# Patient Record
Sex: MALE | Race: WHITE | Employment: OTHER | RURAL
[De-identification: names, ages, dates, MRNs, and addresses within clinical notes are randomized per-mention and may not be internally consistent; named-entity substitution may affect disease eponyms.]

---

## 2017-01-10 ENCOUNTER — HOSPITAL ENCOUNTER (OUTPATIENT)
Dept: OTHER | Age: 57
Discharge: HOME | End: 2017-01-10
Attending: FAMILY MEDICINE | Admitting: FAMILY MEDICINE

## 2017-01-10 DIAGNOSIS — M25.511 CHRONIC RIGHT SHOULDER PAIN: ICD-10-CM

## 2017-01-10 DIAGNOSIS — G89.29 CHRONIC RIGHT SHOULDER PAIN: ICD-10-CM

## 2017-01-13 RX ORDER — PROMETHAZINE HYDROCHLORIDE 25 MG/1
TABLET ORAL
Qty: 20 TABLET | Refills: 0 | Status: SHIPPED | OUTPATIENT
Start: 2017-01-13 | End: 2017-05-17 | Stop reason: SDUPTHER

## 2017-01-20 ENCOUNTER — TELEPHONE (OUTPATIENT)
Dept: FAMILY MEDICINE CLINIC | Age: 57
End: 2017-01-20

## 2017-02-13 RX ORDER — MECLIZINE HYDROCHLORIDE CHEWABLE TABLETS 25 MG/1
TABLET, CHEWABLE ORAL
Qty: 100 TABLET | Refills: 0 | Status: SHIPPED | OUTPATIENT
Start: 2017-02-13 | End: 2017-08-08 | Stop reason: SDUPTHER

## 2017-02-27 ENCOUNTER — TELEPHONE (OUTPATIENT)
Dept: FAMILY MEDICINE CLINIC | Age: 57
End: 2017-02-27

## 2017-03-01 ENCOUNTER — OFFICE VISIT (OUTPATIENT)
Dept: FAMILY MEDICINE CLINIC | Age: 57
End: 2017-03-01
Payer: MEDICARE

## 2017-03-01 ENCOUNTER — HOSPITAL ENCOUNTER (OUTPATIENT)
Dept: OTHER | Age: 57
Discharge: HOME | End: 2017-03-01
Attending: FAMILY MEDICINE | Admitting: FAMILY MEDICINE

## 2017-03-01 VITALS
OXYGEN SATURATION: 90 % | HEIGHT: 76 IN | SYSTOLIC BLOOD PRESSURE: 130 MMHG | DIASTOLIC BLOOD PRESSURE: 64 MMHG | WEIGHT: 315 LBS | HEART RATE: 86 BPM | BODY MASS INDEX: 38.36 KG/M2 | RESPIRATION RATE: 20 BRPM

## 2017-03-01 DIAGNOSIS — E11.42 TYPE 2 DIABETES MELLITUS WITH DIABETIC POLYNEUROPATHY, WITH LONG-TERM CURRENT USE OF INSULIN (HCC): ICD-10-CM

## 2017-03-01 DIAGNOSIS — R06.02 SOB (SHORTNESS OF BREATH): ICD-10-CM

## 2017-03-01 DIAGNOSIS — G47.30 SLEEP APNEA, UNSPECIFIED TYPE: Primary | ICD-10-CM

## 2017-03-01 DIAGNOSIS — R45.1 AGITATION: ICD-10-CM

## 2017-03-01 DIAGNOSIS — Z79.4 TYPE 2 DIABETES MELLITUS WITH DIABETIC POLYNEUROPATHY, WITH LONG-TERM CURRENT USE OF INSULIN (HCC): ICD-10-CM

## 2017-03-01 PROCEDURE — G8417 CALC BMI ABV UP PARAM F/U: HCPCS | Performed by: FAMILY MEDICINE

## 2017-03-01 PROCEDURE — 1036F TOBACCO NON-USER: CPT | Performed by: FAMILY MEDICINE

## 2017-03-01 PROCEDURE — 3045F PR MOST RECENT HEMOGLOBIN A1C LEVEL 7.0-9.0%: CPT | Performed by: FAMILY MEDICINE

## 2017-03-01 PROCEDURE — G8484 FLU IMMUNIZE NO ADMIN: HCPCS | Performed by: FAMILY MEDICINE

## 2017-03-01 PROCEDURE — 99214 OFFICE O/P EST MOD 30 MIN: CPT | Performed by: FAMILY MEDICINE

## 2017-03-01 PROCEDURE — G8427 DOCREV CUR MEDS BY ELIG CLIN: HCPCS | Performed by: FAMILY MEDICINE

## 2017-03-01 PROCEDURE — 3017F COLORECTAL CA SCREEN DOC REV: CPT | Performed by: FAMILY MEDICINE

## 2017-03-01 RX ORDER — LORATADINE 10 MG/1
TABLET ORAL
Qty: 30 TABLET | Refills: 5 | Status: SHIPPED | OUTPATIENT
Start: 2017-03-01 | End: 2017-08-08 | Stop reason: SDUPTHER

## 2017-03-01 RX ORDER — MONTELUKAST SODIUM 10 MG/1
TABLET ORAL
Qty: 90 TABLET | Refills: 5 | Status: SHIPPED | OUTPATIENT
Start: 2017-03-01 | End: 2017-03-22

## 2017-03-01 RX ORDER — OXYCODONE HYDROCHLORIDE 30 MG/1
30 TABLET ORAL EVERY 4 HOURS PRN
Qty: 150 TABLET | Refills: 0 | Status: SHIPPED | OUTPATIENT
Start: 2017-03-01 | End: 2017-03-22 | Stop reason: SDUPTHER

## 2017-03-01 RX ORDER — FUROSEMIDE 40 MG/1
TABLET ORAL
Qty: 30 TABLET | Refills: 5 | Status: SHIPPED | OUTPATIENT
Start: 2017-03-01 | End: 2017-08-08 | Stop reason: SDUPTHER

## 2017-03-01 RX ORDER — POTASSIUM CHLORIDE 750 MG/1
TABLET, FILM COATED, EXTENDED RELEASE ORAL
Qty: 60 TABLET | Refills: 5 | Status: SHIPPED | OUTPATIENT
Start: 2017-03-01 | End: 2017-08-08 | Stop reason: SDUPTHER

## 2017-03-01 RX ORDER — NABUMETONE 750 MG/1
TABLET, FILM COATED ORAL
Qty: 60 TABLET | Refills: 5 | Status: SHIPPED | OUTPATIENT
Start: 2017-03-01 | End: 2017-08-08 | Stop reason: SDUPTHER

## 2017-03-01 RX ORDER — ERGOCALCIFEROL 1.25 MG/1
50000 CAPSULE ORAL WEEKLY
Qty: 4 CAPSULE | Refills: 2 | Status: SHIPPED | OUTPATIENT
Start: 2017-03-01 | End: 2017-04-13

## 2017-03-01 RX ORDER — SIMVASTATIN 40 MG
40 TABLET ORAL NIGHTLY
Qty: 30 TABLET | Refills: 5 | Status: SHIPPED | OUTPATIENT
Start: 2017-03-01 | End: 2017-04-13

## 2017-03-01 RX ORDER — CITALOPRAM 20 MG/1
20 TABLET ORAL DAILY
Qty: 30 TABLET | Refills: 1 | Status: SHIPPED | OUTPATIENT
Start: 2017-03-01 | End: 2017-05-09 | Stop reason: SDUPTHER

## 2017-03-01 RX ORDER — HYDROCODONE BITARTRATE AND ACETAMINOPHEN 10; 325 MG/1; MG/1
1 TABLET ORAL EVERY 4 HOURS PRN
Qty: 150 TABLET | Refills: 0 | Status: SHIPPED | OUTPATIENT
Start: 2017-03-01 | End: 2017-03-22 | Stop reason: SDUPTHER

## 2017-03-01 RX ORDER — OMEPRAZOLE 40 MG/1
1 CAPSULE, DELAYED RELEASE ORAL 2 TIMES DAILY
COMMUNITY
Start: 2017-02-13 | End: 2017-04-13

## 2017-03-01 RX ORDER — CARISOPRODOL 350 MG/1
350 TABLET ORAL 4 TIMES DAILY PRN
Qty: 120 TABLET | Refills: 2 | Status: SHIPPED | OUTPATIENT
Start: 2017-03-01 | End: 2017-05-17 | Stop reason: SDUPTHER

## 2017-03-01 ASSESSMENT — ENCOUNTER SYMPTOMS
SHORTNESS OF BREATH: 1
NAUSEA: 1

## 2017-03-02 LAB
A/G RATIO: 1.3 (ref 0.8–2)
ALBUMIN SERPL-MCNC: 3.9 G/DL (ref 3.4–4.8)
ALP BLD-CCNC: 77 U/L (ref 25–100)
ALT SERPL-CCNC: 33 U/L (ref 4–36)
ANION GAP SERPL CALCULATED.3IONS-SCNC: 12 MMOL/L (ref 3–16)
AST SERPL-CCNC: 38 U/L (ref 8–33)
BILIRUB SERPL-MCNC: <0.2 MG/DL (ref 0.3–1.2)
BUN BLDV-MCNC: 18 MG/DL (ref 6–20)
CALCIUM SERPL-MCNC: 9.2 MG/DL (ref 8.5–10.5)
CHLORIDE BLD-SCNC: 99 MMOL/L (ref 98–107)
CO2: 30 MMOL/L (ref 20–30)
CREAT SERPL-MCNC: 0.9 MG/DL (ref 0.4–1.2)
GFR AFRICAN AMERICAN: >59
GFR NON-AFRICAN AMERICAN: >59
GLOBULIN: 3.1 G/DL
GLUCOSE BLD-MCNC: 294 MG/DL (ref 74–106)
HBA1C MFR BLD: 8.2 %
POTASSIUM SERPL-SCNC: 5.1 MMOL/L (ref 3.4–5.1)
SODIUM BLD-SCNC: 141 MMOL/L (ref 136–145)
TOTAL PROTEIN: 7 G/DL (ref 6.4–8.3)

## 2017-03-03 ENCOUNTER — CARE COORDINATOR VISIT (OUTPATIENT)
Dept: CARE COORDINATION | Age: 57
End: 2017-03-03

## 2017-03-22 ENCOUNTER — OFFICE VISIT (OUTPATIENT)
Dept: FAMILY MEDICINE CLINIC | Age: 57
End: 2017-03-22
Payer: MEDICARE

## 2017-03-22 VITALS
HEART RATE: 83 BPM | DIASTOLIC BLOOD PRESSURE: 68 MMHG | BODY MASS INDEX: 38.36 KG/M2 | WEIGHT: 315 LBS | OXYGEN SATURATION: 93 % | RESPIRATION RATE: 16 BRPM | SYSTOLIC BLOOD PRESSURE: 140 MMHG | HEIGHT: 76 IN

## 2017-03-22 DIAGNOSIS — M25.511 CHRONIC RIGHT SHOULDER PAIN: Primary | ICD-10-CM

## 2017-03-22 DIAGNOSIS — G89.29 CHRONIC RIGHT SHOULDER PAIN: Primary | ICD-10-CM

## 2017-03-22 DIAGNOSIS — E53.8 B12 DEFICIENCY: ICD-10-CM

## 2017-03-22 DIAGNOSIS — M15.9 OSTEOARTHRITIS OF MULTIPLE JOINTS, UNSPECIFIED OSTEOARTHRITIS TYPE: ICD-10-CM

## 2017-03-22 DIAGNOSIS — Z12.11 COLON CANCER SCREENING: ICD-10-CM

## 2017-03-22 DIAGNOSIS — Z79.4 TYPE 2 DIABETES MELLITUS WITH DIABETIC POLYNEUROPATHY, WITH LONG-TERM CURRENT USE OF INSULIN (HCC): ICD-10-CM

## 2017-03-22 DIAGNOSIS — E11.42 TYPE 2 DIABETES MELLITUS WITH DIABETIC POLYNEUROPATHY, WITH LONG-TERM CURRENT USE OF INSULIN (HCC): ICD-10-CM

## 2017-03-22 DIAGNOSIS — G47.30 SLEEP APNEA, UNSPECIFIED TYPE: ICD-10-CM

## 2017-03-22 PROCEDURE — G8484 FLU IMMUNIZE NO ADMIN: HCPCS | Performed by: FAMILY MEDICINE

## 2017-03-22 PROCEDURE — 3045F PR MOST RECENT HEMOGLOBIN A1C LEVEL 7.0-9.0%: CPT | Performed by: FAMILY MEDICINE

## 2017-03-22 PROCEDURE — G8427 DOCREV CUR MEDS BY ELIG CLIN: HCPCS | Performed by: FAMILY MEDICINE

## 2017-03-22 PROCEDURE — 99213 OFFICE O/P EST LOW 20 MIN: CPT | Performed by: FAMILY MEDICINE

## 2017-03-22 PROCEDURE — G8417 CALC BMI ABV UP PARAM F/U: HCPCS | Performed by: FAMILY MEDICINE

## 2017-03-22 PROCEDURE — 1036F TOBACCO NON-USER: CPT | Performed by: FAMILY MEDICINE

## 2017-03-22 PROCEDURE — 20610 DRAIN/INJ JOINT/BURSA W/O US: CPT | Performed by: FAMILY MEDICINE

## 2017-03-22 PROCEDURE — 3017F COLORECTAL CA SCREEN DOC REV: CPT | Performed by: FAMILY MEDICINE

## 2017-03-22 RX ORDER — HYDROCODONE BITARTRATE AND ACETAMINOPHEN 10; 325 MG/1; MG/1
1 TABLET ORAL EVERY 4 HOURS PRN
Qty: 150 TABLET | Refills: 0 | Status: SHIPPED | OUTPATIENT
Start: 2017-03-22 | End: 2017-05-17 | Stop reason: SDUPTHER

## 2017-03-22 RX ORDER — LIDOCAINE HYDROCHLORIDE 10 MG/ML
1 INJECTION, SOLUTION INFILTRATION; PERINEURAL ONCE
Status: COMPLETED | OUTPATIENT
Start: 2017-03-22 | End: 2017-03-22

## 2017-03-22 RX ORDER — OXYCODONE HYDROCHLORIDE 30 MG/1
30 TABLET ORAL EVERY 4 HOURS PRN
Qty: 150 TABLET | Refills: 0 | Status: SHIPPED | OUTPATIENT
Start: 2017-03-22 | End: 2017-05-17 | Stop reason: SDUPTHER

## 2017-03-22 RX ORDER — OMEPRAZOLE 40 MG/1
40 CAPSULE, DELAYED RELEASE ORAL 2 TIMES DAILY
Qty: 30 CAPSULE | Refills: 0 | Status: CANCELLED | OUTPATIENT
Start: 2017-03-22

## 2017-03-22 RX ORDER — OXYCODONE HYDROCHLORIDE 30 MG/1
30 TABLET ORAL EVERY 4 HOURS PRN
Qty: 150 TABLET | Refills: 0 | Status: SHIPPED | OUTPATIENT
Start: 2017-03-22 | End: 2017-03-22 | Stop reason: SDUPTHER

## 2017-03-22 RX ORDER — CYANOCOBALAMIN 1000 UG/ML
1000 INJECTION INTRAMUSCULAR; SUBCUTANEOUS ONCE
Status: COMPLETED | OUTPATIENT
Start: 2017-03-22 | End: 2017-03-22

## 2017-03-22 RX ORDER — METHYLPREDNISOLONE ACETATE 40 MG/ML
40 INJECTION, SUSPENSION INTRA-ARTICULAR; INTRALESIONAL; INTRAMUSCULAR; SOFT TISSUE ONCE
Status: COMPLETED | OUTPATIENT
Start: 2017-03-22 | End: 2017-03-22

## 2017-03-22 RX ORDER — HYDROCODONE BITARTRATE AND ACETAMINOPHEN 10; 325 MG/1; MG/1
1 TABLET ORAL EVERY 4 HOURS PRN
Qty: 150 TABLET | Refills: 0 | Status: SHIPPED | OUTPATIENT
Start: 2017-03-22 | End: 2017-03-22 | Stop reason: SDUPTHER

## 2017-03-22 RX ORDER — MONTELUKAST SODIUM 10 MG/1
TABLET ORAL
Qty: 30 TABLET | Refills: 5 | Status: SHIPPED | OUTPATIENT
Start: 2017-03-22 | End: 2017-08-08 | Stop reason: SDUPTHER

## 2017-03-22 RX ADMIN — METHYLPREDNISOLONE ACETATE 40 MG: 40 INJECTION, SUSPENSION INTRA-ARTICULAR; INTRALESIONAL; INTRAMUSCULAR; SOFT TISSUE at 14:07

## 2017-03-22 RX ADMIN — LIDOCAINE HYDROCHLORIDE 1 ML: 10 INJECTION, SOLUTION INFILTRATION; PERINEURAL at 14:08

## 2017-03-22 RX ADMIN — CYANOCOBALAMIN 1000 MCG: 1000 INJECTION INTRAMUSCULAR; SUBCUTANEOUS at 09:42

## 2017-03-22 ASSESSMENT — ENCOUNTER SYMPTOMS
NAUSEA: 1
SHORTNESS OF BREATH: 1

## 2017-04-13 RX ORDER — OMEPRAZOLE 40 MG/1
CAPSULE, DELAYED RELEASE ORAL
Qty: 60 CAPSULE | Refills: 2 | Status: SHIPPED | OUTPATIENT
Start: 2017-04-13 | End: 2017-05-17

## 2017-04-13 RX ORDER — SIMVASTATIN 40 MG
TABLET ORAL
Qty: 30 TABLET | Refills: 0 | Status: SHIPPED | OUTPATIENT
Start: 2017-04-13 | End: 2017-05-17 | Stop reason: SDUPTHER

## 2017-04-13 RX ORDER — ERGOCALCIFEROL 1.25 MG/1
CAPSULE ORAL
Qty: 4 CAPSULE | Refills: 2 | Status: SHIPPED | OUTPATIENT
Start: 2017-04-13 | End: 2017-05-17

## 2017-05-09 RX ORDER — PEN NEEDLE, DIABETIC 31 GX3/16"
NEEDLE, DISPOSABLE MISCELLANEOUS
Qty: 100 EACH | Refills: 5 | Status: SHIPPED | OUTPATIENT
Start: 2017-05-09 | End: 2017-11-03 | Stop reason: SDUPTHER

## 2017-05-11 RX ORDER — CITALOPRAM 20 MG/1
TABLET ORAL
Qty: 30 TABLET | Refills: 5 | Status: SHIPPED | OUTPATIENT
Start: 2017-05-11 | End: 2017-08-08 | Stop reason: SDUPTHER

## 2017-05-12 RX ORDER — INSULIN GLARGINE 100 [IU]/ML
INJECTION, SOLUTION SUBCUTANEOUS
Qty: 75 ML | Refills: 5 | Status: SHIPPED | OUTPATIENT
Start: 2017-05-12 | End: 2017-08-08 | Stop reason: SDUPTHER

## 2017-05-17 ENCOUNTER — OFFICE VISIT (OUTPATIENT)
Dept: FAMILY MEDICINE CLINIC | Age: 57
End: 2017-05-17
Payer: MEDICARE

## 2017-05-17 ENCOUNTER — HOSPITAL ENCOUNTER (OUTPATIENT)
Dept: OTHER | Age: 57
Discharge: HOME | End: 2017-05-17
Attending: FAMILY MEDICINE | Admitting: FAMILY MEDICINE

## 2017-05-17 VITALS
OXYGEN SATURATION: 91 % | HEIGHT: 76 IN | SYSTOLIC BLOOD PRESSURE: 130 MMHG | DIASTOLIC BLOOD PRESSURE: 62 MMHG | RESPIRATION RATE: 20 BRPM | WEIGHT: 315 LBS | HEART RATE: 87 BPM | BODY MASS INDEX: 38.36 KG/M2

## 2017-05-17 DIAGNOSIS — G47.30 SLEEP APNEA, UNSPECIFIED TYPE: ICD-10-CM

## 2017-05-17 DIAGNOSIS — M54.41 CHRONIC BILATERAL LOW BACK PAIN WITH RIGHT-SIDED SCIATICA: ICD-10-CM

## 2017-05-17 DIAGNOSIS — M15.9 OSTEOARTHRITIS OF MULTIPLE JOINTS, UNSPECIFIED OSTEOARTHRITIS TYPE: ICD-10-CM

## 2017-05-17 DIAGNOSIS — Z79.4 TYPE 2 DIABETES MELLITUS WITH DIABETIC POLYNEUROPATHY, WITH LONG-TERM CURRENT USE OF INSULIN (HCC): Primary | ICD-10-CM

## 2017-05-17 DIAGNOSIS — E11.42 TYPE 2 DIABETES MELLITUS WITH DIABETIC POLYNEUROPATHY, WITH LONG-TERM CURRENT USE OF INSULIN (HCC): ICD-10-CM

## 2017-05-17 DIAGNOSIS — Z79.4 TYPE 2 DIABETES MELLITUS WITH DIABETIC POLYNEUROPATHY, WITH LONG-TERM CURRENT USE OF INSULIN (HCC): ICD-10-CM

## 2017-05-17 DIAGNOSIS — G89.29 CHRONIC BILATERAL LOW BACK PAIN WITH RIGHT-SIDED SCIATICA: ICD-10-CM

## 2017-05-17 DIAGNOSIS — K21.9 GASTROESOPHAGEAL REFLUX DISEASE WITHOUT ESOPHAGITIS: ICD-10-CM

## 2017-05-17 DIAGNOSIS — E11.42 TYPE 2 DIABETES MELLITUS WITH DIABETIC POLYNEUROPATHY, WITH LONG-TERM CURRENT USE OF INSULIN (HCC): Primary | ICD-10-CM

## 2017-05-17 LAB
A/G RATIO: 1.2 (ref 0.8–2)
ALBUMIN SERPL-MCNC: 3.9 G/DL (ref 3.4–4.8)
ALP BLD-CCNC: 76 U/L (ref 25–100)
ALT SERPL-CCNC: 26 U/L (ref 4–36)
ANION GAP SERPL CALCULATED.3IONS-SCNC: 12 MMOL/L (ref 3–16)
AST SERPL-CCNC: 27 U/L (ref 8–33)
BASOPHILS ABSOLUTE: 0 K/UL (ref 0–0.1)
BASOPHILS RELATIVE PERCENT: 0.6 %
BILIRUB SERPL-MCNC: <0.2 MG/DL (ref 0.3–1.2)
BUN BLDV-MCNC: 15 MG/DL (ref 6–20)
CALCIUM SERPL-MCNC: 9.1 MG/DL (ref 8.5–10.5)
CHLORIDE BLD-SCNC: 101 MMOL/L (ref 98–107)
CO2: 28 MMOL/L (ref 20–30)
CREAT SERPL-MCNC: 0.8 MG/DL (ref 0.4–1.2)
EOSINOPHILS ABSOLUTE: 0.2 K/UL (ref 0–0.4)
EOSINOPHILS RELATIVE PERCENT: 3.2 %
GFR AFRICAN AMERICAN: >59
GFR NON-AFRICAN AMERICAN: >59
GLOBULIN: 3.3 G/DL
GLUCOSE BLD-MCNC: 250 MG/DL (ref 74–106)
HBA1C MFR BLD: 7.9 %
HCT VFR BLD CALC: 42.3 % (ref 40–54)
HEMOGLOBIN: 12.9 G/DL (ref 13–18)
LYMPHOCYTES ABSOLUTE: 1.8 K/UL (ref 1.5–4)
LYMPHOCYTES RELATIVE PERCENT: 38.3 % (ref 20–40)
MCH RBC QN AUTO: 29.6 PG (ref 27–32)
MCHC RBC AUTO-ENTMCNC: 30.5 G/DL (ref 31–35)
MCV RBC AUTO: 97 FL (ref 80–100)
MONOCYTES ABSOLUTE: 0.5 K/UL (ref 0.2–0.8)
MONOCYTES RELATIVE PERCENT: 9.6 % (ref 3–10)
NEUTROPHILS ABSOLUTE: 2.3 K/UL (ref 2–7.5)
NEUTROPHILS RELATIVE PERCENT: 48.3 %
PDW BLD-RTO: 13.1 % (ref 11–16)
PLATELET # BLD: 117 K/UL (ref 150–400)
PMV BLD AUTO: 13.3 FL (ref 6–10)
POTASSIUM SERPL-SCNC: 4.9 MMOL/L (ref 3.4–5.1)
RBC # BLD: 4.36 M/UL (ref 4.5–6)
SODIUM BLD-SCNC: 141 MMOL/L (ref 136–145)
TOTAL PROTEIN: 7.2 G/DL (ref 6.4–8.3)
WBC # BLD: 4.7 K/UL (ref 4–11)

## 2017-05-17 PROCEDURE — G8417 CALC BMI ABV UP PARAM F/U: HCPCS | Performed by: FAMILY MEDICINE

## 2017-05-17 PROCEDURE — 99214 OFFICE O/P EST MOD 30 MIN: CPT | Performed by: FAMILY MEDICINE

## 2017-05-17 PROCEDURE — 3045F PR MOST RECENT HEMOGLOBIN A1C LEVEL 7.0-9.0%: CPT | Performed by: FAMILY MEDICINE

## 2017-05-17 PROCEDURE — G8427 DOCREV CUR MEDS BY ELIG CLIN: HCPCS | Performed by: FAMILY MEDICINE

## 2017-05-17 PROCEDURE — 3017F COLORECTAL CA SCREEN DOC REV: CPT | Performed by: FAMILY MEDICINE

## 2017-05-17 PROCEDURE — 1036F TOBACCO NON-USER: CPT | Performed by: FAMILY MEDICINE

## 2017-05-17 RX ORDER — GABAPENTIN 600 MG/1
TABLET ORAL
Qty: 120 TABLET | Refills: 5 | Status: SHIPPED | OUTPATIENT
Start: 2017-05-17 | End: 2017-08-08 | Stop reason: SDUPTHER

## 2017-05-17 RX ORDER — OXYCODONE HYDROCHLORIDE 30 MG/1
30 TABLET ORAL EVERY 4 HOURS PRN
Qty: 150 TABLET | Refills: 0 | Status: SHIPPED | OUTPATIENT
Start: 2017-05-17 | End: 2017-05-17 | Stop reason: SDUPTHER

## 2017-05-17 RX ORDER — HYDROCODONE BITARTRATE AND ACETAMINOPHEN 10; 325 MG/1; MG/1
1 TABLET ORAL EVERY 4 HOURS PRN
Qty: 150 TABLET | Refills: 0 | Status: SHIPPED | OUTPATIENT
Start: 2017-05-17 | End: 2017-05-17 | Stop reason: SDUPTHER

## 2017-05-17 RX ORDER — PANTOPRAZOLE SODIUM 40 MG/1
40 TABLET, DELAYED RELEASE ORAL
Qty: 30 TABLET | Refills: 5 | Status: SHIPPED | OUTPATIENT
Start: 2017-05-17 | End: 2017-08-08 | Stop reason: SDUPTHER

## 2017-05-17 RX ORDER — OXYCODONE HYDROCHLORIDE 30 MG/1
30 TABLET ORAL EVERY 4 HOURS PRN
Qty: 150 TABLET | Refills: 0 | Status: SHIPPED | OUTPATIENT
Start: 2017-05-17 | End: 2017-08-08 | Stop reason: SDUPTHER

## 2017-05-17 RX ORDER — TRIAMCINOLONE ACETONIDE 1 MG/G
CREAM TOPICAL
Qty: 1 TUBE | Refills: 1 | Status: SHIPPED | OUTPATIENT
Start: 2017-05-17 | End: 2017-07-20 | Stop reason: SDUPTHER

## 2017-05-17 RX ORDER — PROMETHAZINE HYDROCHLORIDE 25 MG/1
TABLET ORAL
Qty: 20 TABLET | Refills: 0 | Status: SHIPPED | OUTPATIENT
Start: 2017-05-17 | End: 2017-07-07 | Stop reason: SDUPTHER

## 2017-05-17 RX ORDER — OMEPRAZOLE 40 MG/1
CAPSULE, DELAYED RELEASE ORAL
Qty: 60 CAPSULE | Refills: 2 | Status: CANCELLED | OUTPATIENT
Start: 2017-05-17

## 2017-05-17 RX ORDER — CARISOPRODOL 350 MG/1
350 TABLET ORAL 4 TIMES DAILY PRN
Qty: 120 TABLET | Refills: 2 | Status: SHIPPED | OUTPATIENT
Start: 2017-05-17 | End: 2017-08-08 | Stop reason: SDUPTHER

## 2017-05-17 RX ORDER — ERGOCALCIFEROL 1.25 MG/1
CAPSULE ORAL
Qty: 4 CAPSULE | Refills: 2 | Status: CANCELLED | OUTPATIENT
Start: 2017-05-17

## 2017-05-17 RX ORDER — SIMVASTATIN 40 MG
TABLET ORAL
Qty: 30 TABLET | Refills: 5 | Status: SHIPPED | OUTPATIENT
Start: 2017-05-17 | End: 2017-08-08 | Stop reason: SDUPTHER

## 2017-05-17 RX ORDER — HYDROCODONE BITARTRATE AND ACETAMINOPHEN 10; 325 MG/1; MG/1
1 TABLET ORAL EVERY 4 HOURS PRN
Qty: 150 TABLET | Refills: 0 | Status: SHIPPED | OUTPATIENT
Start: 2017-05-17 | End: 2017-08-08 | Stop reason: SDUPTHER

## 2017-05-17 ASSESSMENT — ENCOUNTER SYMPTOMS
NAUSEA: 1
SHORTNESS OF BREATH: 1

## 2017-05-17 ASSESSMENT — PATIENT HEALTH QUESTIONNAIRE - PHQ9
1. LITTLE INTEREST OR PLEASURE IN DOING THINGS: 0
2. FEELING DOWN, DEPRESSED OR HOPELESS: 0
SUM OF ALL RESPONSES TO PHQ QUESTIONS 1-9: 0
SUM OF ALL RESPONSES TO PHQ9 QUESTIONS 1 & 2: 0

## 2017-07-10 RX ORDER — PROMETHAZINE HYDROCHLORIDE 25 MG/1
TABLET ORAL
Qty: 20 TABLET | Refills: 0 | Status: SHIPPED | OUTPATIENT
Start: 2017-07-10 | End: 2017-07-22 | Stop reason: SDUPTHER

## 2017-07-18 ENCOUNTER — TELEPHONE (OUTPATIENT)
Dept: FAMILY MEDICINE CLINIC | Age: 57
End: 2017-07-18

## 2017-07-19 DIAGNOSIS — Z12.11 COLON CANCER SCREENING: Primary | ICD-10-CM

## 2017-07-19 LAB
CONTROL: ABNORMAL
HEMOCCULT STL QL: POSITIVE

## 2017-07-19 PROCEDURE — 82274 ASSAY TEST FOR BLOOD FECAL: CPT | Performed by: FAMILY MEDICINE

## 2017-07-20 RX ORDER — TRIAMCINOLONE ACETONIDE 1 MG/G
CREAM TOPICAL
Qty: 3 TUBE | Refills: 3 | Status: SHIPPED | OUTPATIENT
Start: 2017-07-20 | End: 2017-08-08 | Stop reason: SDUPTHER

## 2017-07-24 RX ORDER — PROMETHAZINE HYDROCHLORIDE 25 MG/1
TABLET ORAL
Qty: 20 TABLET | Refills: 0 | Status: SHIPPED | OUTPATIENT
Start: 2017-07-24 | End: 2018-01-12 | Stop reason: SDUPTHER

## 2017-08-08 ENCOUNTER — HOSPITAL ENCOUNTER (OUTPATIENT)
Dept: OTHER | Age: 57
Discharge: HOME | End: 2017-08-08
Attending: FAMILY MEDICINE | Admitting: FAMILY MEDICINE

## 2017-08-08 ENCOUNTER — OFFICE VISIT (OUTPATIENT)
Dept: FAMILY MEDICINE CLINIC | Age: 57
End: 2017-08-08
Payer: MEDICARE

## 2017-08-08 VITALS
DIASTOLIC BLOOD PRESSURE: 60 MMHG | SYSTOLIC BLOOD PRESSURE: 130 MMHG | HEART RATE: 74 BPM | RESPIRATION RATE: 22 BRPM | BODY MASS INDEX: 38.36 KG/M2 | HEIGHT: 76 IN | WEIGHT: 315 LBS | OXYGEN SATURATION: 98 %

## 2017-08-08 DIAGNOSIS — G89.29 CHRONIC BILATERAL LOW BACK PAIN WITH RIGHT-SIDED SCIATICA: ICD-10-CM

## 2017-08-08 DIAGNOSIS — Z79.4 TYPE 2 DIABETES MELLITUS WITH DIABETIC POLYNEUROPATHY, WITH LONG-TERM CURRENT USE OF INSULIN (HCC): ICD-10-CM

## 2017-08-08 DIAGNOSIS — M15.9 OSTEOARTHRITIS OF MULTIPLE JOINTS, UNSPECIFIED OSTEOARTHRITIS TYPE: ICD-10-CM

## 2017-08-08 DIAGNOSIS — L03.119 CELLULITIS OF LOWER EXTREMITY, UNSPECIFIED LATERALITY: Primary | ICD-10-CM

## 2017-08-08 DIAGNOSIS — Z12.5 SCREENING FOR PROSTATE CANCER: ICD-10-CM

## 2017-08-08 DIAGNOSIS — M54.41 CHRONIC BILATERAL LOW BACK PAIN WITH RIGHT-SIDED SCIATICA: ICD-10-CM

## 2017-08-08 DIAGNOSIS — E11.9 ENCOUNTER FOR COMPREHENSIVE DIABETIC FOOT EXAMINATION, TYPE 2 DIABETES MELLITUS (HCC): ICD-10-CM

## 2017-08-08 DIAGNOSIS — E11.42 TYPE 2 DIABETES MELLITUS WITH DIABETIC POLYNEUROPATHY, WITH LONG-TERM CURRENT USE OF INSULIN (HCC): ICD-10-CM

## 2017-08-08 PROCEDURE — G8427 DOCREV CUR MEDS BY ELIG CLIN: HCPCS | Performed by: FAMILY MEDICINE

## 2017-08-08 PROCEDURE — 1036F TOBACCO NON-USER: CPT | Performed by: FAMILY MEDICINE

## 2017-08-08 PROCEDURE — 96372 THER/PROPH/DIAG INJ SC/IM: CPT | Performed by: FAMILY MEDICINE

## 2017-08-08 PROCEDURE — 3046F HEMOGLOBIN A1C LEVEL >9.0%: CPT | Performed by: FAMILY MEDICINE

## 2017-08-08 PROCEDURE — G8417 CALC BMI ABV UP PARAM F/U: HCPCS | Performed by: FAMILY MEDICINE

## 2017-08-08 PROCEDURE — 99214 OFFICE O/P EST MOD 30 MIN: CPT | Performed by: FAMILY MEDICINE

## 2017-08-08 PROCEDURE — 3017F COLORECTAL CA SCREEN DOC REV: CPT | Performed by: FAMILY MEDICINE

## 2017-08-08 RX ORDER — LORATADINE 10 MG/1
TABLET ORAL
Qty: 30 TABLET | Refills: 5 | Status: SHIPPED | OUTPATIENT
Start: 2017-08-08 | End: 2018-01-12 | Stop reason: SDUPTHER

## 2017-08-08 RX ORDER — TRIAMCINOLONE ACETONIDE 1 MG/G
CREAM TOPICAL
Qty: 3 TUBE | Refills: 5 | Status: SHIPPED | OUTPATIENT
Start: 2017-08-08 | End: 2017-11-20 | Stop reason: SDUPTHER

## 2017-08-08 RX ORDER — CEFTRIAXONE 1 G/1
1 INJECTION, POWDER, FOR SOLUTION INTRAMUSCULAR; INTRAVENOUS ONCE
Status: COMPLETED | OUTPATIENT
Start: 2017-08-08 | End: 2017-08-08

## 2017-08-08 RX ORDER — NABUMETONE 750 MG/1
TABLET, FILM COATED ORAL
Qty: 60 TABLET | Refills: 5 | Status: SHIPPED | OUTPATIENT
Start: 2017-08-08 | End: 2018-01-19 | Stop reason: ALTCHOICE

## 2017-08-08 RX ORDER — MECLIZINE HYDROCHLORIDE CHEWABLE TABLETS 25 MG/1
TABLET, CHEWABLE ORAL
Qty: 100 TABLET | Refills: 0 | Status: SHIPPED | OUTPATIENT
Start: 2017-08-08 | End: 2017-09-07 | Stop reason: SDUPTHER

## 2017-08-08 RX ORDER — HYDROCODONE BITARTRATE AND ACETAMINOPHEN 10; 325 MG/1; MG/1
1 TABLET ORAL EVERY 4 HOURS PRN
Qty: 150 TABLET | Refills: 0 | Status: SHIPPED | OUTPATIENT
Start: 2017-08-08 | End: 2017-09-06 | Stop reason: SDUPTHER

## 2017-08-08 RX ORDER — GABAPENTIN 600 MG/1
TABLET ORAL
Qty: 120 TABLET | Refills: 5 | Status: SHIPPED | OUTPATIENT
Start: 2017-08-08 | End: 2017-09-11

## 2017-08-08 RX ORDER — CITALOPRAM 20 MG/1
TABLET ORAL
Qty: 30 TABLET | Refills: 5 | Status: SHIPPED | OUTPATIENT
Start: 2017-08-08 | End: 2017-08-08

## 2017-08-08 RX ORDER — LEVOFLOXACIN 500 MG/1
500 TABLET, FILM COATED ORAL DAILY
Qty: 10 TABLET | Refills: 0 | Status: SHIPPED | OUTPATIENT
Start: 2017-08-08 | End: 2017-08-18

## 2017-08-08 RX ORDER — SIMVASTATIN 40 MG
TABLET ORAL
Qty: 30 TABLET | Refills: 5 | Status: SHIPPED | OUTPATIENT
Start: 2017-08-08 | End: 2018-01-12 | Stop reason: SDUPTHER

## 2017-08-08 RX ORDER — OXYCODONE HYDROCHLORIDE 30 MG/1
30 TABLET ORAL EVERY 4 HOURS PRN
Qty: 150 TABLET | Refills: 0 | Status: SHIPPED | OUTPATIENT
Start: 2017-08-08 | End: 2017-09-06 | Stop reason: SDUPTHER

## 2017-08-08 RX ORDER — POTASSIUM CHLORIDE 750 MG/1
TABLET, FILM COATED, EXTENDED RELEASE ORAL
Qty: 60 TABLET | Refills: 5 | Status: SHIPPED | OUTPATIENT
Start: 2017-08-08 | End: 2017-09-11

## 2017-08-08 RX ORDER — FUROSEMIDE 40 MG/1
TABLET ORAL
Qty: 30 TABLET | Refills: 5 | Status: ON HOLD | OUTPATIENT
Start: 2017-08-08 | End: 2018-01-07 | Stop reason: HOSPADM

## 2017-08-08 RX ORDER — ALBUTEROL SULFATE 2.5 MG/3ML
SOLUTION RESPIRATORY (INHALATION)
Qty: 180 VIAL | Refills: 5 | Status: SHIPPED | OUTPATIENT
Start: 2017-08-08 | End: 2018-02-02 | Stop reason: SDUPTHER

## 2017-08-08 RX ORDER — PANTOPRAZOLE SODIUM 40 MG/1
40 TABLET, DELAYED RELEASE ORAL
Qty: 30 TABLET | Refills: 5 | Status: SHIPPED | OUTPATIENT
Start: 2017-08-08 | End: 2018-01-12 | Stop reason: SDUPTHER

## 2017-08-08 RX ORDER — CARISOPRODOL 350 MG/1
350 TABLET ORAL 4 TIMES DAILY PRN
Qty: 120 TABLET | Refills: 2 | Status: SHIPPED | OUTPATIENT
Start: 2017-08-08 | End: 2017-09-13 | Stop reason: SDUPTHER

## 2017-08-08 RX ORDER — MONTELUKAST SODIUM 10 MG/1
TABLET ORAL
Qty: 30 TABLET | Refills: 5 | Status: SHIPPED | OUTPATIENT
Start: 2017-08-08 | End: 2018-01-12 | Stop reason: SDUPTHER

## 2017-08-08 RX ADMIN — CEFTRIAXONE 1 G: 1 INJECTION, POWDER, FOR SOLUTION INTRAMUSCULAR; INTRAVENOUS at 16:41

## 2017-08-08 ASSESSMENT — ENCOUNTER SYMPTOMS
NAUSEA: 1
SHORTNESS OF BREATH: 1

## 2017-08-09 ENCOUNTER — HOSPITAL ENCOUNTER (OUTPATIENT)
Dept: OTHER | Age: 57
Discharge: HOME | End: 2017-08-09
Attending: FAMILY MEDICINE | Admitting: FAMILY MEDICINE

## 2017-08-09 ENCOUNTER — NURSE ONLY (OUTPATIENT)
Dept: FAMILY MEDICINE CLINIC | Age: 57
End: 2017-08-09
Payer: MEDICARE

## 2017-08-09 DIAGNOSIS — Z79.4 TYPE 2 DIABETES MELLITUS WITH DIABETIC POLYNEUROPATHY, WITH LONG-TERM CURRENT USE OF INSULIN (HCC): ICD-10-CM

## 2017-08-09 DIAGNOSIS — L03.119 CELLULITIS OF LOWER EXTREMITY, UNSPECIFIED LATERALITY: Primary | ICD-10-CM

## 2017-08-09 DIAGNOSIS — E11.42 TYPE 2 DIABETES MELLITUS WITH DIABETIC POLYNEUROPATHY, WITH LONG-TERM CURRENT USE OF INSULIN (HCC): ICD-10-CM

## 2017-08-09 DIAGNOSIS — Z12.5 SCREENING FOR PROSTATE CANCER: ICD-10-CM

## 2017-08-09 DIAGNOSIS — L03.119 CELLULITIS OF LOWER EXTREMITY, UNSPECIFIED LATERALITY: ICD-10-CM

## 2017-08-09 LAB
A/G RATIO: 1.1 (ref 0.8–2)
ALBUMIN SERPL-MCNC: 4 G/DL (ref 3.4–4.8)
ALP BLD-CCNC: 72 U/L (ref 25–100)
ALT SERPL-CCNC: 27 U/L (ref 4–36)
ANION GAP SERPL CALCULATED.3IONS-SCNC: 12 MMOL/L (ref 3–16)
AST SERPL-CCNC: 30 U/L (ref 8–33)
BASOPHILS ABSOLUTE: 0 K/UL (ref 0–0.1)
BASOPHILS RELATIVE PERCENT: 0.4 %
BILIRUB SERPL-MCNC: <0.2 MG/DL (ref 0.3–1.2)
BUN BLDV-MCNC: 14 MG/DL (ref 6–20)
CALCIUM SERPL-MCNC: 9.8 MG/DL (ref 8.5–10.5)
CHLORIDE BLD-SCNC: 98 MMOL/L (ref 98–107)
CHOLESTEROL, TOTAL: 189 MG/DL (ref 0–200)
CO2: 30 MMOL/L (ref 20–30)
CREAT SERPL-MCNC: 0.8 MG/DL (ref 0.4–1.2)
CREATININE URINE: 154.5 MG/DL (ref 1.5–300)
EOSINOPHILS ABSOLUTE: 0.1 K/UL (ref 0–0.4)
EOSINOPHILS RELATIVE PERCENT: 2.5 %
GFR AFRICAN AMERICAN: >59
GFR NON-AFRICAN AMERICAN: >59
GLOBULIN: 3.8 G/DL
GLUCOSE BLD-MCNC: 146 MG/DL (ref 74–106)
HBA1C MFR BLD: 8.1 %
HCT VFR BLD CALC: 39.6 % (ref 40–54)
HDLC SERPL-MCNC: 40 MG/DL (ref 40–60)
HEMOGLOBIN: 12.4 G/DL (ref 13–18)
LDL CHOLESTEROL CALCULATED: 99 MG/DL
LYMPHOCYTES ABSOLUTE: 2.1 K/UL (ref 1.5–4)
LYMPHOCYTES RELATIVE PERCENT: 37.5 % (ref 20–40)
MCH RBC QN AUTO: 29.7 PG (ref 27–32)
MCHC RBC AUTO-ENTMCNC: 31.3 G/DL (ref 31–35)
MCV RBC AUTO: 94.7 FL (ref 80–100)
MICROALBUMIN UR-MCNC: 6.6 MG/DL (ref 0–22)
MICROALBUMIN/CREAT UR-RTO: 42.7 MG/G (ref 0–30)
MONOCYTES ABSOLUTE: 0.7 K/UL (ref 0.2–0.8)
MONOCYTES RELATIVE PERCENT: 11.7 % (ref 3–10)
NEUTROPHILS ABSOLUTE: 2.7 K/UL (ref 2–7.5)
NEUTROPHILS RELATIVE PERCENT: 47.9 %
PDW BLD-RTO: 13.4 % (ref 11–16)
PLATELET # BLD: 114 K/UL (ref 150–400)
PMV BLD AUTO: ABNORMAL FL (ref 6–10)
POTASSIUM SERPL-SCNC: 4.6 MMOL/L (ref 3.4–5.1)
PROSTATE SPECIFIC ANTIGEN: 0.11 NG/ML (ref 0–4)
RBC # BLD: 4.18 M/UL (ref 4.5–6)
SODIUM BLD-SCNC: 140 MMOL/L (ref 136–145)
TOTAL PROTEIN: 7.8 G/DL (ref 6.4–8.3)
TRIGL SERPL-MCNC: 250 MG/DL (ref 0–249)
VLDLC SERPL CALC-MCNC: 50 MG/DL
WBC # BLD: 5.7 K/UL (ref 4–11)

## 2017-08-09 PROCEDURE — 96372 THER/PROPH/DIAG INJ SC/IM: CPT | Performed by: FAMILY MEDICINE

## 2017-08-09 RX ORDER — CEFTRIAXONE 1 G/1
1 INJECTION, POWDER, FOR SOLUTION INTRAMUSCULAR; INTRAVENOUS ONCE
Status: COMPLETED | OUTPATIENT
Start: 2017-08-09 | End: 2017-08-09

## 2017-08-09 RX ADMIN — CEFTRIAXONE 1 G: 1 INJECTION, POWDER, FOR SOLUTION INTRAMUSCULAR; INTRAVENOUS at 08:56

## 2017-09-07 RX ORDER — HYDROCODONE BITARTRATE AND ACETAMINOPHEN 10; 325 MG/1; MG/1
1 TABLET ORAL EVERY 4 HOURS PRN
Qty: 150 TABLET | Refills: 0 | Status: SHIPPED | OUTPATIENT
Start: 2017-09-07 | End: 2017-09-11 | Stop reason: SDUPTHER

## 2017-09-07 RX ORDER — MECLIZINE HYDROCHLORIDE CHEWABLE TABLETS 25 MG/1
TABLET, CHEWABLE ORAL
Qty: 30 TABLET | Refills: 0 | Status: SHIPPED | OUTPATIENT
Start: 2017-09-07 | End: 2018-01-12 | Stop reason: SDUPTHER

## 2017-09-07 RX ORDER — OXYCODONE HYDROCHLORIDE 30 MG/1
30 TABLET ORAL EVERY 4 HOURS PRN
Qty: 150 TABLET | Refills: 0 | Status: SHIPPED | OUTPATIENT
Start: 2017-09-07 | End: 2017-09-11 | Stop reason: SDUPTHER

## 2017-09-11 ENCOUNTER — OFFICE VISIT (OUTPATIENT)
Dept: FAMILY MEDICINE CLINIC | Age: 57
End: 2017-09-11
Payer: MEDICARE

## 2017-09-11 VITALS
HEIGHT: 76 IN | SYSTOLIC BLOOD PRESSURE: 130 MMHG | WEIGHT: 315 LBS | DIASTOLIC BLOOD PRESSURE: 60 MMHG | BODY MASS INDEX: 38.36 KG/M2 | HEART RATE: 88 BPM | RESPIRATION RATE: 18 BRPM | OXYGEN SATURATION: 92 %

## 2017-09-11 DIAGNOSIS — E11.42 TYPE 2 DIABETES MELLITUS WITH DIABETIC POLYNEUROPATHY, WITH LONG-TERM CURRENT USE OF INSULIN (HCC): ICD-10-CM

## 2017-09-11 DIAGNOSIS — M15.9 OSTEOARTHRITIS OF MULTIPLE JOINTS, UNSPECIFIED OSTEOARTHRITIS TYPE: ICD-10-CM

## 2017-09-11 DIAGNOSIS — E53.8 B12 DEFICIENCY: ICD-10-CM

## 2017-09-11 DIAGNOSIS — Z79.4 TYPE 2 DIABETES MELLITUS WITH DIABETIC POLYNEUROPATHY, WITH LONG-TERM CURRENT USE OF INSULIN (HCC): ICD-10-CM

## 2017-09-11 DIAGNOSIS — M54.41 CHRONIC BILATERAL LOW BACK PAIN WITH RIGHT-SIDED SCIATICA: ICD-10-CM

## 2017-09-11 DIAGNOSIS — R60.0 PEDAL EDEMA: Primary | ICD-10-CM

## 2017-09-11 DIAGNOSIS — G89.29 CHRONIC BILATERAL LOW BACK PAIN WITH RIGHT-SIDED SCIATICA: ICD-10-CM

## 2017-09-11 PROCEDURE — G8417 CALC BMI ABV UP PARAM F/U: HCPCS | Performed by: FAMILY MEDICINE

## 2017-09-11 PROCEDURE — 96372 THER/PROPH/DIAG INJ SC/IM: CPT | Performed by: FAMILY MEDICINE

## 2017-09-11 PROCEDURE — 1036F TOBACCO NON-USER: CPT | Performed by: FAMILY MEDICINE

## 2017-09-11 PROCEDURE — 3046F HEMOGLOBIN A1C LEVEL >9.0%: CPT | Performed by: FAMILY MEDICINE

## 2017-09-11 PROCEDURE — 90688 IIV4 VACCINE SPLT 0.5 ML IM: CPT | Performed by: FAMILY MEDICINE

## 2017-09-11 PROCEDURE — G0008 ADMIN INFLUENZA VIRUS VAC: HCPCS | Performed by: FAMILY MEDICINE

## 2017-09-11 PROCEDURE — 3017F COLORECTAL CA SCREEN DOC REV: CPT | Performed by: FAMILY MEDICINE

## 2017-09-11 PROCEDURE — 99214 OFFICE O/P EST MOD 30 MIN: CPT | Performed by: FAMILY MEDICINE

## 2017-09-11 PROCEDURE — G8427 DOCREV CUR MEDS BY ELIG CLIN: HCPCS | Performed by: FAMILY MEDICINE

## 2017-09-11 RX ORDER — SPIRONOLACTONE 25 MG/1
25 TABLET ORAL DAILY
Qty: 30 TABLET | Refills: 2 | Status: SHIPPED | OUTPATIENT
Start: 2017-09-11 | End: 2017-11-03 | Stop reason: SDUPTHER

## 2017-09-11 RX ORDER — OXYCODONE HYDROCHLORIDE 30 MG/1
30 TABLET ORAL EVERY 4 HOURS PRN
Qty: 150 TABLET | Refills: 0 | Status: SHIPPED | OUTPATIENT
Start: 2017-09-11 | End: 2017-09-11 | Stop reason: SDUPTHER

## 2017-09-11 RX ORDER — HYDROCODONE BITARTRATE AND ACETAMINOPHEN 10; 325 MG/1; MG/1
1 TABLET ORAL EVERY 4 HOURS PRN
Qty: 150 TABLET | Refills: 0 | Status: SHIPPED | OUTPATIENT
Start: 2017-09-11 | End: 2017-09-11 | Stop reason: SDUPTHER

## 2017-09-11 RX ORDER — GABAPENTIN 800 MG/1
800 TABLET ORAL 4 TIMES DAILY
Qty: 120 TABLET | Refills: 2 | Status: SHIPPED | OUTPATIENT
Start: 2017-09-11 | End: 2017-09-13 | Stop reason: SDUPTHER

## 2017-09-11 RX ORDER — OXYCODONE HYDROCHLORIDE 30 MG/1
30 TABLET ORAL EVERY 4 HOURS PRN
Qty: 150 TABLET | Refills: 0 | Status: SHIPPED | OUTPATIENT
Start: 2017-09-11 | End: 2017-09-13 | Stop reason: SDUPTHER

## 2017-09-11 RX ORDER — HYDROCODONE BITARTRATE AND ACETAMINOPHEN 10; 325 MG/1; MG/1
1 TABLET ORAL EVERY 4 HOURS PRN
Qty: 150 TABLET | Refills: 0 | Status: SHIPPED | OUTPATIENT
Start: 2017-09-11 | End: 2017-09-13 | Stop reason: SDUPTHER

## 2017-09-11 RX ORDER — CYANOCOBALAMIN 1000 UG/ML
1000 INJECTION INTRAMUSCULAR; SUBCUTANEOUS ONCE
Status: COMPLETED | OUTPATIENT
Start: 2017-09-11 | End: 2017-09-11

## 2017-09-11 RX ADMIN — CYANOCOBALAMIN 1000 MCG: 1000 INJECTION INTRAMUSCULAR; SUBCUTANEOUS at 12:24

## 2017-09-11 ASSESSMENT — ENCOUNTER SYMPTOMS
NAUSEA: 1
SHORTNESS OF BREATH: 1

## 2017-09-13 RX ORDER — OXYCODONE HYDROCHLORIDE 30 MG/1
30 TABLET ORAL EVERY 4 HOURS PRN
Qty: 150 TABLET | Refills: 0 | Status: SHIPPED | OUTPATIENT
Start: 2017-09-13 | End: 2017-11-03 | Stop reason: SDUPTHER

## 2017-09-13 RX ORDER — GABAPENTIN 800 MG/1
800 TABLET ORAL 4 TIMES DAILY
Qty: 120 TABLET | Refills: 0 | Status: SHIPPED | OUTPATIENT
Start: 2017-09-13 | End: 2017-11-03 | Stop reason: SDUPTHER

## 2017-09-13 RX ORDER — HYDROCODONE BITARTRATE AND ACETAMINOPHEN 10; 325 MG/1; MG/1
1 TABLET ORAL EVERY 4 HOURS PRN
Qty: 150 TABLET | Refills: 0 | Status: SHIPPED | OUTPATIENT
Start: 2017-09-13 | End: 2017-11-03 | Stop reason: SDUPTHER

## 2017-09-13 RX ORDER — CARISOPRODOL 350 MG/1
350 TABLET ORAL 4 TIMES DAILY PRN
Qty: 120 TABLET | Refills: 0 | Status: SHIPPED | OUTPATIENT
Start: 2017-09-13 | End: 2017-11-03 | Stop reason: SDUPTHER

## 2017-09-14 ENCOUNTER — TELEPHONE (OUTPATIENT)
Dept: FAMILY MEDICINE CLINIC | Age: 57
End: 2017-09-14

## 2017-11-03 ENCOUNTER — OFFICE VISIT (OUTPATIENT)
Dept: FAMILY MEDICINE CLINIC | Age: 57
End: 2017-11-03
Payer: MEDICARE

## 2017-11-03 ENCOUNTER — HOSPITAL ENCOUNTER (OUTPATIENT)
Dept: OTHER | Age: 57
Discharge: HOME | End: 2017-11-03
Attending: FAMILY MEDICINE | Admitting: FAMILY MEDICINE

## 2017-11-03 VITALS
OXYGEN SATURATION: 90 % | WEIGHT: 315 LBS | HEIGHT: 76 IN | RESPIRATION RATE: 16 BRPM | BODY MASS INDEX: 38.36 KG/M2 | DIASTOLIC BLOOD PRESSURE: 58 MMHG | SYSTOLIC BLOOD PRESSURE: 138 MMHG | HEART RATE: 86 BPM

## 2017-11-03 DIAGNOSIS — E11.8 TYPE 2 DIABETES MELLITUS WITH COMPLICATION, WITH LONG-TERM CURRENT USE OF INSULIN (HCC): Primary | ICD-10-CM

## 2017-11-03 DIAGNOSIS — E11.8 TYPE 2 DIABETES MELLITUS WITH COMPLICATION, WITH LONG-TERM CURRENT USE OF INSULIN (HCC): ICD-10-CM

## 2017-11-03 DIAGNOSIS — Z79.4 TYPE 2 DIABETES MELLITUS WITH COMPLICATION, WITH LONG-TERM CURRENT USE OF INSULIN (HCC): ICD-10-CM

## 2017-11-03 DIAGNOSIS — Z79.4 TYPE 2 DIABETES MELLITUS WITH COMPLICATION, WITH LONG-TERM CURRENT USE OF INSULIN (HCC): Primary | ICD-10-CM

## 2017-11-03 LAB
A/G RATIO: 0.9 (ref 0.8–2)
ALBUMIN SERPL-MCNC: 3.4 G/DL (ref 3.4–4.8)
ALP BLD-CCNC: 66 U/L (ref 25–100)
ALT SERPL-CCNC: 9 U/L (ref 4–36)
ANION GAP SERPL CALCULATED.3IONS-SCNC: 12 MMOL/L (ref 3–16)
AST SERPL-CCNC: 16 U/L (ref 8–33)
BILIRUB SERPL-MCNC: <0.2 MG/DL (ref 0.3–1.2)
BUN BLDV-MCNC: 12 MG/DL (ref 6–20)
CALCIUM SERPL-MCNC: 8.7 MG/DL (ref 8.5–10.5)
CHLORIDE BLD-SCNC: 98 MMOL/L (ref 98–107)
CO2: 32 MMOL/L (ref 20–30)
CREAT SERPL-MCNC: 0.8 MG/DL (ref 0.4–1.2)
GFR AFRICAN AMERICAN: >59
GFR NON-AFRICAN AMERICAN: >59
GLOBULIN: 3.6 G/DL
GLUCOSE BLD-MCNC: 166 MG/DL (ref 74–106)
HBA1C MFR BLD: 7.8 %
POTASSIUM SERPL-SCNC: 4.3 MMOL/L (ref 3.4–5.1)
SODIUM BLD-SCNC: 142 MMOL/L (ref 136–145)
TOTAL PROTEIN: 7 G/DL (ref 6.4–8.3)

## 2017-11-03 PROCEDURE — G8417 CALC BMI ABV UP PARAM F/U: HCPCS | Performed by: FAMILY MEDICINE

## 2017-11-03 PROCEDURE — 1036F TOBACCO NON-USER: CPT | Performed by: FAMILY MEDICINE

## 2017-11-03 PROCEDURE — 3017F COLORECTAL CA SCREEN DOC REV: CPT | Performed by: FAMILY MEDICINE

## 2017-11-03 PROCEDURE — G8427 DOCREV CUR MEDS BY ELIG CLIN: HCPCS | Performed by: FAMILY MEDICINE

## 2017-11-03 PROCEDURE — G8484 FLU IMMUNIZE NO ADMIN: HCPCS | Performed by: FAMILY MEDICINE

## 2017-11-03 PROCEDURE — 3045F PR MOST RECENT HEMOGLOBIN A1C LEVEL 7.0-9.0%: CPT | Performed by: FAMILY MEDICINE

## 2017-11-03 PROCEDURE — 99214 OFFICE O/P EST MOD 30 MIN: CPT | Performed by: FAMILY MEDICINE

## 2017-11-03 RX ORDER — CARISOPRODOL 350 MG/1
350 TABLET ORAL 4 TIMES DAILY PRN
Qty: 120 TABLET | Refills: 2 | Status: SHIPPED | OUTPATIENT
Start: 2017-11-03 | End: 2017-11-20 | Stop reason: SDUPTHER

## 2017-11-03 RX ORDER — OXYCODONE HYDROCHLORIDE 30 MG/1
30 TABLET ORAL EVERY 4 HOURS PRN
Qty: 150 TABLET | Refills: 0 | Status: SHIPPED | OUTPATIENT
Start: 2017-11-03 | End: 2017-11-20 | Stop reason: SDUPTHER

## 2017-11-03 RX ORDER — GABAPENTIN 800 MG/1
800 TABLET ORAL 4 TIMES DAILY
Qty: 120 TABLET | Refills: 2 | Status: SHIPPED | OUTPATIENT
Start: 2017-11-03 | End: 2017-11-20 | Stop reason: SDUPTHER

## 2017-11-03 RX ORDER — HYDROCODONE BITARTRATE AND ACETAMINOPHEN 10; 325 MG/1; MG/1
1 TABLET ORAL EVERY 4 HOURS PRN
Qty: 150 TABLET | Refills: 0 | Status: SHIPPED | OUTPATIENT
Start: 2017-11-03 | End: 2017-11-20 | Stop reason: SDUPTHER

## 2017-11-03 RX ORDER — POLYETHYLENE GLYCOL 3350 17 G/17G
17 POWDER, FOR SOLUTION ORAL DAILY
Qty: 510 G | Refills: 1 | Status: SHIPPED | OUTPATIENT
Start: 2017-11-03 | End: 2017-11-20 | Stop reason: SDUPTHER

## 2017-11-03 RX ORDER — SPIRONOLACTONE 25 MG/1
25 TABLET ORAL 2 TIMES DAILY
Qty: 60 TABLET | Refills: 2 | Status: SHIPPED | OUTPATIENT
Start: 2017-11-03 | End: 2017-11-20

## 2017-11-03 RX ORDER — METRONIDAZOLE 500 MG/1
500 TABLET ORAL 3 TIMES DAILY
Qty: 30 TABLET | Refills: 0 | Status: SHIPPED | OUTPATIENT
Start: 2017-11-03 | End: 2017-11-13

## 2017-11-03 RX ORDER — MECLIZINE HYDROCHLORIDE CHEWABLE TABLETS 25 MG/1
TABLET, CHEWABLE ORAL
Qty: 30 TABLET | Refills: 0 | Status: CANCELLED | OUTPATIENT
Start: 2017-11-03

## 2017-11-03 ASSESSMENT — ENCOUNTER SYMPTOMS
SHORTNESS OF BREATH: 1
NAUSEA: 1

## 2017-11-06 NOTE — TELEPHONE ENCOUNTER
Refill request received from pharmacy.  Medication pending for approval.     Patient information below:      Hemoglobin A1C (%)   Date Value   11/03/2017 7.8 (H)   08/08/2017 8.1 (H)   05/17/2017 7.9 (H)     Microscopic Examination (no units)   Date Value   06/08/2015 Not Indicated     MICROALBUMIN, RANDOM URINE (mg/dL)   Date Value   08/09/2017 6.60     LDL Calculated (mg/dL)   Date Value   08/08/2017 99     AST (U/L)   Date Value   11/03/2017 16     ALT (U/L)   Date Value   11/03/2017 9     BUN (mg/dL)   Date Value   11/03/2017 12      (goal A1C is < 7)   (goal LDL is <100) need 30-50% reduction from baseline     BP Readings from Last 3 Encounters:   11/03/17 (!) 138/58   09/11/17 130/60   08/08/17 130/60    (goal /80)      All Future Testing planned in [facility]:  Lab Frequency Next Occurrence   MICROALBUMIN / CREATININE URINE RATIO Once 08/08/2017       Last Office Visit With PCP:  9/7/2017      Next Visit Date:  Future Appointments  Date Time Provider [place]   11/20/2017 9:00 AM Priscila Gabriel MD [address]            Patient Active Problem List:     Right shoulder pain     GERD (gastroesophageal reflux disease)     B12 deficiency     Shoulder pain, left     Cellulitis of right lower extremity     Bilateral low back pain with right-sided sciatica     Type 2 diabetes mellitus with diabetic polyneuropathy (HCC)     Sleep apnea     Osteoarthritis of multiple joints

## 2017-11-07 RX ORDER — PEN NEEDLE, DIABETIC 31 GX3/16"
NEEDLE, DISPOSABLE MISCELLANEOUS
Qty: 100 EACH | Refills: 5 | Status: SHIPPED | OUTPATIENT
Start: 2017-11-07 | End: 2018-03-02 | Stop reason: SDUPTHER

## 2017-11-20 ENCOUNTER — HOSPITAL ENCOUNTER (OUTPATIENT)
Dept: OTHER | Age: 57
Discharge: HOME | End: 2017-11-20
Attending: FAMILY MEDICINE | Admitting: FAMILY MEDICINE

## 2017-11-20 ENCOUNTER — OFFICE VISIT (OUTPATIENT)
Dept: FAMILY MEDICINE CLINIC | Age: 57
End: 2017-11-20
Payer: MEDICARE

## 2017-11-20 VITALS
DIASTOLIC BLOOD PRESSURE: 58 MMHG | HEART RATE: 79 BPM | RESPIRATION RATE: 20 BRPM | HEIGHT: 76 IN | OXYGEN SATURATION: 91 % | SYSTOLIC BLOOD PRESSURE: 120 MMHG

## 2017-11-20 DIAGNOSIS — G89.29 CHRONIC BILATERAL LOW BACK PAIN WITH RIGHT-SIDED SCIATICA: ICD-10-CM

## 2017-11-20 DIAGNOSIS — M15.9 OSTEOARTHRITIS OF MULTIPLE JOINTS, UNSPECIFIED OSTEOARTHRITIS TYPE: ICD-10-CM

## 2017-11-20 DIAGNOSIS — L03.311 CELLULITIS OF ABDOMINAL WALL: Primary | ICD-10-CM

## 2017-11-20 DIAGNOSIS — L03.311 CELLULITIS OF ABDOMINAL WALL: ICD-10-CM

## 2017-11-20 DIAGNOSIS — Z79.4 TYPE 2 DIABETES MELLITUS WITH DIABETIC POLYNEUROPATHY, WITH LONG-TERM CURRENT USE OF INSULIN (HCC): ICD-10-CM

## 2017-11-20 DIAGNOSIS — M54.41 CHRONIC BILATERAL LOW BACK PAIN WITH RIGHT-SIDED SCIATICA: ICD-10-CM

## 2017-11-20 DIAGNOSIS — E11.42 TYPE 2 DIABETES MELLITUS WITH DIABETIC POLYNEUROPATHY, WITH LONG-TERM CURRENT USE OF INSULIN (HCC): ICD-10-CM

## 2017-11-20 LAB
BASOPHILS ABSOLUTE: 0 K/UL (ref 0–0.1)
BASOPHILS RELATIVE PERCENT: 0.4 %
EOSINOPHILS ABSOLUTE: 0.2 K/UL (ref 0–0.4)
EOSINOPHILS RELATIVE PERCENT: 2.7 %
HCT VFR BLD CALC: 34.6 % (ref 40–54)
HEMOGLOBIN: 9.7 G/DL (ref 13–18)
HYPOCHROMIA: PRESENT
LYMPHOCYTES ABSOLUTE: 1.7 K/UL (ref 1.5–4)
LYMPHOCYTES RELATIVE PERCENT: 23.6 % (ref 20–40)
MCH RBC QN AUTO: 26.1 PG (ref 27–32)
MCHC RBC AUTO-ENTMCNC: 28 G/DL (ref 31–35)
MCV RBC AUTO: 93.3 FL (ref 80–100)
MONOCYTES ABSOLUTE: 0.9 K/UL (ref 0.2–0.8)
MONOCYTES RELATIVE PERCENT: 12.8 % (ref 3–10)
NEUTROPHILS ABSOLUTE: 4.5 K/UL (ref 2–7.5)
NEUTROPHILS RELATIVE PERCENT: 60.5 %
PDW BLD-RTO: 15.5 % (ref 11–16)
PLATELET # BLD: 179 K/UL (ref 150–400)
PMV BLD AUTO: 13 FL (ref 6–10)
RBC # BLD: 3.71 M/UL (ref 4.5–6)
WBC # BLD: 7.4 K/UL (ref 4–11)

## 2017-11-20 PROCEDURE — 96372 THER/PROPH/DIAG INJ SC/IM: CPT | Performed by: FAMILY MEDICINE

## 2017-11-20 PROCEDURE — 3017F COLORECTAL CA SCREEN DOC REV: CPT | Performed by: FAMILY MEDICINE

## 2017-11-20 PROCEDURE — 99214 OFFICE O/P EST MOD 30 MIN: CPT | Performed by: FAMILY MEDICINE

## 2017-11-20 PROCEDURE — G8484 FLU IMMUNIZE NO ADMIN: HCPCS | Performed by: FAMILY MEDICINE

## 2017-11-20 PROCEDURE — G8427 DOCREV CUR MEDS BY ELIG CLIN: HCPCS | Performed by: FAMILY MEDICINE

## 2017-11-20 PROCEDURE — 3045F PR MOST RECENT HEMOGLOBIN A1C LEVEL 7.0-9.0%: CPT | Performed by: FAMILY MEDICINE

## 2017-11-20 PROCEDURE — G8417 CALC BMI ABV UP PARAM F/U: HCPCS | Performed by: FAMILY MEDICINE

## 2017-11-20 PROCEDURE — 1036F TOBACCO NON-USER: CPT | Performed by: FAMILY MEDICINE

## 2017-11-20 RX ORDER — CEFTRIAXONE 1 G/1
1 INJECTION, POWDER, FOR SOLUTION INTRAMUSCULAR; INTRAVENOUS ONCE
Status: COMPLETED | OUTPATIENT
Start: 2017-11-20 | End: 2017-11-20

## 2017-11-20 RX ORDER — POLYETHYLENE GLYCOL 3350 17 G/17G
17 POWDER, FOR SOLUTION ORAL DAILY
Qty: 510 G | Refills: 5 | Status: SHIPPED | OUTPATIENT
Start: 2017-11-20 | End: 2017-12-20

## 2017-11-20 RX ORDER — SPIRONOLACTONE 50 MG/1
50 TABLET, FILM COATED ORAL DAILY
Qty: 30 TABLET | Refills: 2 | Status: SHIPPED | OUTPATIENT
Start: 2017-11-20 | End: 2018-01-12 | Stop reason: SDUPTHER

## 2017-11-20 RX ORDER — CEPHALEXIN 500 MG/1
500 CAPSULE ORAL 3 TIMES DAILY
Qty: 30 CAPSULE | Refills: 0 | Status: SHIPPED | OUTPATIENT
Start: 2017-11-20 | End: 2017-12-08 | Stop reason: ALTCHOICE

## 2017-11-20 RX ORDER — SPIRONOLACTONE 25 MG/1
25 TABLET ORAL 2 TIMES DAILY
Qty: 60 TABLET | Refills: 2 | Status: CANCELLED | OUTPATIENT
Start: 2017-11-20

## 2017-11-20 RX ORDER — HYDROCODONE BITARTRATE AND ACETAMINOPHEN 10; 325 MG/1; MG/1
1 TABLET ORAL EVERY 4 HOURS PRN
Qty: 150 TABLET | Refills: 0 | Status: SHIPPED | OUTPATIENT
Start: 2017-11-20 | End: 2018-01-12 | Stop reason: SDUPTHER

## 2017-11-20 RX ORDER — GABAPENTIN 800 MG/1
800 TABLET ORAL 4 TIMES DAILY
Qty: 120 TABLET | Refills: 2 | Status: SHIPPED | OUTPATIENT
Start: 2017-11-20 | End: 2018-01-12 | Stop reason: SDUPTHER

## 2017-11-20 RX ORDER — TRIAMCINOLONE ACETONIDE 1 MG/G
CREAM TOPICAL
Qty: 1 TUBE | Refills: 5 | Status: SHIPPED | OUTPATIENT
Start: 2017-11-20 | End: 2017-11-28 | Stop reason: SDUPTHER

## 2017-11-20 RX ORDER — CITALOPRAM 20 MG/1
20 TABLET ORAL DAILY
Qty: 30 TABLET | Refills: 5 | Status: SHIPPED | OUTPATIENT
Start: 2017-11-20 | End: 2017-11-28

## 2017-11-20 RX ORDER — MECLIZINE HYDROCHLORIDE CHEWABLE TABLETS 25 MG/1
TABLET, CHEWABLE ORAL
Qty: 30 TABLET | Refills: 0 | Status: CANCELLED | OUTPATIENT
Start: 2017-11-20

## 2017-11-20 RX ORDER — NYSTATIN 100000 [USP'U]/G
POWDER TOPICAL 3 TIMES DAILY
Qty: 1 BOTTLE | Refills: 3 | Status: SHIPPED | OUTPATIENT
Start: 2017-11-20 | End: 2018-01-12

## 2017-11-20 RX ORDER — CYANOCOBALAMIN 1000 UG/ML
1000 INJECTION INTRAMUSCULAR; SUBCUTANEOUS ONCE
Status: COMPLETED | OUTPATIENT
Start: 2017-11-20 | End: 2017-11-20

## 2017-11-20 RX ORDER — CARISOPRODOL 350 MG/1
350 TABLET ORAL 4 TIMES DAILY PRN
Qty: 120 TABLET | Refills: 2 | Status: SHIPPED | OUTPATIENT
Start: 2017-11-20 | End: 2018-01-12 | Stop reason: SDUPTHER

## 2017-11-20 RX ORDER — OXYCODONE HYDROCHLORIDE 30 MG/1
30 TABLET ORAL EVERY 4 HOURS PRN
Qty: 150 TABLET | Refills: 0 | Status: SHIPPED | OUTPATIENT
Start: 2017-11-20 | End: 2018-01-12 | Stop reason: SDUPTHER

## 2017-11-20 RX ORDER — CITALOPRAM 20 MG/1
20 TABLET ORAL DAILY
COMMUNITY
Start: 2017-11-03 | End: 2017-11-20 | Stop reason: SDUPTHER

## 2017-11-20 RX ADMIN — CYANOCOBALAMIN 1000 MCG: 1000 INJECTION INTRAMUSCULAR; SUBCUTANEOUS at 10:20

## 2017-11-20 RX ADMIN — CEFTRIAXONE 1 G: 1 INJECTION, POWDER, FOR SOLUTION INTRAMUSCULAR; INTRAVENOUS at 10:15

## 2017-11-20 ASSESSMENT — ENCOUNTER SYMPTOMS
SHORTNESS OF BREATH: 1
NAUSEA: 1

## 2017-11-20 NOTE — PROGRESS NOTES
SUBJECTIVE:    Patient ID: Iraida Ibrahim is a 59 y.o. male. Chief Complaint   Patient presents with    Diabetes       HPI:  He is having some significant abdominal wall pain. He is having redness and swelling and tenderness with skin peeling. He is having more difficulty with moving around. He is not able to drive due to the tenderness. He is forgetting to take the second spironolactone. He is not able to check his sugars. He says he is feeling really bad. He has a lot of fatigue. He is struggling with his pain. He says he is trying to watch his diet. He says he's trying to keep his sugars as good as he can. He says his home situation right now as bad. They've had to move into a hotel room. Review of Systems   Constitutional: Positive for activity change, appetite change and fatigue. Respiratory: Positive for shortness of breath. Gastrointestinal: Positive for nausea. Musculoskeletal: Positive for myalgias. Neurological: Positive for weakness. All other systems reviewed and are negative. OBJECTIVE:  Wt Readings from Last 3 Encounters:   11/28/17 (!) 459 lb (208.2 kg)   11/03/17 (!) 459 lb (208.2 kg)   09/11/17 (!) 443 lb (200.9 kg)     BP Readings from Last 3 Encounters:   12/01/17 (!) 140/70   11/28/17 (!) 142/52   11/20/17 (!) 120/58      Pulse Readings from Last 3 Encounters:   12/01/17 77   11/28/17 70   11/20/17 79     There is no height or weight on file to calculate BMI. Resp Readings from Last 3 Encounters:   12/01/17 22   11/28/17 18   11/20/17 20     Physical Exam   Constitutional: He is oriented to person, place, and time. Vital signs are normal. He appears well-developed and well-nourished. HENT:   Head: Normocephalic and atraumatic.    Right Ear: Hearing and external ear normal.   Left Ear: Hearing and external ear normal.   Nose: Nose normal.   Mouth/Throat: Uvula is midline, oropharynx is clear and moist and mucous membranes are normal.   Eyes: Conjunctivae, 11/28/2017    HGB 9.9 11/28/2017    HCT 35.5 11/28/2017    HCT 41.6 06/05/2012    MCV 92.4 11/28/2017     11/28/2017     06/05/2012       Lab Results   Component Value Date    TSH 1.80 12/20/2016         ASSESSMENT:   1. Cellulitis of abdominal wall  CBC WITH AUTO DIFFERENTIAL   2. Chronic bilateral low back pain with right-sided sciatica     3. Type 2 diabetes mellitus with diabetic polyneuropathy, with long-term current use of insulin ([facility] [address]     4. Osteoarthritis of multiple joints, unspecified osteoarthritis type          PLAN:  Orders Placed This Encounter   Medications    DISCONTD: citalopram (CELEXA) 20 MG tablet     Sig: Take 1 tablet by mouth daily     Dispense:  30 tablet     Refill:  5    oxyCODONE (OXY-IR) 30 MG immediate release tablet     Sig: Take 1 tablet by mouth every 4 hours as needed for Pain  30 day supply. Earliest Fill Date: 11/20/17     Dispense:  150 tablet     Refill:  0    HYDROcodone-acetaminophen (NORCO)  MG per tablet     Sig: Take 1 tablet by mouth every 4 hours as needed for Pain  For break thru pain. 30 day supply. Earliest Fill Date: 11/20/17     Dispense:  150 tablet     Refill:  0    carisoprodol (SOMA) 350 MG tablet     Sig: Take 1 tablet by mouth 4 times daily as needed for Muscle spasms .      Dispense:  120 tablet     Refill:  2    gabapentin (NEURONTIN) 800 MG tablet     Sig: Take 1 tablet by mouth 4 times daily     Dispense:  120 tablet     Refill:  2    polyethylene glycol (MIRALAX) powder     Sig: Take 17 g by mouth daily     Dispense:  510 g     Refill:  5    nystatin (MYCOSTATIN) 517323 UNIT/GM powder     Sig: Apply topically 3 times daily 56.7 bottle please     Dispense:  1 Bottle     Refill:  3    cefTRIAXone (ROCEPHIN) injection 1 g    cephALEXin (KEFLEX) 500 MG capsule     Sig: Take 1 capsule by mouth 3 times daily     Dispense:  30 capsule     Refill:  0    spironolactone (ALDACTONE) 50 MG tablet     Sig: Take 1 tablet by mouth daily

## 2017-11-21 ENCOUNTER — NURSE ONLY (OUTPATIENT)
Dept: FAMILY MEDICINE CLINIC | Age: 57
End: 2017-11-21
Payer: MEDICARE

## 2017-11-21 DIAGNOSIS — L03.115 CELLULITIS OF RIGHT LOWER EXTREMITY: Primary | ICD-10-CM

## 2017-11-21 PROCEDURE — 96372 THER/PROPH/DIAG INJ SC/IM: CPT | Performed by: FAMILY MEDICINE

## 2017-11-21 RX ORDER — CEFTRIAXONE 1 G/1
1 INJECTION, POWDER, FOR SOLUTION INTRAMUSCULAR; INTRAVENOUS ONCE
Status: COMPLETED | OUTPATIENT
Start: 2017-11-21 | End: 2017-11-21

## 2017-11-21 RX ADMIN — CEFTRIAXONE 1 G: 1 INJECTION, POWDER, FOR SOLUTION INTRAMUSCULAR; INTRAVENOUS at 14:26

## 2017-11-21 NOTE — PROGRESS NOTES
Per Jordi Edmond MD orders Reid House was given 1gram  of Rocephin IM, in the right hip. No complications or reactions were noted. Patient tolerated procedure well.

## 2017-11-22 ENCOUNTER — NURSE ONLY (OUTPATIENT)
Dept: FAMILY MEDICINE CLINIC | Age: 57
End: 2017-11-22
Payer: MEDICARE

## 2017-11-22 DIAGNOSIS — L03.115 CELLULITIS OF RIGHT LOWER EXTREMITY: Primary | ICD-10-CM

## 2017-11-22 PROCEDURE — 96372 THER/PROPH/DIAG INJ SC/IM: CPT | Performed by: FAMILY MEDICINE

## 2017-11-22 RX ORDER — CEFTRIAXONE 1 G/1
1 INJECTION, POWDER, FOR SOLUTION INTRAMUSCULAR; INTRAVENOUS ONCE
Status: COMPLETED | OUTPATIENT
Start: 2017-11-22 | End: 2017-11-22

## 2017-11-22 RX ADMIN — CEFTRIAXONE 1 G: 1 INJECTION, POWDER, FOR SOLUTION INTRAMUSCULAR; INTRAVENOUS at 15:08

## 2017-11-28 ENCOUNTER — HOSPITAL ENCOUNTER (OUTPATIENT)
Dept: OTHER | Age: 57
Discharge: HOME | End: 2017-11-28
Attending: FAMILY MEDICINE | Admitting: FAMILY MEDICINE

## 2017-11-28 ENCOUNTER — OFFICE VISIT (OUTPATIENT)
Dept: FAMILY MEDICINE CLINIC | Age: 57
End: 2017-11-28
Payer: MEDICARE

## 2017-11-28 VITALS
HEART RATE: 70 BPM | BODY MASS INDEX: 38.36 KG/M2 | SYSTOLIC BLOOD PRESSURE: 142 MMHG | HEIGHT: 76 IN | OXYGEN SATURATION: 91 % | WEIGHT: 315 LBS | DIASTOLIC BLOOD PRESSURE: 52 MMHG | RESPIRATION RATE: 18 BRPM

## 2017-11-28 DIAGNOSIS — D50.9 IRON DEFICIENCY ANEMIA, UNSPECIFIED IRON DEFICIENCY ANEMIA TYPE: ICD-10-CM

## 2017-11-28 DIAGNOSIS — M15.9 OSTEOARTHRITIS OF MULTIPLE JOINTS, UNSPECIFIED OSTEOARTHRITIS TYPE: ICD-10-CM

## 2017-11-28 DIAGNOSIS — M54.41 CHRONIC BILATERAL LOW BACK PAIN WITH RIGHT-SIDED SCIATICA: ICD-10-CM

## 2017-11-28 DIAGNOSIS — Z79.4 TYPE 2 DIABETES MELLITUS WITH DIABETIC POLYNEUROPATHY, WITH LONG-TERM CURRENT USE OF INSULIN (HCC): ICD-10-CM

## 2017-11-28 DIAGNOSIS — E11.42 TYPE 2 DIABETES MELLITUS WITH DIABETIC POLYNEUROPATHY, WITH LONG-TERM CURRENT USE OF INSULIN (HCC): ICD-10-CM

## 2017-11-28 DIAGNOSIS — G89.29 CHRONIC BILATERAL LOW BACK PAIN WITH RIGHT-SIDED SCIATICA: ICD-10-CM

## 2017-11-28 DIAGNOSIS — L03.311 CELLULITIS OF ABDOMINAL WALL: Primary | ICD-10-CM

## 2017-11-28 LAB
BASOPHILS ABSOLUTE: 0 K/UL (ref 0–0.1)
BASOPHILS RELATIVE PERCENT: 0.2 %
EOSINOPHILS ABSOLUTE: 0.1 K/UL (ref 0–0.4)
EOSINOPHILS RELATIVE PERCENT: 1.1 %
HCT VFR BLD CALC: 35.5 % (ref 40–54)
HEMOGLOBIN: 9.9 G/DL (ref 13–18)
HYPOCHROMIA: PRESENT
LYMPHOCYTES ABSOLUTE: 2.2 K/UL (ref 1.5–4)
LYMPHOCYTES RELATIVE PERCENT: 23.2 % (ref 20–40)
MCH RBC QN AUTO: 25.8 PG (ref 27–32)
MCHC RBC AUTO-ENTMCNC: 27.9 G/DL (ref 31–35)
MCV RBC AUTO: 92.4 FL (ref 80–100)
MONOCYTES ABSOLUTE: 1 K/UL (ref 0.2–0.8)
MONOCYTES RELATIVE PERCENT: 10.2 % (ref 3–10)
NEUTROPHILS ABSOLUTE: 6.2 K/UL (ref 2–7.5)
NEUTROPHILS RELATIVE PERCENT: 65.3 %
PDW BLD-RTO: 16 % (ref 11–16)
PLATELET # BLD: 244 K/UL (ref 150–400)
PMV BLD AUTO: 12.8 FL (ref 6–10)
RBC # BLD: 3.84 M/UL (ref 4.5–6)
WBC # BLD: 9.5 K/UL (ref 4–11)

## 2017-11-28 PROCEDURE — G8484 FLU IMMUNIZE NO ADMIN: HCPCS | Performed by: FAMILY MEDICINE

## 2017-11-28 PROCEDURE — G8427 DOCREV CUR MEDS BY ELIG CLIN: HCPCS | Performed by: FAMILY MEDICINE

## 2017-11-28 PROCEDURE — 3045F PR MOST RECENT HEMOGLOBIN A1C LEVEL 7.0-9.0%: CPT | Performed by: FAMILY MEDICINE

## 2017-11-28 PROCEDURE — 3017F COLORECTAL CA SCREEN DOC REV: CPT | Performed by: FAMILY MEDICINE

## 2017-11-28 PROCEDURE — 1036F TOBACCO NON-USER: CPT | Performed by: FAMILY MEDICINE

## 2017-11-28 PROCEDURE — G8417 CALC BMI ABV UP PARAM F/U: HCPCS | Performed by: FAMILY MEDICINE

## 2017-11-28 PROCEDURE — 99214 OFFICE O/P EST MOD 30 MIN: CPT | Performed by: FAMILY MEDICINE

## 2017-11-28 RX ORDER — LANOLIN ALCOHOL/MO/W.PET/CERES
325 CREAM (GRAM) TOPICAL
Qty: 90 TABLET | Refills: 1 | Status: SHIPPED | OUTPATIENT
Start: 2017-11-28 | End: 2018-01-12 | Stop reason: SDUPTHER

## 2017-11-28 RX ORDER — TRIAMCINOLONE ACETONIDE 1 MG/G
CREAM TOPICAL
Qty: 2 TUBE | Refills: 5 | Status: SHIPPED | OUTPATIENT
Start: 2017-11-28 | End: 2017-12-08 | Stop reason: SDUPTHER

## 2017-11-29 RX ORDER — CARISOPRODOL 350 MG/1
TABLET ORAL
Qty: 120 TABLET | Refills: 0 | OUTPATIENT
Start: 2017-11-29

## 2017-11-29 NOTE — TELEPHONE ENCOUNTER
Refill request received from pharmacy.  Medication pending for approval.     Patient information below:      Hemoglobin A1C (%)   Date Value   11/03/2017 7.8 (H)   08/08/2017 8.1 (H)   05/17/2017 7.9 (H)     Microscopic Examination (no units)   Date Value   06/08/2015 Not Indicated     MICROALBUMIN, RANDOM URINE (mg/dL)   Date Value   08/09/2017 6.60     LDL Calculated (mg/dL)   Date Value   08/08/2017 99     AST (U/L)   Date Value   11/03/2017 16     ALT (U/L)   Date Value   11/03/2017 9     BUN (mg/dL)   Date Value   11/03/2017 12      (goal A1C is < 7)   (goal LDL is <100) need 30-50% reduction from baseline     BP Readings from Last 3 Encounters:   11/28/17 (!) 142/52   11/20/17 (!) 120/58   11/03/17 (!) 138/58    (goal /80)      All Future Testing planned in [facility]:  Lab Frequency Next Occurrence   MICROALBUMIN / CREATININE URINE RATIO Once 08/08/2017       Last Office Visit With PCP:  11/28/2017      Next Visit Date:  Future Appointments  Date Time Provider [place]   12/1/2017 11:30 AM Carter Vieira MD [address]            Patient Active Problem List:     Right shoulder pain     GERD (gastroesophageal reflux disease)     B12 deficiency     Shoulder pain, left     Cellulitis of right lower extremity     Bilateral low back pain with right-sided sciatica     Type 2 diabetes mellitus with diabetic polyneuropathy (HCC)     Sleep apnea     Osteoarthritis of multiple joints

## 2017-11-30 RX ORDER — INSULIN GLARGINE 100 [IU]/ML
INJECTION, SOLUTION SUBCUTANEOUS
Qty: 75 ML | Refills: 5 | Status: SHIPPED | OUTPATIENT
Start: 2017-11-30 | End: 2018-01-12 | Stop reason: SDUPTHER

## 2017-12-01 ENCOUNTER — OFFICE VISIT (OUTPATIENT)
Dept: FAMILY MEDICINE CLINIC | Age: 57
End: 2017-12-01
Payer: MEDICARE

## 2017-12-01 VITALS
OXYGEN SATURATION: 91 % | SYSTOLIC BLOOD PRESSURE: 140 MMHG | HEART RATE: 77 BPM | HEIGHT: 76 IN | RESPIRATION RATE: 22 BRPM | DIASTOLIC BLOOD PRESSURE: 70 MMHG

## 2017-12-01 DIAGNOSIS — R31.9 HEMATURIA, UNSPECIFIED TYPE: Primary | ICD-10-CM

## 2017-12-01 LAB
BILIRUBIN, POC: NEGATIVE
BLOOD URINE, POC: NEGATIVE
CLARITY, POC: CLEAR
COLOR, POC: YELLOW
GLUCOSE URINE, POC: NEGATIVE
KETONES, POC: NEGATIVE
LEUKOCYTE EST, POC: NEGATIVE
NITRITE, POC: NEGATIVE
PH, POC: 5.5
PROTEIN, POC: NEGATIVE
SPECIFIC GRAVITY, POC: 1
UROBILINOGEN, POC: 0.2

## 2017-12-01 PROCEDURE — 1036F TOBACCO NON-USER: CPT | Performed by: FAMILY MEDICINE

## 2017-12-01 PROCEDURE — G8417 CALC BMI ABV UP PARAM F/U: HCPCS | Performed by: FAMILY MEDICINE

## 2017-12-01 PROCEDURE — G8484 FLU IMMUNIZE NO ADMIN: HCPCS | Performed by: FAMILY MEDICINE

## 2017-12-01 PROCEDURE — 99214 OFFICE O/P EST MOD 30 MIN: CPT | Performed by: FAMILY MEDICINE

## 2017-12-01 PROCEDURE — 3017F COLORECTAL CA SCREEN DOC REV: CPT | Performed by: FAMILY MEDICINE

## 2017-12-01 PROCEDURE — G8427 DOCREV CUR MEDS BY ELIG CLIN: HCPCS | Performed by: FAMILY MEDICINE

## 2017-12-01 PROCEDURE — 81002 URINALYSIS NONAUTO W/O SCOPE: CPT | Performed by: FAMILY MEDICINE

## 2017-12-01 RX ORDER — CITALOPRAM 20 MG/1
20 TABLET ORAL DAILY
COMMUNITY
Start: 2017-11-29 | End: 2017-12-28 | Stop reason: ALTCHOICE

## 2017-12-01 ASSESSMENT — ENCOUNTER SYMPTOMS
COLOR CHANGE: 1
SHORTNESS OF BREATH: 1
BACK PAIN: 1
DIARRHEA: 1
NAUSEA: 1

## 2017-12-01 NOTE — PROGRESS NOTES
Have you seen any other physician or provider since your last visit no    Have you had any other diagnostic tests since your last visit? no    Have you changed or stopped any medications since your last visit? no
Pupils are equal, round, and reactive to light. Neck: Trachea normal, normal range of motion and phonation normal. Neck supple. Carotid bruit is not present. No thyroid mass and no thyromegaly present. Cardiovascular: Normal rate, regular rhythm, S1 normal, S2 normal, normal heart sounds and normal pulses. Exam reveals no gallop and no friction rub. No murmur heard. Pulmonary/Chest: Effort normal.   Musculoskeletal:        Right shoulder: He exhibits decreased range of motion, tenderness, pain, spasm and decreased strength. Left shoulder: He exhibits decreased range of motion and tenderness. Left elbow: Tenderness found. Right knee: He exhibits decreased range of motion. Tenderness found. Left knee: He exhibits decreased range of motion. Tenderness found. Lumbar back: He exhibits decreased range of motion, tenderness, pain and spasm. Neurological: He is alert and oriented to person, place, and time. Left arm and 4tht and 5th digit have some decreased sensation. Some decreased  strenght in the left hand   Skin: Skin is warm and dry. Bilateral lower extremity redness and warmth, he has some plus 2 edema below the knee bilaterally   Psychiatric: He has a normal mood and affect. Nursing note and vitals reviewed.       Results in Past 30 Days  Result Component Current Result Ref Range Previous Result Ref Range   Alb 3.4 (11/3/2017) 3.4 - 4.8 g/dL Not in Time Range    Albumin/Globulin Ratio 0.9 (11/3/2017) 0.8 - 2.0 Not in Time Range    Alkaline Phosphatase 66 (11/3/2017) 25 - 100 U/L Not in Time Range    ALT 9 (11/3/2017) 4 - 36 U/L Not in Time Range    AST 16 (11/3/2017) 8 - 33 U/L Not in Time Range    BUN 12 (11/3/2017) 6 - 20 mg/dL Not in Time Range    Calcium 8.7 (11/3/2017) 8.5 - 10.5 mg/dL Not in Time Range    Chloride 98 (11/3/2017) 98 - 107 mmol/L Not in Time Range    CO2 32 (H) (11/3/2017) 20 - 30 mmol/L Not in Time Range    CREATININE 0.8 (11/3/2017) 0.4 -

## 2017-12-03 ASSESSMENT — ENCOUNTER SYMPTOMS
SHORTNESS OF BREATH: 1
NAUSEA: 1

## 2017-12-04 NOTE — PROGRESS NOTES
SUBJECTIVE:    Patient ID: Natalia Reis is a 59 y.o. male. Chief Complaint   Patient presents with    Other     follow up cellulitis of abdominal wall    Discuss Labs       HPI: He's here today in follow-up of the cellulitis. He says it's not quite as swollen. Is not quite is indurated. The medication does seem to be helping some. He says he's trying to do better with eating and drinking. He is continuing to struggle with his appetite. He says is really weak. He's not had any significant improvement any of his other symptoms. He says the fatigue is overwhelming. He says is not sleeping well at all. He said he really has not slept for weeks. He knows as part of why feels bad. Review of Systems   Constitutional: Positive for activity change, appetite change and fatigue. Respiratory: Positive for shortness of breath. Gastrointestinal: Positive for nausea. Musculoskeletal: Positive for myalgias. Neurological: Positive for weakness. All other systems reviewed and are negative. OBJECTIVE:  Wt Readings from Last 3 Encounters:   11/28/17 (!) 459 lb (208.2 kg)   11/03/17 (!) 459 lb (208.2 kg)   09/11/17 (!) 443 lb (200.9 kg)     BP Readings from Last 3 Encounters:   12/01/17 (!) 140/70   11/28/17 (!) 142/52   11/20/17 (!) 120/58      Pulse Readings from Last 3 Encounters:   12/01/17 77   11/28/17 70   11/20/17 79     Body mass index is 55.87 kg/m². Resp Readings from Last 3 Encounters:   12/01/17 22   11/28/17 18   11/20/17 20     Physical Exam   Constitutional: He is oriented to person, place, and time. Vital signs are normal. He appears well-developed and well-nourished. HENT:   Head: Normocephalic and atraumatic.    Right Ear: Hearing and external ear normal.   Left Ear: Hearing and external ear normal.   Nose: Nose normal.   Mouth/Throat: Uvula is midline, oropharynx is clear and moist and mucous membranes are normal.   Eyes: Conjunctivae, EOM and lids are normal. Pupils are equal, round, and reactive to light. Neck: Trachea normal, normal range of motion and phonation normal. Neck supple. Carotid bruit is not present. No thyroid mass and no thyromegaly present. Cardiovascular: Normal rate, regular rhythm, S1 normal, S2 normal, normal heart sounds and normal pulses. Exam reveals no gallop and no friction rub. No murmur heard. Pulmonary/Chest: Effort normal.   Abdominal:   Lower abdomen is somewhat pendulous. There are significant amount of redness. There is a lot of induration. There are some scaling. He does have a little bit of leakage. He does have is obviously some tenderness. Musculoskeletal:        Right shoulder: He exhibits decreased range of motion, tenderness, pain, spasm and decreased strength. Left shoulder: He exhibits decreased range of motion and tenderness. Left elbow: Tenderness found. Right knee: He exhibits decreased range of motion. Tenderness found. Left knee: He exhibits decreased range of motion. Tenderness found. Lumbar back: He exhibits decreased range of motion, tenderness, pain and spasm. Neurological: He is alert and oriented to person, place, and time. Left arm and 4tht and 5th digit have some decreased sensation. Some decreased  strenght in the left hand   Skin: Skin is warm and dry. Bilateral lower extremity redness and warmth, he has some plus 2 edema below the knee bilaterally   Psychiatric: He has a normal mood and affect. Nursing note and vitals reviewed. No results found for requested labs within last 30 days.        Hemoglobin A1C (%)   Date Value   11/03/2017 7.8 (H)     Microscopic Examination (no units)   Date Value   06/08/2015 Not Indicated     MICROALBUMIN, RANDOM URINE (mg/dL)   Date Value   08/09/2017 6.60     LDL Calculated (mg/dL)   Date Value   08/08/2017 99         Lab Results   Component Value Date    WBC 9.5 11/28/2017    NEUTROABS 6.2 11/28/2017    HGB 9.9 11/28/2017    HCT 35.5

## 2017-12-08 ENCOUNTER — OFFICE VISIT (OUTPATIENT)
Dept: FAMILY MEDICINE CLINIC | Age: 57
End: 2017-12-08
Payer: MEDICARE

## 2017-12-08 VITALS
SYSTOLIC BLOOD PRESSURE: 140 MMHG | TEMPERATURE: 99 F | OXYGEN SATURATION: 90 % | HEART RATE: 100 BPM | HEIGHT: 76 IN | RESPIRATION RATE: 18 BRPM | DIASTOLIC BLOOD PRESSURE: 70 MMHG

## 2017-12-08 DIAGNOSIS — I50.9 CONGESTIVE HEART FAILURE, UNSPECIFIED CONGESTIVE HEART FAILURE CHRONICITY, UNSPECIFIED CONGESTIVE HEART FAILURE TYPE: ICD-10-CM

## 2017-12-08 DIAGNOSIS — L03.311 CELLULITIS OF ABDOMINAL WALL: Primary | ICD-10-CM

## 2017-12-08 PROCEDURE — 96372 THER/PROPH/DIAG INJ SC/IM: CPT | Performed by: FAMILY MEDICINE

## 2017-12-08 PROCEDURE — G8427 DOCREV CUR MEDS BY ELIG CLIN: HCPCS | Performed by: FAMILY MEDICINE

## 2017-12-08 PROCEDURE — G8417 CALC BMI ABV UP PARAM F/U: HCPCS | Performed by: FAMILY MEDICINE

## 2017-12-08 PROCEDURE — 1036F TOBACCO NON-USER: CPT | Performed by: FAMILY MEDICINE

## 2017-12-08 PROCEDURE — G8484 FLU IMMUNIZE NO ADMIN: HCPCS | Performed by: FAMILY MEDICINE

## 2017-12-08 PROCEDURE — 99213 OFFICE O/P EST LOW 20 MIN: CPT | Performed by: FAMILY MEDICINE

## 2017-12-08 PROCEDURE — 3017F COLORECTAL CA SCREEN DOC REV: CPT | Performed by: FAMILY MEDICINE

## 2017-12-08 RX ORDER — TRIAMCINOLONE ACETONIDE 1 MG/G
CREAM TOPICAL
Qty: 3 TUBE | Refills: 5 | Status: SHIPPED | OUTPATIENT
Start: 2017-12-08 | End: 2018-01-12 | Stop reason: SDUPTHER

## 2017-12-08 RX ORDER — CYANOCOBALAMIN 1000 UG/ML
1000 INJECTION INTRAMUSCULAR; SUBCUTANEOUS ONCE
Status: COMPLETED | OUTPATIENT
Start: 2017-12-08 | End: 2017-12-08

## 2017-12-08 RX ORDER — CEFTRIAXONE 1 G/1
1 INJECTION, POWDER, FOR SOLUTION INTRAMUSCULAR; INTRAVENOUS ONCE
Status: COMPLETED | OUTPATIENT
Start: 2017-12-08 | End: 2017-12-08

## 2017-12-08 RX ORDER — LEVOFLOXACIN 750 MG/1
750 TABLET ORAL DAILY
Qty: 10 TABLET | Refills: 0 | Status: SHIPPED | OUTPATIENT
Start: 2017-12-08 | End: 2017-12-18

## 2017-12-08 RX ADMIN — CYANOCOBALAMIN 1000 MCG: 1000 INJECTION INTRAMUSCULAR; SUBCUTANEOUS at 10:51

## 2017-12-08 RX ADMIN — CEFTRIAXONE 1 G: 1 INJECTION, POWDER, FOR SOLUTION INTRAMUSCULAR; INTRAVENOUS at 10:50

## 2017-12-11 ENCOUNTER — NURSE ONLY (OUTPATIENT)
Dept: FAMILY MEDICINE CLINIC | Age: 57
End: 2017-12-11
Payer: MEDICARE

## 2017-12-11 DIAGNOSIS — L03.818 CELLULITIS OF OTHER SPECIFIED SITE: Primary | ICD-10-CM

## 2017-12-11 PROCEDURE — 96372 THER/PROPH/DIAG INJ SC/IM: CPT | Performed by: FAMILY MEDICINE

## 2017-12-11 RX ORDER — CEFTRIAXONE 1 G/1
1 INJECTION, POWDER, FOR SOLUTION INTRAMUSCULAR; INTRAVENOUS ONCE
Status: COMPLETED | OUTPATIENT
Start: 2017-12-11 | End: 2017-12-11

## 2017-12-11 RX ADMIN — CEFTRIAXONE 1 G: 1 INJECTION, POWDER, FOR SOLUTION INTRAMUSCULAR; INTRAVENOUS at 15:52

## 2017-12-11 NOTE — PROGRESS NOTES
Per Nadir Nguyen MD orders Sarah Boland was given 1gm  of Rocephin IM, in the Left Gluteal. No complications or reactions were noted. Patient tolerated procedure well.

## 2017-12-12 ENCOUNTER — NURSE ONLY (OUTPATIENT)
Dept: FAMILY MEDICINE CLINIC | Age: 57
End: 2017-12-12
Payer: MEDICARE

## 2017-12-12 DIAGNOSIS — L03.115 CELLULITIS OF RIGHT LOWER EXTREMITY: Primary | ICD-10-CM

## 2017-12-12 PROCEDURE — 96372 THER/PROPH/DIAG INJ SC/IM: CPT | Performed by: FAMILY MEDICINE

## 2017-12-12 RX ORDER — CEFTRIAXONE 1 G/1
1 INJECTION, POWDER, FOR SOLUTION INTRAMUSCULAR; INTRAVENOUS ONCE
Status: COMPLETED | OUTPATIENT
Start: 2017-12-12 | End: 2017-12-12

## 2017-12-12 RX ADMIN — CEFTRIAXONE 1 G: 1 INJECTION, POWDER, FOR SOLUTION INTRAMUSCULAR; INTRAVENOUS at 15:32

## 2017-12-12 NOTE — PROGRESS NOTES
Per Sandip Gracia MD orders Shakira Neil was given 1gram  of Rocephin IM, in the right hip. No complications or reactions were noted. Patient tolerated procedure well.

## 2017-12-13 ENCOUNTER — NURSE ONLY (OUTPATIENT)
Dept: FAMILY MEDICINE CLINIC | Age: 57
End: 2017-12-13
Payer: MEDICARE

## 2017-12-13 DIAGNOSIS — L03.115 CELLULITIS OF RIGHT LOWER EXTREMITY: Primary | ICD-10-CM

## 2017-12-13 PROCEDURE — 96372 THER/PROPH/DIAG INJ SC/IM: CPT | Performed by: FAMILY MEDICINE

## 2017-12-13 RX ORDER — CEFTRIAXONE 1 G/1
1 INJECTION, POWDER, FOR SOLUTION INTRAMUSCULAR; INTRAVENOUS ONCE
Status: COMPLETED | OUTPATIENT
Start: 2017-12-13 | End: 2017-12-13

## 2017-12-13 RX ADMIN — CEFTRIAXONE 1 G: 1 INJECTION, POWDER, FOR SOLUTION INTRAMUSCULAR; INTRAVENOUS at 14:33

## 2017-12-13 NOTE — PROGRESS NOTES
Per Dyan Putnam MD orders Edith Lopes was given 1gram  of Rocephin shot IM, in the left hip. No complications or reactions were noted.  Patient tolerated procedure well. '

## 2017-12-19 ENCOUNTER — TELEPHONE (OUTPATIENT)
Dept: FAMILY MEDICINE CLINIC | Age: 57
End: 2017-12-19

## 2017-12-19 RX ORDER — LEVOFLOXACIN 500 MG/1
500 TABLET, FILM COATED ORAL DAILY
Qty: 10 TABLET | Refills: 0 | Status: SHIPPED | OUTPATIENT
Start: 2017-12-19 | End: 2017-12-27 | Stop reason: ALTCHOICE

## 2017-12-27 ENCOUNTER — OFFICE VISIT (OUTPATIENT)
Dept: FAMILY MEDICINE CLINIC | Age: 57
End: 2017-12-27
Payer: MEDICARE

## 2017-12-27 VITALS
OXYGEN SATURATION: 89 % | RESPIRATION RATE: 18 BRPM | HEIGHT: 76 IN | SYSTOLIC BLOOD PRESSURE: 108 MMHG | DIASTOLIC BLOOD PRESSURE: 60 MMHG | HEART RATE: 86 BPM

## 2017-12-27 DIAGNOSIS — L03.311 CELLULITIS OF ABDOMINAL WALL: Primary | ICD-10-CM

## 2017-12-27 DIAGNOSIS — I50.9 CONGESTIVE HEART FAILURE, UNSPECIFIED CONGESTIVE HEART FAILURE CHRONICITY, UNSPECIFIED CONGESTIVE HEART FAILURE TYPE: ICD-10-CM

## 2017-12-27 PROCEDURE — G8417 CALC BMI ABV UP PARAM F/U: HCPCS | Performed by: FAMILY MEDICINE

## 2017-12-27 PROCEDURE — G8484 FLU IMMUNIZE NO ADMIN: HCPCS | Performed by: FAMILY MEDICINE

## 2017-12-27 PROCEDURE — 1036F TOBACCO NON-USER: CPT | Performed by: FAMILY MEDICINE

## 2017-12-27 PROCEDURE — 3017F COLORECTAL CA SCREEN DOC REV: CPT | Performed by: FAMILY MEDICINE

## 2017-12-27 PROCEDURE — 99213 OFFICE O/P EST LOW 20 MIN: CPT | Performed by: FAMILY MEDICINE

## 2017-12-27 PROCEDURE — G8427 DOCREV CUR MEDS BY ELIG CLIN: HCPCS | Performed by: FAMILY MEDICINE

## 2017-12-27 PROCEDURE — 96372 THER/PROPH/DIAG INJ SC/IM: CPT | Performed by: FAMILY MEDICINE

## 2017-12-27 RX ORDER — CEFTRIAXONE 1 G/1
1 INJECTION, POWDER, FOR SOLUTION INTRAMUSCULAR; INTRAVENOUS ONCE
Status: COMPLETED | OUTPATIENT
Start: 2017-12-27 | End: 2017-12-27

## 2017-12-27 RX ORDER — METHYLPREDNISOLONE SODIUM SUCCINATE 125 MG/2ML
125 INJECTION, POWDER, LYOPHILIZED, FOR SOLUTION INTRAMUSCULAR; INTRAVENOUS ONCE
Status: COMPLETED | OUTPATIENT
Start: 2017-12-27 | End: 2017-12-27

## 2017-12-27 RX ADMIN — CEFTRIAXONE 1 G: 1 INJECTION, POWDER, FOR SOLUTION INTRAMUSCULAR; INTRAVENOUS at 12:32

## 2017-12-27 RX ADMIN — METHYLPREDNISOLONE SODIUM SUCCINATE 125 MG: 125 INJECTION, POWDER, LYOPHILIZED, FOR SOLUTION INTRAMUSCULAR; INTRAVENOUS at 12:33

## 2017-12-27 NOTE — PROGRESS NOTES
Have you seen any other physician or provider since your last visit no    Have you had any other diagnostic tests since your last visit? no    Have you changed or stopped any medications since your last visit? no     Per Martin Connell MD orders Manuel Muñoz was given 1 gram  of Rocephin IM, in the right hip. No complications or reactions were noted. Patient tolerated procedure well. Per Martin Connell MD orders Manuel Muñoz was given 125 mg  of solumedrol IM, in the left hip. No complications or reactions were noted. Patient tolerated procedure well.

## 2017-12-28 ENCOUNTER — NURSE ONLY (OUTPATIENT)
Dept: FAMILY MEDICINE CLINIC | Age: 57
End: 2017-12-28

## 2017-12-28 DIAGNOSIS — L03.115 CELLULITIS OF RIGHT LOWER EXTREMITY: Primary | ICD-10-CM

## 2017-12-28 PROBLEM — I50.43 CHF (CONGESTIVE HEART FAILURE), NYHA CLASS I, ACUTE ON CHRONIC, COMBINED (HCC): Status: ACTIVE | Noted: 2017-12-28

## 2017-12-28 RX ORDER — CEFTRIAXONE 1 G/1
1 INJECTION, POWDER, FOR SOLUTION INTRAMUSCULAR; INTRAVENOUS ONCE
Qty: 1 G | Refills: 0
Start: 2017-12-28 | End: 2017-12-28 | Stop reason: ALTCHOICE

## 2017-12-28 ASSESSMENT — ENCOUNTER SYMPTOMS
NAUSEA: 1
SHORTNESS OF BREATH: 1
COLOR CHANGE: 1
DIARRHEA: 1
BACK PAIN: 1

## 2017-12-28 NOTE — PROGRESS NOTES
Per Keiko Bell MD orders Ludy Granda was given 1 gram  of rocpehin IM, in the right hip. No complications or reactions were noted. Patient tolerated procedure well.

## 2017-12-29 NOTE — PROGRESS NOTES
SUBJECTIVE:    Patient ID: Jeison Torres is a 59 y.o. male. Chief Complaint   Patient presents with    Shortness of Breath    Tachycardia       HPI: He's here complaining of some increase in shortness of breath. He is having some more swelling. He's had more racing heart. He says he's having some more swelling in his abdomen. He is having a lot of redness and tenderness is still having some drainage down in the abdominal area. He says he's somewhat better he thinks that he was before on the abdomen. He continues to struggle with his blood sugars. He's getting by relatively well with his pain medicine. He continues to have a lot of social issues. He is very worried about his mother-in-law who seems to be terminal at this point. Review of Systems   Constitutional: Positive for activity change, appetite change and fatigue. Respiratory: Positive for shortness of breath. Gastrointestinal: Positive for diarrhea and nausea. Musculoskeletal: Positive for arthralgias, back pain, gait problem and myalgias. Skin: Positive for color change and rash. Neurological: Positive for weakness. All other systems reviewed and are negative. OBJECTIVE:  Wt Readings from Last 3 Encounters:   12/28/17 (!) 460 lb (208.7 kg)   11/28/17 (!) 459 lb (208.2 kg)   11/03/17 (!) 459 lb (208.2 kg)     BP Readings from Last 3 Encounters:   12/28/17 (!) 170/70   12/27/17 108/60   12/08/17 (!) 140/70      Pulse Readings from Last 3 Encounters:   12/28/17 82   12/27/17 86   12/08/17 100     There is no height or weight on file to calculate BMI. Resp Readings from Last 3 Encounters:   12/28/17 (!) 2   12/27/17 18   12/08/17 18     Physical Exam   Constitutional: He is oriented to person, place, and time. Vital signs are normal. He appears well-developed and well-nourished. HENT:   Head: Normocephalic and atraumatic.    Right Ear: Hearing and external ear normal.   Left Ear: Hearing and external ear normal.   Nose: Nose normal.   Mouth/Throat: Uvula is midline, oropharynx is clear and moist and mucous membranes are normal.   Eyes: Conjunctivae, EOM and lids are normal. Pupils are equal, round, and reactive to light. Neck: Trachea normal, normal range of motion and phonation normal. Neck supple. Carotid bruit is not present. No thyroid mass and no thyromegaly present. Cardiovascular: Normal rate, regular rhythm, S1 normal, S2 normal, normal heart sounds and normal pulses. Exam reveals no gallop and no friction rub. No murmur heard. Pulmonary/Chest: Effort normal.   Musculoskeletal:        Right shoulder: He exhibits decreased range of motion, tenderness, pain, spasm and decreased strength. Left shoulder: He exhibits decreased range of motion and tenderness. Left elbow: Tenderness found. Right knee: He exhibits decreased range of motion. Tenderness found. Left knee: He exhibits decreased range of motion. Tenderness found. Lumbar back: He exhibits decreased range of motion, tenderness, pain and spasm. Neurological: He is alert and oriented to person, place, and time. Left arm and 4tht and 5th digit have some decreased sensation. Some decreased  strenght in the left hand   Skin: Skin is warm and dry. Bilateral lower extremity redness and warmth, he has some plus 2 edema below the knee bilaterally   Psychiatric: He has a normal mood and affect. Nursing note and vitals reviewed.       Results in Past 30 Days  Result Component Current Result Ref Range Previous Result Ref Range   Alb 3.3 (L) (12/28/2017) 3.4 - 4.8 g/dL Not in Time Range    Albumin/Globulin Ratio 0.8 (12/28/2017) 0.8 - 2.0 Not in Time Range    Alkaline Phosphatase 71 (12/28/2017) 25 - 100 U/L Not in Time Range    ALT 8 (12/28/2017) 4 - 36 U/L Not in Time Range    AST 20 (12/28/2017) 8 - 33 U/L Not in Time Range    BUN 18 (12/28/2017) 6 - 20 mg/dL Not in Time Range    Calcium 8.5 (12/28/2017) 8.5 - 10.5 mg/dL Not in % cream Reorder       Controlled Substances Monitoring:

## 2017-12-30 PROBLEM — E66.01 MORBID OBESITY (HCC): Status: ACTIVE | Noted: 2017-12-30

## 2017-12-30 PROBLEM — E11.9 TYPE 2 DIABETES MELLITUS (HCC): Status: ACTIVE | Noted: 2017-12-30

## 2017-12-30 PROBLEM — D64.9 ANEMIA: Status: ACTIVE | Noted: 2017-12-30

## 2017-12-30 PROBLEM — I50.9 CONGESTIVE HEART FAILURE (HCC): Status: ACTIVE | Noted: 2017-12-30

## 2017-12-30 ASSESSMENT — ENCOUNTER SYMPTOMS
BACK PAIN: 1
DIARRHEA: 1
NAUSEA: 1
COLOR CHANGE: 1
SHORTNESS OF BREATH: 1

## 2017-12-30 NOTE — PROGRESS NOTES
Constitutional: He is oriented to person, place, and time. Vital signs are normal. He appears well-developed and well-nourished. HENT:   Head: Normocephalic and atraumatic. Right Ear: Hearing and external ear normal.   Left Ear: Hearing and external ear normal.   Nose: Nose normal.   Mouth/Throat: Uvula is midline, oropharynx is clear and moist and mucous membranes are normal.   Eyes: Conjunctivae, EOM and lids are normal. Pupils are equal, round, and reactive to light. Neck: Trachea normal, normal range of motion and phonation normal. Neck supple. Carotid bruit is not present. No thyroid mass and no thyromegaly present. Cardiovascular: Normal rate, regular rhythm, S1 normal, S2 normal, normal heart sounds and normal pulses. Exam reveals no gallop and no friction rub. No murmur heard. Pulmonary/Chest: Effort normal.   Musculoskeletal:        Right shoulder: He exhibits decreased range of motion, tenderness, pain, spasm and decreased strength. Left shoulder: He exhibits decreased range of motion and tenderness. Left elbow: Tenderness found. Right knee: He exhibits decreased range of motion. Tenderness found. Left knee: He exhibits decreased range of motion. Tenderness found. Lumbar back: He exhibits decreased range of motion, tenderness, pain and spasm. Neurological: He is alert and oriented to person, place, and time. Left arm and 4tht and 5th digit have some decreased sensation. Some decreased  strenght in the left hand   Skin: Skin is warm and dry. Bilateral lower extremity redness and warmth, he has some plus 2 edema below the knee bilaterally   Psychiatric: He has a normal mood and affect. Nursing note and vitals reviewed.       Results in Past 30 Days  Result Component Current Result Ref Range Previous Result Ref Range   Alb 3.3 (L) (12/28/2017) 3.4 - 4.8 g/dL Not in Time Range    Albumin/Globulin Ratio 0.8 (12/28/2017) 0.8 - 2.0 Not in Time Range mg        Medications Discontinued During This Encounter   Medication Reason    levofloxacin (LEVAQUIN) 500 MG tablet Therapy completed       Controlled Substances Monitoring:

## 2018-01-02 PROBLEM — I10 ESSENTIAL HYPERTENSION: Status: ACTIVE | Noted: 2018-01-02

## 2018-01-02 PROBLEM — R53.81 DECLINING FUNCTIONAL STATUS: Status: ACTIVE | Noted: 2018-01-02

## 2018-01-08 ENCOUNTER — TELEPHONE (OUTPATIENT)
Dept: MEDSURG UNIT | Age: 58
End: 2018-01-08

## 2018-01-09 ENCOUNTER — CARE COORDINATION (OUTPATIENT)
Dept: CARE COORDINATION | Age: 58
End: 2018-01-09

## 2018-01-09 ENCOUNTER — TELEPHONE (OUTPATIENT)
Dept: FAMILY MEDICINE CLINIC | Age: 58
End: 2018-01-09

## 2018-01-09 RX ORDER — POTASSIUM CHLORIDE 20 MEQ/1
20 TABLET, EXTENDED RELEASE ORAL DAILY
Qty: 14 TABLET | Refills: 0 | Status: SHIPPED | OUTPATIENT
Start: 2018-01-09 | End: 2018-01-12

## 2018-01-11 ENCOUNTER — HOSPITAL ENCOUNTER (OUTPATIENT)
Dept: OTHER | Age: 58
Discharge: HOME | End: 2018-01-11
Attending: NURSE PRACTITIONER | Admitting: NURSE PRACTITIONER

## 2018-01-11 ENCOUNTER — HOSPITAL ENCOUNTER (OUTPATIENT)
Dept: PHYSICAL THERAPY | Age: 58
Discharge: HOME | End: 2018-01-31
Attending: INTERNAL MEDICINE | Admitting: INTERNAL MEDICINE

## 2018-01-11 DIAGNOSIS — R60.1 ANASARCA: ICD-10-CM

## 2018-01-11 LAB
ANION GAP SERPL CALCULATED.3IONS-SCNC: 12 MMOL/L (ref 3–16)
BUN BLDV-MCNC: 35 MG/DL (ref 6–20)
CALCIUM SERPL-MCNC: 9.7 MG/DL (ref 8.5–10.5)
CHLORIDE BLD-SCNC: 95 MMOL/L (ref 98–107)
CO2: 35 MMOL/L (ref 20–30)
CREAT SERPL-MCNC: 1.3 MG/DL (ref 0.4–1.2)
GFR AFRICAN AMERICAN: >59
GFR NON-AFRICAN AMERICAN: 56
GLUCOSE BLD-MCNC: 109 MG/DL (ref 74–106)
POTASSIUM SERPL-SCNC: 5.2 MMOL/L (ref 3.4–5.1)
SODIUM BLD-SCNC: 142 MMOL/L (ref 136–145)

## 2018-01-11 ASSESSMENT — PAIN DESCRIPTION - FREQUENCY: FREQUENCY: CONTINUOUS

## 2018-01-11 ASSESSMENT — PAIN DESCRIPTION - LOCATION: LOCATION: BACK

## 2018-01-11 ASSESSMENT — PAIN DESCRIPTION - PAIN TYPE: TYPE: CHRONIC PAIN

## 2018-01-11 ASSESSMENT — PAIN DESCRIPTION - DESCRIPTORS: DESCRIPTORS: ACHING;DULL

## 2018-01-11 ASSESSMENT — PAIN DESCRIPTION - PROGRESSION: CLINICAL_PROGRESSION: NOT CHANGED

## 2018-01-11 ASSESSMENT — PAIN SCALES - GENERAL: PAINLEVEL_OUTOF10: 4

## 2018-01-11 NOTE — PROGRESS NOTES
Physical Therapy  Initial Assessment  Date: 2018  Patient Name: Jocelyn Mcgregor  MRN: [id number]  : 1960     Treatment Diagnosis:  functional decline, decreased strength, endurance; difficulty walking    Restrictions  Restrictions/Precautions  Restrictions/Precautions: General Precautions  Required Braces or Orthoses?: No    Subjective   General  Chart Reviewed: Yes  Patient assessed for rehabilitation services?: Yes  Family / Caregiver Present: No  Referring Practitioner: Inocencia Hightower MD  Diagnosis: Muscle weakness, Difficulty walking  Follows Commands: Within Functional Limits  General Comment  Comments: denies having hip, knee, shoulder surgeries; notes knee arthritis with intermittent pain  PT Visit Information  PT Insurance Information: Medicare  Subjective  Subjective: Recent hospitalization for abdominal wound, congestive heart failure, fluid retention - spent about 9 days in hospital; current weight is about 430#; state illness came on fairly quickly, over about a week; developed as SOB, general weakness, low back pain; was d/c home: currently, feels he is gradually gaining his strength and endurance since returning home; has difficulty with general mobility, low endurance with physical activity, especially ambulation;   Pain Screening  Patient Currently in Pain: Yes  Pain Assessment  Pain Assessment: 0-10  Pain Level: 4  Pain Type: Chronic pain  Pain Location: Back  Pain Descriptors: Aching;Dull  Pain Frequency: Continuous  Clinical Progression: Not changed  Vital Signs  Patient Currently in Pain: Yes    Vision/Hearing  Vision  Vision: Within Functional Limits  Hearing  Hearing: Within functional limits    Orientation  Orientation  Overall Orientation Status: Within Normal Limits    Social/Functional History  Social/Functional History  Lives With: Spouse  ADL Assistance: Independent  Ambulation Assistance: Independent  Transfer Assistance: Independent  Active : Yes  Mode of Transportation: Car  Occupation: Unemployed  Objective     Observation/Palpation  Posture: Fair  Observation: Tall, morbidly obese male; NAD; ambulates with a cane/staff, wide MERRY; steady but guarded; mouth breather, mild SOB breathing    AROM RLE (degrees)  RLE AROM: WFL  AROM LLE (degrees)  LLE AROM : WFL  AROM RUE (degrees)  RUE AROM : WFL  AROM LUE (degrees)  LUE AROM : WFL    Strength RLE  R Hip Flexion: 4+/5  R Hip ABduction: 4+/5  R Hip ADduction: 4+/5  R Knee Flexion: 4+/5  R Knee Extension: 4+/5  R Ankle Dorsiflexion: 5/5  Strength LLE  L Hip Flexion: 4+/5  L Hip ABduction: 4+/5  L Hip ADduction: 4+/5  L Knee Flexion: 4+/5  L Knee Extension: 4+/5  L Ankle Dorsiflexion: 5/5  Strength RUE  Strength RUE: WFL  Strength LUE  Strength LUE: WFL  Tone RLE  RLE Tone: Normotonic  Tone LLE  LLE Tone: Normotonic  Motor Control  Gross Motor?: WFL  Additional Measures  Special Tests: LEFS =  Other: HR/O2: rest: 77/93%, after walking 150 feet - 82/95%, 300 feet: 80/83% with jump to 92% within 20 seconds  Sensation  Overall Sensation Status: WFL     Transfers  Sit to Stand: Modified independent  Stand to sit: Modified independent  Ambulation  Ambulation?: Yes  Ambulation 1  Surface: level tile  Device: Single point cane  Assistance: Modified Independent  Quality of Gait: as noted     Exercises  Exercise 1: Begin: Seated hip Abduction, adduction: use band and ball  Exercise 2: Standing marches: 1' x 3 with needed rest breaks  Exercise 3: standing hip ABD AROM - bilateral - 3 x 10 each  Exercise 4: standing heel-toe raises: 2 x 15  Exercise 5: standing mid rows, low rows  Exercise 7: ambulation in souza: Document: distance = , time = , HR =  O2 =                       Assessment   Conditions Requiring Skilled Therapeutic Intervention  Body structures, Functions, Activity limitations: Decreased functional mobility ; Decreased strength;Decreased high-level IADLs  Assessment: s/p recent hospitalization due to CHF; presents with functional decline, decreased strength, endurance; difficulty walking  Treatment Diagnosis:  functional decline, decreased strength, endurance; difficulty walking  Prognosis: Good  Decision Making: Medium Complexity  Patient Education: sefl-care, activity modification-energy conservation  Barriers to Learning: no  REQUIRES PT FOLLOW UP: Yes  Treatment Initiated : patient ed. Activity Tolerance  Activity Tolerance: Patient Tolerated treatment well         Plan   Plan  Times per week: 2 x week  Plan weeks: 8 weeks  Current Treatment Recommendations: Strengthening, Balance Training, Neuromuscular Re-education, Gait Training, Endurance Training, Home Exercise Program, Transfer Training    G-Code  PT G-Codes  Functional Assessment Tool Used: LEFS  Score: 24  Functional Limitation: Mobility: Walking and moving around  Mobility: Walking and Moving Around Current Status (): At least 40 percent but less than 60 percent impaired, limited or restricted  Mobility: Walking and Moving Around Goal Status (): At least 20 percent but less than 40 percent impaired, limited or restricted    OutComes Score                                           Goals  Short term goals  Time Frame for Short term goals: 4 weeks  Short term goal 1: Patient to ambulate 500+ feet independently without SOB, O2 sats > 90%. Short term goal 2: Achieve a LEFS score of 30 or higher. Short term goal 3: [place] with HEP. Long term goals  Time Frame for Long term goals : 8 weeks  Long term goal 1: Patient to ambulate 1,000 -1,500+ feet independently without SOB, O2 sats > 90%. Long term goal 2: Achieve a LEFS score of 35 or higher. Long term goal 3: Patient to be able to perform ADL's, I-ADL's independently without SOB.   Patient Goals   Patient goals : improve strength, functional mobility, activity tolerance       Therapy Time   Individual Concurrent Group Co-treatment   Time In 1104         Time Out 1148         Minutes 44

## 2018-01-12 ENCOUNTER — OFFICE VISIT (OUTPATIENT)
Dept: FAMILY MEDICINE CLINIC | Age: 58
End: 2018-01-12
Payer: MEDICARE

## 2018-01-12 VITALS
OXYGEN SATURATION: 93 % | RESPIRATION RATE: 22 BRPM | HEIGHT: 76 IN | HEART RATE: 82 BPM | SYSTOLIC BLOOD PRESSURE: 118 MMHG | BODY MASS INDEX: 38.36 KG/M2 | WEIGHT: 315 LBS | DIASTOLIC BLOOD PRESSURE: 52 MMHG

## 2018-01-12 DIAGNOSIS — I10 ESSENTIAL HYPERTENSION: ICD-10-CM

## 2018-01-12 DIAGNOSIS — Z79.4 TYPE 2 DIABETES MELLITUS WITH COMPLICATION, WITH LONG-TERM CURRENT USE OF INSULIN (HCC): ICD-10-CM

## 2018-01-12 DIAGNOSIS — E66.01 MORBID OBESITY (HCC): ICD-10-CM

## 2018-01-12 DIAGNOSIS — E11.8 TYPE 2 DIABETES MELLITUS WITH COMPLICATION, WITH LONG-TERM CURRENT USE OF INSULIN (HCC): ICD-10-CM

## 2018-01-12 DIAGNOSIS — I50.9 CONGESTIVE HEART FAILURE, UNSPECIFIED CONGESTIVE HEART FAILURE CHRONICITY, UNSPECIFIED CONGESTIVE HEART FAILURE TYPE: Primary | ICD-10-CM

## 2018-01-12 PROCEDURE — 99214 OFFICE O/P EST MOD 30 MIN: CPT | Performed by: FAMILY MEDICINE

## 2018-01-12 RX ORDER — ALBUTEROL SULFATE 90 UG/1
2 AEROSOL, METERED RESPIRATORY (INHALATION) EVERY 6 HOURS PRN
Qty: 1 INHALER | Refills: 3 | Status: SHIPPED | OUTPATIENT
Start: 2018-01-12 | End: 2018-04-16

## 2018-01-12 RX ORDER — GABAPENTIN 800 MG/1
800 TABLET ORAL 4 TIMES DAILY
Qty: 120 TABLET | Refills: 2 | Status: SHIPPED | OUTPATIENT
Start: 2018-01-12 | End: 2018-02-02 | Stop reason: SDUPTHER

## 2018-01-12 RX ORDER — FLUTICASONE PROPIONATE 50 MCG
1 SPRAY, SUSPENSION (ML) NASAL DAILY
COMMUNITY
End: 2018-02-02 | Stop reason: SDUPTHER

## 2018-01-12 RX ORDER — ALBUTEROL SULFATE 2.5 MG/3ML
SOLUTION RESPIRATORY (INHALATION)
Qty: 180 VIAL | Refills: 5 | Status: CANCELLED | OUTPATIENT
Start: 2018-01-12

## 2018-01-12 RX ORDER — FUROSEMIDE 80 MG
80 TABLET ORAL DAILY
Qty: 45 TABLET | Refills: 2 | Status: SHIPPED | OUTPATIENT
Start: 2018-01-12 | End: 2018-02-02 | Stop reason: SDUPTHER

## 2018-01-12 RX ORDER — CARISOPRODOL 350 MG/1
350 TABLET ORAL 4 TIMES DAILY PRN
Qty: 120 TABLET | Refills: 2 | Status: SHIPPED | OUTPATIENT
Start: 2018-01-12 | End: 2018-02-02 | Stop reason: SDUPTHER

## 2018-01-12 RX ORDER — NABUMETONE 750 MG/1
TABLET, FILM COATED ORAL
Qty: 60 TABLET | Refills: 5 | Status: CANCELLED | OUTPATIENT
Start: 2018-01-12

## 2018-01-12 RX ORDER — PANTOPRAZOLE SODIUM 40 MG/1
40 TABLET, DELAYED RELEASE ORAL
Qty: 30 TABLET | Refills: 5 | Status: SHIPPED | OUTPATIENT
Start: 2018-01-12 | End: 2018-05-18 | Stop reason: SDUPTHER

## 2018-01-12 RX ORDER — LORATADINE 10 MG/1
TABLET ORAL
Qty: 30 TABLET | Refills: 5 | Status: SHIPPED | OUTPATIENT
Start: 2018-01-12 | End: 2018-05-18 | Stop reason: SDUPTHER

## 2018-01-12 RX ORDER — CARVEDILOL 6.25 MG/1
6.25 TABLET ORAL 2 TIMES DAILY WITH MEALS
Qty: 60 TABLET | Refills: 2 | Status: SHIPPED | OUTPATIENT
Start: 2018-01-12 | End: 2018-04-16 | Stop reason: SDUPTHER

## 2018-01-12 RX ORDER — PROMETHAZINE HYDROCHLORIDE 25 MG/1
TABLET ORAL
Qty: 20 TABLET | Refills: 0 | Status: SHIPPED | OUTPATIENT
Start: 2018-01-12 | End: 2018-01-26 | Stop reason: SDUPTHER

## 2018-01-12 RX ORDER — MECLIZINE HYDROCHLORIDE CHEWABLE TABLETS 25 MG/1
TABLET, CHEWABLE ORAL
Qty: 30 TABLET | Refills: 0 | Status: SHIPPED | OUTPATIENT
Start: 2018-01-12 | End: 2018-02-02 | Stop reason: SDUPTHER

## 2018-01-12 RX ORDER — TRIAMCINOLONE ACETONIDE 1 MG/G
CREAM TOPICAL
Qty: 3 TUBE | Refills: 5 | Status: SHIPPED | OUTPATIENT
Start: 2018-01-12 | End: 2020-03-17 | Stop reason: SDUPTHER

## 2018-01-12 RX ORDER — LISINOPRIL 20 MG/1
20 TABLET ORAL DAILY
Qty: 30 TABLET | Refills: 2 | Status: SHIPPED | OUTPATIENT
Start: 2018-01-12 | End: 2018-02-02

## 2018-01-12 RX ORDER — POTASSIUM CHLORIDE 20 MEQ/1
20 TABLET, EXTENDED RELEASE ORAL DAILY
Qty: 14 TABLET | Refills: 0 | Status: CANCELLED | OUTPATIENT
Start: 2018-01-12

## 2018-01-12 RX ORDER — LANOLIN ALCOHOL/MO/W.PET/CERES
325 CREAM (GRAM) TOPICAL
Qty: 90 TABLET | Refills: 2 | Status: SHIPPED | OUTPATIENT
Start: 2018-01-12 | End: 2018-02-02 | Stop reason: SDUPTHER

## 2018-01-12 RX ORDER — FUROSEMIDE 40 MG/1
40 TABLET ORAL DAILY
COMMUNITY
End: 2018-02-02

## 2018-01-12 RX ORDER — OXYCODONE HYDROCHLORIDE 30 MG/1
30 TABLET ORAL EVERY 4 HOURS PRN
Qty: 150 TABLET | Refills: 0 | Status: SHIPPED | OUTPATIENT
Start: 2018-01-12 | End: 2018-02-02 | Stop reason: SDUPTHER

## 2018-01-12 RX ORDER — SIMVASTATIN 40 MG
TABLET ORAL
Qty: 30 TABLET | Refills: 5 | Status: SHIPPED | OUTPATIENT
Start: 2018-01-12 | End: 2018-05-18 | Stop reason: SDUPTHER

## 2018-01-12 RX ORDER — SPIRONOLACTONE 50 MG/1
50 TABLET, FILM COATED ORAL 2 TIMES DAILY
Qty: 60 TABLET | Refills: 2 | Status: SHIPPED | OUTPATIENT
Start: 2018-01-12 | End: 2018-04-16

## 2018-01-12 RX ORDER — MONTELUKAST SODIUM 10 MG/1
TABLET ORAL
Qty: 30 TABLET | Refills: 5 | Status: SHIPPED | OUTPATIENT
Start: 2018-01-12 | End: 2018-05-18 | Stop reason: SDUPTHER

## 2018-01-12 RX ORDER — HYDROCODONE BITARTRATE AND ACETAMINOPHEN 10; 325 MG/1; MG/1
1 TABLET ORAL EVERY 4 HOURS PRN
Qty: 150 TABLET | Refills: 0 | Status: SHIPPED | OUTPATIENT
Start: 2018-01-12 | End: 2018-02-02 | Stop reason: SDUPTHER

## 2018-01-12 RX ORDER — METOLAZONE 5 MG/1
5 TABLET ORAL DAILY
Qty: 30 TABLET | Refills: 0 | Status: CANCELLED | OUTPATIENT
Start: 2018-01-12

## 2018-01-12 ASSESSMENT — ENCOUNTER SYMPTOMS
NAUSEA: 1
SHORTNESS OF BREATH: 1
BACK PAIN: 1
COLOR CHANGE: 1
DIARRHEA: 1

## 2018-01-12 NOTE — PROGRESS NOTES
SUBJECTIVE:    Patient ID: Siva Wyman is a 59 y.o. male. Chief Complaint   Patient presents with    Follow-Up from [address] [place] [address] Rd cover    Other     he is taking a lasix 40 and 80 not sure if he should       HPI: he is here in follow up of hospitialization. He is doing better. His abdomen is not as red or swollen. He is feeling quite a bit better. He's not having as much shortness of breath. His weight down quite a bit. He says he does feel like he's much improved. He still struggling somewhat with keeping the fluid off. He says he still has a lot of weakness. He's not had any fever. He has completed the medicine they told him to take. Daily he's not able to get on his insurance is the Zaroxolyn. He wasn't sure what to do about that. He is trying to watch the diet as they've told him. He is not have any other significant new symptoms. His blood sugars are doing well. His blood pressures have been good. Review of Systems   Constitutional: Positive for activity change, appetite change and fatigue. Respiratory: Positive for shortness of breath. Gastrointestinal: Positive for diarrhea and nausea. Musculoskeletal: Positive for arthralgias, back pain, gait problem and myalgias. Skin: Positive for color change and rash. Neurological: Positive for weakness. All other systems reviewed and are negative. OBJECTIVE:  Wt Readings from Last 3 Encounters:   01/12/18 (!) 430 lb (195 kg)   01/07/18 (!) 449 lb 8 oz (203.9 kg)   01/01/18 (!) 492 lb 2 oz (223.2 kg)     BP Readings from Last 3 Encounters:   01/12/18 (!) 118/52   01/07/18 (!) 130/52   01/01/18 (!) 175/76      Pulse Readings from Last 3 Encounters:   01/12/18 82   01/07/18 78   01/01/18 78     Body mass index is 52.34 kg/m². Resp Readings from Last 3 Encounters:   01/12/18 22   01/07/18 18   01/01/18 20     Physical Exam   Constitutional: He is oriented to person, place, and time.  Vital signs are normal. He (12/28/2017) 4 - 36 U/L Not in Time Range    AST 20 (12/28/2017) 8 - 33 U/L Not in Time Range    BUN 35 (H) (1/11/2018) 6 - 20 mg/dL 23 (H) (1/5/2018) 6 - 20 mg/dL   Calcium 9.7 (1/11/2018) 8.5 - 10.5 mg/dL 9.3 (1/5/2018) 8.5 - 10.5 mg/dL   Chloride 95 (L) (1/11/2018) 98 - 107 mmol/L 90 (L) (1/5/2018) 98 - 107 mmol/L   CO2 35 (H) (1/11/2018) 20 - 30 mmol/L 34 (H) (1/5/2018) 20 - 30 mmol/L   CREATININE 1.3 (H) (1/11/2018) 0.4 - 1.2 mg/dL 1.0 (1/5/2018) 0.4 - 1.2 mg/dL   GFR  >59 (1/11/2018) >59 >59 (1/5/2018) >59   GFR Non- 56 (L) (1/11/2018) >59 >59 (1/5/2018) >59   Globulin 3.9 (12/28/2017) g/dL Not in Time Range    Glucose 109 (H) (1/11/2018) 74 - 106 mg/dL 241 (H) (1/5/2018) 74 - 106 mg/dL   Potassium 5.2 (H) (1/11/2018) 3.4 - 5.1 mmol/L 4.2 (1/5/2018) 3.4 - 5.1 mmol/L   Sodium 142 (1/11/2018) 136 - 145 mmol/L 135 (L) (1/5/2018) 136 - 145 mmol/L   Total Bilirubin 0.3 (12/28/2017) 0.3 - 1.2 mg/dL Not in Time Range    Total Protein 7.2 (12/28/2017) 6.4 - 8.3 g/dL Not in Time Range        Hemoglobin A1C (%)   Date Value   11/03/2017 7.8 (H)     Microscopic Examination (no units)   Date Value   12/28/2017 Not Indicated     LDL Calculated (mg/dL)   Date Value   08/08/2017 99         Lab Results   Component Value Date    WBC 7.8 01/05/2018    NEUTROABS 4.8 01/05/2018    HGB 10.7 01/05/2018    HCT 38.6 01/05/2018    HCT 41.6 06/05/2012    MCV 92.3 01/05/2018     01/05/2018     06/05/2012       Lab Results   Component Value Date    TSH 2.61 12/28/2017         ASSESSMENT:   1. Congestive heart failure, unspecified congestive heart failure chronicity, unspecified congestive heart failure type ([facility] [address]     2. Essential hypertension     3. Morbid obesity ([facility] [address]     4.  Type 2 diabetes mellitus with complication, with long-term current use of insulin (HCC)          PLAN:  Orders Placed This Encounter   Medications    spironolactone (ALDACTONE) 50 MG tablet     Sig: Take 1 tablet by day supply. Earliest Fill Date: 1/12/18     Dispense:  150 tablet     Refill:  0    ferrous sulfate (FE TABS) 325 (65 Fe) MG EC tablet     Sig: Take 1 tablet by mouth 3 times daily (with meals)     Dispense:  90 tablet     Refill:  2    insulin glargine (LANTUS SOLOSTAR) 100 UNIT/ML injection pen     Sig: INJECT 100 UNITS TWICE A DAY     Dispense:  75 mL     Refill:  5    triamcinolone (KENALOG) 0.1 % cream     Sig: Apply topically 2 times daily.   80 gram tube     Dispense:  3 Tube     Refill:  5    furosemide (LASIX) 80 MG tablet     Sig: Take 1 tablet by mouth daily Take one tablet in the am and half in pm     Dispense:  45 tablet     Refill:  2    lisinopril (PRINIVIL;ZESTRIL) 20 MG tablet     Sig: Take 1 tablet by mouth daily     Dispense:  30 tablet     Refill:  2    carvedilol (COREG) 6.25 MG tablet     Sig: Take 1 tablet by mouth 2 times daily (with meals)     Dispense:  60 tablet     Refill:  2    albuterol sulfate HFA (VENTOLIN HFA) 108 (90 Base) MCG/ACT inhaler     Sig: Inhale 2 puffs into the lungs every 6 hours as needed for Wheezing     Dispense:  1 Inhaler     Refill:  3        Medications Discontinued During This Encounter   Medication Reason    nystatin (MYCOSTATIN) 217686 UNIT/GM powder Cost of medication    potassium chloride (KLOR-CON M) 20 MEQ extended release tablet     metolazone (ZAROXOLYN) 5 MG tablet     spironolactone (ALDACTONE) 50 MG tablet Reorder    meclizine (ANTIVERT) 25 MG CHEW Reorder    loratadine (CLARITIN) 10 MG tablet Reorder    saxagliptin (ONGLYZA) 5 MG TABS tablet Reorder    metFORMIN (GLUCOPHAGE) 1000 MG tablet Reorder    montelukast (SINGULAIR) 10 MG tablet Reorder    pantoprazole (PROTONIX) 40 MG tablet Reorder    simvastatin (ZOCOR) 40 MG tablet Reorder    promethazine (PHENERGAN) 25 MG tablet Reorder    gabapentin (NEURONTIN) 800 MG tablet Reorder    carisoprodol (SOMA) 350 MG tablet Reorder    HYDROcodone-acetaminophen (NORCO)  MG per tablet Reorder    oxyCODONE (OXY-IR) 30 MG immediate release tablet Reorder    ferrous sulfate (FE TABS) 325 (65 Fe) MG EC tablet Reorder    LANTUS SOLOSTAR 100 UNIT/ML injection pen Reorder    triamcinolone (KENALOG) 0.1 % cream Reorder    furosemide (LASIX) 80 MG tablet Reorder    lisinopril (PRINIVIL;ZESTRIL) 20 MG tablet Reorder    carvedilol (COREG) 6.25 MG tablet Reorder       Controlled Substances Monitoring:

## 2018-01-18 ENCOUNTER — HOSPITAL ENCOUNTER (OUTPATIENT)
Dept: PHYSICAL THERAPY | Age: 58
Discharge: HOME | End: 2018-01-18
Admitting: INTERNAL MEDICINE

## 2018-01-19 ENCOUNTER — OFFICE VISIT (OUTPATIENT)
Dept: FAMILY MEDICINE CLINIC | Age: 58
End: 2018-01-19
Payer: MEDICARE

## 2018-01-19 ENCOUNTER — HOSPITAL ENCOUNTER (OUTPATIENT)
Dept: OTHER | Age: 58
Discharge: HOME | End: 2018-01-19
Attending: FAMILY MEDICINE | Admitting: FAMILY MEDICINE

## 2018-01-19 VITALS
RESPIRATION RATE: 18 BRPM | OXYGEN SATURATION: 97 % | SYSTOLIC BLOOD PRESSURE: 122 MMHG | DIASTOLIC BLOOD PRESSURE: 60 MMHG | WEIGHT: 315 LBS | HEART RATE: 80 BPM | BODY MASS INDEX: 38.36 KG/M2 | HEIGHT: 76 IN

## 2018-01-19 DIAGNOSIS — I50.9 CONGESTIVE HEART FAILURE, UNSPECIFIED CONGESTIVE HEART FAILURE CHRONICITY, UNSPECIFIED CONGESTIVE HEART FAILURE TYPE: ICD-10-CM

## 2018-01-19 DIAGNOSIS — I10 ESSENTIAL HYPERTENSION: Primary | ICD-10-CM

## 2018-01-19 DIAGNOSIS — Z79.4 TYPE 2 DIABETES MELLITUS WITH COMPLICATION, WITH LONG-TERM CURRENT USE OF INSULIN (HCC): ICD-10-CM

## 2018-01-19 DIAGNOSIS — E11.8 TYPE 2 DIABETES MELLITUS WITH COMPLICATION, WITH LONG-TERM CURRENT USE OF INSULIN (HCC): ICD-10-CM

## 2018-01-19 DIAGNOSIS — I10 ESSENTIAL HYPERTENSION: ICD-10-CM

## 2018-01-19 LAB
A/G RATIO: 1.1 (ref 0.8–2)
ALBUMIN SERPL-MCNC: 3.9 G/DL (ref 3.4–4.8)
ALP BLD-CCNC: 64 U/L (ref 25–100)
ALT SERPL-CCNC: 17 U/L (ref 4–36)
ANION GAP SERPL CALCULATED.3IONS-SCNC: 12 MMOL/L (ref 3–16)
AST SERPL-CCNC: 20 U/L (ref 8–33)
BASOPHILS ABSOLUTE: 0 K/UL (ref 0–0.1)
BASOPHILS RELATIVE PERCENT: 0.4 %
BILIRUB SERPL-MCNC: <0.2 MG/DL (ref 0.3–1.2)
BUN BLDV-MCNC: 20 MG/DL (ref 6–20)
CALCIUM SERPL-MCNC: 9.5 MG/DL (ref 8.5–10.5)
CHLORIDE BLD-SCNC: 100 MMOL/L (ref 98–107)
CO2: 31 MMOL/L (ref 20–30)
CREAT SERPL-MCNC: 1 MG/DL (ref 0.4–1.2)
EOSINOPHILS ABSOLUTE: 0.3 K/UL (ref 0–0.4)
EOSINOPHILS RELATIVE PERCENT: 3.3 %
GFR AFRICAN AMERICAN: >59
GFR NON-AFRICAN AMERICAN: >59
GLOBULIN: 3.4 G/DL
GLUCOSE BLD-MCNC: 180 MG/DL (ref 74–106)
HCT VFR BLD CALC: 43.4 % (ref 40–54)
HEMOGLOBIN: 12.1 G/DL (ref 13–18)
IMMATURE GRANULOCYTES #: 0.1 K/UL
IMMATURE GRANULOCYTES %: 0.6 % (ref 0–5)
LYMPHOCYTES ABSOLUTE: 2.1 K/UL (ref 1.5–4)
LYMPHOCYTES RELATIVE PERCENT: 26.2 %
MCH RBC QN AUTO: 26.3 PG (ref 27–32)
MCHC RBC AUTO-ENTMCNC: 27.9 G/DL (ref 31–35)
MCV RBC AUTO: 94.3 FL (ref 80–100)
MONOCYTES ABSOLUTE: 0.7 K/UL (ref 0.2–0.8)
MONOCYTES RELATIVE PERCENT: 9.4 %
NEUTROPHILS ABSOLUTE: 4.7 K/UL (ref 2–7.5)
NEUTROPHILS RELATIVE PERCENT: 60.1 %
PDW BLD-RTO: 17.5 % (ref 11–16)
PLATELET # BLD: 232 K/UL (ref 150–400)
PMV BLD AUTO: 13.4 FL (ref 6–10)
POTASSIUM SERPL-SCNC: 4.7 MMOL/L (ref 3.4–5.1)
RBC # BLD: 4.6 M/UL (ref 4.5–6)
SODIUM BLD-SCNC: 143 MMOL/L (ref 136–145)
TOTAL PROTEIN: 7.3 G/DL (ref 6.4–8.3)
WBC # BLD: 7.9 K/UL (ref 4–11)

## 2018-01-19 PROCEDURE — 3046F HEMOGLOBIN A1C LEVEL >9.0%: CPT | Performed by: FAMILY MEDICINE

## 2018-01-19 PROCEDURE — 1111F DSCHRG MED/CURRENT MED MERGE: CPT | Performed by: FAMILY MEDICINE

## 2018-01-19 PROCEDURE — 3017F COLORECTAL CA SCREEN DOC REV: CPT | Performed by: FAMILY MEDICINE

## 2018-01-19 PROCEDURE — 99214 OFFICE O/P EST MOD 30 MIN: CPT | Performed by: FAMILY MEDICINE

## 2018-01-19 PROCEDURE — G8484 FLU IMMUNIZE NO ADMIN: HCPCS | Performed by: FAMILY MEDICINE

## 2018-01-19 PROCEDURE — 1036F TOBACCO NON-USER: CPT | Performed by: FAMILY MEDICINE

## 2018-01-19 PROCEDURE — G8427 DOCREV CUR MEDS BY ELIG CLIN: HCPCS | Performed by: FAMILY MEDICINE

## 2018-01-19 PROCEDURE — G8417 CALC BMI ABV UP PARAM F/U: HCPCS | Performed by: FAMILY MEDICINE

## 2018-01-19 ASSESSMENT — ENCOUNTER SYMPTOMS
COLOR CHANGE: 1
NAUSEA: 1
BACK PAIN: 1
DIARRHEA: 1
SHORTNESS OF BREATH: 1

## 2018-01-19 NOTE — PROGRESS NOTES
Have you seen any other physician or provider since your last visit no    Have you had any other diagnostic tests since your last visit? no    Have you changed or stopped any medications since your last visit? no
(H) (1/5/2018) 20 - 30 mmol/L   CREATININE 1.3 (H) (1/11/2018) 0.4 - 1.2 mg/dL 1.0 (1/5/2018) 0.4 - 1.2 mg/dL   GFR  >59 (1/11/2018) >59 >59 (1/5/2018) >59   GFR Non- 56 (L) (1/11/2018) >59 >59 (1/5/2018) >59   Globulin 3.9 (12/28/2017) g/dL Not in Time Range    Glucose 109 (H) (1/11/2018) 74 - 106 mg/dL 241 (H) (1/5/2018) 74 - 106 mg/dL   Potassium 5.2 (H) (1/11/2018) 3.4 - 5.1 mmol/L 4.2 (1/5/2018) 3.4 - 5.1 mmol/L   Sodium 142 (1/11/2018) 136 - 145 mmol/L 135 (L) (1/5/2018) 136 - 145 mmol/L   Total Bilirubin 0.3 (12/28/2017) 0.3 - 1.2 mg/dL Not in Time Range    Total Protein 7.2 (12/28/2017) 6.4 - 8.3 g/dL Not in Time Range        Hemoglobin A1C (%)   Date Value   11/03/2017 7.8 (H)     Microscopic Examination (no units)   Date Value   12/28/2017 Not Indicated     LDL Calculated (mg/dL)   Date Value   08/08/2017 99         Lab Results   Component Value Date    WBC 7.8 01/05/2018    NEUTROABS 4.8 01/05/2018    HGB 10.7 01/05/2018    HCT 38.6 01/05/2018    HCT 41.6 06/05/2012    MCV 92.3 01/05/2018     01/05/2018     06/05/2012       Lab Results   Component Value Date    TSH 2.61 12/28/2017         ASSESSMENT:   1. Essential hypertension  COMPREHENSIVE METABOLIC PANEL    CBC WITH AUTO DIFFERENTIAL   2. Type 2 diabetes mellitus with complication, with long-term current use of insulin ([facility] [address]     3. Congestive heart failure, unspecified congestive heart failure chronicity, unspecified congestive heart failure type ([facility] [address]          PLAN:  No orders of the defined types were placed in this encounter.        Medications Discontinued During This Encounter   Medication Reason    nabumetone (RELAFEN) 750 MG tablet Alternate therapy       Controlled Substances Monitoring:

## 2018-01-23 ENCOUNTER — HOSPITAL ENCOUNTER (OUTPATIENT)
Dept: PHYSICAL THERAPY | Age: 58
Discharge: HOME | End: 2018-01-23
Admitting: INTERNAL MEDICINE

## 2018-01-23 NOTE — FLOWSHEET NOTE
Physical Therapy Daily Treatment Note   Date:  2018    TIme In:     1400                 Time Out:     1430    Patient Name:  Tom Bach    :  1960  MRN: [id number]    Restrictions/Precautions:    Pertinent Medical History:  Medical/Treatment Diagnosis Information:  ·   functional decline, decreased strength, endurance; difficulty walking  ·    Insurance/Certification information:    Medicare  Physician Information:     Dr. Jus Keane of care signed (Y/N):    Visit# / total visits:     2/    G-Code (if applicable):      Date / Visit # G-Code Applied:         Progress Note: []  Yes  [x]  No  Next due by: Visit #10      Pain level:   7/10  Subjective: Pt reported having a lot of pain in low back.      Objective:  Observation:   Test measurements:    Palpation:    Exercises:  Exercise Resistance/Repetitions Other comments   Seated hip ABD,ADD  5\"x20 23   Standing marches with rest breaks 1'x3 23   Standing hip ABD AROM 3x10ea B 23   Standing heel-toe-raises 2x15 23   standings mid/low rows 2x15 Red 23                                 ambulation in souza: Document: distance = , time = , HR =  O2 =        Other Therapeutic Activities:      Manual Treatments:      Modalities:        Timed Code Treatment Minutes:  10 and rest grouped      Total Treatment Minutes:  30    Treatment/Activity Tolerance:  [] Patient tolerated treatment well [x] Patient limited by fatigue  [x] Patient limited by pain  [] Patient limited by other medical complications  [] Other:     Pain after treatment:      7/10    Prognosis: [] Good [x] Fair  [] Poor    Patient Requires Follow-up: [x] Yes  [] No    Plan:   [x] Continue per plan of care [] Alter current plan (see comments)  [] Plan of care initiated [] Hold pending MD visit [] Discharge    Plan for Next Session:        Electronically signed by:  Ella Guardado PTA

## 2018-01-26 RX ORDER — PROMETHAZINE HYDROCHLORIDE 25 MG/1
TABLET ORAL
Qty: 20 TABLET | Refills: 0 | Status: SHIPPED | OUTPATIENT
Start: 2018-01-26 | End: 2018-02-13 | Stop reason: SDUPTHER

## 2018-01-29 RX ORDER — POTASSIUM CHLORIDE 750 MG/1
TABLET, FILM COATED, EXTENDED RELEASE ORAL
Qty: 60 TABLET | Refills: 0 | OUTPATIENT
Start: 2018-01-29

## 2018-01-30 ENCOUNTER — HOSPITAL ENCOUNTER (OUTPATIENT)
Dept: PHYSICAL THERAPY | Age: 58
Discharge: HOME | End: 2018-02-02
Admitting: INTERNAL MEDICINE

## 2018-01-30 NOTE — FLOWSHEET NOTE
Physical Therapy Daily Treatment Note   Date:  2018    TIme In:      1430                Time Out:     1500    Patient Name:  Shravan Byers    :  1960  MRN: [id number]    Restrictions/Precautions:    Pertinent Medical History:  Medical/Treatment Diagnosis Information:    functional decline, decreased strength, endurance; difficulty walking     Insurance/Certification information:    Medicare  Physician Information:    Callum Suggs MD  Plan of care signed (Y/N):    Visit# / total visits:     3/    G-Code (if applicable):      Date / Visit # G-Code Applied:         Progress Note: []  Yes  [x]  No  Next due by: Visit #10      Pain level:   7/10  Subjective: Pt with no new complaints, back is not hurting as bad as last visit. Objective:  Observation:   Test measurements:    Palpation:    Exercises:  Exercise Resistance/Repetitions Other comments   Seated hip ABD,ADD  5\"x20 30   Standing marches with rest breaks 1'x3 30   Standing hip ABD AROM 3x10ea B 30   Standing heel-toe-raises 2x15 30   standings mid/low rows 2x15 Green 30                                 ambulation in souza: Document: distance = , time = , HR =  O2 =        Other Therapeutic Activities:      Manual Treatments:      Modalities:        Timed Code Treatment Minutes: Total Treatment Minutes:      Treatment/Activity Tolerance:  [] Patient tolerated treatment well [x] Patient limited by fatigue  [] Patient limited by pain  [] Patient limited by other medical complications  [] Other: Pt able to complete exercises today with less c/o pain.     Pain after treatment:      7/10    Prognosis: [] Good [x] Fair  [] Poor    Patient Requires Follow-up: [x] Yes  [] No    Plan:   [x] Continue per plan of care [] Alter current plan (see comments)  [] Plan of care initiated [] Hold pending MD visit [] Discharge    Plan for Next Session:        Electronically signed by:  Felecia Santamaria PTA

## 2018-02-01 ENCOUNTER — HOSPITAL ENCOUNTER (OUTPATIENT)
Dept: PHYSICAL THERAPY | Age: 58
Discharge: HOME | End: 2018-02-04
Admitting: INTERNAL MEDICINE

## 2018-02-01 ENCOUNTER — HOSPITAL ENCOUNTER (OUTPATIENT)
Dept: OTHER | Age: 58
Discharge: HOME | End: 2018-02-28
Attending: INTERNAL MEDICINE | Admitting: INTERNAL MEDICINE

## 2018-02-02 ENCOUNTER — HOSPITAL ENCOUNTER (OUTPATIENT)
Dept: OTHER | Age: 58
Discharge: HOME | End: 2018-02-02
Attending: FAMILY MEDICINE | Admitting: FAMILY MEDICINE

## 2018-02-02 ENCOUNTER — OFFICE VISIT (OUTPATIENT)
Dept: FAMILY MEDICINE CLINIC | Age: 58
End: 2018-02-02
Payer: MEDICARE

## 2018-02-02 VITALS
BODY MASS INDEX: 38.36 KG/M2 | HEART RATE: 81 BPM | SYSTOLIC BLOOD PRESSURE: 112 MMHG | DIASTOLIC BLOOD PRESSURE: 62 MMHG | WEIGHT: 315 LBS | HEIGHT: 76 IN | RESPIRATION RATE: 18 BRPM | OXYGEN SATURATION: 93 %

## 2018-02-02 DIAGNOSIS — R42 DIZZINESS: Primary | ICD-10-CM

## 2018-02-02 DIAGNOSIS — R42 DIZZINESS: ICD-10-CM

## 2018-02-02 LAB
A/G RATIO: 1.3 (ref 0.8–2)
ALBUMIN SERPL-MCNC: 4.1 G/DL (ref 3.4–4.8)
ALP BLD-CCNC: 58 U/L (ref 25–100)
ALT SERPL-CCNC: 13 U/L (ref 4–36)
ANION GAP SERPL CALCULATED.3IONS-SCNC: 10 MMOL/L (ref 3–16)
AST SERPL-CCNC: 17 U/L (ref 8–33)
BASOPHILS ABSOLUTE: 0 K/UL (ref 0–0.1)
BASOPHILS RELATIVE PERCENT: 0.5 %
BILIRUB SERPL-MCNC: <0.2 MG/DL (ref 0.3–1.2)
BUN BLDV-MCNC: 23 MG/DL (ref 6–20)
CALCIUM SERPL-MCNC: 9 MG/DL (ref 8.5–10.5)
CHLORIDE BLD-SCNC: 100 MMOL/L (ref 98–107)
CO2: 32 MMOL/L (ref 20–30)
CREAT SERPL-MCNC: 0.9 MG/DL (ref 0.4–1.2)
EOSINOPHILS ABSOLUTE: 0.3 K/UL (ref 0–0.4)
EOSINOPHILS RELATIVE PERCENT: 4.1 %
GFR AFRICAN AMERICAN: >59
GFR NON-AFRICAN AMERICAN: >59
GLOBULIN: 3.2 G/DL
GLUCOSE BLD-MCNC: 111 MG/DL (ref 74–106)
HCT VFR BLD CALC: 43.9 % (ref 40–54)
HEMOGLOBIN: 12.5 G/DL (ref 13–18)
IMMATURE GRANULOCYTES #: 0 K/UL
IMMATURE GRANULOCYTES %: 0.3 % (ref 0–5)
LYMPHOCYTES ABSOLUTE: 2 K/UL (ref 1.5–4)
LYMPHOCYTES RELATIVE PERCENT: 29.8 %
MCH RBC QN AUTO: 26.6 PG (ref 27–32)
MCHC RBC AUTO-ENTMCNC: 28.5 G/DL (ref 31–35)
MCV RBC AUTO: 93.4 FL (ref 80–100)
MONOCYTES ABSOLUTE: 0.7 K/UL (ref 0.2–0.8)
MONOCYTES RELATIVE PERCENT: 10.5 %
NEUTROPHILS ABSOLUTE: 3.6 K/UL (ref 2–7.5)
NEUTROPHILS RELATIVE PERCENT: 54.8 %
PDW BLD-RTO: 17.1 % (ref 11–16)
PLATELET # BLD: 122 K/UL (ref 150–400)
PMV BLD AUTO: 13.1 FL (ref 6–10)
POTASSIUM SERPL-SCNC: 4.4 MMOL/L (ref 3.4–5.1)
RBC # BLD: 4.7 M/UL (ref 4.5–6)
SODIUM BLD-SCNC: 142 MMOL/L (ref 136–145)
TOTAL PROTEIN: 7.3 G/DL (ref 6.4–8.3)
WBC # BLD: 6.6 K/UL (ref 4–11)

## 2018-02-02 PROCEDURE — 1111F DSCHRG MED/CURRENT MED MERGE: CPT | Performed by: FAMILY MEDICINE

## 2018-02-02 PROCEDURE — 3017F COLORECTAL CA SCREEN DOC REV: CPT | Performed by: FAMILY MEDICINE

## 2018-02-02 PROCEDURE — 99214 OFFICE O/P EST MOD 30 MIN: CPT | Performed by: FAMILY MEDICINE

## 2018-02-02 PROCEDURE — G8484 FLU IMMUNIZE NO ADMIN: HCPCS | Performed by: FAMILY MEDICINE

## 2018-02-02 PROCEDURE — 1036F TOBACCO NON-USER: CPT | Performed by: FAMILY MEDICINE

## 2018-02-02 PROCEDURE — G8417 CALC BMI ABV UP PARAM F/U: HCPCS | Performed by: FAMILY MEDICINE

## 2018-02-02 PROCEDURE — G8427 DOCREV CUR MEDS BY ELIG CLIN: HCPCS | Performed by: FAMILY MEDICINE

## 2018-02-02 RX ORDER — NABUMETONE 750 MG/1
TABLET, FILM COATED ORAL
COMMUNITY
Start: 2018-01-26 | End: 2018-02-02 | Stop reason: CLARIF

## 2018-02-02 RX ORDER — FUROSEMIDE 40 MG/1
40 TABLET ORAL DAILY
Qty: 60 TABLET | Status: CANCELLED | OUTPATIENT
Start: 2018-02-02

## 2018-02-02 RX ORDER — FUROSEMIDE 80 MG
80 TABLET ORAL DAILY
Qty: 45 TABLET | Refills: 2 | Status: SHIPPED | OUTPATIENT
Start: 2018-02-02 | End: 2018-04-16 | Stop reason: SDUPTHER

## 2018-02-02 RX ORDER — CARISOPRODOL 350 MG/1
350 TABLET ORAL 4 TIMES DAILY PRN
Qty: 120 TABLET | Refills: 2 | Status: SHIPPED | OUTPATIENT
Start: 2018-02-02 | End: 2018-03-04

## 2018-02-02 RX ORDER — BENAZEPRIL HYDROCHLORIDE 10 MG/1
10 TABLET ORAL DAILY
Qty: 30 TABLET | Refills: 2 | Status: SHIPPED | OUTPATIENT
Start: 2018-02-02 | End: 2018-02-21

## 2018-02-02 RX ORDER — OXYCODONE HYDROCHLORIDE 30 MG/1
30 TABLET ORAL EVERY 4 HOURS PRN
Qty: 150 TABLET | Refills: 0 | Status: SHIPPED | OUTPATIENT
Start: 2018-02-02 | End: 2018-03-22 | Stop reason: SDUPTHER

## 2018-02-02 RX ORDER — GABAPENTIN 800 MG/1
800 TABLET ORAL 4 TIMES DAILY
Qty: 120 TABLET | Refills: 2 | Status: SHIPPED | OUTPATIENT
Start: 2018-02-02 | End: 2018-02-13

## 2018-02-02 RX ORDER — ALBUTEROL SULFATE 2.5 MG/3ML
SOLUTION RESPIRATORY (INHALATION)
Qty: 180 VIAL | Refills: 5 | Status: SHIPPED | OUTPATIENT
Start: 2018-02-02 | End: 2020-03-17 | Stop reason: SDUPTHER

## 2018-02-02 RX ORDER — MECLIZINE HYDROCHLORIDE CHEWABLE TABLETS 25 MG/1
TABLET, CHEWABLE ORAL
Qty: 30 TABLET | Refills: 0 | Status: SHIPPED | OUTPATIENT
Start: 2018-02-02 | End: 2018-05-24 | Stop reason: SDUPTHER

## 2018-02-02 RX ORDER — FLUTICASONE PROPIONATE 50 MCG
1 SPRAY, SUSPENSION (ML) NASAL DAILY
Qty: 1 BOTTLE | Refills: 5 | Status: SHIPPED | OUTPATIENT
Start: 2018-02-02 | End: 2018-09-04 | Stop reason: SDUPTHER

## 2018-02-02 RX ORDER — LANOLIN ALCOHOL/MO/W.PET/CERES
325 CREAM (GRAM) TOPICAL
Qty: 90 TABLET | Refills: 2 | Status: SHIPPED | OUTPATIENT
Start: 2018-02-02 | End: 2019-08-13

## 2018-02-02 RX ORDER — HYDROCODONE BITARTRATE AND ACETAMINOPHEN 10; 325 MG/1; MG/1
1 TABLET ORAL EVERY 4 HOURS PRN
Qty: 150 TABLET | Refills: 0 | Status: SHIPPED | OUTPATIENT
Start: 2018-02-02 | End: 2018-02-21 | Stop reason: SDUPTHER

## 2018-02-02 ASSESSMENT — ENCOUNTER SYMPTOMS
BACK PAIN: 1
DIARRHEA: 1
COLOR CHANGE: 1
NAUSEA: 1
SHORTNESS OF BREATH: 1

## 2018-02-02 NOTE — PROGRESS NOTES
Have you seen any other physician or provider since your last visit no    Have you had any other diagnostic tests since your last visit? no    Have you changed or stopped any medications since your last visit? no     Goals      A1C <9      Blood Pressure < 140/90                     LDL CALC < 100      weight loss

## 2018-02-02 NOTE — PROGRESS NOTES
normal.   Left Ear: Hearing and external ear normal.   Nose: Nose normal.   Mouth/Throat: Uvula is midline, oropharynx is clear and moist and mucous membranes are normal.   Eyes: Conjunctivae, EOM and lids are normal. Pupils are equal, round, and reactive to light. Neck: Trachea normal, normal range of motion and phonation normal. Neck supple. Carotid bruit is not present. No thyroid mass and no thyromegaly present. Cardiovascular: Normal rate, regular rhythm, S1 normal, S2 normal, normal heart sounds and normal pulses. Exam reveals no gallop and no friction rub. No murmur heard. Pulmonary/Chest: Effort normal.   Musculoskeletal:        Right shoulder: He exhibits decreased range of motion, tenderness, pain, spasm and decreased strength. Left shoulder: He exhibits decreased range of motion and tenderness. Left elbow: Tenderness found. Right knee: He exhibits decreased range of motion. Tenderness found. Left knee: He exhibits decreased range of motion. Tenderness found. Lumbar back: He exhibits decreased range of motion, tenderness, pain and spasm. Neurological: He is alert and oriented to person, place, and time. Left arm and 4tht and 5th digit have some decreased sensation. Some decreased  strenght in the left hand   Skin: Skin is warm and dry. Bilateral lower extremity redness and warmth, he has some plus 2 edema below the knee bilaterally   Psychiatric: He has a normal mood and affect. Nursing note and vitals reviewed.       Results in Past 30 Days  Result Component Current Result Ref Range Previous Result Ref Range   Alb 4.1 (2/2/2018) 3.4 - 4.8 g/dL 3.9 (1/19/2018) 3.4 - 4.8 g/dL   Albumin/Globulin Ratio 1.3 (2/2/2018) 0.8 - 2.0 1.1 (1/19/2018) 0.8 - 2.0   Alkaline Phosphatase 58 (2/2/2018) 25 - 100 U/L 64 (1/19/2018) 25 - 100 U/L   ALT 13 (2/2/2018) 4 - 36 U/L 17 (1/19/2018) 4 - 36 U/L   AST 17 (2/2/2018) 8 - 33 U/L 20 (1/19/2018) 8 - 33 U/L   BUN 23 (H) (65 Fe) MG EC tablet Reorder    albuterol (PROVENTIL) (2.5 MG/3ML) 0.083% nebulizer solution Reorder    furosemide (LASIX) 80 MG tablet Reorder       Controlled Substances Monitoring:

## 2018-02-06 ENCOUNTER — HOSPITAL ENCOUNTER (OUTPATIENT)
Dept: PHYSICAL THERAPY | Age: 58
Discharge: HOME | End: 2018-02-09
Admitting: INTERNAL MEDICINE

## 2018-02-06 NOTE — FLOWSHEET NOTE
Physical Therapy Daily Treatment Note   Date:  2018    TIme In:   1356                   Time Out:     2663    Patient Name:  Maikol Deleon    :  1960  MRN: [id number]    Restrictions/Precautions:    Pertinent Medical History:  Medical/Treatment Diagnosis Information:    functional decline, decreased strength, endurance; difficulty walking     Insurance/Certification information:    Medicare  Physician Information:    Michela Ganser, MD   Plan of care signed (Y/N):    Visit# / total visits:     5/    G-Code (if applicable):      Date / Visit # G-Code Applied:         Progress Note: []  Yes  [x]  No  Next due by: Visit #10      Pain level:   -8/10  Subjective: Pt reports he is having pain in his low back and left knee today. He isn't feeling good today. Objective:  Observation:   Test measurements:  LEFS:. Palpation:    Exercises:  Exercise Resistance/Repetitions Other comments   Seated hip ABD,ADD  2x20 RTB 6   Standing marches with rest breaks 1'x3 6   Standing hip ABD AROM 3x10ea B 6   Standing heel-toe-raises 2x15 6   standings mid/low rows 2x15 blue 6   HS curls 2x15 red 6   LAQ 2x10 2# 6                       ambulation in souza: Document: distance = , time = , HR =  O2 =  250'/4min, Vitals pre/post: 70/80bpm, 93/94 O2. 6     Other Therapeutic Activities:  Re-assess performed by Tierra Santos, PT. Manual Treatments:      Modalities:        Timed Code Treatment Minutes:  35      Total Treatment Minutes:  42    Treatment/Activity Tolerance:  [x] Patient tolerated treatment well [x] Patient limited by fatigue  [] Patient limited by pain  [] Patient limited by other medical complications  [x] Other: Pt completed tx with increased pain levels and difficulty with standing activity due to knee and back pain. Goals  Short term goals  Time Frame for Short term goals: 4 weeks  Short term goal 1: Patient to ambulate 500+ feet independently without SOB, O2 sats > 90%.  - met  Short term goal 2: Achieve a LEFS score of 30 or higher. - not met  Short term goal 3: [place] with HEP. - not met  Long term goals  Time Frame for Long term goals : 8 weeks  Long term goal 1: Patient to ambulate 1,000 -1,500+ feet independently without SOB, O2 sats > 90%. Long term goal 2: Achieve a LEFS score of 35 or higher. Long term goal 3: Patient to be able to perform ADL's, I-ADL's independently without SOB. Slow progress; responding favorably to PT intervention.     Pain after treatment:     8 /10    Prognosis: [] Good [x] Fair  [] Poor    Patient Requires Follow-up: [x] Yes  [] No    Plan:   [x] Continue per plan of care [] Alter current plan (see comments)  [] Plan of care initiated [] Hold pending MD visit [] Discharge    Plan for Next Session:        Electronically signed by:  Haydee Zhang PTA    Electronically signed by Paul Jane PT on 2/9/2018 at 1:33 PM

## 2018-02-08 ENCOUNTER — HOSPITAL ENCOUNTER (OUTPATIENT)
Dept: PHYSICAL THERAPY | Age: 58
Discharge: HOME | End: 2018-02-11
Admitting: INTERNAL MEDICINE

## 2018-02-08 NOTE — FLOWSHEET NOTE
Physical Therapy Daily Treatment Note   Date:  2018    TIme In:                      Time Out:    1433     Patient Name:  Lanie Snellen    :  1960  MRN: [id number]    Restrictions/Precautions:    Pertinent Medical History:  Medical/Treatment Diagnosis Information:    functional decline, decreased strength, endurance; difficulty walking     Insurance/Certification information:    Medicare  Physician Information:    Pili Trejo MD   Plan of care signed (Y/N):    Visit# / total visits:     6/    G-Code (if applicable):      Date / Visit # G-Code Applied:         Progress Note: []  Yes  [x]  No  Next due by: Visit #10      Pain level:   7/10  Subjective: Pt reports he is feeling better today than he did last visit but his knee is getting worse. Objective:  Observation:   Test measurements:  LEFS:. Palpation:    Exercises:  Exercise Resistance/Repetitions Other comments   Seated hip ABD,ADD  2x20 RTB 8   Standing marches with rest breaks 1'x3 8   Standing hip ABD AROM 3x10ea B 8   Standing heel-toe-raises 2x15 8   standings mid/low rows 2x15 blue 8   HS curls 2x15 red 8   LAQ 2x10 2# 8                       ambulation in souza: Document: distance = , time = , HR =  O2 =  300'/6.5min, Vitals pre/post: 76/74bpm, 93/94 O2. 8     Other Therapeutic Activities:      Manual Treatments:      Modalities:        Timed Code Treatment Minutes:  30      Total Treatment Minutes:  38    Treatment/Activity Tolerance:  [x] Patient tolerated treatment well [x] Patient limited by fatigue  [] Patient limited by pain  [] Patient limited by other medical complications  [x] Other: Pt completed tx with decreased rest breaks and was able to maintain good O2 levels with ambulation. Goals  Short term goals  Time Frame for Short term goals: 4 weeks  Short term goal 1: Patient to ambulate 500+ feet independently without SOB, O2 sats > 90%. Short term goal 2: Achieve a LEFS score of 30 or higher.   Short term goal 3:

## 2018-02-13 ENCOUNTER — OFFICE VISIT (OUTPATIENT)
Dept: FAMILY MEDICINE CLINIC | Age: 58
End: 2018-02-13
Payer: MEDICARE

## 2018-02-13 ENCOUNTER — HOSPITAL ENCOUNTER (OUTPATIENT)
Dept: PHYSICAL THERAPY | Age: 58
Discharge: HOME | End: 2018-02-16
Admitting: INTERNAL MEDICINE

## 2018-02-13 VITALS
BODY MASS INDEX: 38.36 KG/M2 | RESPIRATION RATE: 18 BRPM | WEIGHT: 315 LBS | HEART RATE: 78 BPM | DIASTOLIC BLOOD PRESSURE: 52 MMHG | SYSTOLIC BLOOD PRESSURE: 130 MMHG | HEIGHT: 76 IN | OXYGEN SATURATION: 95 %

## 2018-02-13 DIAGNOSIS — E11.42 TYPE 2 DIABETES MELLITUS WITH DIABETIC POLYNEUROPATHY, WITH LONG-TERM CURRENT USE OF INSULIN (HCC): ICD-10-CM

## 2018-02-13 DIAGNOSIS — R42 DIZZINESS: Primary | ICD-10-CM

## 2018-02-13 DIAGNOSIS — I50.9 CONGESTIVE HEART FAILURE, UNSPECIFIED CONGESTIVE HEART FAILURE CHRONICITY, UNSPECIFIED CONGESTIVE HEART FAILURE TYPE: ICD-10-CM

## 2018-02-13 DIAGNOSIS — Z79.4 TYPE 2 DIABETES MELLITUS WITH DIABETIC POLYNEUROPATHY, WITH LONG-TERM CURRENT USE OF INSULIN (HCC): ICD-10-CM

## 2018-02-13 LAB — GLUCOSE BLD-MCNC: 149 MG/DL

## 2018-02-13 PROCEDURE — G8417 CALC BMI ABV UP PARAM F/U: HCPCS | Performed by: FAMILY MEDICINE

## 2018-02-13 PROCEDURE — G8427 DOCREV CUR MEDS BY ELIG CLIN: HCPCS | Performed by: FAMILY MEDICINE

## 2018-02-13 PROCEDURE — 3017F COLORECTAL CA SCREEN DOC REV: CPT | Performed by: FAMILY MEDICINE

## 2018-02-13 PROCEDURE — 3046F HEMOGLOBIN A1C LEVEL >9.0%: CPT | Performed by: FAMILY MEDICINE

## 2018-02-13 PROCEDURE — 1036F TOBACCO NON-USER: CPT | Performed by: FAMILY MEDICINE

## 2018-02-13 PROCEDURE — 82962 GLUCOSE BLOOD TEST: CPT | Performed by: FAMILY MEDICINE

## 2018-02-13 PROCEDURE — G8484 FLU IMMUNIZE NO ADMIN: HCPCS | Performed by: FAMILY MEDICINE

## 2018-02-13 PROCEDURE — 99213 OFFICE O/P EST LOW 20 MIN: CPT | Performed by: FAMILY MEDICINE

## 2018-02-13 RX ORDER — PROMETHAZINE HYDROCHLORIDE 25 MG/1
TABLET ORAL
Qty: 20 TABLET | Refills: 0 | Status: SHIPPED | OUTPATIENT
Start: 2018-02-13 | End: 2018-05-24 | Stop reason: SDUPTHER

## 2018-02-13 RX ORDER — GABAPENTIN 600 MG/1
600 TABLET ORAL 4 TIMES DAILY
Qty: 120 TABLET | Refills: 0 | Status: SHIPPED | OUTPATIENT
Start: 2018-02-13 | End: 2018-03-22 | Stop reason: SDUPTHER

## 2018-02-13 ASSESSMENT — ENCOUNTER SYMPTOMS
COLOR CHANGE: 1
SHORTNESS OF BREATH: 1
NAUSEA: 1
DIARRHEA: 1
BACK PAIN: 1

## 2018-02-13 NOTE — PROGRESS NOTES
Have you seen any other physician or provider since your last visit no    Have you had any other diagnostic tests since your last visit? no    Have you changed or stopped any medications since your last visit? no     Goals      A1C <9      Blood Pressure < 140/90                     LDL CALC < 100      weight loss
Normocephalic and atraumatic. Right Ear: Hearing and external ear normal.   Left Ear: Hearing and external ear normal.   Nose: Nose normal.   Mouth/Throat: Uvula is midline, oropharynx is clear and moist and mucous membranes are normal.   Eyes: Conjunctivae, EOM and lids are normal. Pupils are equal, round, and reactive to light. Neck: Trachea normal, normal range of motion and phonation normal. Neck supple. Carotid bruit is not present. No thyroid mass and no thyromegaly present. Cardiovascular: Normal rate, regular rhythm, S1 normal, S2 normal, normal heart sounds and normal pulses. Exam reveals no gallop and no friction rub. No murmur heard. Pulmonary/Chest: Effort normal.   Musculoskeletal:        Right shoulder: He exhibits decreased range of motion, tenderness, pain, spasm and decreased strength. Left shoulder: He exhibits decreased range of motion and tenderness. Left elbow: Tenderness found. Right knee: He exhibits decreased range of motion. Tenderness found. Left knee: He exhibits decreased range of motion. Tenderness found. Lumbar back: He exhibits decreased range of motion, tenderness, pain and spasm. Neurological: He is alert and oriented to person, place, and time. Left arm and 4tht and 5th digit have some decreased sensation. Some decreased  strenght in the left hand   Skin: Skin is warm and dry. Bilateral lower extremity redness and warmth, he has some plus 2 edema below the knee bilaterally   Psychiatric: He has a normal mood and affect. Nursing note and vitals reviewed.       Results in Past 30 Days  Result Component Current Result Ref Range Previous Result Ref Range   Alb 4.1 (2/2/2018) 3.4 - 4.8 g/dL 3.9 (1/19/2018) 3.4 - 4.8 g/dL   Albumin/Globulin Ratio 1.3 (2/2/2018) 0.8 - 2.0 1.1 (1/19/2018) 0.8 - 2.0   Alkaline Phosphatase 58 (2/2/2018) 25 - 100 U/L 64 (1/19/2018) 25 - 100 U/L   ALT 13 (2/2/2018) 4 - 36 U/L 17 (1/19/2018) 4 - 36 U/L

## 2018-02-15 ENCOUNTER — HOSPITAL ENCOUNTER (OUTPATIENT)
Dept: PHYSICAL THERAPY | Age: 58
Discharge: HOME | End: 2018-02-18
Admitting: INTERNAL MEDICINE

## 2018-02-15 NOTE — FLOWSHEET NOTE
Physical Therapy Daily Treatment Note   Date:  2/15/2018    TIme In:   1410                   Time Out:    1450     Patient Name:  Loida Murphy    :  1960  MRN: [id number]    Restrictions/Precautions:    Pertinent Medical History:  Medical/Treatment Diagnosis Information:    functional decline, decreased strength, endurance; difficulty walking     Insurance/Certification information:    Medicare  Physician Information:    Leticia Husain MD   Plan of care signed (Y/N):    Visit# / total visits:     8/    G-Code (if applicable):      Date / Visit # G-Code Applied:         Progress Note: []  Yes  [x]  No  Next due by: Visit #10      Pain level:   7/10  Subjective: Pt reports he is feeling better today than he did last visit but his knee is getting worse. Objective:  Observation:   Test measurements:  LEFS:. Palpation:    Exercises:  Exercise Resistance/Repetitions Other comments   Seated hip ABD,ADD  2x20 RTB 15   Standing marches with rest breaks 1'x3 15   Standing hip ABD AROM 3x10ea B 15   Standing heel-toe-raises 2x15 15   standings mid/low rows 2x15 blue 15   HS curls 2x15 red 15   LAQ 2x10 2# 15                       ambulation in souza: Document: distance = , time = , HR =  O2 =  490'/4.5min, Vitals pre/post: 72/85bpm, 97/92 O2. 15     Other Therapeutic Activities:      Manual Treatments:      Modalities:        Timed Code Treatment Minutes:  40      Total Treatment Minutes:  40    Treatment/Activity Tolerance:  [x] Patient tolerated treatment well [x] Patient limited by fatigue  [] Patient limited by pain  [] Patient limited by other medical complications  [x] Other: Pt completed tx with decreased rest breaks and was able to maintain good O2 levels with ambulation. Pt able to significantly increase ambulation distance today while keeping good stats.     Goals  Short term goals  Time Frame for Short term goals: 4 weeks  Short term goal 1: Patient to ambulate 500+ feet independently without SOB, O2 sats > 90%. Short term goal 2: Achieve a LEFS score of 30 or higher. Short term goal 3: [place] with HEP. Long term goals  Time Frame for Long term goals : 8 weeks  Long term goal 1: Patient to ambulate 1,000 -1,500+ feet independently without SOB, O2 sats > 90%. Long term goal 2: Achieve a LEFS score of 35 or higher. Long term goal 3: Patient to be able to perform ADL's, I-ADL's independently without SOB.       Pain after treatment:     7/10    Prognosis: [] Good [x] Fair  [] Poor    Patient Requires Follow-up: [x] Yes  [] No    Plan:   [x] Continue per plan of care [] Alter current plan (see comments)  [] Plan of care initiated [] Hold pending MD visit [] Discharge    Plan for Next Session:        Electronically signed by:  Neyda He PTA

## 2018-02-20 ENCOUNTER — HOSPITAL ENCOUNTER (OUTPATIENT)
Dept: PHYSICAL THERAPY | Age: 58
Discharge: HOME | End: 2018-02-23
Admitting: INTERNAL MEDICINE

## 2018-02-21 ENCOUNTER — HOSPITAL ENCOUNTER (OUTPATIENT)
Dept: OTHER | Age: 58
Discharge: HOME | End: 2018-02-21
Attending: FAMILY MEDICINE | Admitting: FAMILY MEDICINE

## 2018-02-21 ENCOUNTER — OFFICE VISIT (OUTPATIENT)
Dept: FAMILY MEDICINE CLINIC | Age: 58
End: 2018-02-21
Payer: MEDICARE

## 2018-02-21 VITALS
DIASTOLIC BLOOD PRESSURE: 56 MMHG | HEART RATE: 73 BPM | SYSTOLIC BLOOD PRESSURE: 112 MMHG | WEIGHT: 315 LBS | BODY MASS INDEX: 38.36 KG/M2 | OXYGEN SATURATION: 96 % | HEIGHT: 76 IN | RESPIRATION RATE: 18 BRPM

## 2018-02-21 DIAGNOSIS — Z79.4 TYPE 2 DIABETES MELLITUS WITH DIABETIC POLYNEUROPATHY, WITH LONG-TERM CURRENT USE OF INSULIN (HCC): ICD-10-CM

## 2018-02-21 DIAGNOSIS — I10 ESSENTIAL HYPERTENSION: ICD-10-CM

## 2018-02-21 DIAGNOSIS — I50.9 CONGESTIVE HEART FAILURE, UNSPECIFIED CONGESTIVE HEART FAILURE CHRONICITY, UNSPECIFIED CONGESTIVE HEART FAILURE TYPE: Primary | ICD-10-CM

## 2018-02-21 DIAGNOSIS — E11.42 TYPE 2 DIABETES MELLITUS WITH DIABETIC POLYNEUROPATHY, WITH LONG-TERM CURRENT USE OF INSULIN (HCC): ICD-10-CM

## 2018-02-21 DIAGNOSIS — M15.9 OSTEOARTHRITIS OF MULTIPLE JOINTS, UNSPECIFIED OSTEOARTHRITIS TYPE: ICD-10-CM

## 2018-02-21 LAB — GLUCOSE BLD-MCNC: 136 MG/DL

## 2018-02-21 PROCEDURE — 3017F COLORECTAL CA SCREEN DOC REV: CPT | Performed by: FAMILY MEDICINE

## 2018-02-21 PROCEDURE — G8417 CALC BMI ABV UP PARAM F/U: HCPCS | Performed by: FAMILY MEDICINE

## 2018-02-21 PROCEDURE — 99214 OFFICE O/P EST MOD 30 MIN: CPT | Performed by: FAMILY MEDICINE

## 2018-02-21 PROCEDURE — 82962 GLUCOSE BLOOD TEST: CPT | Performed by: FAMILY MEDICINE

## 2018-02-21 PROCEDURE — 1036F TOBACCO NON-USER: CPT | Performed by: FAMILY MEDICINE

## 2018-02-21 PROCEDURE — 3044F HG A1C LEVEL LT 7.0%: CPT | Performed by: FAMILY MEDICINE

## 2018-02-21 PROCEDURE — G8482 FLU IMMUNIZE ORDER/ADMIN: HCPCS | Performed by: FAMILY MEDICINE

## 2018-02-21 PROCEDURE — G8427 DOCREV CUR MEDS BY ELIG CLIN: HCPCS | Performed by: FAMILY MEDICINE

## 2018-02-21 RX ORDER — LISINOPRIL 2.5 MG/1
2.5 TABLET ORAL DAILY
Qty: 30 TABLET | Refills: 3 | Status: SHIPPED | OUTPATIENT
Start: 2018-02-21 | End: 2018-06-25 | Stop reason: SDUPTHER

## 2018-02-21 RX ORDER — HYDROCODONE BITARTRATE AND ACETAMINOPHEN 10; 325 MG/1; MG/1
1 TABLET ORAL EVERY 4 HOURS PRN
Qty: 150 TABLET | Refills: 0 | Status: SHIPPED | OUTPATIENT
Start: 2018-02-21 | End: 2018-03-22 | Stop reason: SDUPTHER

## 2018-02-21 ASSESSMENT — ENCOUNTER SYMPTOMS
SHORTNESS OF BREATH: 1
DIARRHEA: 1
BACK PAIN: 1
COLOR CHANGE: 1
NAUSEA: 1

## 2018-02-21 NOTE — PROGRESS NOTES
and external ear normal.   Nose: Nose normal.   Mouth/Throat: Uvula is midline, oropharynx is clear and moist and mucous membranes are normal.   Eyes: Conjunctivae, EOM and lids are normal. Pupils are equal, round, and reactive to light. Neck: Trachea normal, normal range of motion and phonation normal. Neck supple. Carotid bruit is not present. No thyroid mass and no thyromegaly present. Cardiovascular: Normal rate, regular rhythm, S1 normal, S2 normal, normal heart sounds and normal pulses. Exam reveals no gallop and no friction rub. No murmur heard. Pulmonary/Chest: Effort normal.   Musculoskeletal:        Right shoulder: He exhibits decreased range of motion, tenderness, pain, spasm and decreased strength. Left shoulder: He exhibits decreased range of motion and tenderness. Left elbow: Tenderness found. Right knee: He exhibits decreased range of motion. Tenderness found. Left knee: He exhibits decreased range of motion. Tenderness found. Lumbar back: He exhibits decreased range of motion, tenderness, pain and spasm. Neurological: He is alert and oriented to person, place, and time. Left arm and 4tht and 5th digit have some decreased sensation. Some decreased  strenght in the left hand   Skin: Skin is warm and dry. Bilateral lower extremity redness and warmth, he has some plus 2 edema below the knee bilaterally   Psychiatric: He has a normal mood and affect. Nursing note and vitals reviewed.       Results in Past 30 Days  Result Component Current Result Ref Range Previous Result Ref Range   Alb 4.0 (2/21/2018) 3.4 - 4.8 g/dL Not in Time Range    Albumin/Globulin Ratio 1.3 (2/21/2018) 0.8 - 2.0 Not in Time Range    Alkaline Phosphatase 67 (2/21/2018) 25 - 100 U/L Not in Time Range    ALT 21 (2/21/2018) 4 - 36 U/L Not in Time Range    AST 21 (2/21/2018) 8 - 33 U/L Not in Time Range    BUN 21 (H) (2/21/2018) 6 - 20 mg/dL Not in Time Range    Calcium 9.1 (2/21/2018) 8.5 - 10.5 mg/dL Not in Time Range    Chloride 99 (2/21/2018) 98 - 107 mmol/L Not in Time Range    CO2 30 (2/21/2018) 20 - 30 mmol/L Not in Time Range    CREATININE 0.9 (2/21/2018) 0.4 - 1.2 mg/dL Not in Time Range    GFR  >59 (2/21/2018) >59 Not in Time Range    GFR Non- >59 (2/21/2018) >59 Not in Time Range    Globulin 3.1 (2/21/2018) g/dL Not in Time Range    Glucose 168 (H) (2/21/2018) 74 - 106 mg/dL 136 (2/21/2018) mg/dL   Potassium 4.0 (2/21/2018) 3.4 - 5.1 mmol/L Not in Time Range    Sodium 143 (2/21/2018) 136 - 145 mmol/L Not in Time Range    Total Bilirubin <0.2 (L) (2/21/2018) 0.3 - 1.2 mg/dL Not in Time Range    Total Protein 7.1 (2/21/2018) 6.4 - 8.3 g/dL Not in Time Range        Hemoglobin A1C (%)   Date Value   02/21/2018 6.8 (H)     Microscopic Examination (no units)   Date Value   12/28/2017 Not Indicated     LDL Calculated (mg/dL)   Date Value   08/08/2017 99         Lab Results   Component Value Date    WBC 8.4 02/21/2018    NEUTROABS 4.7 02/21/2018    HGB 13.0 02/21/2018    HCT 45.0 02/21/2018    HCT 41.6 06/05/2012    MCV 95.5 02/21/2018     02/21/2018     06/05/2012       Lab Results   Component Value Date    TSH 2.61 12/28/2017         ASSESSMENT:   1. Congestive heart failure, unspecified congestive heart failure chronicity, unspecified congestive heart failure type ([address]     2. Type 2 diabetes mellitus with diabetic polyneuropathy, with long-term current use of insulin ([facility])  POCT Glucose    COMPREHENSIVE METABOLIC PANEL    CBC WITH AUTO DIFFERENTIAL    HEMOGLOBIN A1C   3. Osteoarthritis of multiple joints, unspecified osteoarthritis type     4. Essential hypertension          PLAN:  Orders Placed This Encounter   Medications    HYDROcodone-acetaminophen (NORCO)  MG per tablet     Sig: Take 1 tablet by mouth every 4 hours as needed for Pain for up to 30 days For break thru pain. 30 day supply.      Dispense:  150 tablet

## 2018-02-22 ENCOUNTER — HOSPITAL ENCOUNTER (OUTPATIENT)
Dept: PHYSICAL THERAPY | Age: 58
Discharge: HOME | End: 2018-02-25
Admitting: INTERNAL MEDICINE

## 2018-02-22 LAB
A/G RATIO: 1.3 (ref 0.8–2)
ALBUMIN SERPL-MCNC: 4 G/DL (ref 3.4–4.8)
ALP BLD-CCNC: 67 U/L (ref 25–100)
ALT SERPL-CCNC: 21 U/L (ref 4–36)
ANION GAP SERPL CALCULATED.3IONS-SCNC: 14 MMOL/L (ref 3–16)
AST SERPL-CCNC: 21 U/L (ref 8–33)
BASOPHILS ABSOLUTE: 0 K/UL (ref 0–0.1)
BASOPHILS RELATIVE PERCENT: 0.5 %
BILIRUB SERPL-MCNC: <0.2 MG/DL (ref 0.3–1.2)
BUN BLDV-MCNC: 21 MG/DL (ref 6–20)
CALCIUM SERPL-MCNC: 9.1 MG/DL (ref 8.5–10.5)
CHLORIDE BLD-SCNC: 99 MMOL/L (ref 98–107)
CO2: 30 MMOL/L (ref 20–30)
CREAT SERPL-MCNC: 0.9 MG/DL (ref 0.4–1.2)
EOSINOPHILS ABSOLUTE: 0.3 K/UL (ref 0–0.4)
EOSINOPHILS RELATIVE PERCENT: 3.2 %
GFR AFRICAN AMERICAN: >59
GFR NON-AFRICAN AMERICAN: >59
GLOBULIN: 3.1 G/DL
GLUCOSE BLD-MCNC: 168 MG/DL (ref 74–106)
HBA1C MFR BLD: 6.8 %
HCT VFR BLD CALC: 45 % (ref 40–54)
HEMOGLOBIN: 13 G/DL (ref 13–18)
IMMATURE GRANULOCYTES #: 0 K/UL
IMMATURE GRANULOCYTES %: 0.4 % (ref 0–5)
LYMPHOCYTES ABSOLUTE: 2.6 K/UL (ref 1.5–4)
LYMPHOCYTES RELATIVE PERCENT: 31.3 %
MCH RBC QN AUTO: 27.6 PG (ref 27–32)
MCHC RBC AUTO-ENTMCNC: 28.9 G/DL (ref 31–35)
MCV RBC AUTO: 95.5 FL (ref 80–100)
MONOCYTES ABSOLUTE: 0.7 K/UL (ref 0.2–0.8)
MONOCYTES RELATIVE PERCENT: 8 %
NEUTROPHILS ABSOLUTE: 4.7 K/UL (ref 2–7.5)
NEUTROPHILS RELATIVE PERCENT: 56.6 %
PDW BLD-RTO: 16 % (ref 11–16)
PLATELET # BLD: 140 K/UL (ref 150–400)
POTASSIUM SERPL-SCNC: 4 MMOL/L (ref 3.4–5.1)
RBC # BLD: 4.71 M/UL (ref 4.5–6)
SODIUM BLD-SCNC: 143 MMOL/L (ref 136–145)
TOTAL PROTEIN: 7.1 G/DL (ref 6.4–8.3)
WBC # BLD: 8.4 K/UL (ref 4–11)

## 2018-02-26 RX ORDER — LORATADINE 10 MG/1
TABLET ORAL
Qty: 30 TABLET | Refills: 0 | Status: SHIPPED | OUTPATIENT
Start: 2018-02-26 | End: 2018-05-18

## 2018-02-26 RX ORDER — NABUMETONE 750 MG/1
TABLET, FILM COATED ORAL
Qty: 60 TABLET | Refills: 0 | OUTPATIENT
Start: 2018-02-26

## 2018-02-27 ENCOUNTER — HOSPITAL ENCOUNTER (OUTPATIENT)
Dept: PHYSICAL THERAPY | Age: 58
Discharge: HOME | End: 2018-03-02
Admitting: INTERNAL MEDICINE

## 2018-03-01 ENCOUNTER — HOSPITAL ENCOUNTER (OUTPATIENT)
Dept: OTHER | Age: 58
Discharge: HOME | End: 2018-03-31
Attending: INTERNAL MEDICINE | Admitting: INTERNAL MEDICINE

## 2018-03-06 ENCOUNTER — HOSPITAL ENCOUNTER (OUTPATIENT)
Dept: PHYSICAL THERAPY | Age: 58
Discharge: HOME | End: 2018-03-09
Admitting: INTERNAL MEDICINE

## 2018-03-06 NOTE — FLOWSHEET NOTE
Physical Therapy Reassessment Note     Date:  3/6/18    TIme In:      1430                Time Out:    1455    Patient Name:  Maikol Deleon    :  1960  MRN: [id number]    Restrictions/Precautions:    Pertinent Medical History:  Medical/Treatment Diagnosis Information:    functional decline, decreased strength, endurance; difficulty walking     Insurance/Certification information:    Medicare  Physician Information:    Michela Ganser, MD   Plan of care signed (Y/N):    Visit# / total visits:     11/    G-Code (if applicable):      Date / Visit # G-Code Applied:    PT G-Codes  Functional Assessment Tool Used: LEFS  Score: 18  Functional Limitation: Mobility: Walking and moving around  Mobility: Walking and Moving Around Current Status (): At least 40 percent but less than 60 percent impaired, limited or restricted  Mobility: Walking and Moving Around Goal Status (): At least 20 percent but less than 40 percent impaired, limited or restricted    Progress Note: []  Yes  [x]  No  Next due by: Visit #10      Pain level:   7/10    Subjective: Pt reports his back is really hurting him today. Objective:  Observation:   Test measurements:  LEFS:.   Palpation:    Exercises:  Exercise Resistance/Repetitions Other comments   Seated hip ABD,ADD  2x20 RTB 6   Standing marches with rest breaks 1'x3 Unable to do today, try again next visit   Standing hip ABD AROM 3x10ea B 6   Standing heel-toe-raises 2x15 6   standings mid/low rows 2x15 blue 6   HS curls 2x15 red 6   LAQ 2x10 2# 6                       ambulation in souza: Document: distance = , time = , HR =  O2 =  500'/ 4.5 min, Vitals pre/post:75/86 bpm, SpO2: 95/93% 6     Other Therapeutic Activities:      Manual Treatments:      Modalities:        Timed Code Treatment Minutes:  25      Total Treatment Minutes:  25    Treatment/Activity Tolerance:  [x] Patient tolerated treatment well [x] Patient limited by fatigue  [] Patient limited by pain  []

## 2018-03-08 ENCOUNTER — HOSPITAL ENCOUNTER (OUTPATIENT)
Dept: PHYSICAL THERAPY | Age: 58
Discharge: HOME | End: 2018-03-11
Admitting: INTERNAL MEDICINE

## 2018-03-13 ENCOUNTER — HOSPITAL ENCOUNTER (OUTPATIENT)
Dept: PHYSICAL THERAPY | Age: 58
Discharge: HOME | End: 2018-03-16
Admitting: INTERNAL MEDICINE

## 2018-03-13 NOTE — FLOWSHEET NOTE
> 90%. - met  Short term goal 2: Achieve a LEFS score of 30 or higher. - not met  Short term goal 3: [place] with HEP. - met  Long term goals  Time Frame for Long term goals : 8 weeks  Long term goal 1: Patient to ambulate 1,000 -1,500+ feet independently without SOB, O2 sats > 90%. Long term goal 2: Achieve a LEFS score of 35 or higher. Long term goal 3: Patient to be able to perform ADL's, I-ADL's independently without SOB.       Pain after treatment:     8/10    Prognosis: [] Good [x] Fair  [] Poor    Patient Requires Follow-up: [x] Yes  [] No    Plan:   [x] Continue per plan of care [] Alter current plan (see comments)  [] Plan of care initiated [] Hold pending MD visit [] Discharge    Plan for Next Session:        Electronically signed by:  Electronically signed by Phillip Moyer PTA on 3/13/2018 at 1:35 PM

## 2018-03-15 ENCOUNTER — HOSPITAL ENCOUNTER (OUTPATIENT)
Dept: PHYSICAL THERAPY | Age: 58
Discharge: HOME | End: 2018-03-18
Admitting: INTERNAL MEDICINE

## 2018-03-20 ENCOUNTER — HOSPITAL ENCOUNTER (OUTPATIENT)
Dept: PHYSICAL THERAPY | Age: 58
Discharge: HOME | End: 2018-03-23
Admitting: INTERNAL MEDICINE

## 2018-03-20 NOTE — FLOWSHEET NOTE
Physical Therapy Daily Treatment Note     Date:  3/20/2018    TIme In:  1618                    Time Out:   [facility]    Patient Name:  Giselle Liu    :  1960  MRN: [id number]    Restrictions/Precautions:    Pertinent Medical History:  Medical/Treatment Diagnosis Information:    functional decline, decreased strength, endurance; difficulty walking     Insurance/Certification information:    Medicare  Physician Information:    Quynh Mcnamara MD   Plan of care signed (Y/N):    Visit# / total visits:     13/    G-Code (if applicable):      Date / Visit # G-Code Applied:         Progress Note: []  Yes  [x]  No  Next due by: Visit #10      Pain level:  7.5 /10    Subjective: Pt reports his knee pain is worse than his back pain but he is willing to try anything that may help with his back. Objective:  Observation:   Test measurements:  LEFS:. Palpation:    Exercises:  Exercise Resistance/Repetitions Other comments        Standing marches with rest breaks 1'x3 Pt unable to tolerate. Standing hip ABD AROM 3x10ea B 20   Standing heel-toe-raises 2x15 20   standings mid/low rows 2x15 blue 20   HS curls 2x15 green 20   LAQ 2x10 3# 20   [place] Next visit maybe    Ball lift to wall x15 20   Sitting marches 2x10 20   Big ball roll 3-way x15 ea 20                  ambulation in souza: Document: distance = , time = , HR =  O2 =  500'/ 4.5 min, Vitals pre/post:75/94 bpm, SpO2: 95/93% x2 20     Other Therapeutic Activities:      Manual Treatments:      Modalities:        Timed Code Treatment Minutes:  30      Total Treatment Minutes:  36    Treatment/Activity Tolerance:  [x] Patient tolerated treatment well [x] Patient limited by fatigue  [] Patient limited by pain  [] Patient limited by other medical complications  [x] Other: Pt performed new activities well with frequent short rest breaks throughout.     Goals  Short term goals  Time Frame for Short term goals: 4 weeks  Short term goal 1: Patient to ambulate 500+

## 2018-03-22 RX ORDER — OXYCODONE HYDROCHLORIDE 30 MG/1
30 TABLET ORAL EVERY 4 HOURS PRN
Qty: 150 TABLET | Refills: 0 | Status: SHIPPED | OUTPATIENT
Start: 2018-03-22 | End: 2018-04-16 | Stop reason: SDUPTHER

## 2018-03-22 RX ORDER — GABAPENTIN 600 MG/1
600 TABLET ORAL 4 TIMES DAILY
Qty: 120 TABLET | Refills: 0 | Status: SHIPPED | OUTPATIENT
Start: 2018-03-22 | End: 2018-04-16 | Stop reason: SDUPTHER

## 2018-03-22 RX ORDER — HYDROCODONE BITARTRATE AND ACETAMINOPHEN 10; 325 MG/1; MG/1
1 TABLET ORAL EVERY 4 HOURS PRN
Qty: 150 TABLET | Refills: 0 | Status: SHIPPED | OUTPATIENT
Start: 2018-03-22 | End: 2018-04-16 | Stop reason: SDUPTHER

## 2018-03-22 NOTE — TELEPHONE ENCOUNTER
Refill request received from pharmacy.  Medication pending for approval.     Patient information below:      Hemoglobin A1C (%)   Date Value   02/21/2018 6.8 (H)   11/03/2017 7.8 (H)   08/08/2017 8.1 (H)     Microscopic Examination (no units)   Date Value   12/28/2017 Not Indicated     LDL Calculated (mg/dL)   Date Value   08/08/2017 99     AST (U/L)   Date Value   02/21/2018 21     ALT (U/L)   Date Value   02/21/2018 21     BUN (mg/dL)   Date Value   02/21/2018 21 (H)      (goal A1C is < 7)   (goal LDL is <100) need 30-50% reduction from baseline     BP Readings from Last 3 Encounters:   02/21/18 (!) 112/56   02/13/18 (!) 130/52   02/02/18 112/62    (goal /80)      All Future Testing planned in [facility]:  Lab Frequency Next Occurrence   MICROALBUMIN / CREATININE URINE RATIO Once 08/08/2017       Last Office Visit With PCP:  3/6/2018      Next Visit Date:  Future Appointments  Date Time Provider [place]   3/22/2018 2:00 PM James Moyer, PTA [facility] None   3/27/2018 2:00 PM Britt Moyer, PTA [facility] None   3/29/2018 2:00 PM Britt Moyer, PTA [facility] None   4/16/2018 2:45 PM Tod Baugh MD [address] [place] [address]            Patient Active Problem List:     Right shoulder pain     GERD (gastroesophageal reflux disease)     B12 deficiency     Shoulder pain, left     Cellulitis of right lower extremity     Bilateral low back pain with right-sided sciatica     Type 2 diabetes mellitus with diabetic polyneuropathy (HCC)     Sleep apnea     Osteoarthritis of multiple joints     CHF (congestive heart failure), NYHA class I, acute on chronic, combined ([address]     Anasarca     Panniculitis     Congestive heart failure (HCC)     Type 2 diabetes mellitus ([address]     Morbid obesity ([address]     Anemia     Cellulitis of abdominal wall     Essential hypertension     Declining functional status

## 2018-03-27 ENCOUNTER — HOSPITAL ENCOUNTER (OUTPATIENT)
Dept: PHYSICAL THERAPY | Age: 58
Discharge: HOME | End: 2018-03-30
Admitting: INTERNAL MEDICINE

## 2018-03-27 RX ORDER — LORATADINE 10 MG/1
TABLET ORAL
Qty: 30 TABLET | Refills: 5 | OUTPATIENT
Start: 2018-03-27

## 2018-03-27 RX ORDER — NYSTATIN 100000 [USP'U]/G
POWDER TOPICAL
Qty: 60 BOTTLE | Refills: 0 | Status: SHIPPED | OUTPATIENT
Start: 2018-03-27 | End: 2020-03-17 | Stop reason: SDUPTHER

## 2018-03-29 ENCOUNTER — HOSPITAL ENCOUNTER (OUTPATIENT)
Dept: PHYSICAL THERAPY | Age: 58
Discharge: HOME | End: 2018-04-01
Admitting: INTERNAL MEDICINE

## 2018-04-01 ENCOUNTER — HOSPITAL ENCOUNTER (OUTPATIENT)
Dept: OTHER | Age: 58
Discharge: HOME | End: 2018-04-30
Attending: INTERNAL MEDICINE | Admitting: INTERNAL MEDICINE

## 2018-04-03 ENCOUNTER — HOSPITAL ENCOUNTER (OUTPATIENT)
Dept: PHYSICAL THERAPY | Age: 58
Discharge: HOME | End: 2018-04-06
Admitting: INTERNAL MEDICINE

## 2018-04-10 ENCOUNTER — HOSPITAL ENCOUNTER (OUTPATIENT)
Dept: PHYSICAL THERAPY | Age: 58
Discharge: HOME | End: 2018-04-13
Admitting: INTERNAL MEDICINE

## 2018-04-16 ENCOUNTER — HOSPITAL ENCOUNTER (OUTPATIENT)
Dept: OTHER | Age: 58
Discharge: HOME | End: 2018-04-16
Attending: FAMILY MEDICINE | Admitting: FAMILY MEDICINE

## 2018-04-16 ENCOUNTER — OFFICE VISIT (OUTPATIENT)
Dept: FAMILY MEDICINE CLINIC | Age: 58
End: 2018-04-16
Payer: MEDICARE

## 2018-04-16 VITALS
BODY MASS INDEX: 38.36 KG/M2 | RESPIRATION RATE: 20 BRPM | WEIGHT: 315 LBS | OXYGEN SATURATION: 94 % | HEART RATE: 78 BPM | HEIGHT: 76 IN | DIASTOLIC BLOOD PRESSURE: 72 MMHG | SYSTOLIC BLOOD PRESSURE: 130 MMHG

## 2018-04-16 DIAGNOSIS — R42 DIZZINESS: ICD-10-CM

## 2018-04-16 DIAGNOSIS — R42 DIZZINESS: Primary | ICD-10-CM

## 2018-04-16 LAB
BILIRUBIN, POC: NORMAL
BLOOD URINE, POC: NORMAL
CLARITY, POC: CLEAR
COLOR, POC: YELLOW
GLUCOSE URINE, POC: NORMAL
KETONES, POC: NORMAL
LEUKOCYTE EST, POC: NORMAL
NITRITE, POC: NORMAL
PH, POC: 5.5
PROTEIN, POC: 30
SPECIFIC GRAVITY, POC: 1.02
UROBILINOGEN, POC: 0.2

## 2018-04-16 PROCEDURE — G8427 DOCREV CUR MEDS BY ELIG CLIN: HCPCS | Performed by: FAMILY MEDICINE

## 2018-04-16 PROCEDURE — 1036F TOBACCO NON-USER: CPT | Performed by: FAMILY MEDICINE

## 2018-04-16 PROCEDURE — G8417 CALC BMI ABV UP PARAM F/U: HCPCS | Performed by: FAMILY MEDICINE

## 2018-04-16 PROCEDURE — 81002 URINALYSIS NONAUTO W/O SCOPE: CPT | Performed by: FAMILY MEDICINE

## 2018-04-16 PROCEDURE — 99214 OFFICE O/P EST MOD 30 MIN: CPT | Performed by: FAMILY MEDICINE

## 2018-04-16 PROCEDURE — 3017F COLORECTAL CA SCREEN DOC REV: CPT | Performed by: FAMILY MEDICINE

## 2018-04-16 RX ORDER — BENAZEPRIL HYDROCHLORIDE 10 MG/1
10 TABLET ORAL
COMMUNITY
Start: 2018-04-02 | End: 2018-04-16

## 2018-04-16 RX ORDER — SPIRONOLACTONE 50 MG/1
50 TABLET, FILM COATED ORAL 2 TIMES DAILY
Qty: 60 TABLET | Refills: 2 | Status: CANCELLED | OUTPATIENT
Start: 2018-04-16

## 2018-04-16 RX ORDER — CARISOPRODOL 350 MG/1
350 TABLET ORAL 4 TIMES DAILY PRN
Qty: 120 TABLET | Refills: 2 | Status: SHIPPED | OUTPATIENT
Start: 2018-04-16 | End: 2018-05-16

## 2018-04-16 RX ORDER — NYSTATIN 100000 [USP'U]/G
POWDER TOPICAL
Qty: 60 BOTTLE | Refills: 0 | Status: CANCELLED | OUTPATIENT
Start: 2018-04-16

## 2018-04-16 RX ORDER — GABAPENTIN 800 MG/1
800 TABLET ORAL
COMMUNITY
Start: 2018-03-27 | End: 2018-04-16 | Stop reason: DRUGHIGH

## 2018-04-16 RX ORDER — CARVEDILOL 6.25 MG/1
6.25 TABLET ORAL 2 TIMES DAILY WITH MEALS
Qty: 60 TABLET | Refills: 5 | Status: SHIPPED | OUTPATIENT
Start: 2018-04-16 | End: 2018-10-17 | Stop reason: SDUPTHER

## 2018-04-16 RX ORDER — OXYCODONE HYDROCHLORIDE 30 MG/1
30 TABLET ORAL EVERY 4 HOURS PRN
Qty: 150 TABLET | Refills: 0 | Status: SHIPPED | OUTPATIENT
Start: 2018-04-16 | End: 2018-05-18 | Stop reason: SDUPTHER

## 2018-04-16 RX ORDER — CARISOPRODOL 350 MG/1
350 TABLET ORAL
COMMUNITY
Start: 2018-03-27 | End: 2018-04-16 | Stop reason: SDUPTHER

## 2018-04-16 RX ORDER — GABAPENTIN 600 MG/1
600 TABLET ORAL 4 TIMES DAILY
Qty: 120 TABLET | Refills: 2 | Status: SHIPPED | OUTPATIENT
Start: 2018-04-16 | End: 2018-05-18 | Stop reason: SDUPTHER

## 2018-04-16 RX ORDER — HYDROCODONE BITARTRATE AND ACETAMINOPHEN 10; 325 MG/1; MG/1
1 TABLET ORAL EVERY 4 HOURS PRN
Qty: 150 TABLET | Refills: 0 | Status: SHIPPED | OUTPATIENT
Start: 2018-04-16 | End: 2018-05-18 | Stop reason: SDUPTHER

## 2018-04-16 RX ORDER — FUROSEMIDE 80 MG
80 TABLET ORAL DAILY
Qty: 45 TABLET | Refills: 2 | Status: SHIPPED | OUTPATIENT
Start: 2018-04-16 | End: 2018-11-13 | Stop reason: SDUPTHER

## 2018-04-16 RX ORDER — LORATADINE 10 MG/1
TABLET ORAL
Qty: 30 TABLET | Refills: 5 | Status: CANCELLED | OUTPATIENT
Start: 2018-04-16

## 2018-04-16 RX ORDER — SPIRONOLACTONE 25 MG/1
25 TABLET ORAL DAILY
Qty: 30 TABLET | Refills: 5 | Status: SHIPPED | OUTPATIENT
Start: 2018-04-16 | End: 2018-10-17 | Stop reason: SDUPTHER

## 2018-04-16 ASSESSMENT — ENCOUNTER SYMPTOMS
DIARRHEA: 1
NAUSEA: 1
COLOR CHANGE: 1
SHORTNESS OF BREATH: 1
BACK PAIN: 1

## 2018-04-17 LAB
A/G RATIO: 1.3 (ref 0.8–2)
ALBUMIN SERPL-MCNC: 4.2 G/DL (ref 3.4–4.8)
ALP BLD-CCNC: 59 U/L (ref 25–100)
ALT SERPL-CCNC: 20 U/L (ref 4–36)
ANION GAP SERPL CALCULATED.3IONS-SCNC: 15 MMOL/L (ref 3–16)
AST SERPL-CCNC: 21 U/L (ref 8–33)
BILIRUB SERPL-MCNC: 0.3 MG/DL (ref 0.3–1.2)
BUN BLDV-MCNC: 19 MG/DL (ref 6–20)
CALCIUM SERPL-MCNC: 9.3 MG/DL (ref 8.5–10.5)
CHLORIDE BLD-SCNC: 95 MMOL/L (ref 98–107)
CO2: 28 MMOL/L (ref 20–30)
CREAT SERPL-MCNC: 1 MG/DL (ref 0.4–1.2)
GFR AFRICAN AMERICAN: >59
GFR NON-AFRICAN AMERICAN: >59
GLOBULIN: 3.3 G/DL
GLUCOSE BLD-MCNC: 387 MG/DL (ref 74–106)
HCT VFR BLD CALC: 47.4 % (ref 40–54)
HEMOGLOBIN: 14.2 G/DL (ref 13–18)
MCH RBC QN AUTO: 29.6 PG (ref 27–32)
MCHC RBC AUTO-ENTMCNC: 30 G/DL (ref 31–35)
MCV RBC AUTO: 98.8 FL (ref 80–100)
PDW BLD-RTO: 13.5 % (ref 11–16)
PLATELET # BLD: 114 K/UL (ref 150–400)
POTASSIUM SERPL-SCNC: 4.5 MMOL/L (ref 3.4–5.1)
RBC # BLD: 4.8 M/UL (ref 4.5–6)
SODIUM BLD-SCNC: 138 MMOL/L (ref 136–145)
TOTAL PROTEIN: 7.5 G/DL (ref 6.4–8.3)
WBC # BLD: 6.5 K/UL (ref 4–11)

## 2018-04-26 RX ORDER — FERROUS SULFATE 325(65) MG
TABLET ORAL
Qty: 90 TABLET | Refills: 2 | Status: SHIPPED | OUTPATIENT
Start: 2018-04-26 | End: 2018-08-15 | Stop reason: SDUPTHER

## 2018-04-26 RX ORDER — SIMVASTATIN 40 MG
TABLET ORAL
Qty: 30 TABLET | Refills: 5 | Status: SHIPPED | OUTPATIENT
Start: 2018-04-26 | End: 2018-05-18 | Stop reason: SDUPTHER

## 2018-04-26 RX ORDER — PANTOPRAZOLE SODIUM 40 MG/1
TABLET, DELAYED RELEASE ORAL
Qty: 30 TABLET | Refills: 5 | Status: SHIPPED | OUTPATIENT
Start: 2018-04-26 | End: 2018-05-18 | Stop reason: SDUPTHER

## 2018-05-01 ENCOUNTER — HOSPITAL ENCOUNTER (OUTPATIENT)
Dept: OTHER | Age: 58
Discharge: HOME | End: 2018-05-31
Attending: INTERNAL MEDICINE | Admitting: INTERNAL MEDICINE

## 2018-05-07 RX ORDER — AZITHROMYCIN 250 MG/1
TABLET, FILM COATED ORAL
Qty: 6 TABLET | Refills: 0 | Status: SHIPPED | OUTPATIENT
Start: 2018-05-07 | End: 2018-05-18 | Stop reason: ALTCHOICE

## 2018-05-07 RX ORDER — BENZONATATE 200 MG/1
200 CAPSULE ORAL 3 TIMES DAILY PRN
Qty: 21 CAPSULE | Refills: 0 | Status: SHIPPED | OUTPATIENT
Start: 2018-05-07 | End: 2019-11-25 | Stop reason: SDUPTHER

## 2018-05-18 ENCOUNTER — OFFICE VISIT (OUTPATIENT)
Dept: FAMILY MEDICINE CLINIC | Age: 58
End: 2018-05-18
Payer: MEDICARE

## 2018-05-18 ENCOUNTER — HOSPITAL ENCOUNTER (OUTPATIENT)
Dept: OTHER | Age: 58
Discharge: HOME | End: 2018-05-18
Attending: FAMILY MEDICINE | Admitting: FAMILY MEDICINE

## 2018-05-18 VITALS
DIASTOLIC BLOOD PRESSURE: 68 MMHG | HEART RATE: 70 BPM | OXYGEN SATURATION: 95 % | HEIGHT: 76 IN | SYSTOLIC BLOOD PRESSURE: 132 MMHG | BODY MASS INDEX: 38.36 KG/M2 | WEIGHT: 315 LBS | RESPIRATION RATE: 20 BRPM

## 2018-05-18 DIAGNOSIS — Z79.4 TYPE 2 DIABETES MELLITUS WITH COMPLICATION, WITH LONG-TERM CURRENT USE OF INSULIN (HCC): ICD-10-CM

## 2018-05-18 DIAGNOSIS — R68.89 HEAT INTOLERANCE: ICD-10-CM

## 2018-05-18 DIAGNOSIS — R53.83 FATIGUE, UNSPECIFIED TYPE: ICD-10-CM

## 2018-05-18 DIAGNOSIS — E11.8 TYPE 2 DIABETES MELLITUS WITH COMPLICATION, WITH LONG-TERM CURRENT USE OF INSULIN (HCC): ICD-10-CM

## 2018-05-18 DIAGNOSIS — R53.83 FATIGUE, UNSPECIFIED TYPE: Primary | ICD-10-CM

## 2018-05-18 LAB
A/G RATIO: 1.3 (ref 0.8–2)
ALBUMIN SERPL-MCNC: 4 G/DL (ref 3.4–4.8)
ALP BLD-CCNC: 58 U/L (ref 25–100)
ALT SERPL-CCNC: 25 U/L (ref 4–36)
ANION GAP SERPL CALCULATED.3IONS-SCNC: 11 MMOL/L (ref 3–16)
AST SERPL-CCNC: 27 U/L (ref 8–33)
BILIRUB SERPL-MCNC: 0.3 MG/DL (ref 0.3–1.2)
BUN BLDV-MCNC: 16 MG/DL (ref 6–20)
CALCIUM SERPL-MCNC: 9.2 MG/DL (ref 8.5–10.5)
CHLORIDE BLD-SCNC: 96 MMOL/L (ref 98–107)
CO2: 31 MMOL/L (ref 20–30)
CREAT SERPL-MCNC: 0.9 MG/DL (ref 0.4–1.2)
GFR AFRICAN AMERICAN: >59
GFR NON-AFRICAN AMERICAN: >59
GLOBULIN: 3.2 G/DL
GLUCOSE BLD-MCNC: 244 MG/DL (ref 74–106)
HBA1C MFR BLD: 9.6 %
HCT VFR BLD CALC: 44.9 % (ref 40–54)
HEMOGLOBIN: 13.8 G/DL (ref 13–18)
MCH RBC QN AUTO: 30.4 PG (ref 27–32)
MCHC RBC AUTO-ENTMCNC: 30.7 G/DL (ref 31–35)
MCV RBC AUTO: 98.9 FL (ref 80–100)
PDW BLD-RTO: 12.4 % (ref 11–16)
PLATELET # BLD: 140 K/UL (ref 150–400)
PMV BLD AUTO: 13.7 FL (ref 6–10)
POTASSIUM SERPL-SCNC: 4 MMOL/L (ref 3.4–5.1)
RBC # BLD: 4.54 M/UL (ref 4.5–6)
SODIUM BLD-SCNC: 138 MMOL/L (ref 136–145)
T4 FREE: 1.05 NG/DL (ref 0.89–1.76)
TOTAL PROTEIN: 7.2 G/DL (ref 6.4–8.3)
TSH SERPL DL<=0.05 MIU/L-ACNC: 2.12 UIU/ML (ref 0.35–5.5)
WBC # BLD: 6.3 K/UL (ref 4–11)

## 2018-05-18 PROCEDURE — G8427 DOCREV CUR MEDS BY ELIG CLIN: HCPCS | Performed by: FAMILY MEDICINE

## 2018-05-18 PROCEDURE — G8417 CALC BMI ABV UP PARAM F/U: HCPCS | Performed by: FAMILY MEDICINE

## 2018-05-18 PROCEDURE — 1036F TOBACCO NON-USER: CPT | Performed by: FAMILY MEDICINE

## 2018-05-18 PROCEDURE — 3046F HEMOGLOBIN A1C LEVEL >9.0%: CPT | Performed by: FAMILY MEDICINE

## 2018-05-18 PROCEDURE — 3017F COLORECTAL CA SCREEN DOC REV: CPT | Performed by: FAMILY MEDICINE

## 2018-05-18 PROCEDURE — 99214 OFFICE O/P EST MOD 30 MIN: CPT | Performed by: FAMILY MEDICINE

## 2018-05-18 PROCEDURE — 2022F DILAT RTA XM EVC RTNOPTHY: CPT | Performed by: FAMILY MEDICINE

## 2018-05-18 RX ORDER — GABAPENTIN 600 MG/1
600 TABLET ORAL 4 TIMES DAILY
Qty: 120 TABLET | Refills: 2 | Status: SHIPPED | OUTPATIENT
Start: 2018-05-18 | End: 2018-07-17 | Stop reason: SDUPTHER

## 2018-05-18 RX ORDER — BENAZEPRIL HYDROCHLORIDE 10 MG/1
10 TABLET ORAL
COMMUNITY
Start: 2018-05-02 | End: 2019-01-10

## 2018-05-18 RX ORDER — OXYCODONE HYDROCHLORIDE 30 MG/1
30 TABLET ORAL EVERY 4 HOURS PRN
Qty: 150 TABLET | Refills: 0 | Status: SHIPPED | OUTPATIENT
Start: 2018-05-18 | End: 2018-07-16 | Stop reason: SDUPTHER

## 2018-05-18 RX ORDER — HYDROCODONE BITARTRATE AND ACETAMINOPHEN 10; 325 MG/1; MG/1
1 TABLET ORAL EVERY 4 HOURS PRN
Qty: 150 TABLET | Refills: 0 | Status: SHIPPED | OUTPATIENT
Start: 2018-05-18 | End: 2018-05-18 | Stop reason: SDUPTHER

## 2018-05-18 RX ORDER — PANTOPRAZOLE SODIUM 40 MG/1
40 TABLET, DELAYED RELEASE ORAL
Qty: 30 TABLET | Refills: 5 | Status: SHIPPED | OUTPATIENT
Start: 2018-05-18 | End: 2018-11-15 | Stop reason: SDUPTHER

## 2018-05-18 RX ORDER — LORATADINE 10 MG/1
TABLET ORAL
Qty: 30 TABLET | Refills: 5 | Status: SHIPPED | OUTPATIENT
Start: 2018-05-18 | End: 2019-04-01 | Stop reason: SDUPTHER

## 2018-05-18 RX ORDER — SIMVASTATIN 40 MG
TABLET ORAL
Qty: 30 TABLET | Refills: 5 | Status: SHIPPED | OUTPATIENT
Start: 2018-05-18 | End: 2018-11-15 | Stop reason: SDUPTHER

## 2018-05-18 RX ORDER — MONTELUKAST SODIUM 10 MG/1
TABLET ORAL
Qty: 30 TABLET | Refills: 5 | Status: SHIPPED | OUTPATIENT
Start: 2018-05-18 | End: 2018-11-15 | Stop reason: SDUPTHER

## 2018-05-18 RX ORDER — CARISOPRODOL 350 MG/1
350 TABLET ORAL 4 TIMES DAILY
COMMUNITY
Start: 2018-04-26 | End: 2018-07-17 | Stop reason: SDUPTHER

## 2018-05-18 RX ORDER — HYDROCODONE BITARTRATE AND ACETAMINOPHEN 10; 325 MG/1; MG/1
1 TABLET ORAL EVERY 4 HOURS PRN
Qty: 150 TABLET | Refills: 0 | Status: SHIPPED | OUTPATIENT
Start: 2018-05-18 | End: 2018-07-16 | Stop reason: SDUPTHER

## 2018-05-18 RX ORDER — OXYCODONE HYDROCHLORIDE 30 MG/1
30 TABLET ORAL EVERY 4 HOURS PRN
Qty: 150 TABLET | Refills: 0 | Status: SHIPPED | OUTPATIENT
Start: 2018-05-18 | End: 2018-05-18 | Stop reason: SDUPTHER

## 2018-05-18 ASSESSMENT — ENCOUNTER SYMPTOMS
SHORTNESS OF BREATH: 1
DIARRHEA: 1
BACK PAIN: 1
COLOR CHANGE: 1
NAUSEA: 1

## 2018-05-24 RX ORDER — PROMETHAZINE HYDROCHLORIDE 25 MG/1
TABLET ORAL
Qty: 20 TABLET | Refills: 0 | Status: SHIPPED | OUTPATIENT
Start: 2018-05-24 | End: 2018-06-25 | Stop reason: SDUPTHER

## 2018-05-24 RX ORDER — MECLIZINE HYDROCHLORIDE CHEWABLE TABLETS 25 MG/1
TABLET, CHEWABLE ORAL
Qty: 30 TABLET | Refills: 0 | Status: SHIPPED | OUTPATIENT
Start: 2018-05-24 | End: 2019-12-30

## 2018-06-25 RX ORDER — PROMETHAZINE HYDROCHLORIDE 25 MG/1
TABLET ORAL
Qty: 20 TABLET | Refills: 0 | Status: SHIPPED | OUTPATIENT
Start: 2018-06-25 | End: 2018-07-24 | Stop reason: SDUPTHER

## 2018-06-25 RX ORDER — LISINOPRIL 2.5 MG/1
TABLET ORAL
Qty: 30 TABLET | Refills: 5 | Status: SHIPPED | OUTPATIENT
Start: 2018-06-25 | End: 2018-11-15 | Stop reason: SDUPTHER

## 2018-07-16 DIAGNOSIS — G89.29 CHRONIC MIDLINE LOW BACK PAIN WITH BILATERAL SCIATICA: Primary | ICD-10-CM

## 2018-07-16 DIAGNOSIS — M54.42 CHRONIC MIDLINE LOW BACK PAIN WITH BILATERAL SCIATICA: Primary | ICD-10-CM

## 2018-07-16 DIAGNOSIS — M54.41 CHRONIC MIDLINE LOW BACK PAIN WITH BILATERAL SCIATICA: Primary | ICD-10-CM

## 2018-07-16 RX ORDER — OXYCODONE HYDROCHLORIDE 30 MG/1
30 TABLET ORAL EVERY 4 HOURS PRN
Qty: 150 TABLET | Refills: 0 | Status: SHIPPED | OUTPATIENT
Start: 2018-07-16 | End: 2018-08-15 | Stop reason: SDUPTHER

## 2018-07-16 RX ORDER — HYDROCODONE BITARTRATE AND ACETAMINOPHEN 10; 325 MG/1; MG/1
1 TABLET ORAL EVERY 4 HOURS PRN
Qty: 150 TABLET | Refills: 0 | Status: SHIPPED | OUTPATIENT
Start: 2018-07-16 | End: 2018-08-15 | Stop reason: SDUPTHER

## 2018-07-16 NOTE — TELEPHONE ENCOUNTER
Refill request received from pharmacy.  Medication pending for approval.     **KITTY up to date**    Patient information below:      Hemoglobin A1C (%)   Date Value   05/18/2018 9.6 (H)   02/21/2018 6.8 (H)   11/03/2017 7.8 (H)     Microscopic Examination (no units)   Date Value   12/28/2017 Not Indicated     LDL Calculated (mg/dL)   Date Value   08/08/2017 99     AST (U/L)   Date Value   05/18/2018 27     ALT (U/L)   Date Value   05/18/2018 25     BUN (mg/dL)   Date Value   05/18/2018 16      (goal A1C is < 7)   (goal LDL is <100) need 30-50% reduction from baseline     BP Readings from Last 3 Encounters:   05/18/18 132/68   04/16/18 130/72   02/21/18 (!) 112/56    (goal /80)      All Future Testing planned in [facility]:  Lab Frequency Next Occurrence   MICROALBUMIN / CREATININE URINE RATIO Once 08/08/2017       Last Office Visit With PCP:  6/25/2018      Next Visit Date:  Future Appointments  Date Time Provider [place] Sierra   8/15/2018 9:15 AM Cyn Silva MD [address] [place] [address]            Patient Active Problem List:     Right shoulder pain     GERD (gastroesophageal reflux disease)     B12 deficiency     Shoulder pain, left     Cellulitis of right lower extremity     Bilateral low back pain with right-sided sciatica     Type 2 diabetes mellitus with diabetic polyneuropathy (HCC)     Sleep apnea     Osteoarthritis of multiple joints     CHF (congestive heart failure), NYHA class I, acute on chronic, combined ([address]     Anasarca     Panniculitis     Congestive heart failure (HCC)     Type 2 diabetes mellitus ([address]     Morbid obesity ([address]     Anemia     Cellulitis of abdominal wall     Essential hypertension     Declining functional status

## 2018-07-17 DIAGNOSIS — G89.29 CHRONIC BILATERAL LOW BACK PAIN WITH RIGHT-SIDED SCIATICA: ICD-10-CM

## 2018-07-17 DIAGNOSIS — M15.9 OSTEOARTHRITIS OF MULTIPLE JOINTS, UNSPECIFIED OSTEOARTHRITIS TYPE: ICD-10-CM

## 2018-07-17 DIAGNOSIS — M54.41 CHRONIC BILATERAL LOW BACK PAIN WITH RIGHT-SIDED SCIATICA: ICD-10-CM

## 2018-07-17 DIAGNOSIS — G89.29 CHRONIC RIGHT SHOULDER PAIN: Primary | ICD-10-CM

## 2018-07-17 DIAGNOSIS — M25.511 CHRONIC RIGHT SHOULDER PAIN: Primary | ICD-10-CM

## 2018-07-17 RX ORDER — GABAPENTIN 600 MG/1
600 TABLET ORAL 4 TIMES DAILY
Qty: 120 TABLET | Refills: 2 | Status: SHIPPED | OUTPATIENT
Start: 2018-07-17 | End: 2018-09-24 | Stop reason: SDUPTHER

## 2018-07-17 RX ORDER — NITROGLYCERIN 0.4 MG/1
0.4 TABLET SUBLINGUAL EVERY 5 MIN PRN
Qty: 25 TABLET | Refills: 0 | Status: SHIPPED | OUTPATIENT
Start: 2018-07-17 | End: 2020-03-17 | Stop reason: SDUPTHER

## 2018-07-17 RX ORDER — CARISOPRODOL 350 MG/1
350 TABLET ORAL 4 TIMES DAILY
Qty: 120 TABLET | Refills: 2 | Status: SHIPPED | OUTPATIENT
Start: 2018-07-17 | End: 2018-09-24 | Stop reason: SDUPTHER

## 2018-07-24 RX ORDER — PROMETHAZINE HYDROCHLORIDE 25 MG/1
TABLET ORAL
Qty: 20 TABLET | Refills: 0 | Status: SHIPPED | OUTPATIENT
Start: 2018-07-24 | End: 2018-08-30 | Stop reason: SDUPTHER

## 2018-08-15 ENCOUNTER — HOSPITAL ENCOUNTER (OUTPATIENT)
Facility: HOSPITAL | Age: 58
Discharge: HOME | End: 2018-08-15
Payer: MEDICARE

## 2018-08-15 ENCOUNTER — OFFICE VISIT (OUTPATIENT)
Dept: FAMILY MEDICINE CLINIC | Age: 58
End: 2018-08-15
Payer: MEDICARE

## 2018-08-15 VITALS
BODY MASS INDEX: 38.36 KG/M2 | RESPIRATION RATE: 20 BRPM | DIASTOLIC BLOOD PRESSURE: 68 MMHG | HEART RATE: 82 BPM | OXYGEN SATURATION: 94 % | WEIGHT: 315 LBS | HEIGHT: 76 IN | SYSTOLIC BLOOD PRESSURE: 138 MMHG

## 2018-08-15 DIAGNOSIS — Z79.4 TYPE 2 DIABETES MELLITUS WITH DIABETIC POLYNEUROPATHY, WITH LONG-TERM CURRENT USE OF INSULIN (HCC): ICD-10-CM

## 2018-08-15 DIAGNOSIS — M25.511 CHRONIC RIGHT SHOULDER PAIN: ICD-10-CM

## 2018-08-15 DIAGNOSIS — M15.9 OSTEOARTHRITIS OF MULTIPLE JOINTS, UNSPECIFIED OSTEOARTHRITIS TYPE: ICD-10-CM

## 2018-08-15 DIAGNOSIS — G89.29 CHRONIC MIDLINE LOW BACK PAIN WITH BILATERAL SCIATICA: ICD-10-CM

## 2018-08-15 DIAGNOSIS — I50.43 CHF (CONGESTIVE HEART FAILURE), NYHA CLASS I, ACUTE ON CHRONIC, COMBINED (HCC): ICD-10-CM

## 2018-08-15 DIAGNOSIS — E55.9 VITAMIN D DEFICIENCY: ICD-10-CM

## 2018-08-15 DIAGNOSIS — M54.41 CHRONIC BILATERAL LOW BACK PAIN WITH RIGHT-SIDED SCIATICA: ICD-10-CM

## 2018-08-15 DIAGNOSIS — E11.9 ENCOUNTER FOR DIABETIC FOOT EXAM (HCC): ICD-10-CM

## 2018-08-15 DIAGNOSIS — M54.42 CHRONIC MIDLINE LOW BACK PAIN WITH BILATERAL SCIATICA: ICD-10-CM

## 2018-08-15 DIAGNOSIS — E66.01 MORBID OBESITY (HCC): ICD-10-CM

## 2018-08-15 DIAGNOSIS — E11.42 TYPE 2 DIABETES MELLITUS WITH DIABETIC POLYNEUROPATHY, WITH LONG-TERM CURRENT USE OF INSULIN (HCC): ICD-10-CM

## 2018-08-15 DIAGNOSIS — E53.8 B12 DEFICIENCY: ICD-10-CM

## 2018-08-15 DIAGNOSIS — G89.29 CHRONIC RIGHT SHOULDER PAIN: ICD-10-CM

## 2018-08-15 DIAGNOSIS — Z13.220 SCREENING, LIPID: ICD-10-CM

## 2018-08-15 DIAGNOSIS — G89.29 CHRONIC BILATERAL LOW BACK PAIN WITH RIGHT-SIDED SCIATICA: ICD-10-CM

## 2018-08-15 DIAGNOSIS — R53.83 FATIGUE, UNSPECIFIED TYPE: Primary | ICD-10-CM

## 2018-08-15 DIAGNOSIS — M54.41 CHRONIC MIDLINE LOW BACK PAIN WITH BILATERAL SCIATICA: ICD-10-CM

## 2018-08-15 DIAGNOSIS — I10 ESSENTIAL HYPERTENSION: ICD-10-CM

## 2018-08-15 LAB
A/G RATIO: 1.3 (ref 0.8–2)
ALBUMIN SERPL-MCNC: 4.2 G/DL (ref 3.4–4.8)
ALP BLD-CCNC: 66 U/L (ref 25–100)
ALT SERPL-CCNC: 26 U/L (ref 4–36)
ANION GAP SERPL CALCULATED.3IONS-SCNC: 14 MMOL/L (ref 3–16)
AST SERPL-CCNC: 23 U/L (ref 8–33)
BILIRUB SERPL-MCNC: 0.3 MG/DL (ref 0.3–1.2)
BUN BLDV-MCNC: 18 MG/DL (ref 6–20)
CALCIUM SERPL-MCNC: 9.7 MG/DL (ref 8.5–10.5)
CHLORIDE BLD-SCNC: 96 MMOL/L (ref 98–107)
CHOLESTEROL, TOTAL: 191 MG/DL (ref 0–200)
CO2: 27 MMOL/L (ref 20–30)
CREAT SERPL-MCNC: 1 MG/DL (ref 0.4–1.2)
CREATININE URINE: 21.5 MG/DL (ref 1.5–300)
FOLATE: 16.09 NG/ML
GFR AFRICAN AMERICAN: >59
GFR NON-AFRICAN AMERICAN: >59
GLOBULIN: 3.2 G/DL
GLUCOSE BLD-MCNC: 463 MG/DL (ref 74–106)
HBA1C MFR BLD: 10.9 %
HDLC SERPL-MCNC: 39 MG/DL (ref 40–60)
LDL CHOLESTEROL CALCULATED: 86 MG/DL
MICROALBUMIN UR-MCNC: <1.2 MG/DL (ref 0–22)
MICROALBUMIN/CREAT UR-RTO: NORMAL MG/G (ref 0–30)
POTASSIUM SERPL-SCNC: 4.7 MMOL/L (ref 3.4–5.1)
SODIUM BLD-SCNC: 137 MMOL/L (ref 136–145)
TOTAL PROTEIN: 7.4 G/DL (ref 6.4–8.3)
TRIGL SERPL-MCNC: 332 MG/DL (ref 0–249)
VITAMIN B-12: 315 PG/ML (ref 211–911)
VITAMIN D 25-HYDROXY: 22.3 (ref 32–100)
VLDLC SERPL CALC-MCNC: 66 MG/DL

## 2018-08-15 PROCEDURE — 83036 HEMOGLOBIN GLYCOSYLATED A1C: CPT

## 2018-08-15 PROCEDURE — 82306 VITAMIN D 25 HYDROXY: CPT

## 2018-08-15 PROCEDURE — 1036F TOBACCO NON-USER: CPT | Performed by: FAMILY MEDICINE

## 2018-08-15 PROCEDURE — 80053 COMPREHEN METABOLIC PANEL: CPT

## 2018-08-15 PROCEDURE — 99214 OFFICE O/P EST MOD 30 MIN: CPT | Performed by: FAMILY MEDICINE

## 2018-08-15 PROCEDURE — 3046F HEMOGLOBIN A1C LEVEL >9.0%: CPT | Performed by: FAMILY MEDICINE

## 2018-08-15 PROCEDURE — 82570 ASSAY OF URINE CREATININE: CPT

## 2018-08-15 PROCEDURE — 82607 VITAMIN B-12: CPT

## 2018-08-15 PROCEDURE — 3017F COLORECTAL CA SCREEN DOC REV: CPT | Performed by: FAMILY MEDICINE

## 2018-08-15 PROCEDURE — 80061 LIPID PANEL: CPT

## 2018-08-15 PROCEDURE — G8427 DOCREV CUR MEDS BY ELIG CLIN: HCPCS | Performed by: FAMILY MEDICINE

## 2018-08-15 PROCEDURE — 82043 UR ALBUMIN QUANTITATIVE: CPT

## 2018-08-15 PROCEDURE — 36415 COLL VENOUS BLD VENIPUNCTURE: CPT

## 2018-08-15 PROCEDURE — G8417 CALC BMI ABV UP PARAM F/U: HCPCS | Performed by: FAMILY MEDICINE

## 2018-08-15 PROCEDURE — 2022F DILAT RTA XM EVC RTNOPTHY: CPT | Performed by: FAMILY MEDICINE

## 2018-08-15 PROCEDURE — 82746 ASSAY OF FOLIC ACID SERUM: CPT

## 2018-08-15 RX ORDER — OXYCODONE HYDROCHLORIDE 30 MG/1
30 TABLET ORAL EVERY 4 HOURS PRN
Qty: 150 TABLET | Refills: 0 | Status: SHIPPED | OUTPATIENT
Start: 2018-08-15 | End: 2018-09-24 | Stop reason: SDUPTHER

## 2018-08-15 RX ORDER — OXYCODONE HYDROCHLORIDE 30 MG/1
30 TABLET ORAL EVERY 4 HOURS PRN
Qty: 150 TABLET | Refills: 0 | Status: SHIPPED | OUTPATIENT
Start: 2018-08-15 | End: 2018-08-15 | Stop reason: SDUPTHER

## 2018-08-15 RX ORDER — HYDROCODONE BITARTRATE AND ACETAMINOPHEN 10; 325 MG/1; MG/1
1 TABLET ORAL EVERY 4 HOURS PRN
Qty: 150 TABLET | Refills: 0 | Status: SHIPPED | OUTPATIENT
Start: 2018-08-15 | End: 2018-08-15 | Stop reason: SDUPTHER

## 2018-08-15 RX ORDER — HYDROCODONE BITARTRATE AND ACETAMINOPHEN 10; 325 MG/1; MG/1
1 TABLET ORAL EVERY 4 HOURS PRN
Qty: 150 TABLET | Refills: 0 | Status: SHIPPED | OUTPATIENT
Start: 2018-08-15 | End: 2018-09-24 | Stop reason: SDUPTHER

## 2018-08-15 ASSESSMENT — ENCOUNTER SYMPTOMS
NAUSEA: 1
BACK PAIN: 1
DIARRHEA: 1
COLOR CHANGE: 1
SHORTNESS OF BREATH: 1

## 2018-08-15 ASSESSMENT — PATIENT HEALTH QUESTIONNAIRE - PHQ9
1. LITTLE INTEREST OR PLEASURE IN DOING THINGS: 0
SUM OF ALL RESPONSES TO PHQ9 QUESTIONS 1 & 2: 0
SUM OF ALL RESPONSES TO PHQ QUESTIONS 1-9: 0
2. FEELING DOWN, DEPRESSED OR HOPELESS: 0
SUM OF ALL RESPONSES TO PHQ QUESTIONS 1-9: 0

## 2018-08-15 NOTE — LETTER
necessary by my provider as a result of suspicious/unpredictable behavior or inappropriate drug screen results. I understand if contacted for a random count it is my responsibility to bring all medications listed on this MEDICATION AGREEMENT to the providers office at time of count. 6. I understand that this provider may stop prescribing the medications listed if:    ? I do not show any improvement in pain or my activity has not improved    ? I develop rapid tolerance or loss of improvement as described in my treatment plan    ? I develop significant side effects from the medication    ? My behavior is inconsistent with the responsibilities outlined above, which may also result in being prevented from receiving further care from this office. AGREEMENT: I have read the above and have had all my questions answered. For chronic pain management, I know that chronic pain can be managed with many types of treatments. A chronic opioid trial may be part of my treatment but I must be an active participant in my care. Opioid medication is only one part of my pain management. There is limited scientific data to suggest that using opioids over 4 months will lower my pain and or improve my daily function. There is some scientific information that suggests using chronic opioids can increase my pain, make me feel less well, and increase my risk of unintentional death directly related to the opioid medication. I know that if my provider feels my risk from controlled medications and or opioid therapy is greater than my benefit, I will have my controlled substance medications and or opioid medication compassionately lowered or removed altogether. I agree to a controlled substance medication or chronic opioid trial.     I further agree to allow this office to contact family or friends if there are concerns about my safety and use of the controlled medications. Demario Telles have agreed to use the medications listed on this MEDICATION AGREEMENT as instructed by my physician and as stated in this Medication Agreement.        Patient Signature___________________________________________Date_________    Patient Printed Name_______________________________________      Provider Signature_________________________________________Date_________

## 2018-08-29 RX ORDER — SIMVASTATIN 40 MG
TABLET ORAL
Qty: 90 TABLET | Refills: 0 | OUTPATIENT
Start: 2018-08-29

## 2018-08-30 RX ORDER — PROMETHAZINE HYDROCHLORIDE 25 MG/1
TABLET ORAL
Qty: 20 TABLET | Refills: 0 | Status: SHIPPED | OUTPATIENT
Start: 2018-08-30 | End: 2018-10-02 | Stop reason: SDUPTHER

## 2018-09-04 RX ORDER — FLUTICASONE PROPIONATE 50 MCG
SPRAY, SUSPENSION (ML) NASAL
Qty: 16 BOTTLE | Refills: 0 | Status: SHIPPED | OUTPATIENT
Start: 2018-09-04 | End: 2018-12-11 | Stop reason: SDUPTHER

## 2018-09-17 RX ORDER — INSULIN DETEMIR 100 [IU]/ML
INJECTION, SOLUTION SUBCUTANEOUS
Qty: 5 PEN | Refills: 4 | Status: SHIPPED | OUTPATIENT
Start: 2018-09-17 | End: 2018-11-15 | Stop reason: SDUPTHER

## 2018-09-24 ENCOUNTER — NURSE ONLY (OUTPATIENT)
Dept: FAMILY MEDICINE CLINIC | Age: 58
End: 2018-09-24
Payer: MEDICARE

## 2018-09-24 DIAGNOSIS — G89.29 CHRONIC BILATERAL LOW BACK PAIN WITH RIGHT-SIDED SCIATICA: ICD-10-CM

## 2018-09-24 DIAGNOSIS — M54.41 CHRONIC MIDLINE LOW BACK PAIN WITH BILATERAL SCIATICA: ICD-10-CM

## 2018-09-24 DIAGNOSIS — G89.29 CHRONIC MIDLINE LOW BACK PAIN WITH BILATERAL SCIATICA: ICD-10-CM

## 2018-09-24 DIAGNOSIS — Z00.129 ENCOUNTER FOR CHILDHOOD IMMUNIZATIONS APPROPRIATE FOR AGE: Primary | ICD-10-CM

## 2018-09-24 DIAGNOSIS — Z23 ENCOUNTER FOR CHILDHOOD IMMUNIZATIONS APPROPRIATE FOR AGE: Primary | ICD-10-CM

## 2018-09-24 DIAGNOSIS — E53.8 VITAMIN B12 DEFICIENCY: ICD-10-CM

## 2018-09-24 DIAGNOSIS — M54.41 CHRONIC BILATERAL LOW BACK PAIN WITH RIGHT-SIDED SCIATICA: ICD-10-CM

## 2018-09-24 DIAGNOSIS — M54.42 CHRONIC MIDLINE LOW BACK PAIN WITH BILATERAL SCIATICA: ICD-10-CM

## 2018-09-24 PROCEDURE — 90688 IIV4 VACCINE SPLT 0.5 ML IM: CPT | Performed by: FAMILY MEDICINE

## 2018-09-24 PROCEDURE — G0008 ADMIN INFLUENZA VIRUS VAC: HCPCS | Performed by: FAMILY MEDICINE

## 2018-09-24 PROCEDURE — 96372 THER/PROPH/DIAG INJ SC/IM: CPT | Performed by: FAMILY MEDICINE

## 2018-09-24 RX ORDER — CYANOCOBALAMIN 1000 UG/ML
1000 INJECTION INTRAMUSCULAR; SUBCUTANEOUS ONCE
Status: COMPLETED | OUTPATIENT
Start: 2018-09-24 | End: 2018-09-24

## 2018-09-24 RX ORDER — HYDROCODONE BITARTRATE AND ACETAMINOPHEN 10; 325 MG/1; MG/1
1 TABLET ORAL EVERY 4 HOURS PRN
Qty: 150 TABLET | Refills: 0 | Status: SHIPPED | OUTPATIENT
Start: 2018-09-24 | End: 2018-11-15 | Stop reason: SDUPTHER

## 2018-09-24 RX ORDER — CARISOPRODOL 350 MG/1
350 TABLET ORAL 4 TIMES DAILY
Qty: 120 TABLET | Refills: 2 | Status: SHIPPED | OUTPATIENT
Start: 2018-09-24 | End: 2018-11-15 | Stop reason: SDUPTHER

## 2018-09-24 RX ORDER — OXYCODONE HYDROCHLORIDE 30 MG/1
30 TABLET ORAL EVERY 4 HOURS PRN
Qty: 150 TABLET | Refills: 0 | Status: SHIPPED | OUTPATIENT
Start: 2018-09-24 | End: 2018-11-15 | Stop reason: SDUPTHER

## 2018-09-24 RX ORDER — GABAPENTIN 600 MG/1
600 TABLET ORAL 4 TIMES DAILY
Qty: 120 TABLET | Refills: 2 | Status: SHIPPED | OUTPATIENT
Start: 2018-09-24 | End: 2018-11-15 | Stop reason: SDUPTHER

## 2018-09-24 RX ADMIN — CYANOCOBALAMIN 1000 MCG: 1000 INJECTION INTRAMUSCULAR; SUBCUTANEOUS at 16:49

## 2018-09-24 NOTE — PROGRESS NOTES
Immunizations     Name Date Dose Route    Influenza, Anant Glaze, 3 Years and older, IM (Fluzone 3 yrs and older or Afluria 5 yrs and older) 9/24/2018 0.5 mL Intramuscular    Site: Deltoid- Left    Lot: [id number]    NDC: 98844-727-24          Administrations This Visit     cyanocobalamin injection 1,000 mcg     Admin Date  09/24/2018  16:49 Action  Given Dose  1000 mcg Route  Intramuscular Site  Deltoid Right Administered By  Marbin Hamilton    Ordering Provider:  Brittani Alexis MD    [address]:  [id number]    Lot#:  [id number]    :  [address] [facility]    Patient Supplied?:  No

## 2018-10-02 RX ORDER — PROMETHAZINE HYDROCHLORIDE 25 MG/1
TABLET ORAL
Qty: 20 TABLET | Refills: 0 | Status: SHIPPED | OUTPATIENT
Start: 2018-10-02 | End: 2018-11-13 | Stop reason: SDUPTHER

## 2018-10-17 RX ORDER — CARVEDILOL 6.25 MG/1
TABLET ORAL
Qty: 60 TABLET | Refills: 0 | Status: SHIPPED | OUTPATIENT
Start: 2018-10-17 | End: 2018-11-13 | Stop reason: SDUPTHER

## 2018-10-17 RX ORDER — SPIRONOLACTONE 25 MG/1
TABLET ORAL
Qty: 30 TABLET | Refills: 0 | Status: SHIPPED | OUTPATIENT
Start: 2018-10-17 | End: 2018-11-13 | Stop reason: SDUPTHER

## 2018-10-30 ENCOUNTER — OFFICE VISIT (OUTPATIENT)
Dept: FAMILY MEDICINE CLINIC | Age: 58
End: 2018-10-30
Payer: MEDICARE

## 2018-10-30 VITALS
RESPIRATION RATE: 20 BRPM | HEART RATE: 58 BPM | HEIGHT: 76 IN | SYSTOLIC BLOOD PRESSURE: 100 MMHG | BODY MASS INDEX: 38.36 KG/M2 | DIASTOLIC BLOOD PRESSURE: 62 MMHG | OXYGEN SATURATION: 96 % | WEIGHT: 315 LBS

## 2018-10-30 DIAGNOSIS — S81.802A WOUND OF LEFT LOWER EXTREMITY, INITIAL ENCOUNTER: ICD-10-CM

## 2018-10-30 DIAGNOSIS — S81.801A WOUND OF RIGHT LOWER EXTREMITY, INITIAL ENCOUNTER: Primary | ICD-10-CM

## 2018-10-30 PROCEDURE — G8482 FLU IMMUNIZE ORDER/ADMIN: HCPCS | Performed by: NURSE PRACTITIONER

## 2018-10-30 PROCEDURE — 29580 STRAPPING UNNA BOOT: CPT | Performed by: NURSE PRACTITIONER

## 2018-10-30 PROCEDURE — G8427 DOCREV CUR MEDS BY ELIG CLIN: HCPCS | Performed by: NURSE PRACTITIONER

## 2018-10-30 PROCEDURE — 99213 OFFICE O/P EST LOW 20 MIN: CPT | Performed by: NURSE PRACTITIONER

## 2018-10-30 PROCEDURE — 3017F COLORECTAL CA SCREEN DOC REV: CPT | Performed by: NURSE PRACTITIONER

## 2018-10-30 PROCEDURE — G8417 CALC BMI ABV UP PARAM F/U: HCPCS | Performed by: NURSE PRACTITIONER

## 2018-10-30 PROCEDURE — 1036F TOBACCO NON-USER: CPT | Performed by: NURSE PRACTITIONER

## 2018-10-30 ASSESSMENT — ENCOUNTER SYMPTOMS
CHEST TIGHTNESS: 0
GASTROINTESTINAL NEGATIVE: 1
SHORTNESS OF BREATH: 0
EYES NEGATIVE: 1
ALLERGIC/IMMUNOLOGIC NEGATIVE: 1
COUGH: 0

## 2018-10-30 NOTE — PROGRESS NOTES
Have you seen any other physician or provider since your last visit no    Have you had any other diagnostic tests since your last visit? no    Have you changed or stopped any medications since your last visit? no     Per BLACK [place] applied to Bilateral Lower Extremities. Secured with ace bandage. Patient tolerated procedure well.
Circulation check with good  capillary refill present. Patient instructed to monitor for signs if impaired  circulation from compression, such as pain, coolness to touch, or cyanotic  (blue) in appearence. Loosen ace if any of these occur. There are no discontinued medications. Return in about 1 week (around 11/6/2018) for wound check.     Concepción Knott, APRN

## 2018-11-06 ENCOUNTER — OFFICE VISIT (OUTPATIENT)
Dept: FAMILY MEDICINE CLINIC | Age: 58
End: 2018-11-06
Payer: MEDICARE

## 2018-11-06 VITALS
BODY MASS INDEX: 38.36 KG/M2 | HEART RATE: 91 BPM | WEIGHT: 315 LBS | RESPIRATION RATE: 18 BRPM | OXYGEN SATURATION: 94 % | HEIGHT: 76 IN | DIASTOLIC BLOOD PRESSURE: 60 MMHG | SYSTOLIC BLOOD PRESSURE: 132 MMHG

## 2018-11-06 DIAGNOSIS — Z79.4 TYPE 2 DIABETES MELLITUS WITH DIABETIC POLYNEUROPATHY, WITH LONG-TERM CURRENT USE OF INSULIN (HCC): ICD-10-CM

## 2018-11-06 DIAGNOSIS — M79.605 PAIN IN BOTH LOWER EXTREMITIES: ICD-10-CM

## 2018-11-06 DIAGNOSIS — E11.42 TYPE 2 DIABETES MELLITUS WITH DIABETIC POLYNEUROPATHY, WITH LONG-TERM CURRENT USE OF INSULIN (HCC): ICD-10-CM

## 2018-11-06 DIAGNOSIS — M79.604 PAIN IN BOTH LOWER EXTREMITIES: ICD-10-CM

## 2018-11-06 DIAGNOSIS — L97.901 ULCER OF LOWER EXTREMITY, LIMITED TO BREAKDOWN OF SKIN, UNSPECIFIED LATERALITY (HCC): Primary | ICD-10-CM

## 2018-11-06 DIAGNOSIS — E53.8 B12 DEFICIENCY: ICD-10-CM

## 2018-11-06 PROCEDURE — 96372 THER/PROPH/DIAG INJ SC/IM: CPT | Performed by: NURSE PRACTITIONER

## 2018-11-06 PROCEDURE — G8417 CALC BMI ABV UP PARAM F/U: HCPCS | Performed by: NURSE PRACTITIONER

## 2018-11-06 PROCEDURE — 3017F COLORECTAL CA SCREEN DOC REV: CPT | Performed by: NURSE PRACTITIONER

## 2018-11-06 PROCEDURE — G8427 DOCREV CUR MEDS BY ELIG CLIN: HCPCS | Performed by: NURSE PRACTITIONER

## 2018-11-06 PROCEDURE — 29580 STRAPPING UNNA BOOT: CPT | Performed by: NURSE PRACTITIONER

## 2018-11-06 PROCEDURE — 3046F HEMOGLOBIN A1C LEVEL >9.0%: CPT | Performed by: NURSE PRACTITIONER

## 2018-11-06 PROCEDURE — G8482 FLU IMMUNIZE ORDER/ADMIN: HCPCS | Performed by: NURSE PRACTITIONER

## 2018-11-06 PROCEDURE — 2022F DILAT RTA XM EVC RTNOPTHY: CPT | Performed by: NURSE PRACTITIONER

## 2018-11-06 PROCEDURE — 1036F TOBACCO NON-USER: CPT | Performed by: NURSE PRACTITIONER

## 2018-11-06 PROCEDURE — 99214 OFFICE O/P EST MOD 30 MIN: CPT | Performed by: NURSE PRACTITIONER

## 2018-11-06 RX ORDER — CYANOCOBALAMIN 1000 UG/ML
1000 INJECTION INTRAMUSCULAR; SUBCUTANEOUS ONCE
Status: COMPLETED | OUTPATIENT
Start: 2018-11-06 | End: 2018-11-06

## 2018-11-06 RX ADMIN — CYANOCOBALAMIN 1000 MCG: 1000 INJECTION INTRAMUSCULAR; SUBCUTANEOUS at 13:33

## 2018-11-06 NOTE — PROGRESS NOTES
mass index is 49.05 kg/m². @LEVQPMI0(6)@  Resp Readings from Last 2 Encounters:   11/27/18 20   11/15/18 20       Physical Exam   Constitutional: He is oriented to person, place, and time. Vital signs are normal. He appears well-developed and well-nourished. HENT:   Head: Normocephalic and atraumatic. Right Ear: Hearing and external ear normal.   Left Ear: Hearing and external ear normal.   Nose: Nose normal.   Mouth/Throat: Uvula is midline, oropharynx is clear and moist and mucous membranes are normal.   Eyes: Pupils are equal, round, and reactive to light. Conjunctivae, EOM and lids are normal.   Neck: Trachea normal, normal range of motion and phonation normal. Neck supple. Carotid bruit is not present. No thyroid mass and no thyromegaly present. Cardiovascular: Normal rate, regular rhythm, S1 normal, S2 normal, normal heart sounds and normal pulses. Exam reveals no gallop and no friction rub. No murmur heard. Pulmonary/Chest: Effort normal. He has decreased breath sounds. Musculoskeletal:        Right shoulder: He exhibits decreased range of motion, tenderness, pain, spasm and decreased strength. Left shoulder: He exhibits decreased range of motion and tenderness. Left elbow: Tenderness found. Right knee: He exhibits decreased range of motion. Tenderness found. Left knee: He exhibits decreased range of motion. Tenderness found. Lumbar back: He exhibits decreased range of motion, tenderness, pain and spasm. Right lower leg: He exhibits tenderness. Left lower leg: He exhibits tenderness. Neurological: He is alert and oriented to person, place, and time. GCS eye subscore is 4. GCS verbal subscore is 5. GCS motor subscore is 6. Skin: Skin is warm and dry. Abrasion noted. There is erythema. Bilateral lower extremity redness with small skin ulcerations on front tibial areas that are open with clear drainage.  He has some tenderness with edema below the knees. Psychiatric: He has a normal mood and affect. Nursing note and vitals reviewed. Results in Past 30 Days  Result Component Current Result Ref Range Previous Result Ref Range   Alb 4.5 (11/15/2018) 3.4 - 4.8 g/dL Not in Time Range    Albumin/Globulin Ratio 1.4 (11/15/2018) 0.8 - 2.0 Not in Time Range    Alkaline Phosphatase 65 (11/15/2018) 25 - 100 U/L Not in Time Range    ALT 28 (11/15/2018) 4 - 36 U/L Not in Time Range    AST 30 (11/15/2018) 8 - 33 U/L Not in Time Range    BUN 19 (11/15/2018) 6 - 20 mg/dL Not in Time Range    Calcium 9.7 (11/15/2018) 8.5 - 10.5 mg/dL Not in Time Range    Chloride 95 (L) (11/15/2018) 98 - 107 mmol/L Not in Time Range    CO2 26 (11/15/2018) 20 - 30 mmol/L Not in Time Range    CREATININE 1.0 (11/15/2018) 0.4 - 1.2 mg/dL Not in Time Range    GFR  >59 (11/15/2018) >59 Not in Time Range    GFR Non- >59 (11/15/2018) >59 Not in Time Range    Globulin 3.3 (11/15/2018) g/dL Not in Time Range    Glucose 345 (H) (11/15/2018) 74 - 106 mg/dL Not in Time Range    Potassium 4.8 (11/15/2018) 3.4 - 5.1 mmol/L Not in Time Range    Sodium 137 (11/15/2018) 136 - 145 mmol/L Not in Time Range    Total Bilirubin 0.4 (11/15/2018) 0.3 - 1.2 mg/dL Not in Time Range    Total Protein 7.8 (11/15/2018) 6.4 - 8.3 g/dL Not in Time Range      Hemoglobin A1C (%)   Date Value   11/15/2018 10.1 (H)     Microscopic Examination (no units)   Date Value   12/28/2017 Not Indicated     Microalbumin, Random Urine (mg/dL)   Date Value   08/15/2018 <1.20     LDL Calculated (mg/dL)   Date Value   08/15/2018 86       Lab Results   Component Value Date    WBC 6.3 05/18/2018    NEUTROABS 4.7 02/21/2018    HGB 13.8 05/18/2018    HCT 44.9 05/18/2018    HCT 41.6 06/05/2012    MCV 98.9 05/18/2018     05/18/2018     06/05/2012     Lab Results   Component Value Date    TSH 2.12 05/18/2018         ASSESSMENT/PLAN:  Wilma Hernandez was seen today for wound check.     Diagnoses and

## 2018-11-13 ENCOUNTER — OFFICE VISIT (OUTPATIENT)
Dept: FAMILY MEDICINE CLINIC | Age: 58
End: 2018-11-13
Payer: MEDICARE

## 2018-11-13 VITALS
HEART RATE: 68 BPM | WEIGHT: 315 LBS | RESPIRATION RATE: 18 BRPM | SYSTOLIC BLOOD PRESSURE: 112 MMHG | DIASTOLIC BLOOD PRESSURE: 60 MMHG | BODY MASS INDEX: 38.36 KG/M2 | OXYGEN SATURATION: 96 % | HEIGHT: 76 IN

## 2018-11-13 DIAGNOSIS — Z79.4 TYPE 2 DIABETES MELLITUS WITH DIABETIC POLYNEUROPATHY, WITH LONG-TERM CURRENT USE OF INSULIN (HCC): ICD-10-CM

## 2018-11-13 DIAGNOSIS — R11.0 NAUSEA: ICD-10-CM

## 2018-11-13 DIAGNOSIS — L98.491 SKIN ULCER, LIMITED TO BREAKDOWN OF SKIN (HCC): Primary | ICD-10-CM

## 2018-11-13 DIAGNOSIS — E11.42 TYPE 2 DIABETES MELLITUS WITH DIABETIC POLYNEUROPATHY, WITH LONG-TERM CURRENT USE OF INSULIN (HCC): ICD-10-CM

## 2018-11-13 PROCEDURE — 3017F COLORECTAL CA SCREEN DOC REV: CPT | Performed by: NURSE PRACTITIONER

## 2018-11-13 PROCEDURE — 99213 OFFICE O/P EST LOW 20 MIN: CPT | Performed by: NURSE PRACTITIONER

## 2018-11-13 PROCEDURE — 2022F DILAT RTA XM EVC RTNOPTHY: CPT | Performed by: NURSE PRACTITIONER

## 2018-11-13 PROCEDURE — G8417 CALC BMI ABV UP PARAM F/U: HCPCS | Performed by: NURSE PRACTITIONER

## 2018-11-13 PROCEDURE — G8482 FLU IMMUNIZE ORDER/ADMIN: HCPCS | Performed by: NURSE PRACTITIONER

## 2018-11-13 PROCEDURE — 3046F HEMOGLOBIN A1C LEVEL >9.0%: CPT | Performed by: NURSE PRACTITIONER

## 2018-11-13 PROCEDURE — 1036F TOBACCO NON-USER: CPT | Performed by: NURSE PRACTITIONER

## 2018-11-13 PROCEDURE — G8427 DOCREV CUR MEDS BY ELIG CLIN: HCPCS | Performed by: NURSE PRACTITIONER

## 2018-11-13 RX ORDER — CARVEDILOL 6.25 MG/1
TABLET ORAL
Qty: 60 TABLET | Refills: 2 | Status: SHIPPED | OUTPATIENT
Start: 2018-11-13 | End: 2019-02-08 | Stop reason: SDUPTHER

## 2018-11-13 RX ORDER — PROMETHAZINE HYDROCHLORIDE 25 MG/1
25 TABLET ORAL EVERY 6 HOURS PRN
Qty: 30 TABLET | Refills: 0 | Status: SHIPPED | OUTPATIENT
Start: 2018-11-13 | End: 2018-11-15 | Stop reason: SDUPTHER

## 2018-11-13 RX ORDER — SPIRONOLACTONE 25 MG/1
TABLET ORAL
Qty: 30 TABLET | Refills: 0 | Status: SHIPPED | OUTPATIENT
Start: 2018-11-13 | End: 2018-12-11 | Stop reason: SDUPTHER

## 2018-11-13 RX ORDER — FUROSEMIDE 80 MG
TABLET ORAL
Qty: 135 TABLET | Refills: 0 | Status: SHIPPED | OUTPATIENT
Start: 2018-11-13 | End: 2019-01-10 | Stop reason: SDUPTHER

## 2018-11-15 ENCOUNTER — HOSPITAL ENCOUNTER (OUTPATIENT)
Facility: HOSPITAL | Age: 58
Discharge: HOME | End: 2018-11-15
Payer: MEDICARE

## 2018-11-15 ENCOUNTER — OFFICE VISIT (OUTPATIENT)
Dept: FAMILY MEDICINE CLINIC | Age: 58
End: 2018-11-15
Payer: MEDICARE

## 2018-11-15 VITALS
HEIGHT: 76 IN | BODY MASS INDEX: 38.36 KG/M2 | RESPIRATION RATE: 20 BRPM | HEART RATE: 74 BPM | SYSTOLIC BLOOD PRESSURE: 122 MMHG | WEIGHT: 315 LBS | DIASTOLIC BLOOD PRESSURE: 64 MMHG | OXYGEN SATURATION: 96 %

## 2018-11-15 DIAGNOSIS — Z79.4 TYPE 2 DIABETES MELLITUS WITH DIABETIC POLYNEUROPATHY, WITH LONG-TERM CURRENT USE OF INSULIN (HCC): Primary | ICD-10-CM

## 2018-11-15 DIAGNOSIS — M15.9 OSTEOARTHRITIS OF MULTIPLE JOINTS, UNSPECIFIED OSTEOARTHRITIS TYPE: ICD-10-CM

## 2018-11-15 DIAGNOSIS — Z79.4 TYPE 2 DIABETES MELLITUS WITH DIABETIC POLYNEUROPATHY, WITH LONG-TERM CURRENT USE OF INSULIN (HCC): ICD-10-CM

## 2018-11-15 DIAGNOSIS — G89.29 CHRONIC RIGHT SHOULDER PAIN: ICD-10-CM

## 2018-11-15 DIAGNOSIS — M54.41 CHRONIC BILATERAL LOW BACK PAIN WITH RIGHT-SIDED SCIATICA: ICD-10-CM

## 2018-11-15 DIAGNOSIS — E66.01 MORBID OBESITY (HCC): ICD-10-CM

## 2018-11-15 DIAGNOSIS — E11.42 TYPE 2 DIABETES MELLITUS WITH DIABETIC POLYNEUROPATHY, WITH LONG-TERM CURRENT USE OF INSULIN (HCC): ICD-10-CM

## 2018-11-15 DIAGNOSIS — M25.511 CHRONIC RIGHT SHOULDER PAIN: ICD-10-CM

## 2018-11-15 DIAGNOSIS — E11.42 TYPE 2 DIABETES MELLITUS WITH DIABETIC POLYNEUROPATHY, WITH LONG-TERM CURRENT USE OF INSULIN (HCC): Primary | ICD-10-CM

## 2018-11-15 DIAGNOSIS — I10 ESSENTIAL HYPERTENSION: ICD-10-CM

## 2018-11-15 DIAGNOSIS — Z20.09 EXPOSURE TO INTESTINAL INFECTIOUS DISEASE: ICD-10-CM

## 2018-11-15 DIAGNOSIS — G89.29 CHRONIC BILATERAL LOW BACK PAIN WITH RIGHT-SIDED SCIATICA: ICD-10-CM

## 2018-11-15 PROCEDURE — 3017F COLORECTAL CA SCREEN DOC REV: CPT | Performed by: FAMILY MEDICINE

## 2018-11-15 PROCEDURE — 2022F DILAT RTA XM EVC RTNOPTHY: CPT | Performed by: FAMILY MEDICINE

## 2018-11-15 PROCEDURE — 99213 OFFICE O/P EST LOW 20 MIN: CPT | Performed by: FAMILY MEDICINE

## 2018-11-15 PROCEDURE — 1036F TOBACCO NON-USER: CPT | Performed by: FAMILY MEDICINE

## 2018-11-15 PROCEDURE — 20610 DRAIN/INJ JOINT/BURSA W/O US: CPT | Performed by: FAMILY MEDICINE

## 2018-11-15 PROCEDURE — 3046F HEMOGLOBIN A1C LEVEL >9.0%: CPT | Performed by: FAMILY MEDICINE

## 2018-11-15 PROCEDURE — G8427 DOCREV CUR MEDS BY ELIG CLIN: HCPCS | Performed by: FAMILY MEDICINE

## 2018-11-15 PROCEDURE — 36415 COLL VENOUS BLD VENIPUNCTURE: CPT

## 2018-11-15 PROCEDURE — 86708 HEPATITIS A ANTIBODY: CPT

## 2018-11-15 PROCEDURE — G8417 CALC BMI ABV UP PARAM F/U: HCPCS | Performed by: FAMILY MEDICINE

## 2018-11-15 PROCEDURE — 80053 COMPREHEN METABOLIC PANEL: CPT

## 2018-11-15 PROCEDURE — G8482 FLU IMMUNIZE ORDER/ADMIN: HCPCS | Performed by: FAMILY MEDICINE

## 2018-11-15 PROCEDURE — 83036 HEMOGLOBIN GLYCOSYLATED A1C: CPT

## 2018-11-15 RX ORDER — GABAPENTIN 600 MG/1
600 TABLET ORAL 4 TIMES DAILY
Qty: 120 TABLET | Refills: 2 | Status: SHIPPED | OUTPATIENT
Start: 2018-11-15 | End: 2019-02-14 | Stop reason: SDUPTHER

## 2018-11-15 RX ORDER — MONTELUKAST SODIUM 10 MG/1
TABLET ORAL
Qty: 30 TABLET | Refills: 5 | Status: SHIPPED | OUTPATIENT
Start: 2018-11-15 | End: 2019-05-14 | Stop reason: SDUPTHER

## 2018-11-15 RX ORDER — OXYCODONE HYDROCHLORIDE 30 MG/1
30 TABLET ORAL EVERY 4 HOURS PRN
Qty: 150 TABLET | Refills: 0 | Status: SHIPPED | OUTPATIENT
Start: 2018-11-15 | End: 2018-11-15 | Stop reason: SDUPTHER

## 2018-11-15 RX ORDER — LISINOPRIL 2.5 MG/1
TABLET ORAL
Qty: 30 TABLET | Refills: 5 | Status: SHIPPED | OUTPATIENT
Start: 2018-11-15 | End: 2019-05-14

## 2018-11-15 RX ORDER — PROMETHAZINE HYDROCHLORIDE 25 MG/1
25 TABLET ORAL EVERY 6 HOURS PRN
Qty: 30 TABLET | Refills: 0 | Status: SHIPPED | OUTPATIENT
Start: 2018-11-15 | End: 2019-01-09 | Stop reason: SDUPTHER

## 2018-11-15 RX ORDER — KETOROLAC TROMETHAMINE 30 MG/ML
60 INJECTION, SOLUTION INTRAMUSCULAR; INTRAVENOUS ONCE
Status: SHIPPED | OUTPATIENT
Start: 2018-11-15 | End: 2018-11-20

## 2018-11-15 RX ORDER — PANTOPRAZOLE SODIUM 40 MG/1
40 TABLET, DELAYED RELEASE ORAL
Qty: 30 TABLET | Refills: 5 | Status: SHIPPED | OUTPATIENT
Start: 2018-11-15 | End: 2019-05-14 | Stop reason: SDUPTHER

## 2018-11-15 RX ORDER — HYDROCODONE BITARTRATE AND ACETAMINOPHEN 10; 325 MG/1; MG/1
1 TABLET ORAL EVERY 4 HOURS PRN
Qty: 150 TABLET | Refills: 0 | Status: SHIPPED | OUTPATIENT
Start: 2018-11-15 | End: 2018-11-15 | Stop reason: SDUPTHER

## 2018-11-15 RX ORDER — CARISOPRODOL 350 MG/1
350 TABLET ORAL 4 TIMES DAILY
Qty: 120 TABLET | Refills: 2 | Status: SHIPPED | OUTPATIENT
Start: 2018-11-15 | End: 2019-02-14 | Stop reason: SDUPTHER

## 2018-11-15 RX ORDER — HYDROCODONE BITARTRATE AND ACETAMINOPHEN 10; 325 MG/1; MG/1
1 TABLET ORAL EVERY 4 HOURS PRN
Qty: 150 TABLET | Refills: 0 | Status: SHIPPED | OUTPATIENT
Start: 2018-11-15 | End: 2018-12-04 | Stop reason: SDUPTHER

## 2018-11-15 RX ORDER — SIMVASTATIN 40 MG
TABLET ORAL
Qty: 30 TABLET | Refills: 5 | Status: SHIPPED | OUTPATIENT
Start: 2018-11-15 | End: 2019-05-14 | Stop reason: SDUPTHER

## 2018-11-15 RX ORDER — OXYCODONE HYDROCHLORIDE 30 MG/1
30 TABLET ORAL EVERY 4 HOURS PRN
Qty: 150 TABLET | Refills: 0 | Status: SHIPPED | OUTPATIENT
Start: 2018-11-15 | End: 2018-12-04 | Stop reason: SDUPTHER

## 2018-11-15 RX ORDER — METHYLPREDNISOLONE ACETATE 40 MG/ML
40 INJECTION, SUSPENSION INTRA-ARTICULAR; INTRALESIONAL; INTRAMUSCULAR; SOFT TISSUE ONCE
Status: COMPLETED | OUTPATIENT
Start: 2018-11-15 | End: 2018-11-15

## 2018-11-15 RX ORDER — LIDOCAINE HYDROCHLORIDE 10 MG/ML
1 INJECTION, SOLUTION INFILTRATION; PERINEURAL ONCE
Status: COMPLETED | OUTPATIENT
Start: 2018-11-15 | End: 2018-11-15

## 2018-11-15 RX ADMIN — METHYLPREDNISOLONE ACETATE 40 MG: 40 INJECTION, SUSPENSION INTRA-ARTICULAR; INTRALESIONAL; INTRAMUSCULAR; SOFT TISSUE at 15:24

## 2018-11-15 RX ADMIN — LIDOCAINE HYDROCHLORIDE 1 ML: 10 INJECTION, SOLUTION INFILTRATION; PERINEURAL at 15:23

## 2018-11-15 ASSESSMENT — ENCOUNTER SYMPTOMS
BACK PAIN: 1
SHORTNESS OF BREATH: 1
NAUSEA: 1
COLOR CHANGE: 1
DIARRHEA: 1

## 2018-11-15 NOTE — PROGRESS NOTES
SUBJECTIVE:    Patient ID: Dakotah Wong is a 59 y.o. male. Chief Complaint   Patient presents with    Shoulder Pain     r side     Diabetes     f/u       HPI: he is having much more low back pain. He says he has had to work on his hot water heater. He is miserable with pain, even with his pain medication. He is haivng pretty good blood pressures athome. He is having some fluctuating blood sugars. He really having a lot of right shoulder pain. He is says the cold and damp weather has made it much worse. He is not having any chest pain or increase in sob. He is not having any medication problems. He's had some abrasions on his legs that have completely healed now. He says he feels like there completed back to normal. He is struggling with his arthritic pains. He says he's functionally not as good as he is mentally. He says that's a big struggle for him. He's not have significant other new issues. Review of Systems   Constitutional: Positive for activity change, appetite change and fatigue. Respiratory: Positive for shortness of breath. Gastrointestinal: Positive for diarrhea and nausea. Musculoskeletal: Positive for arthralgias, back pain, gait problem and myalgias. Skin: Positive for color change and rash. Neurological: Positive for weakness. All other systems reviewed and are negative. OBJECTIVE:  Wt Readings from Last 3 Encounters:   11/15/18 (!) 411 lb (186.4 kg)   11/13/18 (!) 410 lb (186 kg)   11/06/18 (!) 403 lb (182.8 kg)     BP Readings from Last 3 Encounters:   11/15/18 122/64   11/13/18 112/60   11/06/18 132/60      Pulse Readings from Last 3 Encounters:   11/15/18 74   11/13/18 68   11/06/18 91     Body mass index is 50.03 kg/m². Resp Readings from Last 3 Encounters:   11/15/18 20   11/13/18 18   11/06/18 18     Physical Exam   Constitutional: He is oriented to person, place, and time. Vital signs are normal. He appears well-developed and well-nourished.    HENT: complications or reactions noted. Patient tolerated procedure well. Medications Discontinued During This Encounter   Medication Reason    promethazine (PHENERGAN) 25 MG tablet REORDER    HYDROcodone-acetaminophen (NORCO)  MG per tablet REORDER    oxyCODONE (OXY-IR) 30 MG immediate release tablet REORDER    carisoprodol (SOMA) 350 MG tablet REORDER    gabapentin (NEURONTIN) 600 MG tablet REORDER    LEVEMIR FLEXTOUCH 100 UNIT/ML injection pen REORDER    lisinopril (PRINIVIL;ZESTRIL) 2.5 MG tablet REORDER    metFORMIN (GLUCOPHAGE) 1000 MG tablet REORDER    pantoprazole (PROTONIX) 40 MG tablet REORDER    montelukast (SINGULAIR) 10 MG tablet REORDER    simvastatin (ZOCOR) 40 MG tablet REORDER    glucose blood VI test strips (LEIDY CONTOUR NEXT TEST) strip REORDER    Insulin Pen Needle (ULTRA-THIN II MINI PEN NEEDLE) 31G X 5 MM MISC REORDER    HYDROcodone-acetaminophen (NORCO)  MG per tablet REORDER    oxyCODONE (OXY-IR) 30 MG immediate release tablet REORDER       Controlled Substances Monitoring: Attestation: The Prescription Monitoring Report for this patient was reviewed today. Aman Falk MD)  Documentation: Random urine drug screen sent today., No signs of potential drug abuse or diversion identified.  Aman Falk MD)

## 2018-11-15 NOTE — LETTER
necessary by my provider as a result of suspicious/unpredictable behavior or inappropriate drug screen results. I understand if contacted for a random count it is my responsibility to bring all medications listed on this MEDICATION AGREEMENT to the providers office at time of count. 6. I understand that this provider may stop prescribing the medications listed if:    ? I do not show any improvement in pain or my activity has not improved    ? I develop rapid tolerance or loss of improvement as described in my treatment plan    ? I develop significant side effects from the medication    ? My behavior is inconsistent with the responsibilities outlined above, which may also result in being prevented from receiving further care from this office. AGREEMENT: I have read the above and have had all my questions answered. For chronic pain management, I know that chronic pain can be managed with many types of treatments. A chronic opioid trial may be part of my treatment but I must be an active participant in my care. Opioid medication is only one part of my pain management. There is limited scientific data to suggest that using opioids over 4 months will lower my pain and or improve my daily function. There is some scientific information that suggests using chronic opioids can increase my pain, make me feel less well, and increase my risk of unintentional death directly related to the opioid medication. I know that if my provider feels my risk from controlled medications and or opioid therapy is greater than my benefit, I will have my controlled substance medications and or opioid medication compassionately lowered or removed altogether. I agree to a controlled substance medication or chronic opioid trial.     I further agree to allow this office to contact family or friends if there are concerns about my safety and use of the controlled medications.

## 2018-11-16 LAB
A/G RATIO: 1.4 (ref 0.8–2)
ALBUMIN SERPL-MCNC: 4.5 G/DL (ref 3.4–4.8)
ALP BLD-CCNC: 65 U/L (ref 25–100)
ALT SERPL-CCNC: 28 U/L (ref 4–36)
ANION GAP SERPL CALCULATED.3IONS-SCNC: 16 MMOL/L (ref 3–16)
AST SERPL-CCNC: 30 U/L (ref 8–33)
BILIRUB SERPL-MCNC: 0.4 MG/DL (ref 0.3–1.2)
BUN BLDV-MCNC: 19 MG/DL (ref 6–20)
CALCIUM SERPL-MCNC: 9.7 MG/DL (ref 8.5–10.5)
CHLORIDE BLD-SCNC: 95 MMOL/L (ref 98–107)
CO2: 26 MMOL/L (ref 20–30)
CREAT SERPL-MCNC: 1 MG/DL (ref 0.4–1.2)
GFR AFRICAN AMERICAN: >59
GFR NON-AFRICAN AMERICAN: >59
GLOBULIN: 3.3 G/DL
GLUCOSE BLD-MCNC: 345 MG/DL (ref 74–106)
HBA1C MFR BLD: 10.1 %
POTASSIUM SERPL-SCNC: 4.8 MMOL/L (ref 3.4–5.1)
SODIUM BLD-SCNC: 137 MMOL/L (ref 136–145)
TOTAL PROTEIN: 7.8 G/DL (ref 6.4–8.3)

## 2018-11-19 LAB — HAV AB SERPL IA-ACNC: NEGATIVE

## 2018-11-27 ENCOUNTER — OFFICE VISIT (OUTPATIENT)
Dept: FAMILY MEDICINE CLINIC | Age: 58
End: 2018-11-27
Payer: MEDICARE

## 2018-11-27 VITALS
WEIGHT: 315 LBS | RESPIRATION RATE: 20 BRPM | HEART RATE: 78 BPM | DIASTOLIC BLOOD PRESSURE: 72 MMHG | SYSTOLIC BLOOD PRESSURE: 130 MMHG | HEIGHT: 76 IN | OXYGEN SATURATION: 98 % | BODY MASS INDEX: 38.36 KG/M2

## 2018-11-27 DIAGNOSIS — M54.50 ACUTE EXACERBATION OF CHRONIC LOW BACK PAIN: Primary | ICD-10-CM

## 2018-11-27 DIAGNOSIS — G89.29 ACUTE EXACERBATION OF CHRONIC LOW BACK PAIN: Primary | ICD-10-CM

## 2018-11-27 PROCEDURE — G8417 CALC BMI ABV UP PARAM F/U: HCPCS | Performed by: FAMILY MEDICINE

## 2018-11-27 PROCEDURE — G8427 DOCREV CUR MEDS BY ELIG CLIN: HCPCS | Performed by: FAMILY MEDICINE

## 2018-11-27 PROCEDURE — 3017F COLORECTAL CA SCREEN DOC REV: CPT | Performed by: FAMILY MEDICINE

## 2018-11-27 PROCEDURE — 96372 THER/PROPH/DIAG INJ SC/IM: CPT | Performed by: FAMILY MEDICINE

## 2018-11-27 PROCEDURE — G8482 FLU IMMUNIZE ORDER/ADMIN: HCPCS | Performed by: FAMILY MEDICINE

## 2018-11-27 PROCEDURE — 1036F TOBACCO NON-USER: CPT | Performed by: FAMILY MEDICINE

## 2018-11-27 PROCEDURE — 99213 OFFICE O/P EST LOW 20 MIN: CPT | Performed by: FAMILY MEDICINE

## 2018-11-27 RX ORDER — METHYLPREDNISOLONE SODIUM SUCCINATE 125 MG/2ML
125 INJECTION, POWDER, LYOPHILIZED, FOR SOLUTION INTRAMUSCULAR; INTRAVENOUS ONCE
Status: COMPLETED | OUTPATIENT
Start: 2018-11-27 | End: 2018-11-27

## 2018-11-27 RX ORDER — KETOROLAC TROMETHAMINE 30 MG/ML
60 INJECTION, SOLUTION INTRAMUSCULAR; INTRAVENOUS ONCE
Status: COMPLETED | OUTPATIENT
Start: 2018-11-27 | End: 2018-11-27

## 2018-11-27 RX ADMIN — KETOROLAC TROMETHAMINE 60 MG: 30 INJECTION, SOLUTION INTRAMUSCULAR; INTRAVENOUS at 16:36

## 2018-11-27 RX ADMIN — METHYLPREDNISOLONE SODIUM SUCCINATE 125 MG: 125 INJECTION, POWDER, LYOPHILIZED, FOR SOLUTION INTRAMUSCULAR; INTRAVENOUS at 16:37

## 2018-11-27 ASSESSMENT — ENCOUNTER SYMPTOMS
DIARRHEA: 1
BACK PAIN: 1
NAUSEA: 1
SHORTNESS OF BREATH: 1
COLOR CHANGE: 1

## 2018-11-28 ENCOUNTER — TELEPHONE (OUTPATIENT)
Dept: FAMILY MEDICINE CLINIC | Age: 58
End: 2018-11-28

## 2018-11-28 RX ORDER — PREDNISONE 10 MG/1
TABLET ORAL
Qty: 48 TABLET | Refills: 0 | Status: SHIPPED | OUTPATIENT
Start: 2018-11-28 | End: 2018-11-29 | Stop reason: SDUPTHER

## 2018-11-29 DIAGNOSIS — G89.29 CHRONIC BILATERAL LOW BACK PAIN WITH RIGHT-SIDED SCIATICA: Primary | ICD-10-CM

## 2018-11-29 DIAGNOSIS — M54.41 CHRONIC BILATERAL LOW BACK PAIN WITH RIGHT-SIDED SCIATICA: Primary | ICD-10-CM

## 2018-11-29 RX ORDER — PREDNISONE 10 MG/1
TABLET ORAL
Qty: 48 TABLET | Refills: 0 | Status: SHIPPED | OUTPATIENT
Start: 2018-11-29 | End: 2018-12-09

## 2018-12-02 PROBLEM — L97.901 ULCER OF LOWER EXTREMITY, LIMITED TO BREAKDOWN OF SKIN (HCC): Status: ACTIVE | Noted: 2018-12-02

## 2018-12-02 ASSESSMENT — ENCOUNTER SYMPTOMS
SHORTNESS OF BREATH: 1
GASTROINTESTINAL NEGATIVE: 1
EYES NEGATIVE: 1
NAUSEA: 1
COLOR CHANGE: 1
EYES NEGATIVE: 1
SHORTNESS OF BREATH: 1
BACK PAIN: 1
BACK PAIN: 1
ALLERGIC/IMMUNOLOGIC NEGATIVE: 1
ALLERGIC/IMMUNOLOGIC NEGATIVE: 1
COLOR CHANGE: 1

## 2018-12-04 ENCOUNTER — OFFICE VISIT (OUTPATIENT)
Dept: FAMILY MEDICINE CLINIC | Age: 58
End: 2018-12-04
Payer: MEDICARE

## 2018-12-04 VITALS
RESPIRATION RATE: 20 BRPM | DIASTOLIC BLOOD PRESSURE: 72 MMHG | HEIGHT: 76 IN | OXYGEN SATURATION: 96 % | SYSTOLIC BLOOD PRESSURE: 130 MMHG | WEIGHT: 315 LBS | BODY MASS INDEX: 38.36 KG/M2 | HEART RATE: 78 BPM

## 2018-12-04 DIAGNOSIS — M54.41 CHRONIC BILATERAL LOW BACK PAIN WITH RIGHT-SIDED SCIATICA: ICD-10-CM

## 2018-12-04 DIAGNOSIS — M25.511 CHRONIC RIGHT SHOULDER PAIN: Primary | ICD-10-CM

## 2018-12-04 DIAGNOSIS — G89.29 CHRONIC BILATERAL LOW BACK PAIN WITH RIGHT-SIDED SCIATICA: ICD-10-CM

## 2018-12-04 DIAGNOSIS — M15.9 OSTEOARTHRITIS OF MULTIPLE JOINTS, UNSPECIFIED OSTEOARTHRITIS TYPE: ICD-10-CM

## 2018-12-04 DIAGNOSIS — R53.81 DECLINING FUNCTIONAL STATUS: ICD-10-CM

## 2018-12-04 DIAGNOSIS — E53.8 B12 DEFICIENCY: ICD-10-CM

## 2018-12-04 DIAGNOSIS — I10 ESSENTIAL HYPERTENSION: ICD-10-CM

## 2018-12-04 DIAGNOSIS — G89.29 CHRONIC RIGHT SHOULDER PAIN: Primary | ICD-10-CM

## 2018-12-04 PROCEDURE — G8417 CALC BMI ABV UP PARAM F/U: HCPCS | Performed by: FAMILY MEDICINE

## 2018-12-04 PROCEDURE — G8482 FLU IMMUNIZE ORDER/ADMIN: HCPCS | Performed by: FAMILY MEDICINE

## 2018-12-04 PROCEDURE — 20610 DRAIN/INJ JOINT/BURSA W/O US: CPT | Performed by: FAMILY MEDICINE

## 2018-12-04 PROCEDURE — 3017F COLORECTAL CA SCREEN DOC REV: CPT | Performed by: FAMILY MEDICINE

## 2018-12-04 PROCEDURE — G8427 DOCREV CUR MEDS BY ELIG CLIN: HCPCS | Performed by: FAMILY MEDICINE

## 2018-12-04 PROCEDURE — 1036F TOBACCO NON-USER: CPT | Performed by: FAMILY MEDICINE

## 2018-12-04 PROCEDURE — 99214 OFFICE O/P EST MOD 30 MIN: CPT | Performed by: FAMILY MEDICINE

## 2018-12-04 PROCEDURE — 90471 IMMUNIZATION ADMIN: CPT | Performed by: FAMILY MEDICINE

## 2018-12-04 PROCEDURE — 90632 HEPA VACCINE ADULT IM: CPT | Performed by: FAMILY MEDICINE

## 2018-12-04 RX ORDER — OXYCODONE HYDROCHLORIDE 30 MG/1
30 TABLET ORAL EVERY 4 HOURS PRN
Qty: 150 TABLET | Refills: 0 | Status: SHIPPED | OUTPATIENT
Start: 2018-12-04 | End: 2019-02-14 | Stop reason: SDUPTHER

## 2018-12-04 RX ORDER — HYDROCODONE BITARTRATE AND ACETAMINOPHEN 10; 325 MG/1; MG/1
1 TABLET ORAL EVERY 4 HOURS PRN
Qty: 150 TABLET | Refills: 0 | Status: SHIPPED | OUTPATIENT
Start: 2018-12-04 | End: 2019-02-14 | Stop reason: SDUPTHER

## 2018-12-04 RX ORDER — KETOROLAC TROMETHAMINE 30 MG/ML
60 INJECTION, SOLUTION INTRAMUSCULAR; INTRAVENOUS ONCE
Status: COMPLETED | OUTPATIENT
Start: 2018-12-04 | End: 2018-12-04

## 2018-12-04 RX ORDER — METHYLPREDNISOLONE ACETATE 40 MG/ML
40 INJECTION, SUSPENSION INTRA-ARTICULAR; INTRALESIONAL; INTRAMUSCULAR; SOFT TISSUE ONCE
Status: COMPLETED | OUTPATIENT
Start: 2018-12-04 | End: 2018-12-04

## 2018-12-04 RX ORDER — METHYLPREDNISOLONE SODIUM SUCCINATE 125 MG/2ML
125 INJECTION, POWDER, LYOPHILIZED, FOR SOLUTION INTRAMUSCULAR; INTRAVENOUS ONCE
Status: COMPLETED | OUTPATIENT
Start: 2018-12-04 | End: 2018-12-04

## 2018-12-04 RX ORDER — LIDOCAINE HYDROCHLORIDE 10 MG/ML
1 INJECTION, SOLUTION INFILTRATION; PERINEURAL ONCE
Status: COMPLETED | OUTPATIENT
Start: 2018-12-04 | End: 2018-12-04

## 2018-12-04 RX ORDER — CYANOCOBALAMIN 1000 UG/ML
1000 INJECTION INTRAMUSCULAR; SUBCUTANEOUS ONCE
Status: COMPLETED | OUTPATIENT
Start: 2018-12-04 | End: 2018-12-04

## 2018-12-04 RX ADMIN — LIDOCAINE HYDROCHLORIDE 1 ML: 10 INJECTION, SOLUTION INFILTRATION; PERINEURAL at 15:07

## 2018-12-04 RX ADMIN — METHYLPREDNISOLONE ACETATE 40 MG: 40 INJECTION, SUSPENSION INTRA-ARTICULAR; INTRALESIONAL; INTRAMUSCULAR; SOFT TISSUE at 15:08

## 2018-12-04 RX ADMIN — CYANOCOBALAMIN 1000 MCG: 1000 INJECTION INTRAMUSCULAR; SUBCUTANEOUS at 15:10

## 2018-12-04 RX ADMIN — METHYLPREDNISOLONE SODIUM SUCCINATE 125 MG: 125 INJECTION, POWDER, LYOPHILIZED, FOR SOLUTION INTRAMUSCULAR; INTRAVENOUS at 15:09

## 2018-12-04 RX ADMIN — KETOROLAC TROMETHAMINE 60 MG: 30 INJECTION, SOLUTION INTRAMUSCULAR; INTRAVENOUS at 15:06

## 2018-12-04 ASSESSMENT — ENCOUNTER SYMPTOMS
SHORTNESS OF BREATH: 1
COLOR CHANGE: 1
BACK PAIN: 1
DIARRHEA: 1
NAUSEA: 1

## 2018-12-04 NOTE — PROGRESS NOTES
Administrations This Visit     cyanocobalamin injection 1,000 mcg     Admin Date  12/04/2018  15:10 Action  Given Dose  1000 mcg Route  Intramuscular Site  Deltoid Left Administered By  [address] [place] [address]    Ordering Provider:  Chavo Ahumada MD    . [facility] 47:  [id number]    Lot#:  [id number]    :  [facility]    Patient Supplied?:  No          ketorolac (TORADOL) injection 60 mg     Admin Date  12/04/2018  15:06 Action  Given Dose  60 mg Route  Intramuscular Site  Dorsogluteal Right Administered By  [address] [place] [address]    Ordering Provider:  Chavo Ahumada MD    . [facility] 47:  [id number]    Lot#:  [id number]    :  [facility]    Patient Supplied?:  No          lidocaine 1 % injection 1 mL     Admin Date  12/04/2018  15:07 Action  Given Dose  1 mL Route  Intra-articular Site  Shoulder Right Administered By  [address] [place] [address]    Ordering Provider:  Chavo Ahumada MD    . [facility] 47:  [id number]    Lot#:  [id number]    :  [facility]    Patient Supplied?:  No    Comments:  Dr Ingrid Coleman did inj          methylPREDNISolone acetate (DEPO-MEDROL) injection 40 mg     Admin Date  12/04/2018  15:08 Action  Given Dose  40 mg Route  Intra-articular Site  Shoulder Right Administered By  [address] [place] [address]    Ordering Provider:  Chavo Ahumada MD    . [facility] 47:  [id number]    Lot#:  [id number]    :  [address] Patient Supplied?:  No          methylPREDNISolone sodium (SOLU-MEDROL) injection 125 mg     Admin Date  12/04/2018  15:09 Action  Given Dose  125 mg Route  Intramuscular Site  Dorsogluteal Right Administered By  [address] [place] [address]    Ordering Provider:  Chavo Ahumada MD    . [facility] 47:  [id number]    Lot#:  [id number]    :  [address]     Patient Supplied?:  No
Chronic right shoulder pain   DRAIN/INJECT LARGE JOINT/BURSA   2. Chronic bilateral low back pain with right-sided sciatica  HYDROcodone-acetaminophen (NORCO)  MG per tablet    oxyCODONE (OXY-IR) 30 MG immediate release tablet   3. Osteoarthritis of multiple joints, unspecified osteoarthritis type   DRAIN/INJECT LARGE JOINT/BURSA   4. Essential hypertension     5. Declining functional status     6. B12 deficiency          PLAN:  Orders Placed This Encounter   Medications    Blood Glucose Monitoring Suppl (D-CARE GLUCOMETER) w/Device KIT     Si Device by Does not apply route daily     Dispense:  1 kit     Refill:  0    methylPREDNISolone acetate (DEPO-MEDROL) injection 40 mg    lidocaine 1 % injection 1 mL    methylPREDNISolone sodium (SOLU-MEDROL) injection 125 mg    ketorolac (TORADOL) injection 60 mg    HYDROcodone-acetaminophen (NORCO)  MG per tablet     Sig: Take 1 tablet by mouth every 4 hours as needed for Pain for up to 30 days. For break thru pain. 30 day supply. Dispense:  150 tablet     Refill:  0    oxyCODONE (OXY-IR) 30 MG immediate release tablet     Sig: Take 1 tablet by mouth every 4 hours as needed for Pain for up to 30 days. 30 day supply.      Dispense:  150 tablet     Refill:  0    cyanocobalamin injection 1,000 mcg        Medications Discontinued During This Encounter   Medication Reason    HYDROcodone-acetaminophen (NORCO)  MG per tablet REORDER    oxyCODONE (OXY-IR) 30 MG immediate release tablet REORDER       Controlled Substances Monitoring:

## 2018-12-11 RX ORDER — SPIRONOLACTONE 25 MG/1
TABLET ORAL
Qty: 30 TABLET | Refills: 0 | Status: SHIPPED | OUTPATIENT
Start: 2018-12-11 | End: 2019-01-10 | Stop reason: SDUPTHER

## 2018-12-11 RX ORDER — PROMETHAZINE HYDROCHLORIDE 25 MG/1
25 TABLET ORAL EVERY 6 HOURS PRN
Qty: 30 TABLET | Refills: 1 | Status: SHIPPED | OUTPATIENT
Start: 2018-12-11 | End: 2018-12-18

## 2018-12-11 RX ORDER — FLUTICASONE PROPIONATE 50 MCG
SPRAY, SUSPENSION (ML) NASAL
Qty: 16 BOTTLE | Refills: 0 | Status: SHIPPED | OUTPATIENT
Start: 2018-12-11 | End: 2019-01-09 | Stop reason: SDUPTHER

## 2019-01-09 RX ORDER — SPIRONOLACTONE 25 MG/1
TABLET ORAL
Qty: 30 TABLET | Refills: 0 | Status: CANCELLED | OUTPATIENT
Start: 2019-01-09

## 2019-01-10 ENCOUNTER — OFFICE VISIT (OUTPATIENT)
Dept: FAMILY MEDICINE CLINIC | Age: 59
End: 2019-01-10
Payer: MEDICARE

## 2019-01-10 VITALS
WEIGHT: 315 LBS | DIASTOLIC BLOOD PRESSURE: 60 MMHG | OXYGEN SATURATION: 94 % | SYSTOLIC BLOOD PRESSURE: 110 MMHG | HEIGHT: 76 IN | HEART RATE: 78 BPM | RESPIRATION RATE: 20 BRPM | BODY MASS INDEX: 38.36 KG/M2

## 2019-01-10 DIAGNOSIS — R60.0 PEDAL EDEMA: Primary | ICD-10-CM

## 2019-01-10 PROCEDURE — 3017F COLORECTAL CA SCREEN DOC REV: CPT | Performed by: FAMILY MEDICINE

## 2019-01-10 PROCEDURE — G8482 FLU IMMUNIZE ORDER/ADMIN: HCPCS | Performed by: FAMILY MEDICINE

## 2019-01-10 PROCEDURE — G8417 CALC BMI ABV UP PARAM F/U: HCPCS | Performed by: FAMILY MEDICINE

## 2019-01-10 PROCEDURE — 99213 OFFICE O/P EST LOW 20 MIN: CPT | Performed by: FAMILY MEDICINE

## 2019-01-10 PROCEDURE — 1036F TOBACCO NON-USER: CPT | Performed by: FAMILY MEDICINE

## 2019-01-10 PROCEDURE — G8427 DOCREV CUR MEDS BY ELIG CLIN: HCPCS | Performed by: FAMILY MEDICINE

## 2019-01-10 PROCEDURE — 96372 THER/PROPH/DIAG INJ SC/IM: CPT | Performed by: FAMILY MEDICINE

## 2019-01-10 RX ORDER — SPIRONOLACTONE 25 MG/1
TABLET ORAL
Qty: 15 TABLET | Refills: 0 | Status: SHIPPED | OUTPATIENT
Start: 2019-01-10 | End: 2019-02-08 | Stop reason: SDUPTHER

## 2019-01-10 RX ORDER — PROMETHAZINE HYDROCHLORIDE 25 MG/1
25 TABLET ORAL EVERY 6 HOURS PRN
Qty: 30 TABLET | Refills: 1 | Status: SHIPPED | OUTPATIENT
Start: 2019-01-10 | End: 2019-01-10

## 2019-01-10 RX ORDER — CYANOCOBALAMIN 1000 UG/ML
1000 INJECTION INTRAMUSCULAR; SUBCUTANEOUS ONCE
Status: COMPLETED | OUTPATIENT
Start: 2019-01-10 | End: 2019-01-10

## 2019-01-10 RX ORDER — FLUTICASONE PROPIONATE 50 MCG
SPRAY, SUSPENSION (ML) NASAL
Qty: 16 BOTTLE | Refills: 0 | Status: SHIPPED | OUTPATIENT
Start: 2019-01-10 | End: 2019-02-08 | Stop reason: SDUPTHER

## 2019-01-10 RX ORDER — PROMETHAZINE HYDROCHLORIDE 25 MG/1
TABLET ORAL
Qty: 30 TABLET | Refills: 0 | Status: SHIPPED | OUTPATIENT
Start: 2019-01-10 | End: 2019-05-02 | Stop reason: SDUPTHER

## 2019-01-10 RX ORDER — FUROSEMIDE 80 MG
TABLET ORAL
Qty: 135 TABLET | Refills: 0 | Status: SHIPPED | OUTPATIENT
Start: 2019-01-10 | End: 2020-03-17 | Stop reason: SDUPTHER

## 2019-01-10 RX ADMIN — CYANOCOBALAMIN 1000 MCG: 1000 INJECTION INTRAMUSCULAR; SUBCUTANEOUS at 12:50

## 2019-01-10 ASSESSMENT — ENCOUNTER SYMPTOMS
BACK PAIN: 1
DIARRHEA: 1
COLOR CHANGE: 1
SHORTNESS OF BREATH: 1
NAUSEA: 1

## 2019-02-08 RX ORDER — PNV NO.95/FERROUS FUM/FOLIC AC 28MG-0.8MG
TABLET ORAL
Qty: 90 TABLET | Refills: 0 | Status: SHIPPED | OUTPATIENT
Start: 2019-02-08 | End: 2019-03-05 | Stop reason: SDUPTHER

## 2019-02-08 RX ORDER — FLUTICASONE PROPIONATE 50 MCG
SPRAY, SUSPENSION (ML) NASAL
Qty: 16 BOTTLE | Refills: 0 | Status: SHIPPED | OUTPATIENT
Start: 2019-02-08 | End: 2019-03-05 | Stop reason: SDUPTHER

## 2019-02-08 RX ORDER — FUROSEMIDE 80 MG
TABLET ORAL
Qty: 135 TABLET | Refills: 0 | Status: SHIPPED | OUTPATIENT
Start: 2019-02-08 | End: 2019-02-14 | Stop reason: SDUPTHER

## 2019-02-08 RX ORDER — CARVEDILOL 6.25 MG/1
TABLET ORAL
Qty: 60 TABLET | Refills: 0 | Status: SHIPPED | OUTPATIENT
Start: 2019-02-08 | End: 2019-03-05 | Stop reason: SDUPTHER

## 2019-02-08 RX ORDER — SPIRONOLACTONE 25 MG/1
TABLET ORAL
Qty: 15 TABLET | Refills: 0 | Status: SHIPPED | OUTPATIENT
Start: 2019-02-08 | End: 2019-03-05 | Stop reason: SDUPTHER

## 2019-02-14 ENCOUNTER — OFFICE VISIT (OUTPATIENT)
Dept: FAMILY MEDICINE CLINIC | Age: 59
End: 2019-02-14
Payer: MEDICARE

## 2019-02-14 ENCOUNTER — HOSPITAL ENCOUNTER (OUTPATIENT)
Facility: HOSPITAL | Age: 59
Discharge: HOME | End: 2019-02-14
Payer: MEDICARE

## 2019-02-14 VITALS
HEIGHT: 76 IN | SYSTOLIC BLOOD PRESSURE: 110 MMHG | RESPIRATION RATE: 20 BRPM | BODY MASS INDEX: 38.36 KG/M2 | OXYGEN SATURATION: 91 % | DIASTOLIC BLOOD PRESSURE: 70 MMHG | HEART RATE: 90 BPM | WEIGHT: 315 LBS

## 2019-02-14 DIAGNOSIS — Z79.4 TYPE 2 DIABETES MELLITUS WITH DIABETIC POLYNEUROPATHY, WITH LONG-TERM CURRENT USE OF INSULIN (HCC): ICD-10-CM

## 2019-02-14 DIAGNOSIS — E11.42 TYPE 2 DIABETES MELLITUS WITH DIABETIC POLYNEUROPATHY, WITH LONG-TERM CURRENT USE OF INSULIN (HCC): ICD-10-CM

## 2019-02-14 DIAGNOSIS — M15.9 OSTEOARTHRITIS OF MULTIPLE JOINTS, UNSPECIFIED OSTEOARTHRITIS TYPE: ICD-10-CM

## 2019-02-14 DIAGNOSIS — G89.29 CHRONIC BILATERAL LOW BACK PAIN WITH RIGHT-SIDED SCIATICA: ICD-10-CM

## 2019-02-14 DIAGNOSIS — K21.9 GASTROESOPHAGEAL REFLUX DISEASE WITHOUT ESOPHAGITIS: ICD-10-CM

## 2019-02-14 DIAGNOSIS — E53.8 B12 DEFICIENCY: ICD-10-CM

## 2019-02-14 DIAGNOSIS — M54.41 CHRONIC BILATERAL LOW BACK PAIN WITH RIGHT-SIDED SCIATICA: ICD-10-CM

## 2019-02-14 DIAGNOSIS — J20.9 ACUTE BRONCHITIS, UNSPECIFIED ORGANISM: Primary | ICD-10-CM

## 2019-02-14 DIAGNOSIS — I10 ESSENTIAL HYPERTENSION: ICD-10-CM

## 2019-02-14 DIAGNOSIS — I50.43 CHF (CONGESTIVE HEART FAILURE), NYHA CLASS I, ACUTE ON CHRONIC, COMBINED (HCC): ICD-10-CM

## 2019-02-14 PROCEDURE — 3017F COLORECTAL CA SCREEN DOC REV: CPT | Performed by: FAMILY MEDICINE

## 2019-02-14 PROCEDURE — G8417 CALC BMI ABV UP PARAM F/U: HCPCS | Performed by: FAMILY MEDICINE

## 2019-02-14 PROCEDURE — G8427 DOCREV CUR MEDS BY ELIG CLIN: HCPCS | Performed by: FAMILY MEDICINE

## 2019-02-14 PROCEDURE — 3046F HEMOGLOBIN A1C LEVEL >9.0%: CPT | Performed by: FAMILY MEDICINE

## 2019-02-14 PROCEDURE — 36415 COLL VENOUS BLD VENIPUNCTURE: CPT

## 2019-02-14 PROCEDURE — 96372 THER/PROPH/DIAG INJ SC/IM: CPT | Performed by: FAMILY MEDICINE

## 2019-02-14 PROCEDURE — 2022F DILAT RTA XM EVC RTNOPTHY: CPT | Performed by: FAMILY MEDICINE

## 2019-02-14 PROCEDURE — 99214 OFFICE O/P EST MOD 30 MIN: CPT | Performed by: FAMILY MEDICINE

## 2019-02-14 PROCEDURE — G8482 FLU IMMUNIZE ORDER/ADMIN: HCPCS | Performed by: FAMILY MEDICINE

## 2019-02-14 PROCEDURE — 1036F TOBACCO NON-USER: CPT | Performed by: FAMILY MEDICINE

## 2019-02-14 RX ORDER — CEFTRIAXONE 1 G/1
1 INJECTION, POWDER, FOR SOLUTION INTRAMUSCULAR; INTRAVENOUS ONCE
Status: DISCONTINUED | OUTPATIENT
Start: 2019-02-14 | End: 2021-06-15 | Stop reason: HOSPADM

## 2019-02-14 RX ORDER — CARISOPRODOL 350 MG/1
350 TABLET ORAL 4 TIMES DAILY
Qty: 120 TABLET | Refills: 2 | Status: SHIPPED | OUTPATIENT
Start: 2019-02-14 | End: 2019-05-14

## 2019-02-14 RX ORDER — GABAPENTIN 600 MG/1
600 TABLET ORAL 4 TIMES DAILY
Qty: 120 TABLET | Refills: 2 | Status: SHIPPED | OUTPATIENT
Start: 2019-02-14 | End: 2019-05-14

## 2019-02-14 RX ORDER — OXYCODONE HYDROCHLORIDE 30 MG/1
30 TABLET ORAL EVERY 4 HOURS PRN
Qty: 150 TABLET | Refills: 0 | Status: SHIPPED | OUTPATIENT
Start: 2019-02-14 | End: 2019-02-14 | Stop reason: SDUPTHER

## 2019-02-14 RX ORDER — LEVOFLOXACIN 500 MG/1
500 TABLET, FILM COATED ORAL DAILY
Qty: 10 TABLET | Refills: 0 | Status: SHIPPED | OUTPATIENT
Start: 2019-02-14 | End: 2019-02-24

## 2019-02-14 RX ORDER — CYANOCOBALAMIN 1000 UG/ML
1000 INJECTION INTRAMUSCULAR; SUBCUTANEOUS ONCE
Status: COMPLETED | OUTPATIENT
Start: 2019-02-14 | End: 2019-02-14

## 2019-02-14 RX ORDER — GUAIFENESIN 600 MG/1
1200 TABLET, EXTENDED RELEASE ORAL 2 TIMES DAILY PRN
Qty: 60 TABLET | Refills: 0 | Status: SHIPPED | OUTPATIENT
Start: 2019-02-14 | End: 2019-05-14

## 2019-02-14 RX ORDER — HYDROCODONE BITARTRATE AND ACETAMINOPHEN 10; 325 MG/1; MG/1
1 TABLET ORAL EVERY 4 HOURS PRN
Qty: 150 TABLET | Refills: 0 | Status: SHIPPED | OUTPATIENT
Start: 2019-02-14 | End: 2019-04-08 | Stop reason: SDUPTHER

## 2019-02-14 RX ORDER — OXYCODONE HYDROCHLORIDE 30 MG/1
30 TABLET ORAL EVERY 4 HOURS PRN
Qty: 150 TABLET | Refills: 0 | Status: SHIPPED | OUTPATIENT
Start: 2019-02-14 | End: 2019-04-08 | Stop reason: SDUPTHER

## 2019-02-14 RX ORDER — HYDROCODONE BITARTRATE AND ACETAMINOPHEN 10; 325 MG/1; MG/1
1 TABLET ORAL EVERY 4 HOURS PRN
Qty: 150 TABLET | Refills: 0 | Status: SHIPPED | OUTPATIENT
Start: 2019-02-14 | End: 2019-02-14 | Stop reason: SDUPTHER

## 2019-02-14 RX ADMIN — CYANOCOBALAMIN 1000 MCG: 1000 INJECTION INTRAMUSCULAR; SUBCUTANEOUS at 15:43

## 2019-02-14 ASSESSMENT — PATIENT HEALTH QUESTIONNAIRE - PHQ9
SUM OF ALL RESPONSES TO PHQ QUESTIONS 1-9: 0
2. FEELING DOWN, DEPRESSED OR HOPELESS: 0
SUM OF ALL RESPONSES TO PHQ9 QUESTIONS 1 & 2: 0
1. LITTLE INTEREST OR PLEASURE IN DOING THINGS: 0
SUM OF ALL RESPONSES TO PHQ QUESTIONS 1-9: 0

## 2019-02-14 ASSESSMENT — ENCOUNTER SYMPTOMS
COLOR CHANGE: 1
BACK PAIN: 1
DIARRHEA: 1
SHORTNESS OF BREATH: 1
NAUSEA: 1

## 2019-02-15 PROCEDURE — 36415 COLL VENOUS BLD VENIPUNCTURE: CPT

## 2019-03-05 RX ORDER — SPIRONOLACTONE 25 MG/1
TABLET ORAL
Qty: 15 TABLET | Refills: 0 | Status: SHIPPED | OUTPATIENT
Start: 2019-03-05 | End: 2019-04-01 | Stop reason: SDUPTHER

## 2019-03-05 RX ORDER — FLUTICASONE PROPIONATE 50 MCG
SPRAY, SUSPENSION (ML) NASAL
Qty: 16 G | Refills: 0 | Status: SHIPPED | OUTPATIENT
Start: 2019-03-05 | End: 2019-04-01 | Stop reason: SDUPTHER

## 2019-03-05 RX ORDER — CARVEDILOL 6.25 MG/1
TABLET ORAL
Qty: 60 TABLET | Refills: 2 | Status: SHIPPED | OUTPATIENT
Start: 2019-03-05 | End: 2019-05-14

## 2019-03-05 RX ORDER — FERROUS SULFATE 325(65) MG
TABLET ORAL
Qty: 90 TABLET | Refills: 2 | Status: SHIPPED | OUTPATIENT
Start: 2019-03-05 | End: 2019-05-14

## 2019-04-01 RX ORDER — SPIRONOLACTONE 25 MG/1
TABLET ORAL
Qty: 15 TABLET | Refills: 0 | Status: SHIPPED | OUTPATIENT
Start: 2019-04-01 | End: 2019-05-02 | Stop reason: SDUPTHER

## 2019-04-01 RX ORDER — FLUTICASONE PROPIONATE 50 MCG
SPRAY, SUSPENSION (ML) NASAL
Qty: 16 G | Refills: 0 | Status: SHIPPED | OUTPATIENT
Start: 2019-04-01 | End: 2019-05-02 | Stop reason: SDUPTHER

## 2019-04-01 RX ORDER — LORATADINE 10 MG/1
TABLET ORAL
Qty: 30 TABLET | Refills: 5 | Status: SHIPPED | OUTPATIENT
Start: 2019-04-01 | End: 2019-09-26 | Stop reason: SDUPTHER

## 2019-04-08 DIAGNOSIS — G89.29 CHRONIC BILATERAL LOW BACK PAIN WITH RIGHT-SIDED SCIATICA: ICD-10-CM

## 2019-04-08 DIAGNOSIS — M54.41 CHRONIC BILATERAL LOW BACK PAIN WITH RIGHT-SIDED SCIATICA: ICD-10-CM

## 2019-04-08 RX ORDER — HYDROCODONE BITARTRATE AND ACETAMINOPHEN 10; 325 MG/1; MG/1
1 TABLET ORAL EVERY 4 HOURS PRN
Qty: 150 TABLET | Refills: 0 | Status: SHIPPED | OUTPATIENT
Start: 2019-04-08 | End: 2019-05-14 | Stop reason: SDUPTHER

## 2019-04-08 RX ORDER — OXYCODONE HYDROCHLORIDE 30 MG/1
30 TABLET ORAL EVERY 4 HOURS PRN
Qty: 150 TABLET | Refills: 0 | Status: SHIPPED | OUTPATIENT
Start: 2019-04-08 | End: 2019-05-14 | Stop reason: SDUPTHER

## 2019-05-02 RX ORDER — FLUTICASONE PROPIONATE 50 MCG
SPRAY, SUSPENSION (ML) NASAL
Qty: 16 G | Refills: 0 | Status: SHIPPED | OUTPATIENT
Start: 2019-05-02 | End: 2019-06-03 | Stop reason: SDUPTHER

## 2019-05-02 RX ORDER — PROMETHAZINE HYDROCHLORIDE 25 MG/1
TABLET ORAL
Qty: 30 TABLET | Refills: 0 | Status: SHIPPED | OUTPATIENT
Start: 2019-05-02 | End: 2019-06-03 | Stop reason: SDUPTHER

## 2019-05-02 RX ORDER — SPIRONOLACTONE 25 MG/1
TABLET ORAL
Qty: 15 TABLET | Refills: 0 | Status: SHIPPED | OUTPATIENT
Start: 2019-05-02 | End: 2019-05-14 | Stop reason: SDUPTHER

## 2019-05-02 NOTE — TELEPHONE ENCOUNTER
Refill request received from pharmacy.  Medication pending for approval.     Patient information below:      Hemoglobin A1C (%)   Date Value   02/14/2019 9.3 (H)   11/15/2018 10.1 (H)   08/15/2018 10.9 (H)     Microscopic Examination (no units)   Date Value   12/28/2017 Not Indicated     Microalbumin, Random Urine (mg/dL)   Date Value   08/15/2018 <1.20     LDL Calculated (mg/dL)   Date Value   08/15/2018 86     AST (IU/L)   Date Value   02/14/2019 17     ALT (IU/L)   Date Value   02/14/2019 21     BUN (mg/dL)   Date Value   02/14/2019 13      (goal A1C is < 7)   (goal LDL is <100) need 30-50% reduction from baseline     BP Readings from Last 3 Encounters:   02/14/19 110/70   01/10/19 110/60   12/04/18 130/72    (goal /80)      All Future Testing planned in [facility]:  Lab Frequency Next Occurrence       Last Office Visit With PCP:  4/24/2019      Next Visit Date:  Future Appointments   Date Time Provider [place]   5/14/2019  1:30 PM Ravi Garcia MD [address]            Patient Active Problem List:     Right shoulder pain     GERD (gastroesophageal reflux disease)     B12 deficiency     Shoulder pain, left     Cellulitis of right lower extremity     Bilateral low back pain with right-sided sciatica     Type 2 diabetes mellitus with diabetic polyneuropathy (HCC)     Sleep apnea     Osteoarthritis of multiple joints     CHF (congestive heart failure), NYHA class I, acute on chronic, combined ([address]     Anasarca     Panniculitis     Congestive heart failure (HCC)     Type 2 diabetes mellitus ([address]     Morbid obesity ([address]     Anemia     Cellulitis of abdominal wall     Essential hypertension     Declining functional status     Ulcer of lower extremity, limited to breakdown of skin ([address]

## 2019-05-14 ENCOUNTER — OFFICE VISIT (OUTPATIENT)
Dept: FAMILY MEDICINE CLINIC | Age: 59
End: 2019-05-14
Payer: MEDICARE

## 2019-05-14 ENCOUNTER — HOSPITAL ENCOUNTER (OUTPATIENT)
Facility: HOSPITAL | Age: 59
Discharge: HOME | End: 2019-05-14
Payer: MEDICARE

## 2019-05-14 VITALS
DIASTOLIC BLOOD PRESSURE: 72 MMHG | RESPIRATION RATE: 20 BRPM | OXYGEN SATURATION: 98 % | HEART RATE: 85 BPM | HEIGHT: 76 IN | WEIGHT: 315 LBS | BODY MASS INDEX: 38.36 KG/M2 | SYSTOLIC BLOOD PRESSURE: 130 MMHG

## 2019-05-14 DIAGNOSIS — G89.29 CHRONIC PAIN OF LEFT KNEE: ICD-10-CM

## 2019-05-14 DIAGNOSIS — E11.42 TYPE 2 DIABETES MELLITUS WITH DIABETIC POLYNEUROPATHY, WITH LONG-TERM CURRENT USE OF INSULIN (HCC): ICD-10-CM

## 2019-05-14 DIAGNOSIS — Z12.11 COLON CANCER SCREENING: ICD-10-CM

## 2019-05-14 DIAGNOSIS — E53.8 B12 DEFICIENCY: ICD-10-CM

## 2019-05-14 DIAGNOSIS — G89.29 CHRONIC BILATERAL LOW BACK PAIN WITH RIGHT-SIDED SCIATICA: ICD-10-CM

## 2019-05-14 DIAGNOSIS — I50.43 CHF (CONGESTIVE HEART FAILURE), NYHA CLASS I, ACUTE ON CHRONIC, COMBINED (HCC): ICD-10-CM

## 2019-05-14 DIAGNOSIS — G89.29 CHRONIC RIGHT SHOULDER PAIN: Primary | ICD-10-CM

## 2019-05-14 DIAGNOSIS — M25.511 CHRONIC RIGHT SHOULDER PAIN: Primary | ICD-10-CM

## 2019-05-14 DIAGNOSIS — I10 ESSENTIAL HYPERTENSION: ICD-10-CM

## 2019-05-14 DIAGNOSIS — Z79.4 TYPE 2 DIABETES MELLITUS WITH DIABETIC POLYNEUROPATHY, WITH LONG-TERM CURRENT USE OF INSULIN (HCC): ICD-10-CM

## 2019-05-14 DIAGNOSIS — M15.9 OSTEOARTHRITIS OF MULTIPLE JOINTS, UNSPECIFIED OSTEOARTHRITIS TYPE: ICD-10-CM

## 2019-05-14 DIAGNOSIS — M25.562 CHRONIC PAIN OF LEFT KNEE: ICD-10-CM

## 2019-05-14 DIAGNOSIS — M54.41 CHRONIC BILATERAL LOW BACK PAIN WITH RIGHT-SIDED SCIATICA: ICD-10-CM

## 2019-05-14 PROCEDURE — 1036F TOBACCO NON-USER: CPT | Performed by: FAMILY MEDICINE

## 2019-05-14 PROCEDURE — 20610 DRAIN/INJ JOINT/BURSA W/O US: CPT | Performed by: FAMILY MEDICINE

## 2019-05-14 PROCEDURE — 3046F HEMOGLOBIN A1C LEVEL >9.0%: CPT | Performed by: FAMILY MEDICINE

## 2019-05-14 PROCEDURE — 3017F COLORECTAL CA SCREEN DOC REV: CPT | Performed by: FAMILY MEDICINE

## 2019-05-14 PROCEDURE — G8427 DOCREV CUR MEDS BY ELIG CLIN: HCPCS | Performed by: FAMILY MEDICINE

## 2019-05-14 PROCEDURE — 36415 COLL VENOUS BLD VENIPUNCTURE: CPT

## 2019-05-14 PROCEDURE — 2022F DILAT RTA XM EVC RTNOPTHY: CPT | Performed by: FAMILY MEDICINE

## 2019-05-14 PROCEDURE — G8417 CALC BMI ABV UP PARAM F/U: HCPCS | Performed by: FAMILY MEDICINE

## 2019-05-14 PROCEDURE — 96372 THER/PROPH/DIAG INJ SC/IM: CPT | Performed by: FAMILY MEDICINE

## 2019-05-14 PROCEDURE — 99213 OFFICE O/P EST LOW 20 MIN: CPT | Performed by: FAMILY MEDICINE

## 2019-05-14 RX ORDER — SPIRONOLACTONE 25 MG/1
TABLET ORAL
Qty: 30 TABLET | Refills: 2 | Status: SHIPPED | OUTPATIENT
Start: 2019-05-14 | End: 2019-09-16 | Stop reason: SDUPTHER

## 2019-05-14 RX ORDER — LIDOCAINE HYDROCHLORIDE 10 MG/ML
1 INJECTION, SOLUTION INFILTRATION; PERINEURAL ONCE
Status: COMPLETED | OUTPATIENT
Start: 2019-05-14 | End: 2019-05-14

## 2019-05-14 RX ORDER — GABAPENTIN 800 MG/1
800 TABLET ORAL 4 TIMES DAILY
Qty: 120 TABLET | Refills: 2 | Status: SHIPPED | OUTPATIENT
Start: 2019-05-14 | End: 2019-08-06 | Stop reason: SDUPTHER

## 2019-05-14 RX ORDER — METHYLPREDNISOLONE ACETATE 40 MG/ML
40 INJECTION, SUSPENSION INTRA-ARTICULAR; INTRALESIONAL; INTRAMUSCULAR; SOFT TISSUE ONCE
Status: COMPLETED | OUTPATIENT
Start: 2019-05-14 | End: 2019-05-14

## 2019-05-14 RX ORDER — CARISOPRODOL 350 MG/1
350 TABLET ORAL 4 TIMES DAILY
Qty: 120 TABLET | Refills: 2 | Status: SHIPPED | OUTPATIENT
Start: 2019-05-14 | End: 2019-06-13

## 2019-05-14 RX ORDER — OXYCODONE HYDROCHLORIDE 30 MG/1
30 TABLET ORAL EVERY 4 HOURS PRN
Qty: 150 TABLET | Refills: 0 | Status: SHIPPED | OUTPATIENT
Start: 2019-05-14 | End: 2019-06-11 | Stop reason: SDUPTHER

## 2019-05-14 RX ORDER — SIMVASTATIN 40 MG
TABLET ORAL
Qty: 30 TABLET | Refills: 5 | Status: SHIPPED | OUTPATIENT
Start: 2019-05-14 | End: 2019-09-16 | Stop reason: SDUPTHER

## 2019-05-14 RX ORDER — HYDROCODONE BITARTRATE AND ACETAMINOPHEN 10; 325 MG/1; MG/1
1 TABLET ORAL EVERY 4 HOURS PRN
Qty: 150 TABLET | Refills: 0 | Status: SHIPPED | OUTPATIENT
Start: 2019-05-14 | End: 2019-06-11 | Stop reason: SDUPTHER

## 2019-05-14 RX ORDER — CYANOCOBALAMIN 1000 UG/ML
1000 INJECTION INTRAMUSCULAR; SUBCUTANEOUS ONCE
Status: COMPLETED | OUTPATIENT
Start: 2019-05-14 | End: 2019-05-14

## 2019-05-14 RX ORDER — MONTELUKAST SODIUM 10 MG/1
TABLET ORAL
Qty: 30 TABLET | Refills: 5 | Status: SHIPPED | OUTPATIENT
Start: 2019-05-14 | End: 2019-09-16 | Stop reason: SDUPTHER

## 2019-05-14 RX ORDER — AMOXICILLIN AND CLAVULANATE POTASSIUM 875; 125 MG/1; MG/1
1 TABLET, FILM COATED ORAL 2 TIMES DAILY WITH MEALS
Qty: 20 TABLET | Refills: 0 | Status: SHIPPED | OUTPATIENT
Start: 2019-05-14 | End: 2019-05-24

## 2019-05-14 RX ORDER — PANTOPRAZOLE SODIUM 40 MG/1
40 TABLET, DELAYED RELEASE ORAL
Qty: 30 TABLET | Refills: 5 | Status: SHIPPED | OUTPATIENT
Start: 2019-05-14 | End: 2019-09-16 | Stop reason: SDUPTHER

## 2019-05-14 RX ADMIN — LIDOCAINE HYDROCHLORIDE 1 ML: 10 INJECTION, SOLUTION INFILTRATION; PERINEURAL at 15:26

## 2019-05-14 RX ADMIN — LIDOCAINE HYDROCHLORIDE 1 ML: 10 INJECTION, SOLUTION INFILTRATION; PERINEURAL at 15:25

## 2019-05-14 RX ADMIN — METHYLPREDNISOLONE ACETATE 40 MG: 40 INJECTION, SUSPENSION INTRA-ARTICULAR; INTRALESIONAL; INTRAMUSCULAR; SOFT TISSUE at 15:28

## 2019-05-14 RX ADMIN — CYANOCOBALAMIN 1000 MCG: 1000 INJECTION INTRAMUSCULAR; SUBCUTANEOUS at 15:28

## 2019-05-14 RX ADMIN — METHYLPREDNISOLONE ACETATE 40 MG: 40 INJECTION, SUSPENSION INTRA-ARTICULAR; INTRALESIONAL; INTRAMUSCULAR; SOFT TISSUE at 15:27

## 2019-05-14 ASSESSMENT — ENCOUNTER SYMPTOMS
NAUSEA: 1
COLOR CHANGE: 1
DIARRHEA: 1
BACK PAIN: 1
SHORTNESS OF BREATH: 1

## 2019-05-14 NOTE — PATIENT INSTRUCTIONS
they seem to have the same symptoms. What may be good for you may be harmful to others. · If you are no longer taking a prescribed medication and you have pills left please take your pills out of their original containers. Mix crushed pills with an undesirable substance, such as cat litter or used coffee grounds. Put the mixture into a disposable container with a lid, such as an empty margarine tub, or into a sealable bag. Cover up or remove any of your personal information on the empty containers by covering it with black permanent marker or duct tape. Place the sealed container with the mixture, and the empty drug containers, in the trash. · If you use a medication that is in the form of a patch, dispose of used patches by folding them in half so that the sticky sides meet, and then flushing them down a toilet. They should not be placed in the household trash where children or pets can find them. · If you have any questions, ask your provider or pharmacist for more information. · Be sure to keep all appointments for provider visits or tests.   ·

## 2019-05-14 NOTE — PROGRESS NOTES
SUBJECTIVE:    Patient ID: Jessika Kimball is a 59 y.o. male. Chief Complaint   Patient presents with    Hand Pain     going numb also     Shoulder Pain     R side        HPI: He's here today complaining of some significant hand numbness. He's having some pain in his right shoulder particularly. He says is getting worse as he goes along. He is not having quite as much difficulties with his blood pressures. He is not having as much swelling. He is having some elevated blood sugars particularly off and on. He has not had any chest pain. He denies any increase in shortness of breath. His functional ability is getting worse. He says having more arthritic symptoms. He's had more difficulty with his day-to-day function. He is having to use a walking cane almost all the time. He said 9 any recent falls or injuries. He does get some significant relief with his pain medicine. He says he's really wanting to go back to work. He says he doesn't see that happening. He is very depressed about that. Review of Systems   Constitutional: Positive for activity change, appetite change and fatigue. Respiratory: Positive for shortness of breath. Gastrointestinal: Positive for diarrhea and nausea. Musculoskeletal: Positive for arthralgias, back pain, gait problem and myalgias. Skin: Positive for color change and rash. Neurological: Positive for weakness. All other systems reviewed and are negative. OBJECTIVE:  Wt Readings from Last 3 Encounters:   05/14/19 (!) 400 lb (181.4 kg)   02/14/19 (!) 404 lb (183.3 kg)   01/10/19 (!) 420 lb (190.5 kg)     BP Readings from Last 3 Encounters:   05/14/19 130/72   02/14/19 110/70   01/10/19 110/60      Pulse Readings from Last 3 Encounters:   05/14/19 85   02/14/19 90   01/10/19 78     Body mass index is 48.69 kg/m².      Resp Readings from Last 3 Encounters:   05/14/19 20   02/14/19 20   01/10/19 20     Physical Exam   Constitutional: He is oriented to person, place, and time. Vital signs are normal. He appears well-developed and well-nourished. HENT:   Head: Normocephalic and atraumatic. Right Ear: Hearing and external ear normal.   Left Ear: Hearing and external ear normal.   Nose: Nose normal.   Mouth/Throat: Uvula is midline, oropharynx is clear and moist and mucous membranes are normal.   Eyes: Pupils are equal, round, and reactive to light. Conjunctivae, EOM and lids are normal.   Neck: Trachea normal, normal range of motion and phonation normal. Neck supple. Carotid bruit is not present. No thyroid mass and no thyromegaly present. Cardiovascular: Normal rate, regular rhythm, S1 normal, S2 normal, normal heart sounds and normal pulses. Exam reveals no gallop and no friction rub. No murmur heard. Pulmonary/Chest: Effort normal.   Musculoskeletal:        Right shoulder: He exhibits decreased range of motion, tenderness, pain, spasm and decreased strength. Left shoulder: He exhibits decreased range of motion and tenderness. Left elbow: Tenderness found. Right knee: He exhibits decreased range of motion. Tenderness found. Left knee: He exhibits decreased range of motion. Tenderness found. Lumbar back: He exhibits decreased range of motion, tenderness, pain and spasm. Right upper leg: He exhibits tenderness. Neurological: He is alert and oriented to person, place, and time. Left arm and 4tht and 5th digit have some decreased sensation. Some decreased  strenght in the left hand   Skin: Skin is warm and dry. Bilateral lower extremity redness and warmth, he has some plus 2 edema below the knee bilaterally   Psychiatric: He has a normal mood and affect. Nursing note and vitals reviewed.       Results in Past 30 Days  Result Component Current Result Ref Range Previous Result Ref Range   Alb 4.2 (5/15/2019) 3.6 - 4.8 g/dL Not in Time Range    Albumin/Globulin Ratio 1.3 (5/15/2019) 1.2 - 2.2 Not in Time Range    Alkaline Phosphatase 94 (5/15/2019) 39 - 117 IU/L Not in Time Range    ALT 31 (5/15/2019) 0 - 44 IU/L Not in Time Range    AST 31 (5/15/2019) 0 - 40 IU/L Not in Time Range    BUN 14 (5/15/2019) 8 - 27 mg/dL Not in Time Range    Calcium 9.4 (5/15/2019) 8.6 - 10.2 mg/dL Not in Time Range    Chloride 94 (L) (5/15/2019) 96 - 106 mmol/L Not in Time Range    CO2 21 (5/15/2019) 20 - 29 mmol/L Not in Time Range    CREATININE 0.95 (5/15/2019) 0.76 - 1.27 mg/dL Not in Time Range    GFR  100 (5/15/2019) >59 mL/min/1.73 Not in Time Range    GFR Non- 87 (5/15/2019) >59 mL/min/1.73 Not in Time Range    Globulin 3.2 (5/15/2019) 1.5 - 4.5 g/dL Not in Time Range    Glucose 274 (H) (5/15/2019) 65 - 99 mg/dL Not in Time Range    Potassium 4.9 (5/15/2019) 3.5 - 5.2 mmol/L Not in Time Range    Sodium 134 (5/15/2019) 134 - 144 mmol/L Not in Time Range    Total Bilirubin 0.3 (5/15/2019) 0.0 - 1.2 mg/dL Not in Time Range    Total Protein 7.4 (5/15/2019) 6.0 - 8.5 g/dL Not in Time Range        Hemoglobin A1C (%)   Date Value   05/15/2019 12.5 (H)     Microscopic Examination (no units)   Date Value   12/28/2017 Not Indicated     Microalbumin, Random Urine (mg/dL)   Date Value   08/15/2018 <1.20     LDL Calculated (mg/dL)   Date Value   05/15/2019 86         Lab Results   Component Value Date    WBC 7.8 05/15/2019    NEUTROABS 4.7 02/21/2018    HGB 15.1 05/15/2019    HCT 45.8 05/15/2019    HCT 41.6 06/05/2012    MCV 92 05/15/2019     05/15/2019     06/05/2012       Lab Results   Component Value Date    TSH 2.12 05/18/2018         ASSESSMENT:    Diagnosis Orders   1. Chronic right shoulder pain  20610 - ID DRAIN/INJECT LARGE JOINT/BURSA   2. Chronic bilateral low back pain with right-sided sciatica  oxyCODONE (OXY-IR) 30 MG immediate release tablet    HYDROcodone-acetaminophen (NORCO)  MG per tablet    gabapentin (NEURONTIN) 800 MG tablet    carisoprodol (SOMA) 350 MG tablet   3.  Colon cancer screening  COLOGUARD   4. Type 2 diabetes mellitus with diabetic polyneuropathy, with long-term current use of insulin ([facility]     5. B12 deficiency     6. CHF (congestive heart failure), NYHA class I, acute on chronic, combined ([facility]     7. Essential hypertension     8. Osteoarthritis of multiple joints, unspecified osteoarthritis type  67103 - [place] DRAIN/INJECT LARGE JOINT/BURSA   9. Chronic pain of left knee  XR KNEE LEFT (MIN 4 VIEWS)    28068 - [place] DRAIN/INJECT LARGE JOINT/BURSA        PLAN:  Orders Placed This Encounter   Medications    pantoprazole (PROTONIX) 40 MG tablet     Sig: Take 1 tablet by mouth daily (with breakfast) In place of omeprazole     Dispense:  30 tablet     Refill:  5    montelukast (SINGULAIR) 10 MG tablet     Sig: TAKE ONE TABLET BY MOUTH NIGHTLY AS NEEDED FOR ALLERGIES     Dispense:  30 tablet     Refill:  5    simvastatin (ZOCOR) 40 MG tablet     Sig: TAKE ONE TABLET BY MOUTH AT BEDTIME FOR CHOLESTEROL     Dispense:  30 tablet     Refill:  5    blood glucose test strips (LEIDY CONTOUR NEXT TEST) strip     Sig: TEST TWICE A DAY     Dispense:  100 strip     Refill:  5    metFORMIN (GLUCOPHAGE) 1000 MG tablet     Sig: TAKE ONE TABLET BY MOUTH TWICE A DAY FOR SUGAR CONTROL     Dispense:  60 tablet     Refill:  5    spironolactone (ALDACTONE) 25 MG tablet     Sig: TAKE ONE TABLET BY MOUTH DAILY FOR BLOOD PRESSURE (FLUID PILL)     Dispense:  30 tablet     Refill:  2    oxyCODONE (OXY-IR) 30 MG immediate release tablet     Sig: Take 1 tablet by mouth every 4 hours as needed for Pain for up to 30 days. Dispense:  150 tablet     Refill:  0     Reduce doses taken as pain becomes manageable    HYDROcodone-acetaminophen (NORCO)  MG per tablet     Sig: Take 1 tablet by mouth every 4 hours as needed for Pain for up to 30 days. For break thru pain.   30 day supply     Dispense:  150 tablet     Refill:  0     Reduce doses taken as pain becomes manageable    gabapentin (NEURONTIN) 800 MG tablet     Sig: Take 1 tablet by mouth 4 times daily for 30 days. Dispense:  120 tablet     Refill:  2    carisoprodol (SOMA) 350 MG tablet     Sig: Take 1 tablet by mouth 4 times daily for 30 days. Dispense:  120 tablet     Refill:  2    cyanocobalamin injection 1,000 mcg    methylPREDNISolone acetate (DEPO-MEDROL) injection 40 mg    lidocaine 1 % injection 1 mL    methylPREDNISolone acetate (DEPO-MEDROL) injection 40 mg    lidocaine 1 % injection 1 mL    amoxicillin-clavulanate (AUGMENTIN) 875-125 MG per tablet     Sig: Take 1 tablet by mouth 2 times daily (with meals) for 10 days     Dispense:  20 tablet     Refill:  0           Medications Discontinued During This Encounter   Medication Reason    FEROSUL 325 (65 Fe) MG tablet     carvedilol (COREG) 6.25 MG tablet LIST CLEANUP    lisinopril (PRINIVIL;ZESTRIL) 2.5 MG tablet LIST CLEANUP    guaiFENesin (MUCINEX) 600 MG extended release tablet LIST CLEANUP    Methyl Salicylate-Lido-Menthol (DERMACINRX DUOPATCH PHARMAPAK EX) LIST CLEANUP    gabapentin (NEURONTIN) 600 MG tablet     carisoprodol (SOMA) 350 MG tablet LIST CLEANUP    pantoprazole (PROTONIX) 40 MG tablet REORDER    montelukast (SINGULAIR) 10 MG tablet REORDER    simvastatin (ZOCOR) 40 MG tablet REORDER    blood glucose test strips (LEIDY CONTOUR NEXT TEST) strip REORDER    metFORMIN (GLUCOPHAGE) 1000 MG tablet REORDER    spironolactone (ALDACTONE) 25 MG tablet REORDER    oxyCODONE (OXY-IR) 30 MG immediate release tablet REORDER    HYDROcodone-acetaminophen (NORCO)  MG per tablet REORDER   Steroid injection was performed by Pamela Velez MD using Plain Lidocaine 1% 1ml and Depo-Medrol 40mg 1ml Intra-Articular in the right shoulder and left knee. No complications or reactions noted. Patient tolerated procedure well.       Controlled Substances Monitoring:

## 2019-06-03 RX ORDER — LISINOPRIL 2.5 MG/1
TABLET ORAL
Qty: 30 TABLET | Refills: 5 | Status: SHIPPED | OUTPATIENT
Start: 2019-06-03 | End: 2019-09-16

## 2019-06-03 RX ORDER — PROMETHAZINE HYDROCHLORIDE 25 MG/1
TABLET ORAL
Qty: 30 TABLET | Refills: 0 | Status: SHIPPED | OUTPATIENT
Start: 2019-06-03 | End: 2019-08-06 | Stop reason: SDUPTHER

## 2019-06-03 RX ORDER — FLUTICASONE PROPIONATE 50 MCG
SPRAY, SUSPENSION (ML) NASAL
Qty: 16 G | Refills: 0 | Status: SHIPPED | OUTPATIENT
Start: 2019-06-03 | End: 2019-07-01 | Stop reason: SDUPTHER

## 2019-06-03 RX ORDER — CARVEDILOL 6.25 MG/1
TABLET ORAL
Qty: 60 TABLET | Refills: 2 | Status: SHIPPED | OUTPATIENT
Start: 2019-06-03 | End: 2019-08-13 | Stop reason: SDUPTHER

## 2019-06-03 RX ORDER — FERROUS SULFATE 325(65) MG
TABLET ORAL
Qty: 90 TABLET | Refills: 2 | Status: SHIPPED | OUTPATIENT
Start: 2019-06-03 | End: 2019-08-13 | Stop reason: SDUPTHER

## 2019-06-03 NOTE — TELEPHONE ENCOUNTER
Refill request received from pharmacy.  Medication pending for approval.     Patient information below:      Hemoglobin A1C (%)   Date Value   05/15/2019 12.5 (H)   02/14/2019 9.3 (H)   11/15/2018 10.1 (H)     Microscopic Examination (no units)   Date Value   12/28/2017 Not Indicated     Microalbumin, Random Urine (mg/dL)   Date Value   08/15/2018 <1.20     LDL Calculated (mg/dL)   Date Value   05/15/2019 86     AST (IU/L)   Date Value   05/15/2019 31     ALT (IU/L)   Date Value   05/15/2019 31     BUN (mg/dL)   Date Value   05/15/2019 14      (goal A1C is < 7)   (goal LDL is <100) need 30-50% reduction from baseline     BP Readings from Last 3 Encounters:   05/14/19 130/72   02/14/19 110/70   01/10/19 110/60    (goal /80)      All Future Testing planned in [facility]:  Lab Frequency Next Occurrence   COLOGUARD Once 05/14/2019   XR KNEE LEFT (MIN 4 VIEWS) Once 05/14/2019       Last Office Visit With PCP:  5/14/2019      Next Visit Date:  Future Appointments   Date Time Provider Marin Sierra   8/13/2019  1:15 PM Vince Card MD [address]            Patient Active Problem List:     Right shoulder pain     GERD (gastroesophageal reflux disease)     B12 deficiency     Shoulder pain, left     Cellulitis of right lower extremity     Bilateral low back pain with right-sided sciatica     Type 2 diabetes mellitus with diabetic polyneuropathy (HCC)     Sleep apnea     Osteoarthritis of multiple joints     CHF (congestive heart failure), NYHA class I, acute on chronic, combined ([address]     Anasarca     Panniculitis     Congestive heart failure (HCC)     Type 2 diabetes mellitus ([address]     Morbid obesity ([address]     Anemia     Cellulitis of abdominal wall     Essential hypertension     Declining functional status     Ulcer of lower extremity, limited to breakdown of skin ([address]

## 2019-06-11 DIAGNOSIS — M54.41 CHRONIC BILATERAL LOW BACK PAIN WITH RIGHT-SIDED SCIATICA: ICD-10-CM

## 2019-06-11 DIAGNOSIS — G89.29 CHRONIC BILATERAL LOW BACK PAIN WITH RIGHT-SIDED SCIATICA: ICD-10-CM

## 2019-06-11 RX ORDER — HYDROCODONE BITARTRATE AND ACETAMINOPHEN 10; 325 MG/1; MG/1
1 TABLET ORAL EVERY 4 HOURS PRN
Qty: 150 TABLET | Refills: 0 | Status: SHIPPED | OUTPATIENT
Start: 2019-06-11 | End: 2019-07-08 | Stop reason: SDUPTHER

## 2019-06-11 RX ORDER — FLUCONAZOLE 150 MG/1
150 TABLET ORAL ONCE
Qty: 2 TABLET | Refills: 0 | Status: SHIPPED | OUTPATIENT
Start: 2019-06-11 | End: 2019-06-11

## 2019-06-11 RX ORDER — OXYCODONE HYDROCHLORIDE 30 MG/1
30 TABLET ORAL EVERY 4 HOURS PRN
Qty: 150 TABLET | Refills: 0 | Status: SHIPPED | OUTPATIENT
Start: 2019-06-11 | End: 2019-07-08 | Stop reason: SDUPTHER

## 2019-07-01 RX ORDER — FLUTICASONE PROPIONATE 50 MCG
SPRAY, SUSPENSION (ML) NASAL
Qty: 16 G | Refills: 2 | Status: SHIPPED | OUTPATIENT
Start: 2019-07-01 | End: 2019-08-13 | Stop reason: SDUPTHER

## 2019-07-08 DIAGNOSIS — G89.29 CHRONIC BILATERAL LOW BACK PAIN WITH RIGHT-SIDED SCIATICA: ICD-10-CM

## 2019-07-08 DIAGNOSIS — M54.41 CHRONIC BILATERAL LOW BACK PAIN WITH RIGHT-SIDED SCIATICA: ICD-10-CM

## 2019-07-08 RX ORDER — HYDROCODONE BITARTRATE AND ACETAMINOPHEN 10; 325 MG/1; MG/1
1 TABLET ORAL EVERY 4 HOURS PRN
Qty: 150 TABLET | Refills: 0 | Status: SHIPPED | OUTPATIENT
Start: 2019-07-08 | End: 2019-07-25 | Stop reason: SDUPTHER

## 2019-07-08 RX ORDER — OXYCODONE HYDROCHLORIDE 30 MG/1
30 TABLET ORAL EVERY 4 HOURS PRN
Qty: 150 TABLET | Refills: 0 | Status: SHIPPED | OUTPATIENT
Start: 2019-07-08 | End: 2019-07-25 | Stop reason: SDUPTHER

## 2019-07-23 NOTE — TELEPHONE ENCOUNTER
Refill request received from pharmacy.  Medication pending for approval.     Patient information below:      Hemoglobin A1C (%)   Date Value   05/15/2019 12.5 (H)   02/14/2019 9.3 (H)   11/15/2018 10.1 (H)     Microscopic Examination (no units)   Date Value   12/28/2017 Not Indicated     Microalbumin, Random Urine (mg/dL)   Date Value   08/15/2018 <1.20     LDL Calculated (mg/dL)   Date Value   05/15/2019 86     AST (IU/L)   Date Value   05/15/2019 31     ALT (IU/L)   Date Value   05/15/2019 31     BUN (mg/dL)   Date Value   05/15/2019 14      (goal A1C is < 7)   (goal LDL is <100) need 30-50% reduction from baseline     BP Readings from Last 3 Encounters:   05/14/19 130/72   02/14/19 110/70   01/10/19 110/60    (goal /80)      All Future Testing planned in [facility]:  Lab Frequency Next Occurrence   COLOGUARD Once 07/31/2019   XR KNEE LEFT (MIN 4 VIEWS) Once 05/14/2019       Last Office Visit With PCP:  7/1/2019      Next Visit Date:  Future Appointments   Date Time Provider Marin Sierra   8/13/2019  1:15 PM Ann Le MD [address]            Patient Active Problem List:     Right shoulder pain     GERD (gastroesophageal reflux disease)     B12 deficiency     Shoulder pain, left     Cellulitis of right lower extremity     Bilateral low back pain with right-sided sciatica     Type 2 diabetes mellitus with diabetic polyneuropathy (HCC)     Sleep apnea     Osteoarthritis of multiple joints     CHF (congestive heart failure), NYHA class I, acute on chronic, combined ([address]     Anasarca     Panniculitis     Congestive heart failure (HCC)     Type 2 diabetes mellitus ([address]     Morbid obesity ([address]     Anemia     Cellulitis of abdominal wall     Essential hypertension     Declining functional status     Ulcer of lower extremity, limited to breakdown of skin ([address]

## 2019-07-24 RX ORDER — INSULIN DETEMIR 100 [IU]/ML
INJECTION, SOLUTION SUBCUTANEOUS
Qty: 1 PEN | Refills: 3 | Status: SHIPPED | OUTPATIENT
Start: 2019-07-24 | End: 2019-08-13 | Stop reason: SDUPTHER

## 2019-07-25 ENCOUNTER — TELEPHONE (OUTPATIENT)
Dept: FAMILY MEDICINE CLINIC | Age: 59
End: 2019-07-25

## 2019-07-25 DIAGNOSIS — M54.41 CHRONIC BILATERAL LOW BACK PAIN WITH RIGHT-SIDED SCIATICA: ICD-10-CM

## 2019-07-25 DIAGNOSIS — G89.29 CHRONIC BILATERAL LOW BACK PAIN WITH RIGHT-SIDED SCIATICA: ICD-10-CM

## 2019-07-25 RX ORDER — OXYCODONE HYDROCHLORIDE 30 MG/1
30 TABLET ORAL EVERY 4 HOURS PRN
Qty: 150 TABLET | Refills: 0 | Status: SHIPPED | OUTPATIENT
Start: 2019-07-25 | End: 2019-08-13 | Stop reason: SDUPTHER

## 2019-07-25 RX ORDER — HYDROCODONE BITARTRATE AND ACETAMINOPHEN 10; 325 MG/1; MG/1
1 TABLET ORAL EVERY 4 HOURS PRN
Qty: 150 TABLET | Refills: 0 | Status: SHIPPED | OUTPATIENT
Start: 2019-07-25 | End: 2019-08-13 | Stop reason: SDUPTHER

## 2019-07-25 RX ORDER — FLUCONAZOLE 100 MG/1
100 TABLET ORAL DAILY
Qty: 14 TABLET | Refills: 0 | Status: SHIPPED | OUTPATIENT
Start: 2019-07-25 | End: 2019-08-08

## 2019-08-06 DIAGNOSIS — M54.41 CHRONIC BILATERAL LOW BACK PAIN WITH RIGHT-SIDED SCIATICA: ICD-10-CM

## 2019-08-06 DIAGNOSIS — G89.29 CHRONIC BILATERAL LOW BACK PAIN WITH RIGHT-SIDED SCIATICA: ICD-10-CM

## 2019-08-07 ENCOUNTER — TELEPHONE (OUTPATIENT)
Dept: FAMILY MEDICINE CLINIC | Age: 59
End: 2019-08-07

## 2019-08-07 RX ORDER — PROMETHAZINE HYDROCHLORIDE 25 MG/1
TABLET ORAL
Qty: 30 TABLET | Refills: 0 | Status: SHIPPED | OUTPATIENT
Start: 2019-08-07 | End: 2019-08-13 | Stop reason: SDUPTHER

## 2019-08-07 RX ORDER — GABAPENTIN 800 MG/1
TABLET ORAL
Qty: 120 TABLET | Refills: 2 | Status: SHIPPED | OUTPATIENT
Start: 2019-08-07 | End: 2019-09-16 | Stop reason: SDUPTHER

## 2019-08-13 ENCOUNTER — HOSPITAL ENCOUNTER (OUTPATIENT)
Facility: HOSPITAL | Age: 59
Discharge: HOME | End: 2019-08-13
Payer: MEDICARE

## 2019-08-13 ENCOUNTER — OFFICE VISIT (OUTPATIENT)
Dept: FAMILY MEDICINE CLINIC | Age: 59
End: 2019-08-13
Payer: MEDICARE

## 2019-08-13 VITALS
SYSTOLIC BLOOD PRESSURE: 122 MMHG | BODY MASS INDEX: 38.36 KG/M2 | HEART RATE: 78 BPM | OXYGEN SATURATION: 98 % | RESPIRATION RATE: 20 BRPM | WEIGHT: 315 LBS | DIASTOLIC BLOOD PRESSURE: 70 MMHG | HEIGHT: 76 IN

## 2019-08-13 DIAGNOSIS — E11.9 ENCOUNTER FOR DIABETIC FOOT EXAM (HCC): ICD-10-CM

## 2019-08-13 DIAGNOSIS — E55.9 VITAMIN D DEFICIENCY: ICD-10-CM

## 2019-08-13 DIAGNOSIS — M25.511 CHRONIC RIGHT SHOULDER PAIN: ICD-10-CM

## 2019-08-13 DIAGNOSIS — D50.9 IRON DEFICIENCY ANEMIA, UNSPECIFIED IRON DEFICIENCY ANEMIA TYPE: ICD-10-CM

## 2019-08-13 DIAGNOSIS — E11.42 TYPE 2 DIABETES MELLITUS WITH DIABETIC POLYNEUROPATHY, WITH LONG-TERM CURRENT USE OF INSULIN (HCC): ICD-10-CM

## 2019-08-13 DIAGNOSIS — E66.01 MORBID OBESITY WITH BMI OF 45.0-49.9, ADULT (HCC): ICD-10-CM

## 2019-08-13 DIAGNOSIS — R53.83 FATIGUE, UNSPECIFIED TYPE: ICD-10-CM

## 2019-08-13 DIAGNOSIS — L97.901 ULCER OF LOWER EXTREMITY, LIMITED TO BREAKDOWN OF SKIN, UNSPECIFIED LATERALITY (HCC): ICD-10-CM

## 2019-08-13 DIAGNOSIS — Z79.4 TYPE 2 DIABETES MELLITUS WITH DIABETIC POLYNEUROPATHY, WITH LONG-TERM CURRENT USE OF INSULIN (HCC): Primary | ICD-10-CM

## 2019-08-13 DIAGNOSIS — G89.29 CHRONIC BILATERAL LOW BACK PAIN WITH RIGHT-SIDED SCIATICA: ICD-10-CM

## 2019-08-13 DIAGNOSIS — G89.29 CHRONIC RIGHT SHOULDER PAIN: ICD-10-CM

## 2019-08-13 DIAGNOSIS — E11.42 TYPE 2 DIABETES MELLITUS WITH DIABETIC POLYNEUROPATHY, WITH LONG-TERM CURRENT USE OF INSULIN (HCC): Primary | ICD-10-CM

## 2019-08-13 DIAGNOSIS — M54.41 CHRONIC BILATERAL LOW BACK PAIN WITH RIGHT-SIDED SCIATICA: ICD-10-CM

## 2019-08-13 DIAGNOSIS — Z79.4 TYPE 2 DIABETES MELLITUS WITH DIABETIC POLYNEUROPATHY, WITH LONG-TERM CURRENT USE OF INSULIN (HCC): ICD-10-CM

## 2019-08-13 DIAGNOSIS — M15.9 OSTEOARTHRITIS OF MULTIPLE JOINTS, UNSPECIFIED OSTEOARTHRITIS TYPE: ICD-10-CM

## 2019-08-13 PROCEDURE — G8417 CALC BMI ABV UP PARAM F/U: HCPCS | Performed by: FAMILY MEDICINE

## 2019-08-13 PROCEDURE — 1036F TOBACCO NON-USER: CPT | Performed by: FAMILY MEDICINE

## 2019-08-13 PROCEDURE — 99213 OFFICE O/P EST LOW 20 MIN: CPT | Performed by: FAMILY MEDICINE

## 2019-08-13 PROCEDURE — 20610 DRAIN/INJ JOINT/BURSA W/O US: CPT | Performed by: FAMILY MEDICINE

## 2019-08-13 PROCEDURE — 3046F HEMOGLOBIN A1C LEVEL >9.0%: CPT | Performed by: FAMILY MEDICINE

## 2019-08-13 PROCEDURE — 2022F DILAT RTA XM EVC RTNOPTHY: CPT | Performed by: FAMILY MEDICINE

## 2019-08-13 PROCEDURE — G8427 DOCREV CUR MEDS BY ELIG CLIN: HCPCS | Performed by: FAMILY MEDICINE

## 2019-08-13 PROCEDURE — 36415 COLL VENOUS BLD VENIPUNCTURE: CPT

## 2019-08-13 PROCEDURE — 3017F COLORECTAL CA SCREEN DOC REV: CPT | Performed by: FAMILY MEDICINE

## 2019-08-13 RX ORDER — HYDROCODONE BITARTRATE AND ACETAMINOPHEN 10; 325 MG/1; MG/1
1 TABLET ORAL EVERY 4 HOURS PRN
Qty: 150 TABLET | Refills: 0 | Status: SHIPPED | OUTPATIENT
Start: 2019-08-13 | End: 2019-09-16 | Stop reason: SDUPTHER

## 2019-08-13 RX ORDER — CARVEDILOL 6.25 MG/1
TABLET ORAL
Qty: 60 TABLET | Refills: 5 | Status: SHIPPED | OUTPATIENT
Start: 2019-08-13 | End: 2019-09-16

## 2019-08-13 RX ORDER — PROMETHAZINE HYDROCHLORIDE 25 MG/1
TABLET ORAL
Qty: 30 TABLET | Refills: 0 | Status: SHIPPED | OUTPATIENT
Start: 2019-08-13 | End: 2019-09-16 | Stop reason: SDUPTHER

## 2019-08-13 RX ORDER — OXYCODONE HYDROCHLORIDE 30 MG/1
30 TABLET ORAL EVERY 4 HOURS PRN
Qty: 150 TABLET | Refills: 0 | Status: SHIPPED | OUTPATIENT
Start: 2019-08-13 | End: 2019-09-16 | Stop reason: SDUPTHER

## 2019-08-13 RX ORDER — FERROUS SULFATE 325(65) MG
TABLET ORAL
Qty: 90 TABLET | Refills: 5 | Status: SHIPPED | OUTPATIENT
Start: 2019-08-13 | End: 2019-12-17 | Stop reason: SDUPTHER

## 2019-08-13 RX ORDER — CYANOCOBALAMIN 1000 UG/ML
1000 INJECTION INTRAMUSCULAR; SUBCUTANEOUS ONCE
Status: COMPLETED | OUTPATIENT
Start: 2019-08-13 | End: 2019-08-13

## 2019-08-13 RX ORDER — OXYCODONE HYDROCHLORIDE 30 MG/1
30 TABLET ORAL EVERY 4 HOURS PRN
Qty: 150 TABLET | Refills: 0 | Status: SHIPPED | OUTPATIENT
Start: 2019-08-13 | End: 2019-08-13 | Stop reason: SDUPTHER

## 2019-08-13 RX ORDER — LIDOCAINE HYDROCHLORIDE 10 MG/ML
1 INJECTION, SOLUTION INFILTRATION; PERINEURAL ONCE
Status: COMPLETED | OUTPATIENT
Start: 2019-08-13 | End: 2019-08-13

## 2019-08-13 RX ORDER — FLUCONAZOLE 100 MG/1
100 TABLET ORAL DAILY
Qty: 7 TABLET | Refills: 1 | Status: SHIPPED | OUTPATIENT
Start: 2019-08-13 | End: 2019-11-13 | Stop reason: SDUPTHER

## 2019-08-13 RX ORDER — HYDROCODONE BITARTRATE AND ACETAMINOPHEN 10; 325 MG/1; MG/1
1 TABLET ORAL EVERY 4 HOURS PRN
Qty: 150 TABLET | Refills: 0 | Status: SHIPPED | OUTPATIENT
Start: 2019-08-13 | End: 2019-08-13 | Stop reason: SDUPTHER

## 2019-08-13 RX ORDER — METHYLPREDNISOLONE ACETATE 40 MG/ML
40 INJECTION, SUSPENSION INTRA-ARTICULAR; INTRALESIONAL; INTRAMUSCULAR; SOFT TISSUE ONCE
Status: COMPLETED | OUTPATIENT
Start: 2019-08-13 | End: 2019-08-13

## 2019-08-13 RX ORDER — FLUTICASONE PROPIONATE 50 MCG
SPRAY, SUSPENSION (ML) NASAL
Qty: 16 G | Refills: 2 | Status: SHIPPED | OUTPATIENT
Start: 2019-08-13 | End: 2020-06-17 | Stop reason: SDUPTHER

## 2019-08-13 RX ADMIN — LIDOCAINE HYDROCHLORIDE 1 ML: 10 INJECTION, SOLUTION INFILTRATION; PERINEURAL at 14:40

## 2019-08-13 RX ADMIN — CYANOCOBALAMIN 1000 MCG: 1000 INJECTION INTRAMUSCULAR; SUBCUTANEOUS at 14:39

## 2019-08-13 RX ADMIN — METHYLPREDNISOLONE ACETATE 40 MG: 40 INJECTION, SUSPENSION INTRA-ARTICULAR; INTRALESIONAL; INTRAMUSCULAR; SOFT TISSUE at 14:42

## 2019-08-13 ASSESSMENT — ENCOUNTER SYMPTOMS
NAUSEA: 1
BACK PAIN: 1
SHORTNESS OF BREATH: 1
DIARRHEA: 1
COLOR CHANGE: 1

## 2019-08-13 NOTE — PROGRESS NOTES
Administrations This Visit     cyanocobalamin injection 1,000 mcg     Admin Date  08/13/2019  14:39 Action  Given Dose  1000 mcg Route  Intramuscular Site  Deltoid Left Administered By  [facility]    Ordering Provider:  Joseluis Pate MD    . [facility]:  [id number]    Lot#:  [id number]    :  [address] [facility]    Patient Supplied?:  No          lidocaine 1 % injection 1 mL     Admin Date  08/13/2019  14:40 Action  Given Dose  1 mL Route  Intra-articular Site  Shoulder Right Administered By  [address] [place] [address]    Ordering Provider:  Joseluis Pate MD    . [facility]:  [id number]    Lot#:  [id number]    :  [address] [facility]    Patient Supplied?:  No    Comments:  Dr Anuj Sands did inj          methylPREDNISolone acetate (DEPO-MEDROL) injection 40 mg     Admin Date  08/13/2019  14:42 Action  Given Dose  40 mg Route  Intra-articular Site  Shoulder Right Administered By  [address] [place] [address]    Ordering Provider:  Joseluis Pate MD    . [facility] 47:  [id number]    Lot#:  [id number]    :  [id number]  [facility].     Patient Supplied?:  No    Comments:  Dr Anuj Sands did inj
Hemoglobin A1C (%)   Date Value   05/15/2019 12.5 (H)     Microscopic Examination (no units)   Date Value   12/28/2017 Not Indicated     Microalbumin, Random Urine (mg/dL)   Date Value   08/15/2018 <1.20     LDL Calculated (mg/dL)   Date Value   05/15/2019 86         Lab Results   Component Value Date    WBC 7.8 05/15/2019    NEUTROABS 4.7 02/21/2018    HGB 15.1 05/15/2019    HCT 45.8 05/15/2019    HCT 41.6 06/05/2012    MCV 92 05/15/2019     05/15/2019     06/05/2012       Lab Results   Component Value Date    TSH 2.12 05/18/2018         ASSESSMENT:    Diagnosis Orders   1. Type 2 diabetes mellitus with diabetic polyneuropathy, with long-term current use of insulin [facility])  External Referral To Ophthalmology    Diabetic Foot Exam   2. Chronic bilateral low back pain with right-sided sciatica  HYDROcodone-acetaminophen (NORCO)  MG per tablet    oxyCODONE (OXY-IR) 30 MG immediate release tablet    DISCONTINUED: HYDROcodone-acetaminophen (NORCO)  MG per tablet    DISCONTINUED: oxyCODONE (OXY-IR) 30 MG immediate release tablet   3. Encounter for diabetic foot exam ([facility]  Diabetic Foot Exam   4. Morbid obesity with BMI of 45.0-49.9, adult ([facility]     5. Ulcer of lower extremity, limited to breakdown of skin, unspecified laterality ([facility]     6. Chronic right shoulder pain  09039 - CT DRAIN/INJECT LARGE JOINT/BURSA   7. Osteoarthritis of multiple joints, unspecified osteoarthritis type  29995 - CT DRAIN/INJECT LARGE JOINT/BURSA   8. Iron deficiency anemia, unspecified iron deficiency anemia type     9. Vitamin D deficiency     10.  Fatigue, unspecified type          PLAN:  Orders Placed This Encounter   Medications    fluticasone (FLONASE) 50 MCG/ACT nasal spray     Sig: USE 1 SPRAY IN EACH NOSTRIL DAILY     Dispense:  16 g     Refill:  2    insulin detemir (LEVEMIR FLEXTOUCH) 100 UNIT/ML injection pen     Sig: INJECT 100 UNITS UNDER THE SKIN TWO TIMES A DAY     Dispense:  1 pen     Refill:  3

## 2019-08-15 RX ORDER — ERGOCALCIFEROL 1.25 MG/1
50000 CAPSULE ORAL WEEKLY
Qty: 4 CAPSULE | Refills: 2 | Status: SHIPPED | OUTPATIENT
Start: 2019-08-15 | End: 2020-06-17 | Stop reason: SDUPTHER

## 2019-08-16 RX ORDER — GLUCOSAMINE HCL/CHONDROITIN SU 500-400 MG
CAPSULE ORAL
Qty: 100 STRIP | Refills: 5 | Status: SHIPPED | OUTPATIENT
Start: 2019-08-16 | End: 2019-09-25

## 2019-08-16 RX ORDER — SYRING-NEEDL,DISP,INSUL,0.3 ML 30 GX5/16"
SYRINGE, EMPTY DISPOSABLE MISCELLANEOUS
Qty: 100 DEVICE | Refills: 0 | Status: SHIPPED | OUTPATIENT
Start: 2019-08-16 | End: 2019-09-25

## 2019-09-11 ENCOUNTER — CARE COORDINATION (OUTPATIENT)
Dept: CARE COORDINATION | Age: 59
End: 2019-09-11

## 2019-09-11 DIAGNOSIS — E11.42 TYPE 2 DIABETES MELLITUS WITH DIABETIC POLYNEUROPATHY, WITH LONG-TERM CURRENT USE OF INSULIN (HCC): Primary | ICD-10-CM

## 2019-09-11 DIAGNOSIS — Z79.4 TYPE 2 DIABETES MELLITUS WITH DIABETIC POLYNEUROPATHY, WITH LONG-TERM CURRENT USE OF INSULIN (HCC): Primary | ICD-10-CM

## 2019-09-16 ENCOUNTER — OFFICE VISIT (OUTPATIENT)
Dept: FAMILY MEDICINE CLINIC | Age: 59
End: 2019-09-16
Payer: MEDICARE

## 2019-09-16 ENCOUNTER — HOSPITAL ENCOUNTER (OUTPATIENT)
Facility: HOSPITAL | Age: 59
Discharge: HOME | End: 2019-09-16
Payer: MEDICARE

## 2019-09-16 VITALS
SYSTOLIC BLOOD PRESSURE: 122 MMHG | DIASTOLIC BLOOD PRESSURE: 70 MMHG | HEART RATE: 78 BPM | WEIGHT: 315 LBS | RESPIRATION RATE: 20 BRPM | HEIGHT: 76 IN | OXYGEN SATURATION: 96 % | BODY MASS INDEX: 38.36 KG/M2

## 2019-09-16 DIAGNOSIS — E11.42 TYPE 2 DIABETES MELLITUS WITH DIABETIC POLYNEUROPATHY, WITH LONG-TERM CURRENT USE OF INSULIN (HCC): ICD-10-CM

## 2019-09-16 DIAGNOSIS — E55.9 VITAMIN D DEFICIENCY: ICD-10-CM

## 2019-09-16 DIAGNOSIS — Z79.4 TYPE 2 DIABETES MELLITUS WITH DIABETIC POLYNEUROPATHY, WITH LONG-TERM CURRENT USE OF INSULIN (HCC): ICD-10-CM

## 2019-09-16 DIAGNOSIS — G89.29 CHRONIC BILATERAL LOW BACK PAIN WITH RIGHT-SIDED SCIATICA: Primary | ICD-10-CM

## 2019-09-16 DIAGNOSIS — H54.7 VISION PROBLEMS: ICD-10-CM

## 2019-09-16 DIAGNOSIS — M54.41 CHRONIC BILATERAL LOW BACK PAIN WITH RIGHT-SIDED SCIATICA: Primary | ICD-10-CM

## 2019-09-16 DIAGNOSIS — I10 ESSENTIAL HYPERTENSION: ICD-10-CM

## 2019-09-16 DIAGNOSIS — M15.9 OSTEOARTHRITIS OF MULTIPLE JOINTS, UNSPECIFIED OSTEOARTHRITIS TYPE: ICD-10-CM

## 2019-09-16 PROCEDURE — 2022F DILAT RTA XM EVC RTNOPTHY: CPT | Performed by: FAMILY MEDICINE

## 2019-09-16 PROCEDURE — G8427 DOCREV CUR MEDS BY ELIG CLIN: HCPCS | Performed by: FAMILY MEDICINE

## 2019-09-16 PROCEDURE — 1036F TOBACCO NON-USER: CPT | Performed by: FAMILY MEDICINE

## 2019-09-16 PROCEDURE — 3017F COLORECTAL CA SCREEN DOC REV: CPT | Performed by: FAMILY MEDICINE

## 2019-09-16 PROCEDURE — 36415 COLL VENOUS BLD VENIPUNCTURE: CPT

## 2019-09-16 PROCEDURE — 3046F HEMOGLOBIN A1C LEVEL >9.0%: CPT | Performed by: FAMILY MEDICINE

## 2019-09-16 PROCEDURE — 99214 OFFICE O/P EST MOD 30 MIN: CPT | Performed by: FAMILY MEDICINE

## 2019-09-16 PROCEDURE — G8417 CALC BMI ABV UP PARAM F/U: HCPCS | Performed by: FAMILY MEDICINE

## 2019-09-16 RX ORDER — SPIRONOLACTONE 25 MG/1
TABLET ORAL
Qty: 30 TABLET | Refills: 5 | Status: SHIPPED | OUTPATIENT
Start: 2019-09-16 | End: 2019-12-17 | Stop reason: SDUPTHER

## 2019-09-16 RX ORDER — PANTOPRAZOLE SODIUM 40 MG/1
40 TABLET, DELAYED RELEASE ORAL
Qty: 30 TABLET | Refills: 5 | Status: SHIPPED | OUTPATIENT
Start: 2019-09-16 | End: 2019-12-17 | Stop reason: SDUPTHER

## 2019-09-16 RX ORDER — GABAPENTIN 800 MG/1
TABLET ORAL
Qty: 120 TABLET | Refills: 2 | Status: SHIPPED | OUTPATIENT
Start: 2019-09-16 | End: 2020-03-17 | Stop reason: SDUPTHER

## 2019-09-16 RX ORDER — HYDROCODONE BITARTRATE AND ACETAMINOPHEN 10; 325 MG/1; MG/1
1 TABLET ORAL EVERY 4 HOURS PRN
Qty: 150 TABLET | Refills: 0 | Status: SHIPPED | OUTPATIENT
Start: 2019-09-16 | End: 2019-09-16 | Stop reason: SDUPTHER

## 2019-09-16 RX ORDER — MONTELUKAST SODIUM 10 MG/1
TABLET ORAL
Qty: 30 TABLET | Refills: 5 | Status: SHIPPED | OUTPATIENT
Start: 2019-09-16 | End: 2019-12-17 | Stop reason: SDUPTHER

## 2019-09-16 RX ORDER — SIMVASTATIN 40 MG
TABLET ORAL
Qty: 30 TABLET | Refills: 5 | Status: SHIPPED | OUTPATIENT
Start: 2019-09-16 | End: 2019-12-17 | Stop reason: SDUPTHER

## 2019-09-16 RX ORDER — OXYCODONE HYDROCHLORIDE 30 MG/1
30 TABLET ORAL EVERY 4 HOURS PRN
Qty: 150 TABLET | Refills: 0 | Status: SHIPPED | OUTPATIENT
Start: 2019-09-16 | End: 2019-09-16 | Stop reason: SDUPTHER

## 2019-09-16 RX ORDER — OXYCODONE HYDROCHLORIDE 30 MG/1
30 TABLET ORAL EVERY 4 HOURS PRN
Qty: 150 TABLET | Refills: 0 | Status: SHIPPED | OUTPATIENT
Start: 2019-09-16 | End: 2019-11-25 | Stop reason: SDUPTHER

## 2019-09-16 RX ORDER — HYDROCODONE BITARTRATE AND ACETAMINOPHEN 10; 325 MG/1; MG/1
1 TABLET ORAL EVERY 4 HOURS PRN
Qty: 150 TABLET | Refills: 0 | Status: SHIPPED | OUTPATIENT
Start: 2019-09-16 | End: 2019-11-25 | Stop reason: SDUPTHER

## 2019-09-16 RX ORDER — PROMETHAZINE HYDROCHLORIDE 25 MG/1
TABLET ORAL
Qty: 30 TABLET | Refills: 0 | Status: SHIPPED | OUTPATIENT
Start: 2019-09-16 | End: 2019-11-19 | Stop reason: SDUPTHER

## 2019-09-16 ASSESSMENT — ENCOUNTER SYMPTOMS
BACK PAIN: 1
DIARRHEA: 1
COLOR CHANGE: 1
NAUSEA: 1
SHORTNESS OF BREATH: 1

## 2019-09-16 NOTE — PROGRESS NOTES
- 1.27 mg/dL Not in Time Range    GFR  89 (9/16/2019) >59 mL/min/1.73 Not in Time Range    GFR Non- 77 (9/16/2019) >59 mL/min/1.73 Not in Time Range    Globulin 2.8 (9/16/2019) 1.5 - 4.5 g/dL Not in Time Range    Glucose 183 (H) (9/16/2019) 65 - 99 mg/dL Not in Time Range    Potassium 4.8 (9/16/2019) 3.5 - 5.2 mmol/L Not in Time Range    Sodium 139 (9/16/2019) 134 - 144 mmol/L Not in Time Range    Total Bilirubin 0.2 (9/16/2019) 0.0 - 1.2 mg/dL Not in Time Range    Total Protein 6.8 (9/16/2019) 6.0 - 8.5 g/dL Not in Time Range        Hemoglobin A1C (%)   Date Value   09/16/2019 10.7 (H)     Microscopic Examination (no units)   Date Value   12/28/2017 Not Indicated     Microalbumin, Random Urine (ug/mL)   Date Value   08/14/2019 78.7     LDL Calculated (mg/dL)   Date Value   05/15/2019 86         Lab Results   Component Value Date    WBC 6.2 08/14/2019    NEUTROABS 4.7 02/21/2018    HGB 14.2 08/14/2019    HCT 42.2 08/14/2019    HCT 41.6 06/05/2012    MCV 91 08/14/2019     08/14/2019     06/05/2012       Lab Results   Component Value Date    TSH 2.12 05/18/2018         ASSESSMENT:    Diagnosis Orders   1. Chronic bilateral low back pain with right-sided sciatica  gabapentin (NEURONTIN) 800 MG tablet    HYDROcodone-acetaminophen (NORCO)  MG per tablet    oxyCODONE (OXY-IR) 30 MG immediate release tablet    DISCONTINUED: HYDROcodone-acetaminophen (NORCO)  MG per tablet    DISCONTINUED: oxyCODONE (OXY-IR) 30 MG immediate release tablet   2. Vision problems     3. Type 2 diabetes mellitus with diabetic polyneuropathy, with long-term current use of insulin ([address]     4. Osteoarthritis of multiple joints, unspecified osteoarthritis type     5. Essential hypertension     6.  Vitamin D deficiency          PLAN:  Orders Placed This Encounter   Medications    promethazine (PHENERGAN) 25 MG tablet     Sig: TAKE ONE TABLET BY MOUTH EVERY 6 HOURS AS NEEDED FOR NAUSEA OR

## 2019-09-17 ENCOUNTER — HOSPITAL ENCOUNTER (OUTPATIENT)
Dept: NUTRITION | Facility: HOSPITAL | Age: 59
Discharge: HOME | End: 2019-09-17
Payer: MEDICARE

## 2019-09-17 VITALS — BODY MASS INDEX: 38.36 KG/M2 | HEIGHT: 76 IN | WEIGHT: 315 LBS

## 2019-09-17 PROCEDURE — 97802 MEDICAL NUTRITION INDIV IN: CPT

## 2019-09-17 SDOH — ECONOMIC STABILITY: FOOD INSECURITY: ADDITIONAL INFORMATION: YES

## 2019-09-25 ENCOUNTER — OFFICE VISIT (OUTPATIENT)
Dept: FAMILY MEDICINE CLINIC | Age: 59
End: 2019-09-25
Payer: MEDICARE

## 2019-09-25 VITALS
HEART RATE: 68 BPM | DIASTOLIC BLOOD PRESSURE: 62 MMHG | WEIGHT: 315 LBS | RESPIRATION RATE: 18 BRPM | HEIGHT: 76 IN | BODY MASS INDEX: 38.36 KG/M2 | OXYGEN SATURATION: 97 % | SYSTOLIC BLOOD PRESSURE: 120 MMHG

## 2019-09-25 DIAGNOSIS — G89.29 CHRONIC RIGHT SHOULDER PAIN: Primary | ICD-10-CM

## 2019-09-25 DIAGNOSIS — M25.511 CHRONIC RIGHT SHOULDER PAIN: Primary | ICD-10-CM

## 2019-09-25 PROCEDURE — 1036F TOBACCO NON-USER: CPT | Performed by: FAMILY MEDICINE

## 2019-09-25 PROCEDURE — 99213 OFFICE O/P EST LOW 20 MIN: CPT | Performed by: FAMILY MEDICINE

## 2019-09-25 PROCEDURE — 20610 DRAIN/INJ JOINT/BURSA W/O US: CPT | Performed by: FAMILY MEDICINE

## 2019-09-25 PROCEDURE — G8417 CALC BMI ABV UP PARAM F/U: HCPCS | Performed by: FAMILY MEDICINE

## 2019-09-25 PROCEDURE — 3017F COLORECTAL CA SCREEN DOC REV: CPT | Performed by: FAMILY MEDICINE

## 2019-09-25 PROCEDURE — G8427 DOCREV CUR MEDS BY ELIG CLIN: HCPCS | Performed by: FAMILY MEDICINE

## 2019-09-25 RX ORDER — METHYLPREDNISOLONE ACETATE 40 MG/ML
40 INJECTION, SUSPENSION INTRA-ARTICULAR; INTRALESIONAL; INTRAMUSCULAR; SOFT TISSUE ONCE
Status: COMPLETED | OUTPATIENT
Start: 2019-09-25 | End: 2019-09-25

## 2019-09-25 RX ORDER — LIDOCAINE HYDROCHLORIDE 10 MG/ML
1 INJECTION, SOLUTION INFILTRATION; PERINEURAL ONCE
Status: COMPLETED | OUTPATIENT
Start: 2019-09-25 | End: 2019-09-25

## 2019-09-25 RX ADMIN — LIDOCAINE HYDROCHLORIDE 1 ML: 10 INJECTION, SOLUTION INFILTRATION; PERINEURAL at 14:27

## 2019-09-25 RX ADMIN — METHYLPREDNISOLONE ACETATE 40 MG: 40 INJECTION, SUSPENSION INTRA-ARTICULAR; INTRALESIONAL; INTRAMUSCULAR; SOFT TISSUE at 14:27

## 2019-09-26 DIAGNOSIS — M25.511 CHRONIC RIGHT SHOULDER PAIN: Primary | ICD-10-CM

## 2019-09-26 DIAGNOSIS — G89.29 CHRONIC RIGHT SHOULDER PAIN: Primary | ICD-10-CM

## 2019-09-26 RX ORDER — LORATADINE 10 MG/1
TABLET ORAL
Qty: 30 TABLET | Refills: 5 | Status: SHIPPED | OUTPATIENT
Start: 2019-09-26 | End: 2020-04-15

## 2019-09-26 RX ORDER — LANCETS
EACH MISCELLANEOUS
Qty: 100 EACH | Refills: 0 | Status: SHIPPED | OUTPATIENT
Start: 2019-09-26 | End: 2019-10-21 | Stop reason: SDUPTHER

## 2019-09-26 RX ORDER — CARISOPRODOL 350 MG/1
TABLET ORAL
Qty: 120 TABLET | Refills: 2 | Status: SHIPPED | OUTPATIENT
Start: 2019-09-26 | End: 2019-09-30 | Stop reason: SDUPTHER

## 2019-09-28 DIAGNOSIS — M25.511 CHRONIC RIGHT SHOULDER PAIN: ICD-10-CM

## 2019-09-28 DIAGNOSIS — G89.29 CHRONIC RIGHT SHOULDER PAIN: ICD-10-CM

## 2019-09-30 DIAGNOSIS — G89.29 CHRONIC RIGHT SHOULDER PAIN: ICD-10-CM

## 2019-09-30 DIAGNOSIS — M25.511 CHRONIC RIGHT SHOULDER PAIN: ICD-10-CM

## 2019-09-30 RX ORDER — CARISOPRODOL 350 MG/1
TABLET ORAL
Qty: 120 TABLET | Refills: 2 | OUTPATIENT
Start: 2019-09-30 | End: 2019-10-30

## 2019-09-30 RX ORDER — CARISOPRODOL 350 MG/1
350 TABLET ORAL 4 TIMES DAILY PRN
Qty: 120 TABLET | Refills: 2 | Status: SHIPPED | OUTPATIENT
Start: 2019-09-30 | End: 2019-12-17 | Stop reason: SDUPTHER

## 2019-09-30 ASSESSMENT — ENCOUNTER SYMPTOMS
COLOR CHANGE: 1
BACK PAIN: 1

## 2019-09-30 NOTE — PROGRESS NOTES
SUBJECTIVE:    Patient ID: Geetha Sierra is a 59 y.o. male. Chief Complaint   Patient presents with    Shoulder Pain     right shoulder pain, injection       HPI: He is here complaining of right shoulder pain. He is having more difficulties with keeping the pain under control. He is really not able to use that arm much. He says he has been trying to stay active. He has been using his medication as much as he can. He says he is not having any recent falls or noted new injuries. His blood sugars have been doing somewhat better. His blood pressures have not been high. Says that the injections do seem to help for a while. Review of Systems   Constitutional: Positive for fatigue. Musculoskeletal: Positive for arthralgias, back pain, gait problem and myalgias. Skin: Positive for color change and rash. Neurological: Positive for weakness. All other systems reviewed and are negative. OBJECTIVE:  Wt Readings from Last 3 Encounters:   09/25/19 (!) 415 lb (188.2 kg)   09/17/19 (!) 415 lb (188.2 kg)   09/16/19 (!) 415 lb (188.2 kg)     BP Readings from Last 3 Encounters:   09/25/19 120/62   09/16/19 122/70   08/13/19 122/70      Pulse Readings from Last 3 Encounters:   09/25/19 68   09/16/19 78   08/13/19 78     Body mass index is 50.52 kg/m². Resp Readings from Last 3 Encounters:   09/25/19 18   09/16/19 20   08/13/19 20     Physical Exam   Constitutional: He is oriented to person, place, and time. Vital signs are normal. He appears well-developed and well-nourished. HENT:   Head: Normocephalic and atraumatic. Right Ear: Hearing and external ear normal.   Left Ear: Hearing and external ear normal.   Nose: Nose normal.   Mouth/Throat: Uvula is midline, oropharynx is clear and moist and mucous membranes are normal.   Eyes: Pupils are equal, round, and reactive to light. Conjunctivae, EOM and lids are normal.   Neck: Trachea normal, normal range of motion and phonation normal. Neck supple.

## 2019-10-14 ENCOUNTER — TELEPHONE (OUTPATIENT)
Dept: FAMILY MEDICINE CLINIC | Age: 59
End: 2019-10-14

## 2019-10-21 DIAGNOSIS — M54.41 CHRONIC BILATERAL LOW BACK PAIN WITH RIGHT-SIDED SCIATICA: ICD-10-CM

## 2019-10-21 DIAGNOSIS — G89.29 CHRONIC BILATERAL LOW BACK PAIN WITH RIGHT-SIDED SCIATICA: ICD-10-CM

## 2019-10-22 RX ORDER — LANCETS
EACH MISCELLANEOUS
Qty: 100 EACH | Refills: 0 | Status: SHIPPED | OUTPATIENT
Start: 2019-10-22 | End: 2020-06-17 | Stop reason: SDUPTHER

## 2019-10-22 RX ORDER — GABAPENTIN 800 MG/1
TABLET ORAL
Qty: 120 TABLET | Refills: 2 | Status: SHIPPED | OUTPATIENT
Start: 2019-10-22 | End: 2019-12-17 | Stop reason: SDUPTHER

## 2019-11-05 ENCOUNTER — NURSE ONLY (OUTPATIENT)
Dept: FAMILY MEDICINE CLINIC | Age: 59
End: 2019-11-05
Payer: MEDICARE

## 2019-11-05 DIAGNOSIS — Z23 NEED FOR INFLUENZA VACCINATION: Primary | ICD-10-CM

## 2019-11-05 PROCEDURE — 90688 IIV4 VACCINE SPLT 0.5 ML IM: CPT | Performed by: FAMILY MEDICINE

## 2019-11-05 PROCEDURE — G0008 ADMIN INFLUENZA VIRUS VAC: HCPCS | Performed by: FAMILY MEDICINE

## 2019-11-13 ENCOUNTER — TELEPHONE (OUTPATIENT)
Dept: FAMILY MEDICINE CLINIC | Age: 59
End: 2019-11-13

## 2019-11-13 RX ORDER — FLUCONAZOLE 100 MG/1
100 TABLET ORAL DAILY
Qty: 7 TABLET | Refills: 1 | Status: SHIPPED | OUTPATIENT
Start: 2019-11-13 | End: 2021-05-19 | Stop reason: SDUPTHER

## 2019-11-19 RX ORDER — CEPHALEXIN 500 MG/1
500 CAPSULE ORAL 3 TIMES DAILY
Qty: 30 CAPSULE | Refills: 0 | Status: SHIPPED | OUTPATIENT
Start: 2019-11-19 | End: 2019-11-29

## 2019-11-19 RX ORDER — PROMETHAZINE HYDROCHLORIDE 25 MG/1
TABLET ORAL
Qty: 30 TABLET | Refills: 0 | Status: SHIPPED | OUTPATIENT
Start: 2019-11-19 | End: 2019-12-17 | Stop reason: SDUPTHER

## 2019-11-24 RX ORDER — LISINOPRIL 2.5 MG/1
TABLET ORAL
Qty: 30 TABLET | Refills: 5 | Status: SHIPPED | OUTPATIENT
Start: 2019-11-24 | End: 2020-03-17

## 2019-11-25 DIAGNOSIS — G89.29 CHRONIC BILATERAL LOW BACK PAIN WITH RIGHT-SIDED SCIATICA: ICD-10-CM

## 2019-11-25 DIAGNOSIS — M54.41 CHRONIC BILATERAL LOW BACK PAIN WITH RIGHT-SIDED SCIATICA: ICD-10-CM

## 2019-11-25 RX ORDER — BENZONATATE 200 MG/1
200 CAPSULE ORAL 3 TIMES DAILY PRN
Qty: 21 CAPSULE | Refills: 0 | Status: SHIPPED | OUTPATIENT
Start: 2019-11-25 | End: 2020-08-04 | Stop reason: SDUPTHER

## 2019-11-25 RX ORDER — OXYCODONE HYDROCHLORIDE 30 MG/1
30 TABLET ORAL EVERY 4 HOURS PRN
Qty: 150 TABLET | Refills: 0 | Status: SHIPPED | OUTPATIENT
Start: 2019-11-25 | End: 2019-12-17 | Stop reason: SDUPTHER

## 2019-11-25 RX ORDER — HYDROCODONE BITARTRATE AND ACETAMINOPHEN 10; 325 MG/1; MG/1
1 TABLET ORAL EVERY 4 HOURS PRN
Qty: 150 TABLET | Refills: 0 | Status: SHIPPED | OUTPATIENT
Start: 2019-11-25 | End: 2019-12-17 | Stop reason: SDUPTHER

## 2019-12-17 ENCOUNTER — HOSPITAL ENCOUNTER (OUTPATIENT)
Facility: HOSPITAL | Age: 59
Discharge: HOME | End: 2019-12-17
Payer: MEDICARE

## 2019-12-17 ENCOUNTER — OFFICE VISIT (OUTPATIENT)
Dept: FAMILY MEDICINE CLINIC | Age: 59
End: 2019-12-17
Payer: MEDICARE

## 2019-12-17 VITALS
OXYGEN SATURATION: 94 % | DIASTOLIC BLOOD PRESSURE: 70 MMHG | HEART RATE: 84 BPM | HEIGHT: 76 IN | SYSTOLIC BLOOD PRESSURE: 134 MMHG | WEIGHT: 315 LBS | BODY MASS INDEX: 38.36 KG/M2 | RESPIRATION RATE: 20 BRPM

## 2019-12-17 DIAGNOSIS — E11.42 TYPE 2 DIABETES MELLITUS WITH DIABETIC POLYNEUROPATHY, WITH LONG-TERM CURRENT USE OF INSULIN (HCC): ICD-10-CM

## 2019-12-17 DIAGNOSIS — F43.10 PTSD (POST-TRAUMATIC STRESS DISORDER): Primary | ICD-10-CM

## 2019-12-17 DIAGNOSIS — Z79.4 TYPE 2 DIABETES MELLITUS WITH DIABETIC POLYNEUROPATHY, WITH LONG-TERM CURRENT USE OF INSULIN (HCC): ICD-10-CM

## 2019-12-17 DIAGNOSIS — M15.9 OSTEOARTHRITIS OF MULTIPLE JOINTS, UNSPECIFIED OSTEOARTHRITIS TYPE: ICD-10-CM

## 2019-12-17 DIAGNOSIS — Z12.5 SCREENING FOR PROSTATE CANCER: ICD-10-CM

## 2019-12-17 DIAGNOSIS — I10 ESSENTIAL HYPERTENSION: ICD-10-CM

## 2019-12-17 DIAGNOSIS — E78.9 LIPID DISORDER: ICD-10-CM

## 2019-12-17 DIAGNOSIS — G89.29 CHRONIC BILATERAL LOW BACK PAIN WITH RIGHT-SIDED SCIATICA: ICD-10-CM

## 2019-12-17 DIAGNOSIS — M54.41 CHRONIC BILATERAL LOW BACK PAIN WITH RIGHT-SIDED SCIATICA: ICD-10-CM

## 2019-12-17 DIAGNOSIS — G89.29 CHRONIC RIGHT SHOULDER PAIN: ICD-10-CM

## 2019-12-17 DIAGNOSIS — M25.511 CHRONIC RIGHT SHOULDER PAIN: ICD-10-CM

## 2019-12-17 PROBLEM — I70.90 ARTERIOSCLEROTIC VASCULAR DISEASE: Status: ACTIVE | Noted: 2019-09-24

## 2019-12-17 PROBLEM — H25.819 COMBINED FORM OF SENILE CATARACT: Status: ACTIVE | Noted: 2019-09-24

## 2019-12-17 PROBLEM — E11.3419: Status: ACTIVE | Noted: 2019-09-24

## 2019-12-17 PROBLEM — H02.30 EXCESS SKIN OF EYELID: Status: ACTIVE | Noted: 2019-09-24

## 2019-12-17 PROBLEM — H40.1490 PSEUDOEXFOLIATION GLAUCOMA: Status: ACTIVE | Noted: 2019-09-24

## 2019-12-17 PROCEDURE — G8482 FLU IMMUNIZE ORDER/ADMIN: HCPCS | Performed by: FAMILY MEDICINE

## 2019-12-17 PROCEDURE — 2022F DILAT RTA XM EVC RTNOPTHY: CPT | Performed by: FAMILY MEDICINE

## 2019-12-17 PROCEDURE — 36415 COLL VENOUS BLD VENIPUNCTURE: CPT

## 2019-12-17 PROCEDURE — 3017F COLORECTAL CA SCREEN DOC REV: CPT | Performed by: FAMILY MEDICINE

## 2019-12-17 PROCEDURE — 3046F HEMOGLOBIN A1C LEVEL >9.0%: CPT | Performed by: FAMILY MEDICINE

## 2019-12-17 PROCEDURE — G8427 DOCREV CUR MEDS BY ELIG CLIN: HCPCS | Performed by: FAMILY MEDICINE

## 2019-12-17 PROCEDURE — 99214 OFFICE O/P EST MOD 30 MIN: CPT | Performed by: FAMILY MEDICINE

## 2019-12-17 PROCEDURE — G8598 ASA/ANTIPLAT THER USED: HCPCS | Performed by: FAMILY MEDICINE

## 2019-12-17 PROCEDURE — G8417 CALC BMI ABV UP PARAM F/U: HCPCS | Performed by: FAMILY MEDICINE

## 2019-12-17 PROCEDURE — 1036F TOBACCO NON-USER: CPT | Performed by: FAMILY MEDICINE

## 2019-12-17 RX ORDER — HYDROCODONE BITARTRATE AND ACETAMINOPHEN 10; 325 MG/1; MG/1
1 TABLET ORAL EVERY 4 HOURS PRN
Qty: 150 TABLET | Refills: 0 | Status: SHIPPED | OUTPATIENT
Start: 2019-12-17 | End: 2020-01-14 | Stop reason: SDUPTHER

## 2019-12-17 RX ORDER — CARISOPRODOL 350 MG/1
350 TABLET ORAL 4 TIMES DAILY PRN
Qty: 120 TABLET | Refills: 2 | Status: SHIPPED | OUTPATIENT
Start: 2019-12-17 | End: 2020-03-17 | Stop reason: SDUPTHER

## 2019-12-17 RX ORDER — OXYCODONE HYDROCHLORIDE 30 MG/1
30 TABLET ORAL EVERY 4 HOURS PRN
Qty: 150 TABLET | Refills: 0 | Status: SHIPPED | OUTPATIENT
Start: 2019-12-17 | End: 2020-01-14 | Stop reason: SDUPTHER

## 2019-12-17 RX ORDER — GABAPENTIN 800 MG/1
TABLET ORAL
Qty: 120 TABLET | Refills: 2 | Status: SHIPPED | OUTPATIENT
Start: 2019-12-17 | End: 2020-06-17 | Stop reason: SDUPTHER

## 2019-12-17 RX ORDER — SIMVASTATIN 40 MG
TABLET ORAL
Qty: 30 TABLET | Refills: 5 | Status: SHIPPED | OUTPATIENT
Start: 2019-12-17 | End: 2020-06-17 | Stop reason: SDUPTHER

## 2019-12-17 RX ORDER — PROMETHAZINE HYDROCHLORIDE 25 MG/1
TABLET ORAL
Qty: 30 TABLET | Refills: 0 | Status: SHIPPED | OUTPATIENT
Start: 2019-12-17 | End: 2020-03-17 | Stop reason: SDUPTHER

## 2019-12-17 RX ORDER — MONTELUKAST SODIUM 10 MG/1
TABLET ORAL
Qty: 30 TABLET | Refills: 5 | Status: SHIPPED | OUTPATIENT
Start: 2019-12-17 | End: 2020-06-17 | Stop reason: SDUPTHER

## 2019-12-17 RX ORDER — DULOXETIN HYDROCHLORIDE 30 MG/1
30 CAPSULE, DELAYED RELEASE ORAL DAILY
Qty: 30 CAPSULE | Refills: 2 | Status: SHIPPED | OUTPATIENT
Start: 2019-12-17 | End: 2020-03-17 | Stop reason: ALTCHOICE

## 2019-12-17 RX ORDER — PANTOPRAZOLE SODIUM 40 MG/1
40 TABLET, DELAYED RELEASE ORAL
Qty: 30 TABLET | Refills: 5 | Status: SHIPPED | OUTPATIENT
Start: 2019-12-17 | End: 2020-06-17 | Stop reason: SDUPTHER

## 2019-12-17 RX ORDER — SPIRONOLACTONE 25 MG/1
TABLET ORAL
Qty: 30 TABLET | Refills: 5 | Status: SHIPPED | OUTPATIENT
Start: 2019-12-17 | End: 2020-06-17 | Stop reason: SDUPTHER

## 2019-12-17 RX ORDER — FERROUS SULFATE 325(65) MG
TABLET ORAL
Qty: 90 TABLET | Refills: 5 | Status: SHIPPED | OUTPATIENT
Start: 2019-12-17 | End: 2020-06-17 | Stop reason: SDUPTHER

## 2019-12-17 ASSESSMENT — ENCOUNTER SYMPTOMS
BACK PAIN: 1
COLOR CHANGE: 1

## 2019-12-30 ENCOUNTER — OFFICE VISIT (OUTPATIENT)
Dept: FAMILY MEDICINE CLINIC | Age: 59
End: 2019-12-30
Payer: MEDICARE

## 2019-12-30 VITALS
HEIGHT: 76 IN | RESPIRATION RATE: 20 BRPM | SYSTOLIC BLOOD PRESSURE: 117 MMHG | HEART RATE: 83 BPM | DIASTOLIC BLOOD PRESSURE: 82 MMHG | OXYGEN SATURATION: 95 % | BODY MASS INDEX: 38.36 KG/M2 | WEIGHT: 315 LBS

## 2019-12-30 DIAGNOSIS — R42 DIZZINESS: ICD-10-CM

## 2019-12-30 DIAGNOSIS — Z00.00 ROUTINE GENERAL MEDICAL EXAMINATION AT A HEALTH CARE FACILITY: Primary | ICD-10-CM

## 2019-12-30 LAB
BILIRUBIN, POC: NEGATIVE
BLOOD URINE, POC: NEGATIVE
CLARITY, POC: CLEAR
COLOR, POC: YELLOW
GLUCOSE URINE, POC: 500
KETONES, POC: NEGATIVE
LEUKOCYTE EST, POC: NEGATIVE
NITRITE, POC: NEGATIVE
PH, POC: 5
PROTEIN, POC: 30
SPECIFIC GRAVITY, POC: 1.01
UROBILINOGEN, POC: 0.2

## 2019-12-30 PROCEDURE — G8482 FLU IMMUNIZE ORDER/ADMIN: HCPCS | Performed by: FAMILY MEDICINE

## 2019-12-30 PROCEDURE — 3017F COLORECTAL CA SCREEN DOC REV: CPT | Performed by: FAMILY MEDICINE

## 2019-12-30 PROCEDURE — 81002 URINALYSIS NONAUTO W/O SCOPE: CPT | Performed by: FAMILY MEDICINE

## 2019-12-30 PROCEDURE — G8598 ASA/ANTIPLAT THER USED: HCPCS | Performed by: FAMILY MEDICINE

## 2019-12-30 PROCEDURE — G0439 PPPS, SUBSEQ VISIT: HCPCS | Performed by: FAMILY MEDICINE

## 2019-12-30 RX ORDER — MECLIZINE HYDROCHLORIDE 25 MG/1
25 TABLET ORAL 3 TIMES DAILY PRN
Qty: 30 TABLET | Refills: 2 | Status: SHIPPED | OUTPATIENT
Start: 2019-12-30 | End: 2020-01-09

## 2019-12-30 ASSESSMENT — PATIENT HEALTH QUESTIONNAIRE - PHQ9
SUM OF ALL RESPONSES TO PHQ QUESTIONS 1-9: 0
SUM OF ALL RESPONSES TO PHQ9 QUESTIONS 1 & 2: 0
2. FEELING DOWN, DEPRESSED OR HOPELESS: 0
SUM OF ALL RESPONSES TO PHQ QUESTIONS 1-9: 0
1. LITTLE INTEREST OR PLEASURE IN DOING THINGS: 0

## 2020-01-14 RX ORDER — OXYCODONE HYDROCHLORIDE 30 MG/1
30 TABLET ORAL EVERY 4 HOURS PRN
Qty: 150 TABLET | Refills: 0 | Status: SHIPPED | OUTPATIENT
Start: 2020-01-14 | End: 2020-01-14 | Stop reason: SDUPTHER

## 2020-01-14 RX ORDER — HYDROCODONE BITARTRATE AND ACETAMINOPHEN 10; 325 MG/1; MG/1
1 TABLET ORAL EVERY 4 HOURS PRN
Qty: 150 TABLET | Refills: 0 | Status: SHIPPED | OUTPATIENT
Start: 2020-01-14 | End: 2020-03-17 | Stop reason: SDUPTHER

## 2020-01-14 RX ORDER — HYDROCODONE BITARTRATE AND ACETAMINOPHEN 10; 325 MG/1; MG/1
1 TABLET ORAL EVERY 4 HOURS PRN
Qty: 150 TABLET | Refills: 0 | Status: SHIPPED | OUTPATIENT
Start: 2020-01-14 | End: 2020-01-14 | Stop reason: SDUPTHER

## 2020-01-14 RX ORDER — OXYCODONE HYDROCHLORIDE 30 MG/1
30 TABLET ORAL EVERY 4 HOURS PRN
Qty: 150 TABLET | Refills: 0 | Status: SHIPPED | OUTPATIENT
Start: 2020-01-14 | End: 2020-03-17 | Stop reason: SDUPTHER

## 2020-02-18 RX ORDER — CARVEDILOL 6.25 MG/1
TABLET ORAL
Qty: 60 TABLET | Refills: 5 | Status: SHIPPED | OUTPATIENT
Start: 2020-02-18 | End: 2020-08-05

## 2020-02-18 NOTE — TELEPHONE ENCOUNTER
Refill request received from pharmacy.  Medication pending for approval.       Last Office Visit With PCP:  12/30/2019    Next Visit Date:  Future Appointments   Date Time Provider Marin Mariela   3/17/2020  2:00 PM Mackenzie Moreno MD [address] [place] [address]

## 2020-03-17 ENCOUNTER — HOSPITAL ENCOUNTER (OUTPATIENT)
Facility: HOSPITAL | Age: 60
Discharge: HOME | End: 2020-03-17
Payer: MEDICARE

## 2020-03-17 ENCOUNTER — OFFICE VISIT (OUTPATIENT)
Dept: FAMILY MEDICINE CLINIC | Age: 60
End: 2020-03-17
Payer: MEDICARE

## 2020-03-17 VITALS
WEIGHT: 315 LBS | HEIGHT: 76 IN | RESPIRATION RATE: 18 BRPM | BODY MASS INDEX: 38.36 KG/M2 | SYSTOLIC BLOOD PRESSURE: 138 MMHG | OXYGEN SATURATION: 95 % | HEART RATE: 74 BPM | DIASTOLIC BLOOD PRESSURE: 70 MMHG

## 2020-03-17 PROCEDURE — G8427 DOCREV CUR MEDS BY ELIG CLIN: HCPCS | Performed by: FAMILY MEDICINE

## 2020-03-17 PROCEDURE — 1036F TOBACCO NON-USER: CPT | Performed by: FAMILY MEDICINE

## 2020-03-17 PROCEDURE — 2022F DILAT RTA XM EVC RTNOPTHY: CPT | Performed by: FAMILY MEDICINE

## 2020-03-17 PROCEDURE — 99214 OFFICE O/P EST MOD 30 MIN: CPT | Performed by: FAMILY MEDICINE

## 2020-03-17 PROCEDURE — G8417 CALC BMI ABV UP PARAM F/U: HCPCS | Performed by: FAMILY MEDICINE

## 2020-03-17 PROCEDURE — 3017F COLORECTAL CA SCREEN DOC REV: CPT | Performed by: FAMILY MEDICINE

## 2020-03-17 PROCEDURE — G8482 FLU IMMUNIZE ORDER/ADMIN: HCPCS | Performed by: FAMILY MEDICINE

## 2020-03-17 PROCEDURE — 3046F HEMOGLOBIN A1C LEVEL >9.0%: CPT | Performed by: FAMILY MEDICINE

## 2020-03-17 RX ORDER — OXYCODONE HYDROCHLORIDE 30 MG/1
30 TABLET ORAL EVERY 4 HOURS PRN
Qty: 150 TABLET | Refills: 0 | Status: SHIPPED | OUTPATIENT
Start: 2020-03-17 | End: 2020-05-06 | Stop reason: SDUPTHER

## 2020-03-17 RX ORDER — TRIAMCINOLONE ACETONIDE 1 MG/G
CREAM TOPICAL
Qty: 3 TUBE | Refills: 5 | Status: ON HOLD | OUTPATIENT
Start: 2020-03-17 | End: 2021-06-15

## 2020-03-17 RX ORDER — OXYCODONE HYDROCHLORIDE 30 MG/1
30 TABLET ORAL EVERY 4 HOURS PRN
Qty: 150 TABLET | Refills: 0 | Status: SHIPPED | OUTPATIENT
Start: 2020-03-17 | End: 2020-03-17 | Stop reason: SDUPTHER

## 2020-03-17 RX ORDER — CARISOPRODOL 350 MG/1
350 TABLET ORAL 4 TIMES DAILY PRN
Qty: 120 TABLET | Refills: 2 | Status: SHIPPED | OUTPATIENT
Start: 2020-03-17 | End: 2020-06-05

## 2020-03-17 RX ORDER — ALBUTEROL SULFATE 2.5 MG/3ML
SOLUTION RESPIRATORY (INHALATION)
Qty: 180 VIAL | Refills: 5 | Status: SHIPPED | OUTPATIENT
Start: 2020-03-17 | End: 2020-08-25

## 2020-03-17 RX ORDER — HYDROCODONE BITARTRATE AND ACETAMINOPHEN 10; 325 MG/1; MG/1
1 TABLET ORAL EVERY 4 HOURS PRN
Qty: 150 TABLET | Refills: 0 | Status: SHIPPED | OUTPATIENT
Start: 2020-03-17 | End: 2020-03-17 | Stop reason: SDUPTHER

## 2020-03-17 RX ORDER — GABAPENTIN 800 MG/1
TABLET ORAL
Qty: 120 TABLET | Refills: 2 | Status: SHIPPED | OUTPATIENT
Start: 2020-03-17 | End: 2020-06-17

## 2020-03-17 RX ORDER — NYSTATIN 100000 [USP'U]/G
POWDER TOPICAL
Qty: 60 BOTTLE | Refills: 0 | Status: SHIPPED | OUTPATIENT
Start: 2020-03-17 | End: 2020-06-17 | Stop reason: SDUPTHER

## 2020-03-17 RX ORDER — BRIMONIDINE TARTRATE 0.15 %
0.15 DROPS OPHTHALMIC (EYE) 2 TIMES DAILY
Status: ON HOLD | COMMUNITY
Start: 2020-03-13 | End: 2021-06-15

## 2020-03-17 RX ORDER — PROMETHAZINE HYDROCHLORIDE 25 MG/1
TABLET ORAL
Qty: 30 TABLET | Refills: 0 | Status: SHIPPED | OUTPATIENT
Start: 2020-03-17 | End: 2020-05-13

## 2020-03-17 RX ORDER — HYDROCODONE BITARTRATE AND ACETAMINOPHEN 10; 325 MG/1; MG/1
1 TABLET ORAL EVERY 4 HOURS PRN
Qty: 150 TABLET | Refills: 0 | Status: SHIPPED | OUTPATIENT
Start: 2020-03-17 | End: 2020-05-06 | Stop reason: SDUPTHER

## 2020-03-17 RX ORDER — FUROSEMIDE 80 MG
TABLET ORAL
Qty: 135 TABLET | Refills: 3 | Status: SHIPPED | OUTPATIENT
Start: 2020-03-17 | End: 2020-08-25 | Stop reason: SDUPTHER

## 2020-03-17 RX ORDER — NITROGLYCERIN 0.4 MG/1
0.4 TABLET SUBLINGUAL EVERY 5 MIN PRN
Qty: 25 TABLET | Refills: 0 | Status: SHIPPED | OUTPATIENT
Start: 2020-03-17 | End: 2021-03-02 | Stop reason: SDUPTHER

## 2020-03-17 ASSESSMENT — PATIENT HEALTH QUESTIONNAIRE - PHQ9
1. LITTLE INTEREST OR PLEASURE IN DOING THINGS: 1
SUM OF ALL RESPONSES TO PHQ9 QUESTIONS 1 & 2: 2
SUM OF ALL RESPONSES TO PHQ QUESTIONS 1-9: 2
SUM OF ALL RESPONSES TO PHQ QUESTIONS 1-9: 2
2. FEELING DOWN, DEPRESSED OR HOPELESS: 1

## 2020-03-17 ASSESSMENT — ENCOUNTER SYMPTOMS
COLOR CHANGE: 1
BACK PAIN: 1

## 2020-03-17 NOTE — PROGRESS NOTES
Nose normal.      Mouth/Throat:      Pharynx: Uvula midline. Eyes:      General: Lids are normal.      Conjunctiva/sclera: Conjunctivae normal.      Pupils: Pupils are equal, round, and reactive to light. Neck:      Musculoskeletal: Normal range of motion and neck supple. Thyroid: No thyroid mass or thyromegaly. Vascular: No carotid bruit. Trachea: Trachea and phonation normal.   Cardiovascular:      Rate and Rhythm: Normal rate and regular rhythm. Pulses: Normal pulses. Heart sounds: Normal heart sounds, S1 normal and S2 normal. No murmur. No friction rub. No gallop. Pulmonary:      Effort: Pulmonary effort is normal.   Musculoskeletal:      Right shoulder: He exhibits decreased range of motion, tenderness, pain, spasm and decreased strength. Left shoulder: He exhibits decreased range of motion and tenderness. Left elbow: Tenderness found. Right knee: He exhibits decreased range of motion. Tenderness found. Left knee: He exhibits decreased range of motion. Tenderness found. Lumbar back: He exhibits decreased range of motion, tenderness, pain and spasm. Right upper leg: He exhibits tenderness. Skin:     General: Skin is warm and dry. Comments: Bilateral lower extremity redness and warmth, he has some plus 2 edema below the knee bilaterally   Neurological:      Mental Status: He is alert and oriented to person, place, and time. Comments: Left arm and 4tht and 5th digit have some decreased sensation. Some decreased  strenght in the left hand   Psychiatric:         Mood and Affect: Mood is anxious.           Results in Past 30 Days  Result Component Current Result Ref Range Previous Result Ref Range   Alb 4.2 (3/17/2020) 3.8 - 4.8 g/dL Not in Time Range    Albumin/Globulin Ratio 1.3 (3/17/2020) 1.2 - 2.2 Not in Time Range    Alkaline Phosphatase 81 (3/17/2020) 39 - 117 IU/L Not in Time Range    ALT 30 (3/17/2020) 0 - 44 IU/L Not in Time Range AST 28 (3/17/2020) 0 - 40 IU/L Not in Time Range    BUN 17 (3/17/2020) 8 - 27 mg/dL Not in Time Range    Calcium 9.4 (3/17/2020) 8.6 - 10.2 mg/dL Not in Time Range    Chloride 97 (3/17/2020) 96 - 106 mmol/L Not in Time Range    CO2 23 (3/17/2020) 20 - 29 mmol/L Not in Time Range    CREATININE 1.00 (3/17/2020) 0.76 - 1.27 mg/dL Not in Time Range    GFR  93 (3/17/2020) >59 mL/min/1.73 Not in Time Range    GFR Non- 81 (3/17/2020) >59 mL/min/1.73 Not in Time Range    Globulin 3.2 (3/17/2020) 1.5 - 4.5 g/dL Not in Time Range    Glucose 163 (H) (3/17/2020) 65 - 99 mg/dL Not in Time Range    Potassium 4.8 (3/17/2020) 3.5 - 5.2 mmol/L Not in Time Range    Sodium 140 (3/17/2020) 134 - 144 mmol/L Not in Time Range    Total Bilirubin 0.3 (3/17/2020) 0.0 - 1.2 mg/dL Not in Time Range    Total Protein 7.4 (3/17/2020) 6.0 - 8.5 g/dL Not in Time Range      Hemoglobin A1C (%)   Date Value   03/17/2020 9.1 (H)     Microscopic Examination (no units)   Date Value   12/28/2017 Not Indicated     Microalbumin, Random Urine (ug/mL)   Date Value   08/14/2019 78.7     LDL Calculated (mg/dL)   Date Value   12/17/2019 72       Lab Results   Component Value Date    WBC 5.4 12/17/2019    NEUTROABS 4.7 02/21/2018    HGB 14.9 12/17/2019    HCT 45.2 12/17/2019    HCT 41.6 06/05/2012    MCV 93 12/17/2019     12/17/2019     06/05/2012     Lab Results   Component Value Date    TSH 2.12 05/18/2018       ASSESSMENT:    Diagnosis Orders   1. Type 2 diabetes mellitus with diabetic polyneuropathy, with long-term current use of insulin ([address]     2. Chronic bilateral low back pain with right-sided sciatica  gabapentin (NEURONTIN) 800 MG tablet    oxyCODONE (OXY-IR) 30 MG immediate release tablet    HYDROcodone-acetaminophen (NORCO)  MG per tablet    DISCONTINUED: HYDROcodone-acetaminophen (NORCO)  MG per tablet    DISCONTINUED: oxyCODONE (OXY-IR) 30 MG immediate release tablet   3.  Chronic right

## 2020-03-17 NOTE — PROGRESS NOTES
Chief Complaint   Patient presents with    Shoulder Pain       Have you seen any other physician or provider since your last visit no    Have you had any other diagnostic tests since your last visit? no    Have you changed or stopped any medications since your last visit? no         Diabetic retinal exam completed this year?  yes                       * If yes please have patient sign a records release to obtain record to update Health Maintenance                       * If no, please order referral for patient to be scheduled

## 2020-04-15 ENCOUNTER — TELEPHONE (OUTPATIENT)
Dept: FAMILY MEDICINE CLINIC | Age: 60
End: 2020-04-15

## 2020-04-15 RX ORDER — LORATADINE 10 MG/1
TABLET ORAL
Qty: 30 TABLET | Refills: 5 | Status: SHIPPED | OUTPATIENT
Start: 2020-04-15 | End: 2020-08-25 | Stop reason: SDUPTHER

## 2020-05-06 RX ORDER — HYDROCODONE BITARTRATE AND ACETAMINOPHEN 10; 325 MG/1; MG/1
1 TABLET ORAL EVERY 4 HOURS PRN
Qty: 150 TABLET | Refills: 0 | Status: SHIPPED | OUTPATIENT
Start: 2020-05-06 | End: 2020-06-05

## 2020-05-06 RX ORDER — OXYCODONE HYDROCHLORIDE 30 MG/1
30 TABLET ORAL EVERY 4 HOURS PRN
Qty: 150 TABLET | Refills: 0 | Status: SHIPPED | OUTPATIENT
Start: 2020-05-06 | End: 2020-06-10 | Stop reason: SDUPTHER

## 2020-05-13 RX ORDER — PROMETHAZINE HYDROCHLORIDE 25 MG/1
TABLET ORAL
Qty: 30 TABLET | Refills: 0 | Status: SHIPPED | OUTPATIENT
Start: 2020-05-13 | End: 2020-06-10

## 2020-06-05 RX ORDER — HYDROCODONE BITARTRATE AND ACETAMINOPHEN 10; 325 MG/1; MG/1
TABLET ORAL
Qty: 150 TABLET | Refills: 0 | Status: SHIPPED | OUTPATIENT
Start: 2020-06-05 | End: 2020-06-17 | Stop reason: SDUPTHER

## 2020-06-05 RX ORDER — CARISOPRODOL 350 MG/1
TABLET ORAL
Qty: 120 TABLET | Refills: 2 | Status: SHIPPED | OUTPATIENT
Start: 2020-06-05 | End: 2020-11-30 | Stop reason: SDUPTHER

## 2020-06-05 NOTE — TELEPHONE ENCOUNTER
Refill request received from pharmacy.  Medication pending for approval.       Last Office Visit With PCP:  5/13/2020    Next Visit Date:  Future Appointments   Date Time Provider Marin Sierra   6/17/2020  3:15 PM Calderon Shore MD [address] up to date

## 2020-06-10 RX ORDER — OXYCODONE HYDROCHLORIDE 30 MG/1
30 TABLET ORAL EVERY 4 HOURS PRN
Qty: 150 TABLET | Refills: 0 | Status: SHIPPED | OUTPATIENT
Start: 2020-06-10 | End: 2020-06-17 | Stop reason: SDUPTHER

## 2020-06-10 RX ORDER — OXYCODONE HYDROCHLORIDE 30 MG/1
TABLET ORAL
Qty: 150 TABLET | Refills: 0 | OUTPATIENT
Start: 2020-06-10

## 2020-06-10 RX ORDER — LISINOPRIL 2.5 MG/1
TABLET ORAL
Qty: 30 TABLET | Refills: 5 | Status: SHIPPED | OUTPATIENT
Start: 2020-06-10 | End: 2020-08-25

## 2020-06-10 RX ORDER — PROMETHAZINE HYDROCHLORIDE 25 MG/1
TABLET ORAL
Qty: 30 TABLET | Refills: 0 | Status: SHIPPED | OUTPATIENT
Start: 2020-06-10 | End: 2020-08-05

## 2020-06-10 NOTE — TELEPHONE ENCOUNTER
Refill request received from pharmacy.  Medication pending for approval.       Last Office Visit With PCP:  03/17/20    Next Visit Date:  Future Appointments   Date Time Provider Marin Sierra   6/17/2020  3:15 PM MD Richy [address] Up to date

## 2020-06-17 ENCOUNTER — HOSPITAL ENCOUNTER (OUTPATIENT)
Facility: HOSPITAL | Age: 60
Discharge: HOME | End: 2020-06-17
Payer: MEDICARE

## 2020-06-17 ENCOUNTER — OFFICE VISIT (OUTPATIENT)
Dept: FAMILY MEDICINE CLINIC | Age: 60
End: 2020-06-17
Payer: MEDICARE

## 2020-06-17 VITALS
HEART RATE: 70 BPM | HEIGHT: 76 IN | WEIGHT: 315 LBS | RESPIRATION RATE: 18 BRPM | BODY MASS INDEX: 38.36 KG/M2 | OXYGEN SATURATION: 96 % | SYSTOLIC BLOOD PRESSURE: 132 MMHG | DIASTOLIC BLOOD PRESSURE: 70 MMHG

## 2020-06-17 PROCEDURE — 99214 OFFICE O/P EST MOD 30 MIN: CPT | Performed by: FAMILY MEDICINE

## 2020-06-17 PROCEDURE — 3046F HEMOGLOBIN A1C LEVEL >9.0%: CPT | Performed by: FAMILY MEDICINE

## 2020-06-17 PROCEDURE — G8427 DOCREV CUR MEDS BY ELIG CLIN: HCPCS | Performed by: FAMILY MEDICINE

## 2020-06-17 PROCEDURE — 36415 COLL VENOUS BLD VENIPUNCTURE: CPT

## 2020-06-17 PROCEDURE — 81002 URINALYSIS NONAUTO W/O SCOPE: CPT | Performed by: FAMILY MEDICINE

## 2020-06-17 PROCEDURE — 2022F DILAT RTA XM EVC RTNOPTHY: CPT | Performed by: FAMILY MEDICINE

## 2020-06-17 PROCEDURE — 1036F TOBACCO NON-USER: CPT | Performed by: FAMILY MEDICINE

## 2020-06-17 PROCEDURE — 3017F COLORECTAL CA SCREEN DOC REV: CPT | Performed by: FAMILY MEDICINE

## 2020-06-17 PROCEDURE — G8417 CALC BMI ABV UP PARAM F/U: HCPCS | Performed by: FAMILY MEDICINE

## 2020-06-17 RX ORDER — INSULIN DETEMIR 100 [IU]/ML
INJECTION, SOLUTION SUBCUTANEOUS
Qty: 20 PEN | Refills: 5 | Status: SHIPPED | OUTPATIENT
Start: 2020-06-17 | End: 2020-10-07 | Stop reason: SDUPTHER

## 2020-06-17 RX ORDER — MONTELUKAST SODIUM 10 MG/1
TABLET ORAL
Qty: 90 TABLET | Refills: 3 | Status: SHIPPED | OUTPATIENT
Start: 2020-06-17 | End: 2020-12-16 | Stop reason: SDUPTHER

## 2020-06-17 RX ORDER — FERROUS SULFATE 325(65) MG
TABLET ORAL
Qty: 90 TABLET | Refills: 5 | Status: SHIPPED | OUTPATIENT
Start: 2020-06-17 | End: 2020-09-17

## 2020-06-17 RX ORDER — FLUTICASONE PROPIONATE 50 MCG
SPRAY, SUSPENSION (ML) NASAL
Qty: 16 G | Refills: 2 | Status: SHIPPED | OUTPATIENT
Start: 2020-06-17 | End: 2020-08-25

## 2020-06-17 RX ORDER — HYDROCODONE BITARTRATE AND ACETAMINOPHEN 10; 325 MG/1; MG/1
TABLET ORAL
Qty: 150 TABLET | Refills: 0 | Status: SHIPPED | OUTPATIENT
Start: 2020-06-17 | End: 2020-06-17 | Stop reason: SDUPTHER

## 2020-06-17 RX ORDER — SIMVASTATIN 40 MG
TABLET ORAL
Qty: 90 TABLET | Refills: 3 | Status: SHIPPED | OUTPATIENT
Start: 2020-06-17 | End: 2020-12-16 | Stop reason: SDUPTHER

## 2020-06-17 RX ORDER — PANTOPRAZOLE SODIUM 40 MG/1
40 TABLET, DELAYED RELEASE ORAL
Qty: 90 TABLET | Refills: 3 | Status: SHIPPED | OUTPATIENT
Start: 2020-06-17 | End: 2020-12-16 | Stop reason: SDUPTHER

## 2020-06-17 RX ORDER — OXYCODONE HYDROCHLORIDE 30 MG/1
30 TABLET ORAL EVERY 4 HOURS PRN
Qty: 150 TABLET | Refills: 0 | Status: SHIPPED | OUTPATIENT
Start: 2020-06-17 | End: 2020-06-17 | Stop reason: SDUPTHER

## 2020-06-17 RX ORDER — NYSTATIN 100000 [USP'U]/G
POWDER TOPICAL
Qty: 60 BOTTLE | Refills: 1 | Status: SHIPPED | OUTPATIENT
Start: 2020-06-17 | End: 2020-07-20

## 2020-06-17 RX ORDER — HYDROCODONE BITARTRATE AND ACETAMINOPHEN 10; 325 MG/1; MG/1
TABLET ORAL
Qty: 150 TABLET | Refills: 0 | Status: SHIPPED | OUTPATIENT
Start: 2020-06-17 | End: 2020-08-25 | Stop reason: SDUPTHER

## 2020-06-17 RX ORDER — OXYCODONE HYDROCHLORIDE 30 MG/1
30 TABLET ORAL EVERY 4 HOURS PRN
Qty: 150 TABLET | Refills: 0 | Status: SHIPPED | OUTPATIENT
Start: 2020-06-17 | End: 2020-08-25 | Stop reason: SDUPTHER

## 2020-06-17 RX ORDER — SPIRONOLACTONE 25 MG/1
TABLET ORAL
Qty: 90 TABLET | Refills: 3 | Status: SHIPPED | OUTPATIENT
Start: 2020-06-17 | End: 2020-08-25

## 2020-06-17 RX ORDER — ERGOCALCIFEROL 1.25 MG/1
50000 CAPSULE ORAL WEEKLY
Qty: 4 CAPSULE | Refills: 2 | Status: SHIPPED | OUTPATIENT
Start: 2020-06-17 | End: 2020-08-25

## 2020-06-17 RX ORDER — GABAPENTIN 800 MG/1
TABLET ORAL
Qty: 120 TABLET | Refills: 2 | Status: SHIPPED | OUTPATIENT
Start: 2020-06-17 | End: 2020-10-02

## 2020-06-17 RX ORDER — LANCETS
EACH MISCELLANEOUS
Qty: 100 EACH | Refills: 3 | Status: SHIPPED | OUTPATIENT
Start: 2020-06-17

## 2020-06-17 ASSESSMENT — ENCOUNTER SYMPTOMS
BACK PAIN: 1
COLOR CHANGE: 1

## 2020-06-17 NOTE — PROGRESS NOTES
(6/17/2020) 0 - 40 IU/L Not in Time Range    BUN 18 (6/17/2020) 8 - 27 mg/dL Not in Time Range    Calcium 9.1 (6/17/2020) 8.6 - 10.2 mg/dL Not in Time Range    Chloride 103 (6/17/2020) 96 - 106 mmol/L Not in Time Range    CO2 24 (6/17/2020) 20 - 29 mmol/L Not in Time Range    CREATININE 0.99 (6/17/2020) 0.76 - 1.27 mg/dL Not in Time Range    GFR  95 (6/17/2020) >59 mL/min/1.73 Not in Time Range    GFR Non- 82 (6/17/2020) >59 mL/min/1.73 Not in Time Range    Globulin 3.2 (6/17/2020) 1.5 - 4.5 g/dL Not in Time Range    Glucose 184 (H) (6/17/2020) 65 - 99 mg/dL Not in Time Range    Potassium 4.9 (6/17/2020) 3.5 - 5.2 mmol/L Not in Time Range    Sodium 139 (6/17/2020) 134 - 144 mmol/L Not in Time Range    Total Bilirubin 0.2 (6/17/2020) 0.0 - 1.2 mg/dL Not in Time Range    Total Protein 7.3 (6/17/2020) 6.0 - 8.5 g/dL Not in Time Range      Hemoglobin A1C (%)   Date Value   06/17/2020 9.4 (H)     Microscopic Examination (no units)   Date Value   12/28/2017 Not Indicated     Microalbumin, Random Urine (ug/mL)   Date Value   08/14/2019 78.7     LDL Calculated (mg/dL)   Date Value   12/17/2019 72       Lab Results   Component Value Date    WBC 6.9 06/17/2020    NEUTROABS 4.7 02/21/2018    HGB 13.3 06/17/2020    HCT 40.8 06/17/2020    HCT 41.6 06/05/2012    MCV 93 06/17/2020     06/17/2020     06/05/2012     Lab Results   Component Value Date    TSH 2.12 05/18/2018       ASSESSMENT:    Diagnosis Orders   1. Type 2 diabetes mellitus with diabetic polyneuropathy, with long-term current use of insulin ([facility] [address]     2. Chronic bilateral low back pain with right-sided sciatica  gabapentin (NEURONTIN) 800 MG tablet    HYDROcodone-acetaminophen (NORCO)  MG per tablet    oxyCODONE (OXY-IR) 30 MG immediate release tablet    DISCONTINUED: oxyCODONE (OXY-IR) 30 MG immediate release tablet    DISCONTINUED: HYDROcodone-acetaminophen (NORCO)  MG per tablet   3.  Essential hypertension tablet REORDER    insulin detemir (LEVEMIR FLEXTOUCH) 100 UNIT/ML injection pen REORDER    montelukast (SINGULAIR) 10 MG tablet REORDER    simvastatin (ZOCOR) 40 MG tablet REORDER    metFORMIN (GLUCOPHAGE) 1000 MG tablet REORDER    gabapentin (NEURONTIN) 800 MG tablet REORDER    ferrous sulfate (FEROSUL) 325 (65 Fe) MG tablet REORDER    Insulin Pen Needle 30G X 8 MM MISC REORDER    ACCU-CHEK SOFTCLIX LANCETS MISC REORDER    vitamin D (ERGOCALCIFEROL) 35644 units CAPS capsule REORDER    fluticasone (FLONASE) 50 MCG/ACT nasal spray REORDER    empagliflozin (JARDIANCE) 25 MG tablet REORDER    spironolactone (ALDACTONE) 25 MG tablet REORDER    HYDROcodone-acetaminophen (NORCO)  MG per tablet REORDER    oxyCODONE (OXY-IR) 30 MG immediate release tablet REORDER       Controlled Substances Monitoring: Attestation: The Prescription Monitoring Report for this patient was reviewed today. Chanda Acharya MD)  Periodic Controlled Substance Monitoring: Possible medication side effects, risk of tolerance/dependence & alternative treatments discussed., No signs of potential drug abuse or diversion identified. , Assessed functional status., Obtaining appropriate analgesic effect of treatment. Chanda Acharya MD)    Please note: This chart was generated using Dragon dictation software. Although every effort was made to ensure the accuracy of this automated transcription, some errors in transcription may have occurred.

## 2020-07-20 RX ORDER — NYSTATIN 100000 [USP'U]/G
POWDER TOPICAL
Qty: 60 G | Refills: 1 | Status: SHIPPED | OUTPATIENT
Start: 2020-07-20 | End: 2020-08-25 | Stop reason: SDUPTHER

## 2020-07-20 NOTE — TELEPHONE ENCOUNTER
Refill request received from pharmacy.  Medication pending for approval.       Last Office Visit With PCP:  6/17/2020    Next Visit Date:  Future Appointments   Date Time Provider Marin Sierra   9/17/2020  3:00 PM [facility]

## 2020-07-30 ENCOUNTER — VIRTUAL VISIT (OUTPATIENT)
Dept: FAMILY MEDICINE CLINIC | Age: 60
End: 2020-07-30
Payer: MEDICARE

## 2020-07-30 PROCEDURE — G2025 DIS SITE TELE SVCS RHC/FQHC: HCPCS | Performed by: FAMILY MEDICINE

## 2020-07-30 ASSESSMENT — ENCOUNTER SYMPTOMS
COLOR CHANGE: 1
BACK PAIN: 1

## 2020-07-30 NOTE — PROGRESS NOTES
SUBJECTIVE:    Patient ID: Mindy Fernandez is a 62 y.o. male. Chief Complaint   Patient presents with    Extremity Weakness    Diarrhea     Mindy Fernandez is a 62 y.o. male evaluated via telephone on 7/30/2020. Consent:  He and/or health care decision maker is aware that that he may receive a bill for this telephone service, depending on his insurance coverage, and has provided verbal consent to proceed: yes      Documentation:  I communicated with the patient and/or health care decision maker about his weakness, his diarrhea, h   Details of this discussion including any medical advice provided: to him      I affirm this is a Patient Initiated Episode with a Patient who has not had a related appointment within my department in the past 7 days or scheduled within the next 24 hours. Patient identification was verified at the start of the visit: yes    Total Time: 11 minutes    Note: not billable if this call serves to triage the patient into an appointment for the relevant concern      Cheyenne Salmeron     HPI: telephone call  Telephone conversation with him about weakness and diarrhea. He is been diagnosed with coronavirus. He says he is struggling. He is feeling a little better than he did. He is coughing quite a bit. He is having some significant congestion. He denies any chest pain. He is having some more shortness of breath than his usual.  He is having some nausea. Though he is eating a little better than he did. He says he can  Actually take some things now. He says he for several days really could not eat much at all. He said everything tasted so bad. He has not had any falls or injuries. He is having quite a bit of back pain. That may be worse since he has been laying around quite a bit in the last week or so. Review of Systems   Constitutional: Positive for fatigue. Musculoskeletal: Positive for arthralgias, back pain, gait problem and myalgias.    Skin: Positive for color change and rash. Neurological: Positive for weakness. All other systems reviewed and are negative. OBJECTIVE:  There were no vitals taken for this visit. Wt Readings from Last 3 Encounters:   06/17/20 (!) 423 lb 12.8 oz (192.2 kg)   03/17/20 (!) 418 lb (189.6 kg)   12/30/19 (!) 416 lb (188.7 kg)     BP Readings from Last 3 Encounters:   08/25/20 132/68   06/17/20 132/70   03/17/20 138/70      Pulse Readings from Last 3 Encounters:   08/25/20 65   06/17/20 70   03/17/20 74     There is no height or weight on file to calculate BMI. Resp Readings from Last 3 Encounters:   08/25/20 18   06/17/20 18   03/17/20 18     Past medical, surgical, family and social history were reviewed and updated with the patient. Physical Exam  Neurological:      Mental Status: He is alert and oriented to person, place, and time. Mental status is at baseline. Psychiatric:         Attention and Perception: Attention and perception normal.         Mood and Affect: Mood normal. Affect is flat. Speech: Speech normal.         Behavior: Behavior normal. Behavior is cooperative. Thought Content: Thought content normal.         Cognition and Memory: Cognition and memory normal.         Judgment: Judgment normal.         No results found for requested labs within last 30 days. Hemoglobin A1C (%)   Date Value   06/17/2020 9.4 (H)     Microscopic Examination (no units)   Date Value   12/28/2017 Not Indicated     Microalbumin, Random Urine (ug/mL)   Date Value   08/14/2019 78.7     LDL Calculated (mg/dL)   Date Value   12/17/2019 72       Lab Results   Component Value Date    WBC 6.9 06/17/2020    NEUTROABS 4.7 02/21/2018    HGB 13.3 06/17/2020    HCT 40.8 06/17/2020    HCT 41.6 06/05/2012    MCV 93 06/17/2020     06/17/2020     06/05/2012     Lab Results   Component Value Date    TSH 2.12 05/18/2018       ASSESSMENT:    Diagnosis Orders   1. COVID-19     2.  Type 2 diabetes mellitus with diabetic polyneuropathy, with long-term current use of insulin ([address]     3. Essential hypertension     4. Chronic bilateral low back pain with right-sided sciatica     5. Weakness          PLAN:  Continue meds as is for now. I have encouraged him to increase his fluid intake. Try to eat some things that will slow the diarrhea down and be cautious with things that might make it worse. I encouraged him to try to taking some protein. Cautiousness about being around other people. Keep an eye on his blood pressures and blood sugars. He does have follow-up scheduled in the office in just a few days. There are no discontinued medications. Controlled Substances Monitoring:      Please note: This chart was generated using Dragon dictation software. Although every effort was made to ensure the accuracy of this automated transcription, some errors in transcription may have occurred.

## 2020-08-03 ENCOUNTER — CARE COORDINATION (OUTPATIENT)
Dept: CARE COORDINATION | Age: 60
End: 2020-08-03

## 2020-08-03 ENCOUNTER — TELEPHONE (OUTPATIENT)
Dept: FAMILY MEDICINE CLINIC | Age: 60
End: 2020-08-03

## 2020-08-03 NOTE — CARE COORDINATION
I called Kathia Davila today due to questions concerning COVID 19. Kathia Davila was notified by the local health department that he has COVID 19 and quarantine was explained to him. He has questions about his other health conditions and what he should expect and how long is quarantine. We discussed the [facility] latest guidelines for quarantine of at least 10 days from his test if it is a mild to moderate case, and no fever or fever reducers for 24 hours. Kathia Davila has primarily suffered with cough, body aches, diarrhea, feeling feverish and fatigue/weakness. He states that diarrhea ended yesterday. He has been able to eat and drink today. We discussed pushing some extra fluid like diet gatorade or water. Kathia Davila tells me that he hasn't taken his lasix because of the weakness and diarrhea and now has some swelling in his feet and ankles. He is able to carry easy conversation so not too SOA but does state that he feels more winded than his normal.  Cough has been very difficult and a call was placed to Dr Tommi Shone for Oddis Speedy pills\". We discussed that he needs to use fall precautions when standing and not move out too fast.  He normally would take lasix 80 in the am and 1/2 at night. He tells me that he wants to take a lasix . I asked that maybe he only take 1/2 and let me discuss with Dr Tommi Shone. Even though Kathia Davila says he thinks he is feeling a little bit better he does have lots of phlegm some of which is green. I let him know that I would check on him tomorrow and would find out from Dr Tommi Shone how best to proceed.

## 2020-08-04 ENCOUNTER — CARE COORDINATION (OUTPATIENT)
Dept: CARE COORDINATION | Age: 60
End: 2020-08-04

## 2020-08-04 RX ORDER — BENZONATATE 200 MG/1
200 CAPSULE ORAL 3 TIMES DAILY PRN
Qty: 21 CAPSULE | Refills: 0 | Status: SHIPPED | OUTPATIENT
Start: 2020-08-04 | End: 2020-08-11

## 2020-08-04 NOTE — CARE COORDINATION
I called Bri Rodriguez today for a fu. He tells me that he did sleep some sitting up last night. He denies a fever this morning. He tells me that he thinks that his congestion is improved. He does continue to cough. He took his lasix last evening and tells me that he had a lot of fluid come off and his swelling is some better now. I talked to Dr Clarence Sandoval about Ephriam Chock condition and she is sending him some cough medication.

## 2020-08-05 RX ORDER — PROMETHAZINE HYDROCHLORIDE 25 MG/1
TABLET ORAL
Qty: 30 TABLET | Refills: 0 | Status: SHIPPED | OUTPATIENT
Start: 2020-08-05 | End: 2020-08-25 | Stop reason: SDUPTHER

## 2020-08-05 RX ORDER — CARVEDILOL 6.25 MG/1
TABLET ORAL
Qty: 60 TABLET | Refills: 5 | Status: SHIPPED | OUTPATIENT
Start: 2020-08-05 | End: 2020-08-25

## 2020-08-05 NOTE — TELEPHONE ENCOUNTER
Refill request received from pharmacy.  Medication pending for approval.       Last Office Visit With PCP:  07/30/20    Next Visit Date:  Future Appointments   Date Time Provider Marin Sierra   9/17/2020  3:00 PM [facility]

## 2020-08-18 ENCOUNTER — TELEPHONE (OUTPATIENT)
Dept: FAMILY MEDICINE CLINIC | Age: 60
End: 2020-08-18

## 2020-08-18 NOTE — TELEPHONE ENCOUNTER
Jose Alejandro missed call on Friday for VV Visit. He is wanting you to call him. He has something personal going on and will not tell me.

## 2020-08-20 ENCOUNTER — NURSE TRIAGE (OUTPATIENT)
Dept: OTHER | Facility: CLINIC | Age: 60
End: 2020-08-20

## 2020-08-20 ENCOUNTER — TELEPHONE (OUTPATIENT)
Dept: FAMILY MEDICINE CLINIC | Age: 60
End: 2020-08-20

## 2020-08-20 NOTE — TELEPHONE ENCOUNTER
Reason for Disposition   Diabetes mellitus or peripheral vascular disease (\"poor circulation\")    Answer Assessment - Initial Assessment Questions  1. ONSET: \"When did the pain start? \"       \"years\"   2. LOCATION: \"Where is the pain located? \"   (e.g., around nail, entire toe, at foot joint)       Left great toe   3. PAIN: \"How bad is the pain? \"    (Scale 1-10; or mild, moderate, severe)    -  MILD (1-3): doesn't interfere with normal activities     -  MODERATE (4-7): interferes with normal activities (e.g., work or school) or awakens from sleep, limping     -  SEVERE (8-10): excruciating pain, unable to do any normal activities, unable to walk      Intermittent \"shooting \" pain, 8/10  4. APPEARANCE: \"What does the toe look like? \" (e.g., redness, swelling, bruising, pallor)      denies  5. CAUSE: \"What do you think is causing the toe pain? \"     \"callous\" reports chronic history  6. OTHER SYMPTOMS: \"Do you have any other symptoms? \" (e.g., leg pain, rash, fever, numbness)      Reports h/o DM, states chronic numbness  7. PREGNANCY: \"Is there any chance you are pregnant? \" \"When was your last menstrual period? \"    N/A    Protocols used: TOE PAIN-ADULT-[place]    Patient called pre-service center [facility]) [address] with red flag complaint. Brief description of triage: C/o left great toe pain, reports seeing \"a little\" blood on the rug in the BR this AM, \"not a lot\". Reports frequent h/o callous removal, see above assessment. Triage indicates for patient to to be seen in 3 days    Care advice provided, patient verbalizes understanding; denies any other questions or concerns; instructed to call back for any new or worsening symptoms. Writer provided warm transfer to [facility] for appointment scheduling. Please do not respond to the triage nurse through this encounter. Any subsequent communication should be directly with the patient.

## 2020-08-24 NOTE — PROGRESS NOTES
Health Maintenance Due This Visit   Colonoscopy No   Mammogram No   Annual Wellness Visit No   Microalbumin Yes   HgbA1C No   Diabetic Eye Exam No    House Bill One Due This Visit   KITTY Yes   UDS Yes   Contract Yes  No chief complaint on file.       Have you seen any other physician or provider since your last visit no    Have you had any other diagnostic tests since your last visit? no    Have you changed or stopped any medications since your last visit? no

## 2020-08-25 ENCOUNTER — OFFICE VISIT (OUTPATIENT)
Dept: FAMILY MEDICINE CLINIC | Age: 60
End: 2020-08-25
Payer: MEDICARE

## 2020-08-25 ENCOUNTER — HOSPITAL ENCOUNTER (OUTPATIENT)
Facility: HOSPITAL | Age: 60
Discharge: HOME | End: 2020-08-25
Payer: MEDICARE

## 2020-08-25 VITALS
SYSTOLIC BLOOD PRESSURE: 132 MMHG | RESPIRATION RATE: 18 BRPM | HEART RATE: 65 BPM | DIASTOLIC BLOOD PRESSURE: 68 MMHG | HEIGHT: 76 IN | BODY MASS INDEX: 51.59 KG/M2 | OXYGEN SATURATION: 97 %

## 2020-08-25 PROCEDURE — 20610 DRAIN/INJ JOINT/BURSA W/O US: CPT | Performed by: FAMILY MEDICINE

## 2020-08-25 PROCEDURE — 36415 COLL VENOUS BLD VENIPUNCTURE: CPT

## 2020-08-25 PROCEDURE — G8417 CALC BMI ABV UP PARAM F/U: HCPCS | Performed by: FAMILY MEDICINE

## 2020-08-25 PROCEDURE — 99214 OFFICE O/P EST MOD 30 MIN: CPT | Performed by: FAMILY MEDICINE

## 2020-08-25 PROCEDURE — 1036F TOBACCO NON-USER: CPT | Performed by: FAMILY MEDICINE

## 2020-08-25 PROCEDURE — 3046F HEMOGLOBIN A1C LEVEL >9.0%: CPT | Performed by: FAMILY MEDICINE

## 2020-08-25 PROCEDURE — 3017F COLORECTAL CA SCREEN DOC REV: CPT | Performed by: FAMILY MEDICINE

## 2020-08-25 PROCEDURE — 2022F DILAT RTA XM EVC RTNOPTHY: CPT | Performed by: FAMILY MEDICINE

## 2020-08-25 PROCEDURE — G8427 DOCREV CUR MEDS BY ELIG CLIN: HCPCS | Performed by: FAMILY MEDICINE

## 2020-08-25 PROCEDURE — 96372 THER/PROPH/DIAG INJ SC/IM: CPT | Performed by: FAMILY MEDICINE

## 2020-08-25 RX ORDER — DIAZEPAM 5 MG/1
5 TABLET ORAL EVERY 8 HOURS PRN
Qty: 15 TABLET | Refills: 0 | Status: SHIPPED | OUTPATIENT
Start: 2020-08-25 | End: 2020-12-16 | Stop reason: SDUPTHER

## 2020-08-25 RX ORDER — LORATADINE 10 MG/1
TABLET ORAL
Qty: 30 TABLET | Refills: 5 | Status: SHIPPED | OUTPATIENT
Start: 2020-08-25 | End: 2021-03-02 | Stop reason: SDUPTHER

## 2020-08-25 RX ORDER — FUROSEMIDE 80 MG
TABLET ORAL
Qty: 135 TABLET | Refills: 3 | Status: SHIPPED | OUTPATIENT
Start: 2020-08-25 | End: 2021-03-02 | Stop reason: SDUPTHER

## 2020-08-25 RX ORDER — OXYCODONE HYDROCHLORIDE 30 MG/1
30 TABLET ORAL EVERY 4 HOURS PRN
Qty: 150 TABLET | Refills: 0 | Status: SHIPPED | OUTPATIENT
Start: 2020-08-25 | End: 2020-09-17 | Stop reason: SDUPTHER

## 2020-08-25 RX ORDER — NYSTATIN 100000 [USP'U]/G
POWDER TOPICAL
Qty: 60 G | Refills: 1 | Status: SHIPPED | OUTPATIENT
Start: 2020-08-25 | End: 2021-03-02 | Stop reason: SDUPTHER

## 2020-08-25 RX ORDER — HYDROCODONE BITARTRATE AND ACETAMINOPHEN 10; 325 MG/1; MG/1
TABLET ORAL
Qty: 150 TABLET | Refills: 0 | Status: SHIPPED | OUTPATIENT
Start: 2020-08-25 | End: 2020-08-25 | Stop reason: SDUPTHER

## 2020-08-25 RX ORDER — HYDROCODONE BITARTRATE AND ACETAMINOPHEN 10; 325 MG/1; MG/1
TABLET ORAL
Qty: 150 TABLET | Refills: 0 | Status: SHIPPED | OUTPATIENT
Start: 2020-08-25 | End: 2020-09-17 | Stop reason: SDUPTHER

## 2020-08-25 RX ORDER — LIDOCAINE HYDROCHLORIDE 10 MG/ML
1 INJECTION, SOLUTION INFILTRATION; PERINEURAL ONCE
Status: COMPLETED | OUTPATIENT
Start: 2020-08-25 | End: 2020-08-25

## 2020-08-25 RX ORDER — CYANOCOBALAMIN 1000 UG/ML
1000 INJECTION INTRAMUSCULAR; SUBCUTANEOUS ONCE
Status: COMPLETED | OUTPATIENT
Start: 2020-08-25 | End: 2020-08-25

## 2020-08-25 RX ORDER — OXYCODONE HYDROCHLORIDE 30 MG/1
30 TABLET ORAL EVERY 4 HOURS PRN
Qty: 150 TABLET | Refills: 0 | Status: SHIPPED | OUTPATIENT
Start: 2020-08-25 | End: 2020-08-25 | Stop reason: SDUPTHER

## 2020-08-25 RX ORDER — CARISOPRODOL 350 MG/1
350 TABLET ORAL
COMMUNITY
Start: 2020-08-05 | End: 2020-08-31

## 2020-08-25 RX ORDER — METHYLPREDNISOLONE ACETATE 40 MG/ML
40 INJECTION, SUSPENSION INTRA-ARTICULAR; INTRALESIONAL; INTRAMUSCULAR; SOFT TISSUE ONCE
Status: COMPLETED | OUTPATIENT
Start: 2020-08-25 | End: 2020-08-25

## 2020-08-25 RX ORDER — PROMETHAZINE HYDROCHLORIDE 25 MG/1
TABLET ORAL
Qty: 30 TABLET | Refills: 0 | Status: SHIPPED | OUTPATIENT
Start: 2020-08-25 | End: 2020-09-28 | Stop reason: SDUPTHER

## 2020-08-25 RX ADMIN — LIDOCAINE HYDROCHLORIDE 1 ML: 10 INJECTION, SOLUTION INFILTRATION; PERINEURAL at 16:21

## 2020-08-25 RX ADMIN — CYANOCOBALAMIN 1000 MCG: 1000 INJECTION INTRAMUSCULAR; SUBCUTANEOUS at 16:17

## 2020-08-25 RX ADMIN — METHYLPREDNISOLONE ACETATE 40 MG: 40 INJECTION, SUSPENSION INTRA-ARTICULAR; INTRALESIONAL; INTRAMUSCULAR; SOFT TISSUE at 16:22

## 2020-08-25 ASSESSMENT — ENCOUNTER SYMPTOMS
BACK PAIN: 1
COLOR CHANGE: 1

## 2020-08-25 NOTE — PROGRESS NOTES
Normocephalic and atraumatic. Right Ear: Hearing and external ear normal.      Left Ear: Hearing and external ear normal.      Nose: Nose normal.      Mouth/Throat:      Pharynx: Uvula midline. Eyes:      General: Lids are normal.      Conjunctiva/sclera: Conjunctivae normal.      Pupils: Pupils are equal, round, and reactive to light. Neck:      Musculoskeletal: Normal range of motion and neck supple. Thyroid: No thyroid mass or thyromegaly. Vascular: No carotid bruit. Trachea: Trachea and phonation normal.   Cardiovascular:      Rate and Rhythm: Normal rate and regular rhythm. Pulses: Normal pulses. Heart sounds: Normal heart sounds, S1 normal and S2 normal. No murmur. No friction rub. No gallop. Pulmonary:      Effort: Pulmonary effort is normal.   Musculoskeletal:      Right shoulder: He exhibits decreased range of motion, tenderness, pain, spasm and decreased strength. Left shoulder: He exhibits decreased range of motion and tenderness. Left elbow: Tenderness found. Right knee: He exhibits decreased range of motion. Tenderness found. Left knee: He exhibits decreased range of motion. Tenderness found. Lumbar back: He exhibits decreased range of motion, tenderness, pain and spasm. Right upper leg: He exhibits tenderness. Feet:      Comments: Right great toe has obvious blood blister no drainage no redness no warmth no significant amount of tenderness  Skin:     General: Skin is warm and dry. Comments: Bilateral lower extremity redness and warmth, he has some plus 2 edema below the knee bilaterally   Neurological:      Mental Status: He is alert and oriented to person, place, and time. Comments: Left arm and 4tht and 5th digit have some decreased sensation. Some decreased  strenght in the left hand   Psychiatric:         Mood and Affect: Mood is anxious.           Results in Past 30 Days  Result Component Current Result Ref Range Previous Result Ref Range   Alb 3.8 (8/25/2020) 3.8 - 4.8 g/dL Not in Time Range    Albumin/Globulin Ratio 1.2 (8/25/2020) 1.2 - 2.2 Not in Time Range    Alkaline Phosphatase 65 (8/25/2020) 39 - 117 IU/L Not in Time Range    ALT 24 (8/25/2020) 0 - 44 IU/L Not in Time Range    AST 25 (8/25/2020) 0 - 40 IU/L Not in Time Range    BUN 15 (8/25/2020) 8 - 27 mg/dL Not in Time Range    Calcium 9.5 (8/25/2020) 8.6 - 10.2 mg/dL Not in Time Range    Chloride 99 (8/25/2020) 96 - 106 mmol/L Not in Time Range    CO2 27 (8/25/2020) 20 - 29 mmol/L Not in Time Range    CREATININE 0.99 (8/25/2020) 0.76 - 1.27 mg/dL Not in Time Range    GFR  95 (8/25/2020) >59 mL/min/1.73 Not in Time Range    GFR Non- 82 (8/25/2020) >59 mL/min/1.73 Not in Time Range    Globulin 3.1 (8/25/2020) 1.5 - 4.5 g/dL Not in Time Range    Glucose 280 (H) (8/25/2020) 65 - 99 mg/dL Not in Time Range    Potassium 4.2 (8/25/2020) 3.5 - 5.2 mmol/L Not in Time Range    Sodium 141 (8/25/2020) 134 - 144 mmol/L Not in Time Range    Total Bilirubin 0.3 (8/25/2020) 0.0 - 1.2 mg/dL Not in Time Range    Total Protein 6.9 (8/25/2020) 6.0 - 8.5 g/dL Not in Time Range      Hemoglobin A1C (%)   Date Value   06/17/2020 9.4 (H)     Microscopic Examination (no units)   Date Value   12/28/2017 Not Indicated     Microalbumin, Random Urine (ug/mL)   Date Value   08/14/2019 78.7     LDL Calculated (mg/dL)   Date Value   12/17/2019 72       Lab Results   Component Value Date    WBC 6.6 08/25/2020    NEUTROABS 4.7 02/21/2018    HGB 12.6 08/25/2020    HCT 39.3 08/25/2020    HCT 41.6 06/05/2012    MCV 94 08/25/2020     08/25/2020     06/05/2012     Lab Results   Component Value Date    TSH 2.12 05/18/2018       ASSESSMENT:    Diagnosis Orders   1. Hematoma     2.  Chronic bilateral low back pain with right-sided sciatica  HYDROcodone-acetaminophen (NORCO)  MG per tablet    oxyCODONE (OXY-IR) 30 MG immediate release tablet DISCONTINUED: HYDROcodone-acetaminophen (NORCO)  MG per tablet    DISCONTINUED: oxyCODONE (OXY-IR) 30 MG immediate release tablet   3. Type 2 diabetes mellitus with diabetic polyneuropathy, with long-term current use of insulin ([facility])   DIABETES FOOT EXAM    Diabetic Shoe   4. Screening for colon cancer  Cologuard (For External Results Only)   5. Chronic right shoulder pain  20610 - ME DRAIN/INJECT LARGE JOINT/BURSA   6. Essential hypertension     7. Anxiety  diazePAM (VALIUM) 5 MG tablet        PLAN:  Orders Placed This Encounter   Medications    promethazine (PHENERGAN) 25 MG tablet     Sig: Take one tablet by mouth every 6 hours as needed for nausea and vomiting     Dispense:  30 tablet     Refill:  0    nystatin (NYSTATIN) 645540 UNIT/GM powder     Sig: APPLY TOPICALLY 3 TIMES DAILY AS DIRECTED     Dispense:  60 g     Refill:  1    loratadine (CLARITIN) 10 MG tablet     Sig: Take one tablet by mouth daily as needed for allergies     Dispense:  30 tablet     Refill:  5    furosemide (LASIX) 80 MG tablet     Sig: TAKE ONE TABLET BY MOUTH EVERY MORNING AND 1\2 TABLET IN THE PM(FLUIDPILL)     Dispense:  135 tablet     Refill:  3    DISCONTD: HYDROcodone-acetaminophen (NORCO)  MG per tablet     Sig: TAKE ONE TABLET BY MOUTH EVERY 4 HOURS AS NEEDED FOR BREAKTHROUGH PAIN     Dispense:  150 tablet     Refill:  0     Reduce doses taken as pain becomes manageable    DISCONTD: oxyCODONE (OXY-IR) 30 MG immediate release tablet     Sig: Take 1 tablet by mouth every 4 hours as needed for Pain for up to 30 days.      Dispense:  150 tablet     Refill:  0     Reduce doses taken as pain becomes manageable    cyanocobalamin injection 1,000 mcg    lidocaine 1 % injection 1 mL    methylPREDNISolone acetate (DEPO-MEDROL) injection 40 mg    HYDROcodone-acetaminophen (NORCO)  MG per tablet     Sig: TAKE ONE TABLET BY MOUTH EVERY 4 HOURS AS NEEDED FOR BREAKTHROUGH PAIN     Dispense:  150 tablet     Refill:  0 Reduce doses taken as pain becomes manageable    oxyCODONE (OXY-IR) 30 MG immediate release tablet     Sig: Take 1 tablet by mouth every 4 hours as needed for Pain for up to 30 days. Dispense:  150 tablet     Refill:  0     Reduce doses taken as pain becomes manageable    diazePAM (VALIUM) 5 MG tablet     Sig: Take 1 tablet by mouth every 8 hours as needed for Anxiety or Sleep for up to 10 days. Dispense:  15 tablet     Refill:  0        Medications Discontinued During This Encounter   Medication Reason    albuterol (PROVENTIL) (2.5 MG/3ML) 0.083% nebulizer solution LIST CLEANUP    carvedilol (COREG) 6.25 MG tablet LIST CLEANUP    fluticasone (FLONASE) 50 MCG/ACT nasal spray LIST CLEANUP    lisinopril (PRINIVIL;ZESTRIL) 2.5 MG tablet LIST CLEANUP    Omega-3 Fatty Acids (FISH OIL) 1000 MG CAPS LIST CLEANUP    spironolactone (ALDACTONE) 25 MG tablet LIST CLEANUP    vitamin D (ERGOCALCIFEROL) 1.25 MG (15377 UT) CAPS capsule LIST CLEANUP    promethazine (PHENERGAN) 25 MG tablet REORDER    nystatin (NYSTATIN) 000819 UNIT/GM powder REORDER    loratadine (CLARITIN) 10 MG tablet REORDER    furosemide (LASIX) 80 MG tablet REORDER    HYDROcodone-acetaminophen (NORCO)  MG per tablet REORDER    oxyCODONE (OXY-IR) 30 MG immediate release tablet REORDER    HYDROcodone-acetaminophen (NORCO)  MG per tablet REORDER    oxyCODONE (OXY-IR) 30 MG immediate release tablet REORDER       Controlled Substances Monitoring:      Please note: This chart was generated using Dragon dictation software. Although every effort was made to ensure the accuracy of this automated transcription, some errors in transcription may have occurred.

## 2020-08-31 RX ORDER — CARISOPRODOL 350 MG/1
TABLET ORAL
Qty: 120 TABLET | Refills: 2 | Status: SHIPPED | OUTPATIENT
Start: 2020-08-31 | End: 2020-11-29

## 2020-08-31 NOTE — TELEPHONE ENCOUNTER
Refill request received from pharmacy.  Medication pending for approval.       Last Office Visit With PCP:  08/25/20    Next Visit Date:  Future Appointments   Date Time Provider Marin Sierra   9/17/2020  3:00 PM Sylvia Lanier MD [address] [place] [address]

## 2020-09-17 ENCOUNTER — OFFICE VISIT (OUTPATIENT)
Dept: FAMILY MEDICINE CLINIC | Age: 60
End: 2020-09-17
Payer: MEDICARE

## 2020-09-17 ENCOUNTER — HOSPITAL ENCOUNTER (OUTPATIENT)
Facility: HOSPITAL | Age: 60
Discharge: HOME | End: 2020-09-17
Payer: MEDICARE

## 2020-09-17 VITALS
HEIGHT: 76 IN | BODY MASS INDEX: 38.36 KG/M2 | OXYGEN SATURATION: 96 % | WEIGHT: 315 LBS | DIASTOLIC BLOOD PRESSURE: 70 MMHG | HEART RATE: 63 BPM | RESPIRATION RATE: 18 BRPM | SYSTOLIC BLOOD PRESSURE: 138 MMHG

## 2020-09-17 PROCEDURE — 1036F TOBACCO NON-USER: CPT | Performed by: FAMILY MEDICINE

## 2020-09-17 PROCEDURE — 99214 OFFICE O/P EST MOD 30 MIN: CPT | Performed by: FAMILY MEDICINE

## 2020-09-17 PROCEDURE — 3017F COLORECTAL CA SCREEN DOC REV: CPT | Performed by: FAMILY MEDICINE

## 2020-09-17 PROCEDURE — G8417 CALC BMI ABV UP PARAM F/U: HCPCS | Performed by: FAMILY MEDICINE

## 2020-09-17 PROCEDURE — G8427 DOCREV CUR MEDS BY ELIG CLIN: HCPCS | Performed by: FAMILY MEDICINE

## 2020-09-17 PROCEDURE — 36415 COLL VENOUS BLD VENIPUNCTURE: CPT

## 2020-09-17 PROCEDURE — 2022F DILAT RTA XM EVC RTNOPTHY: CPT | Performed by: FAMILY MEDICINE

## 2020-09-17 PROCEDURE — 3052F HG A1C>EQUAL 8.0%<EQUAL 9.0%: CPT | Performed by: FAMILY MEDICINE

## 2020-09-17 RX ORDER — HYDROCODONE BITARTRATE AND ACETAMINOPHEN 10; 325 MG/1; MG/1
TABLET ORAL
Qty: 150 TABLET | Refills: 0 | Status: SHIPPED | OUTPATIENT
Start: 2020-09-17 | End: 2020-09-17 | Stop reason: SDUPTHER

## 2020-09-17 RX ORDER — OXYCODONE HYDROCHLORIDE 30 MG/1
30 TABLET ORAL EVERY 4 HOURS PRN
Qty: 150 TABLET | Refills: 0 | Status: SHIPPED | OUTPATIENT
Start: 2020-09-17 | End: 2020-09-17 | Stop reason: SDUPTHER

## 2020-09-17 RX ORDER — OXYCODONE HYDROCHLORIDE 30 MG/1
30 TABLET ORAL EVERY 4 HOURS PRN
Qty: 150 TABLET | Refills: 0 | Status: SHIPPED | OUTPATIENT
Start: 2020-09-17 | End: 2020-11-19 | Stop reason: SDUPTHER

## 2020-09-17 RX ORDER — HYDROCODONE BITARTRATE AND ACETAMINOPHEN 10; 325 MG/1; MG/1
TABLET ORAL
Qty: 150 TABLET | Refills: 0 | Status: SHIPPED | OUTPATIENT
Start: 2020-09-17 | End: 2020-11-19 | Stop reason: SDUPTHER

## 2020-09-17 ASSESSMENT — PATIENT HEALTH QUESTIONNAIRE - PHQ9
SUM OF ALL RESPONSES TO PHQ QUESTIONS 1-9: 2
SUM OF ALL RESPONSES TO PHQ9 QUESTIONS 1 & 2: 2
1. LITTLE INTEREST OR PLEASURE IN DOING THINGS: 1
2. FEELING DOWN, DEPRESSED OR HOPELESS: 1
SUM OF ALL RESPONSES TO PHQ QUESTIONS 1-9: 2

## 2020-09-17 ASSESSMENT — ENCOUNTER SYMPTOMS
COLOR CHANGE: 1
BACK PAIN: 1

## 2020-09-17 NOTE — PROGRESS NOTES
SUBJECTIVE:    Patient ID: Sarah Shelley is a 62 y.o. male. Chief Complaint   Patient presents with    Diabetes     f/u       HPI: office visit  He is still really worring about his wife who has been sick with covid. She is still havign a lot of anxiety. He has her at home now. He is having some less swelling. He is not having any chest pain or increase in sob. His blood pressures are doing well. He says his blood sugars are up more than they probably should be. He says he thinks a lot of his stress. He is struggling with some swelling. He says that seems to be worse since he has been so concerned about her. He is getting better with his shortness of breath. He is not having as much cough. He does seem to be recovering well from coronavirus. Review of Systems   Constitutional: Positive for fatigue. Musculoskeletal: Positive for arthralgias, back pain, gait problem and myalgias. Skin: Positive for color change and rash. Neurological: Positive for weakness. All other systems reviewed and are negative. OBJECTIVE:  /70   Pulse 63   Resp 18   Ht 6' 4\" (1.93 m)   Wt (!) 410 lb 9.6 oz (186.2 kg)   SpO2 96% Comment: ra  BMI 49.98 kg/m²    Wt Readings from Last 3 Encounters:   09/17/20 (!) 410 lb 9.6 oz (186.2 kg)   06/17/20 (!) 423 lb 12.8 oz (192.2 kg)   03/17/20 (!) 418 lb (189.6 kg)     BP Readings from Last 3 Encounters:   09/24/20 130/78   09/17/20 138/70   08/25/20 132/68      Pulse Readings from Last 3 Encounters:   09/24/20 68   09/17/20 63   08/25/20 65     Body mass index is 49.98 kg/m². Resp Readings from Last 3 Encounters:   09/24/20 20   09/17/20 18   08/25/20 18     Past medical, surgical, family and social history were reviewed and updated with the patient. Physical Exam  Vitals signs and nursing note reviewed. Constitutional:       Appearance: He is well-developed. HENT:      Head: Normocephalic and atraumatic.       Right Ear: Hearing and external ear normal.      Left Ear: Hearing and external ear normal.      Nose: Nose normal.      Mouth/Throat:      Pharynx: Uvula midline. Eyes:      General: Lids are normal.      Conjunctiva/sclera: Conjunctivae normal.      Pupils: Pupils are equal, round, and reactive to light. Neck:      Musculoskeletal: Normal range of motion and neck supple. Thyroid: No thyroid mass or thyromegaly. Vascular: No carotid bruit. Trachea: Trachea and phonation normal.   Cardiovascular:      Rate and Rhythm: Normal rate and regular rhythm. Pulses: Normal pulses. Heart sounds: Normal heart sounds, S1 normal and S2 normal. No murmur. No friction rub. No gallop. Pulmonary:      Effort: Pulmonary effort is normal.   Musculoskeletal:      Right shoulder: He exhibits decreased range of motion, tenderness, pain, spasm and decreased strength. Left shoulder: He exhibits decreased range of motion and tenderness. Left elbow: Tenderness found. Right knee: He exhibits decreased range of motion. Tenderness found. Left knee: He exhibits decreased range of motion. Tenderness found. Lumbar back: He exhibits decreased range of motion, tenderness, pain and spasm. Right upper leg: He exhibits tenderness. Skin:     General: Skin is warm and dry. Comments: Bilateral lower extremity redness and warmth, he has some plus 2 edema below the knee bilaterally   Neurological:      Mental Status: He is alert and oriented to person, place, and time. Psychiatric:         Attention and Perception: Attention and perception normal.         Mood and Affect: Affect normal. Mood is anxious. Speech: Speech normal.         Behavior: Behavior normal. Behavior is cooperative. Thought Content:  Thought content normal.         Cognition and Memory: Cognition and memory normal.          Results in Past 30 Days  Result Component Current Result Ref Range Previous Result Ref Range   Alb 3.9 (9/17/2020) 3.8 - 4.8 g/dL Not in Time Range    Albumin/Globulin Ratio 1.1 (L) (9/17/2020) 1.2 - 2.2 Not in Time Range    Alkaline Phosphatase 62 (9/17/2020) 39 - 117 IU/L Not in Time Range    ALT 20 (9/17/2020) 0 - 44 IU/L Not in Time Range    AST 22 (9/17/2020) 0 - 40 IU/L Not in Time Range    BUN 18 (9/17/2020) 8 - 27 mg/dL Not in Time Range    Calcium 9.6 (9/17/2020) 8.6 - 10.2 mg/dL Not in Time Range    Chloride 104 (9/17/2020) 96 - 106 mmol/L Not in Time Range    CO2 22 (9/17/2020) 20 - 29 mmol/L Not in Time Range    CREATININE 0.89 (9/17/2020) 0.76 - 1.27 mg/dL Not in Time Range    GFR  107 (9/17/2020) >59 mL/min/1.73 Not in Time Range    GFR Non- 92 (9/17/2020) >59 mL/min/1.73 Not in Time Range    Globulin 3.4 (9/17/2020) 1.5 - 4.5 g/dL Not in Time Range    Glucose 166 (H) (9/17/2020) 65 - 99 mg/dL Not in Time Range    Potassium 4.6 (9/17/2020) 3.5 - 5.2 mmol/L Not in Time Range    Sodium 143 (9/17/2020) 134 - 144 mmol/L Not in Time Range    Total Bilirubin 0.2 (9/17/2020) 0.0 - 1.2 mg/dL Not in Time Range    Total Protein 7.3 (9/17/2020) 6.0 - 8.5 g/dL Not in Time Range      Hemoglobin A1C (%)   Date Value   09/17/2020 8.1 (H)     Microscopic Examination (no units)   Date Value   12/28/2017 Not Indicated     Microalbumin, Random Urine (ug/mL)   Date Value   09/17/2020 76.2     LDL Calculated (mg/dL)   Date Value   12/17/2019 72       Lab Results   Component Value Date    WBC 6.6 08/25/2020    NEUTROABS 4.7 02/21/2018    HGB 12.6 08/25/2020    HCT 39.3 08/25/2020    HCT 41.6 06/05/2012    MCV 94 08/25/2020     08/25/2020     06/05/2012     Lab Results   Component Value Date    TSH 2.12 05/18/2018       ASSESSMENT:    Diagnosis Orders   1. Type 2 diabetes mellitus with diabetic polyneuropathy, with long-term current use of insulin ([facility] [address]     2. Essential hypertension     3. Weakness     4. Vitamin D deficiency     5.  Chronic bilateral low back pain with right-sided sciatica

## 2020-09-24 ENCOUNTER — OFFICE VISIT (OUTPATIENT)
Dept: FAMILY MEDICINE CLINIC | Age: 60
End: 2020-09-24
Payer: MEDICARE

## 2020-09-24 VITALS
RESPIRATION RATE: 20 BRPM | SYSTOLIC BLOOD PRESSURE: 130 MMHG | HEART RATE: 68 BPM | OXYGEN SATURATION: 98 % | DIASTOLIC BLOOD PRESSURE: 78 MMHG

## 2020-09-24 PROCEDURE — 1036F TOBACCO NON-USER: CPT | Performed by: FAMILY MEDICINE

## 2020-09-24 PROCEDURE — 3017F COLORECTAL CA SCREEN DOC REV: CPT | Performed by: FAMILY MEDICINE

## 2020-09-24 PROCEDURE — 3052F HG A1C>EQUAL 8.0%<EQUAL 9.0%: CPT | Performed by: FAMILY MEDICINE

## 2020-09-24 PROCEDURE — G8427 DOCREV CUR MEDS BY ELIG CLIN: HCPCS | Performed by: FAMILY MEDICINE

## 2020-09-24 PROCEDURE — 2022F DILAT RTA XM EVC RTNOPTHY: CPT | Performed by: FAMILY MEDICINE

## 2020-09-24 PROCEDURE — 99213 OFFICE O/P EST LOW 20 MIN: CPT | Performed by: FAMILY MEDICINE

## 2020-09-24 PROCEDURE — 29580 STRAPPING UNNA BOOT: CPT | Performed by: FAMILY MEDICINE

## 2020-09-24 PROCEDURE — 96372 THER/PROPH/DIAG INJ SC/IM: CPT | Performed by: FAMILY MEDICINE

## 2020-09-24 PROCEDURE — G8417 CALC BMI ABV UP PARAM F/U: HCPCS | Performed by: FAMILY MEDICINE

## 2020-09-24 RX ORDER — CEPHALEXIN 500 MG/1
500 CAPSULE ORAL 3 TIMES DAILY
Qty: 30 CAPSULE | Refills: 0 | Status: SHIPPED | OUTPATIENT
Start: 2020-09-24 | End: 2020-10-04

## 2020-09-24 RX ORDER — CYANOCOBALAMIN 1000 UG/ML
1000 INJECTION INTRAMUSCULAR; SUBCUTANEOUS ONCE
Status: COMPLETED | OUTPATIENT
Start: 2020-09-24 | End: 2020-09-24

## 2020-09-24 RX ADMIN — CYANOCOBALAMIN 1000 MCG: 1000 INJECTION INTRAMUSCULAR; SUBCUTANEOUS at 17:03

## 2020-09-24 ASSESSMENT — PATIENT HEALTH QUESTIONNAIRE - PHQ9
SUM OF ALL RESPONSES TO PHQ9 QUESTIONS 1 & 2: 0
1. LITTLE INTEREST OR PLEASURE IN DOING THINGS: 0
SUM OF ALL RESPONSES TO PHQ QUESTIONS 1-9: 0
2. FEELING DOWN, DEPRESSED OR HOPELESS: 0
SUM OF ALL RESPONSES TO PHQ QUESTIONS 1-9: 0

## 2020-09-24 NOTE — PROGRESS NOTES
Per Ector Chacon MD order, Mazie Nissen applied to Right Lower Extremity. Secured with ace bandage. Patient tolerated procedure well. Administrations This Visit     cyanocobalamin injection 1,000 mcg     Admin Date  09/24/2020  17:03 Action  Given Dose  1000 mcg Route  Intramuscular Site  Deltoid Right Administered By  Aisha Ogden, AYAAN    Ordering Provider:  Ector Chacon MD    [address]:  [id number]    Lot#:  [id number]    :  [employer] [facility]    Patient Supplied?:  No              Patient tolerated injection well. Patient advised to wait 20 minutes in the office following the injection. No signs/symptoms of reaction noted after 20 minutes.

## 2020-09-27 ASSESSMENT — ENCOUNTER SYMPTOMS
BACK PAIN: 1
COLOR CHANGE: 1

## 2020-09-28 RX ORDER — PROMETHAZINE HYDROCHLORIDE 25 MG/1
TABLET ORAL
Qty: 30 TABLET | Refills: 0 | Status: SHIPPED | OUTPATIENT
Start: 2020-09-28 | End: 2020-10-27 | Stop reason: SDUPTHER

## 2020-09-28 NOTE — TELEPHONE ENCOUNTER
Patient called, requested refill.        Next Office Visit Date:  Future Appointments   Date Time Provider Marin Sierra   10/1/2020  1:45 PM Geraldo Pro MD [id number]   12/17/2020  2:15 PM Geraldo Pro MD [address] [facility] Brian [place]jasar

## 2020-09-28 NOTE — PROGRESS NOTES
of motion and neck supple. Thyroid: No thyroid mass or thyromegaly. Vascular: No carotid bruit. Trachea: Trachea and phonation normal.   Cardiovascular:      Rate and Rhythm: Normal rate and regular rhythm. Pulses: Normal pulses. Heart sounds: Normal heart sounds, S1 normal and S2 normal. No murmur. No friction rub. No gallop. Pulmonary:      Effort: Pulmonary effort is normal.   Musculoskeletal:      Right shoulder: He exhibits decreased range of motion, tenderness, pain, spasm and decreased strength. Left shoulder: He exhibits decreased range of motion and tenderness. Left elbow: Tenderness found. Right knee: He exhibits decreased range of motion. Tenderness found. Left knee: He exhibits decreased range of motion. Tenderness found. Lumbar back: He exhibits decreased range of motion, tenderness, pain and spasm. Right upper leg: He exhibits tenderness. Skin:     General: Skin is warm and dry. Comments: Right great toe open area. There is a minimal amount of drainage. There is no redness there is no warmth. Neurological:      Mental Status: He is alert and oriented to person, place, and time. Psychiatric:         Attention and Perception: Attention and perception normal.         Mood and Affect: Affect normal. Mood is anxious. Speech: Speech normal.         Behavior: Behavior normal. Behavior is cooperative. Thought Content:  Thought content normal.         Cognition and Memory: Cognition and memory normal.        Results in Past 30 Days  Result Component Current Result Ref Range Previous Result Ref Range   Alb 3.9 (9/17/2020) 3.8 - 4.8 g/dL Not in Time Range    Albumin/Globulin Ratio 1.1 (L) (9/17/2020) 1.2 - 2.2 Not in Time Range    Alkaline Phosphatase 62 (9/17/2020) 39 - 117 IU/L Not in Time Range    ALT 20 (9/17/2020) 0 - 44 IU/L Not in Time Range    AST 22 (9/17/2020) 0 - 40 IU/L Not in Time Range    BUN 18 (9/17/2020) 8 - 27 mg/dL Not in Time Range    Calcium 9.6 (9/17/2020) 8.6 - 10.2 mg/dL Not in Time Range    Chloride 104 (9/17/2020) 96 - 106 mmol/L Not in Time Range    CO2 22 (9/17/2020) 20 - 29 mmol/L Not in Time Range    CREATININE 0.89 (9/17/2020) 0.76 - 1.27 mg/dL Not in Time Range    GFR  107 (9/17/2020) >59 mL/min/1.73 Not in Time Range    GFR Non- 92 (9/17/2020) >59 mL/min/1.73 Not in Time Range    Globulin 3.4 (9/17/2020) 1.5 - 4.5 g/dL Not in Time Range    Glucose 166 (H) (9/17/2020) 65 - 99 mg/dL Not in Time Range    Potassium 4.6 (9/17/2020) 3.5 - 5.2 mmol/L Not in Time Range    Sodium 143 (9/17/2020) 134 - 144 mmol/L Not in Time Range    Total Bilirubin 0.2 (9/17/2020) 0.0 - 1.2 mg/dL Not in Time Range    Total Protein 7.3 (9/17/2020) 6.0 - 8.5 g/dL Not in Time Range      Hemoglobin A1C (%)   Date Value   09/17/2020 8.1 (H)     Microscopic Examination (no units)   Date Value   12/28/2017 Not Indicated     Microalbumin, Random Urine (ug/mL)   Date Value   09/17/2020 76.2     LDL Calculated (mg/dL)   Date Value   12/17/2019 72       Lab Results   Component Value Date    WBC 6.6 08/25/2020    NEUTROABS 4.7 02/21/2018    HGB 12.6 08/25/2020    HCT 39.3 08/25/2020    HCT 41.6 06/05/2012    MCV 94 08/25/2020     08/25/2020     06/05/2012     Lab Results   Component Value Date    TSH 2.12 05/18/2018       ASSESSMENT:    Diagnosis Orders   1. Diabetic ulcer of right great toe (HCC)  MS APPLY OF PASTE BOOT   2. B12 deficiency  cyanocobalamin injection 1,000 mcg        PLAN:  Orders Placed This Encounter   Medications    cyanocobalamin injection 1,000 mcg        There are no discontinued medications. Controlled Substances Monitoring:      Please note: This chart was generated using Dragon dictation software. Although every effort was made to ensure the accuracy of this automated transcription, some errors in transcription may have occurred.

## 2020-10-01 ENCOUNTER — OFFICE VISIT (OUTPATIENT)
Dept: FAMILY MEDICINE CLINIC | Age: 60
End: 2020-10-01
Payer: MEDICARE

## 2020-10-01 VITALS
BODY MASS INDEX: 38.36 KG/M2 | HEIGHT: 76 IN | TEMPERATURE: 96.9 F | DIASTOLIC BLOOD PRESSURE: 60 MMHG | OXYGEN SATURATION: 93 % | WEIGHT: 315 LBS | SYSTOLIC BLOOD PRESSURE: 102 MMHG | RESPIRATION RATE: 18 BRPM | HEART RATE: 64 BPM

## 2020-10-01 PROCEDURE — G8417 CALC BMI ABV UP PARAM F/U: HCPCS | Performed by: FAMILY MEDICINE

## 2020-10-01 PROCEDURE — G8427 DOCREV CUR MEDS BY ELIG CLIN: HCPCS | Performed by: FAMILY MEDICINE

## 2020-10-01 PROCEDURE — 1036F TOBACCO NON-USER: CPT | Performed by: FAMILY MEDICINE

## 2020-10-01 PROCEDURE — 3052F HG A1C>EQUAL 8.0%<EQUAL 9.0%: CPT | Performed by: FAMILY MEDICINE

## 2020-10-01 PROCEDURE — 99213 OFFICE O/P EST LOW 20 MIN: CPT | Performed by: FAMILY MEDICINE

## 2020-10-01 PROCEDURE — G8484 FLU IMMUNIZE NO ADMIN: HCPCS | Performed by: FAMILY MEDICINE

## 2020-10-01 PROCEDURE — 3017F COLORECTAL CA SCREEN DOC REV: CPT | Performed by: FAMILY MEDICINE

## 2020-10-01 PROCEDURE — 2022F DILAT RTA XM EVC RTNOPTHY: CPT | Performed by: FAMILY MEDICINE

## 2020-10-01 RX ORDER — CYANOCOBALAMIN 1000 UG/ML
1000 INJECTION INTRAMUSCULAR; SUBCUTANEOUS ONCE
Status: DISCONTINUED | OUTPATIENT
Start: 2020-10-01 | End: 2020-10-15

## 2020-10-01 RX ORDER — CYANOCOBALAMIN 1000 UG/ML
1000 INJECTION INTRAMUSCULAR; SUBCUTANEOUS ONCE
Status: DISCONTINUED | OUTPATIENT
Start: 2020-10-01 | End: 2020-10-01

## 2020-10-01 ASSESSMENT — ENCOUNTER SYMPTOMS
COLOR CHANGE: 1
BACK PAIN: 1

## 2020-10-01 NOTE — PROGRESS NOTES
Chief Complaint   Patient presents with    Wound Check     f/u       Have you seen any other physician or provider since your last visit no    Have you had any other diagnostic tests since your last visit? no    Have you changed or stopped any medications since your last visit? no           Per John Moser MD order, [facility] applied to left and right Lower Extremity. Secured with ace bandage. Patient tolerated procedure well.

## 2020-10-01 NOTE — PROGRESS NOTES
SUBJECTIVE:    Patient ID: Apolonia Carter is a 62 y.o. male. Chief Complaint   Patient presents with    Wound Check     f/u       HPI: office visit  He is in the office today in follow-up of his total lesion. It does seem to be less tender today. He is not having as much drainage from it. He still having some tenderness. He has not had any noted warmth. Very minimal redness. He is caring for it as instructed. Review of Systems   Constitutional: Positive for fatigue. Musculoskeletal: Positive for arthralgias, back pain, gait problem and myalgias. Skin: Positive for color change and rash. Neurological: Positive for weakness. All other systems reviewed and are negative. OBJECTIVE:  /60   Pulse 64   Temp 96.9 °F (36.1 °C)   Resp 18   Ht 6' 4\" (1.93 m)   Wt (!) 418 lb (189.6 kg) Comment: unable to weigh  SpO2 93% Comment: ra  BMI 50.88 kg/m²    Wt Readings from Last 3 Encounters:   10/01/20 (!) 418 lb (189.6 kg)   09/17/20 (!) 410 lb 9.6 oz (186.2 kg)   06/17/20 (!) 423 lb 12.8 oz (192.2 kg)     BP Readings from Last 3 Encounters:   10/01/20 102/60   09/24/20 130/78   09/17/20 138/70      Pulse Readings from Last 3 Encounters:   10/01/20 64   09/24/20 68   09/17/20 63     Body mass index is 50.88 kg/m². Resp Readings from Last 3 Encounters:   10/01/20 18   09/24/20 20   09/17/20 18     Past medical, surgical, family and social history were reviewed and updated with the patient. Physical Exam  Vitals signs and nursing note reviewed. Constitutional:       Appearance: He is well-developed. HENT:      Head: Normocephalic and atraumatic. Right Ear: Hearing and external ear normal.      Left Ear: Hearing and external ear normal.      Nose: Nose normal.      Mouth/Throat:      Pharynx: Uvula midline. Eyes:      General: Lids are normal.      Conjunctiva/sclera: Conjunctivae normal.      Pupils: Pupils are equal, round, and reactive to light.    Neck: Musculoskeletal: Normal range of motion and neck supple. Thyroid: No thyroid mass or thyromegaly. Vascular: No carotid bruit. Trachea: Trachea and phonation normal.   Cardiovascular:      Rate and Rhythm: Normal rate and regular rhythm. Pulses: Normal pulses. Heart sounds: Normal heart sounds, S1 normal and S2 normal. No murmur. No friction rub. No gallop. Pulmonary:      Effort: Pulmonary effort is normal.   Musculoskeletal:      Right shoulder: He exhibits decreased range of motion, tenderness, pain, spasm and decreased strength. Left shoulder: He exhibits decreased range of motion and tenderness. Left elbow: Tenderness found. Right knee: He exhibits decreased range of motion. Tenderness found. Left knee: He exhibits decreased range of motion. Tenderness found. Lumbar back: He exhibits decreased range of motion, tenderness, pain and spasm. Right upper leg: He exhibits tenderness. Skin:     General: Skin is warm and dry. Comments: Right great toe open area. There is a minimal amount of drainage. There is no redness there is no warmth. Neurological:      Mental Status: He is alert and oriented to person, place, and time. Psychiatric:         Attention and Perception: Attention and perception normal.         Mood and Affect: Affect normal. Mood is anxious. Speech: Speech normal.         Behavior: Behavior normal. Behavior is cooperative. Thought Content:  Thought content normal.         Cognition and Memory: Cognition and memory normal.          Results in Past 30 Days  Result Component Current Result Ref Range Previous Result Ref Range   Alb 3.9 (9/17/2020) 3.8 - 4.8 g/dL Not in Time Range    Albumin/Globulin Ratio 1.1 (L) (9/17/2020) 1.2 - 2.2 Not in Time Range    Alkaline Phosphatase 62 (9/17/2020) 39 - 117 IU/L Not in Time Range    ALT 20 (9/17/2020) 0 - 44 IU/L Not in Time Range    AST 22 (9/17/2020) 0 - 40 IU/L Not in Time Range    BUN 18 (9/17/2020) 8 - 27 mg/dL Not in Time Range    Calcium 9.6 (9/17/2020) 8.6 - 10.2 mg/dL Not in Time Range    Chloride 104 (9/17/2020) 96 - 106 mmol/L Not in Time Range    CO2 22 (9/17/2020) 20 - 29 mmol/L Not in Time Range    CREATININE 0.89 (9/17/2020) 0.76 - 1.27 mg/dL Not in Time Range    GFR  107 (9/17/2020) >59 mL/min/1.73 Not in Time Range    GFR Non- 92 (9/17/2020) >59 mL/min/1.73 Not in Time Range    Globulin 3.4 (9/17/2020) 1.5 - 4.5 g/dL Not in Time Range    Glucose 166 (H) (9/17/2020) 65 - 99 mg/dL Not in Time Range    Potassium 4.6 (9/17/2020) 3.5 - 5.2 mmol/L Not in Time Range    Sodium 143 (9/17/2020) 134 - 144 mmol/L Not in Time Range    Total Bilirubin 0.2 (9/17/2020) 0.0 - 1.2 mg/dL Not in Time Range    Total Protein 7.3 (9/17/2020) 6.0 - 8.5 g/dL Not in Time Range      Hemoglobin A1C (%)   Date Value   09/17/2020 8.1 (H)     Microscopic Examination (no units)   Date Value   12/28/2017 Not Indicated     Microalbumin, Random Urine (ug/mL)   Date Value   09/17/2020 76.2     LDL Calculated (mg/dL)   Date Value   12/17/2019 72       Lab Results   Component Value Date    WBC 6.6 08/25/2020    NEUTROABS 4.7 02/21/2018    HGB 12.6 08/25/2020    HCT 39.3 08/25/2020    HCT 41.6 06/05/2012    MCV 94 08/25/2020     08/25/2020     06/05/2012     Lab Results   Component Value Date    TSH 2.12 05/18/2018       ASSESSMENT:    Diagnosis Orders   1. Diabetic ulcer of right great toe ([address]     2. Type 2 diabetes mellitus with diabetic polyneuropathy, with long-term current use of insulin [facility])  External Referral To Podiatry        PLAN:  Orders Placed This Encounter   Medications    DISCONTD: cyanocobalamin injection 1,000 mcg    cyanocobalamin injection 1,000 mcg        Medications Discontinued During This Encounter   Medication Reason    cyanocobalamin injection 1,000 mcg        Controlled Substances Monitoring:      Please note:  This chart was generated using Dragon dictation software. Although every effort was made to ensure the accuracy of this automated transcription, some errors in transcription may have occurred.

## 2020-10-02 ENCOUNTER — NURSE ONLY (OUTPATIENT)
Dept: FAMILY MEDICINE CLINIC | Age: 60
End: 2020-10-02
Payer: MEDICARE

## 2020-10-02 ENCOUNTER — TELEPHONE (OUTPATIENT)
Dept: FAMILY MEDICINE CLINIC | Age: 60
End: 2020-10-02

## 2020-10-02 PROCEDURE — 96372 THER/PROPH/DIAG INJ SC/IM: CPT | Performed by: FAMILY MEDICINE

## 2020-10-02 RX ORDER — CYANOCOBALAMIN 1000 UG/ML
1000 INJECTION INTRAMUSCULAR; SUBCUTANEOUS ONCE
Status: COMPLETED | OUTPATIENT
Start: 2020-10-02 | End: 2020-10-02

## 2020-10-02 RX ADMIN — CYANOCOBALAMIN 1000 MCG: 1000 INJECTION INTRAMUSCULAR; SUBCUTANEOUS at 10:47

## 2020-10-02 NOTE — TELEPHONE ENCOUNTER
Refill request received from pharmacy.  Medication pending for approval.       Last Office Visit With PCP:  10/1/2020    Next Visit Date:  Future Appointments   Date Time Provider Marin Sierra   12/17/2020  2:15 PM Vero Thornton MD [address] [place] [address] up to date

## 2020-10-02 NOTE — TELEPHONE ENCOUNTER
Patient scheduled to see Dr. Priscila Juarez (Podiatry) on 10/16/20 at 10:15. Patient's referral, along with all pertinent medical records faxed to the attention of scheduling on 10/01/20. Patient contacted advised of appointment date/time/location. Patient verbalized understanding of all instructions.  (Per Dr. Komal Burgos office)

## 2020-10-02 NOTE — PROGRESS NOTES
Administrations This Visit     cyanocobalamin injection 1,000 mcg     Admin Date  10/02/2020  10:47 Action  Given Dose  1000 mcg Route  Intramuscular Site  Deltoid Left Administered By  Sean Baldwin    Ordering Provider:  Francisco Mederos MD    NDC:  70179-404-79    Lot#:      :  [address] [place] [address]    Patient Supplied?:  No

## 2020-10-04 RX ORDER — GABAPENTIN 800 MG/1
TABLET ORAL
Qty: 120 TABLET | Refills: 2 | Status: SHIPPED | OUTPATIENT
Start: 2020-10-04 | End: 2020-12-16 | Stop reason: SDUPTHER

## 2020-10-05 RX ORDER — SITAGLIPTIN 100 MG/1
TABLET, FILM COATED ORAL
Qty: 90 TABLET | Refills: 0 | OUTPATIENT
Start: 2020-10-05

## 2020-10-05 RX ORDER — INSULIN DETEMIR 100 [IU]/ML
INJECTION, SOLUTION SUBCUTANEOUS
Qty: 5 PEN | Refills: 2 | OUTPATIENT
Start: 2020-10-05

## 2020-10-05 RX ORDER — EMPAGLIFLOZIN 25 MG/1
TABLET, FILM COATED ORAL
Qty: 90 TABLET | Refills: 0 | Status: SHIPPED | OUTPATIENT
Start: 2020-10-05 | End: 2020-10-07 | Stop reason: SDUPTHER

## 2020-10-05 NOTE — TELEPHONE ENCOUNTER
Refill request received from pharmacy.  Medication pending for approval.       Last Office Visit With PCP:  10/1/2020    Next Visit Date:  Future Appointments   Date Time Provider Marin Sierra   12/17/2020  2:15 PM Burke Abraham MD [address] [place] [address]

## 2020-10-06 ENCOUNTER — OFFICE VISIT (OUTPATIENT)
Dept: FAMILY MEDICINE CLINIC | Age: 60
End: 2020-10-06
Payer: MEDICARE

## 2020-10-06 VITALS
OXYGEN SATURATION: 98 % | RESPIRATION RATE: 20 BRPM | HEIGHT: 76 IN | HEART RATE: 84 BPM | DIASTOLIC BLOOD PRESSURE: 68 MMHG | WEIGHT: 315 LBS | BODY MASS INDEX: 38.36 KG/M2 | SYSTOLIC BLOOD PRESSURE: 112 MMHG

## 2020-10-06 PROCEDURE — 96372 THER/PROPH/DIAG INJ SC/IM: CPT | Performed by: FAMILY MEDICINE

## 2020-10-06 PROCEDURE — 3052F HG A1C>EQUAL 8.0%<EQUAL 9.0%: CPT | Performed by: FAMILY MEDICINE

## 2020-10-06 PROCEDURE — G8482 FLU IMMUNIZE ORDER/ADMIN: HCPCS | Performed by: FAMILY MEDICINE

## 2020-10-06 PROCEDURE — G8417 CALC BMI ABV UP PARAM F/U: HCPCS | Performed by: FAMILY MEDICINE

## 2020-10-06 PROCEDURE — 3017F COLORECTAL CA SCREEN DOC REV: CPT | Performed by: FAMILY MEDICINE

## 2020-10-06 PROCEDURE — 1036F TOBACCO NON-USER: CPT | Performed by: FAMILY MEDICINE

## 2020-10-06 PROCEDURE — G8427 DOCREV CUR MEDS BY ELIG CLIN: HCPCS | Performed by: FAMILY MEDICINE

## 2020-10-06 PROCEDURE — 2022F DILAT RTA XM EVC RTNOPTHY: CPT | Performed by: FAMILY MEDICINE

## 2020-10-06 PROCEDURE — 99214 OFFICE O/P EST MOD 30 MIN: CPT | Performed by: FAMILY MEDICINE

## 2020-10-06 PROCEDURE — 90688 IIV4 VACCINE SPLT 0.5 ML IM: CPT | Performed by: FAMILY MEDICINE

## 2020-10-06 PROCEDURE — G0008 ADMIN INFLUENZA VIRUS VAC: HCPCS | Performed by: FAMILY MEDICINE

## 2020-10-06 RX ORDER — CYANOCOBALAMIN 1000 UG/ML
1000 INJECTION INTRAMUSCULAR; SUBCUTANEOUS ONCE
Status: COMPLETED | OUTPATIENT
Start: 2020-10-06 | End: 2020-10-06

## 2020-10-06 RX ADMIN — CYANOCOBALAMIN 1000 MCG: 1000 INJECTION INTRAMUSCULAR; SUBCUTANEOUS at 17:15

## 2020-10-06 ASSESSMENT — PATIENT HEALTH QUESTIONNAIRE - PHQ9
SUM OF ALL RESPONSES TO PHQ QUESTIONS 1-9: 0
SUM OF ALL RESPONSES TO PHQ9 QUESTIONS 1 & 2: 0
1. LITTLE INTEREST OR PLEASURE IN DOING THINGS: 0
SUM OF ALL RESPONSES TO PHQ QUESTIONS 1-9: 0
2. FEELING DOWN, DEPRESSED OR HOPELESS: 0

## 2020-10-06 NOTE — PROGRESS NOTES
Immunizations Administered     Name Date Dose Route    Influenza, Quadv, IM, (6 mo and older Fluzone, Flulaval, Fluarix and 3 yrs and older Afluria) 10/6/2020 0.5 mL Intramuscular    Site: Deltoid- Right    Lot: [id number]    NDC: 14381-377-79        Administrations This Visit     cyanocobalamin injection 1,000 mcg     Admin Date  10/06/2020  17:15 Action  Given Dose  1000 mcg Route  Intramuscular Site  Deltoid Left Administered By  Pleas Gilford, RN    Ordering Provider:  Allie Alvarado MD    NDC:  14861-153-87    Lot#:  [id number]    :  [facility]    Patient Supplied?:  No              Patient tolerated injection well. Patient advised to wait 20 minutes in the office following the injection. No signs/symptoms of reaction noted after 20 minutes.

## 2020-10-07 RX ORDER — INSULIN DETEMIR 100 [IU]/ML
INJECTION, SOLUTION SUBCUTANEOUS
Qty: 20 PEN | Refills: 5 | Status: SHIPPED | OUTPATIENT
Start: 2020-10-07 | End: 2021-03-02 | Stop reason: SDUPTHER

## 2020-10-07 RX ORDER — EMPAGLIFLOZIN 25 MG/1
TABLET, FILM COATED ORAL
Qty: 90 TABLET | Refills: 1 | Status: SHIPPED | OUTPATIENT
Start: 2020-10-07 | End: 2020-12-16 | Stop reason: SDUPTHER

## 2020-10-13 NOTE — PROGRESS NOTES
Health Maintenance Due This Visit   Colonoscopy No   Mammogram No   Annual Wellness Visit No   Microalbumin No   HgbA1C No   Diabetic Eye Exam Yes    House Bill One Due This Visit   KITTY No   UDS No   Contract No

## 2020-10-15 ENCOUNTER — OFFICE VISIT (OUTPATIENT)
Dept: FAMILY MEDICINE CLINIC | Age: 60
End: 2020-10-15
Payer: MEDICARE

## 2020-10-15 VITALS
HEIGHT: 76 IN | TEMPERATURE: 96.4 F | RESPIRATION RATE: 18 BRPM | OXYGEN SATURATION: 95 % | BODY MASS INDEX: 38.36 KG/M2 | SYSTOLIC BLOOD PRESSURE: 120 MMHG | HEART RATE: 52 BPM | WEIGHT: 315 LBS | DIASTOLIC BLOOD PRESSURE: 60 MMHG

## 2020-10-15 PROCEDURE — 99214 OFFICE O/P EST MOD 30 MIN: CPT | Performed by: FAMILY MEDICINE

## 2020-10-15 PROCEDURE — 3052F HG A1C>EQUAL 8.0%<EQUAL 9.0%: CPT | Performed by: FAMILY MEDICINE

## 2020-10-15 PROCEDURE — G8482 FLU IMMUNIZE ORDER/ADMIN: HCPCS | Performed by: FAMILY MEDICINE

## 2020-10-15 PROCEDURE — G8417 CALC BMI ABV UP PARAM F/U: HCPCS | Performed by: FAMILY MEDICINE

## 2020-10-15 PROCEDURE — 3017F COLORECTAL CA SCREEN DOC REV: CPT | Performed by: FAMILY MEDICINE

## 2020-10-15 PROCEDURE — 2022F DILAT RTA XM EVC RTNOPTHY: CPT | Performed by: FAMILY MEDICINE

## 2020-10-15 PROCEDURE — 1036F TOBACCO NON-USER: CPT | Performed by: FAMILY MEDICINE

## 2020-10-15 PROCEDURE — 96372 THER/PROPH/DIAG INJ SC/IM: CPT | Performed by: FAMILY MEDICINE

## 2020-10-15 PROCEDURE — G8427 DOCREV CUR MEDS BY ELIG CLIN: HCPCS | Performed by: FAMILY MEDICINE

## 2020-10-15 PROCEDURE — 20610 DRAIN/INJ JOINT/BURSA W/O US: CPT | Performed by: FAMILY MEDICINE

## 2020-10-15 RX ORDER — LIDOCAINE HYDROCHLORIDE 10 MG/ML
1 INJECTION, SOLUTION INFILTRATION; PERINEURAL ONCE
Status: COMPLETED | OUTPATIENT
Start: 2020-10-15 | End: 2020-10-15

## 2020-10-15 RX ORDER — METHYLPREDNISOLONE ACETATE 40 MG/ML
40 INJECTION, SUSPENSION INTRA-ARTICULAR; INTRALESIONAL; INTRAMUSCULAR; SOFT TISSUE ONCE
Status: COMPLETED | OUTPATIENT
Start: 2020-10-15 | End: 2020-10-15

## 2020-10-15 RX ORDER — CYANOCOBALAMIN 1000 UG/ML
1000 INJECTION INTRAMUSCULAR; SUBCUTANEOUS ONCE
Status: COMPLETED | OUTPATIENT
Start: 2020-10-15 | End: 2020-10-15

## 2020-10-15 RX ADMIN — METHYLPREDNISOLONE ACETATE 40 MG: 40 INJECTION, SUSPENSION INTRA-ARTICULAR; INTRALESIONAL; INTRAMUSCULAR; SOFT TISSUE at 16:43

## 2020-10-15 RX ADMIN — CYANOCOBALAMIN 1000 MCG: 1000 INJECTION INTRAMUSCULAR; SUBCUTANEOUS at 16:39

## 2020-10-15 RX ADMIN — LIDOCAINE HYDROCHLORIDE 1 ML: 10 INJECTION, SOLUTION INFILTRATION; PERINEURAL at 16:44

## 2020-10-15 ASSESSMENT — ENCOUNTER SYMPTOMS
COLOR CHANGE: 1
BACK PAIN: 1

## 2020-10-15 NOTE — PROGRESS NOTES
Chief Complaint   Patient presents with    Toe Injury     wound check       Have you seen any other physician or provider since your last visit no    Have you had any other diagnostic tests since your last visit? no    Have you changed or stopped any medications since your last visit? no     Administrations This Visit     cyanocobalamin injection 1,000 mcg     Admin Date  10/15/2020  16:39 Action  Given Dose  1000 mcg Route  Intramuscular Site  Deltoid Left Administered By  Calvin Rios MA    Ordering Provider:  Livia Cervantes MD    NDC:  39233-142-70    Lot#:  [facility]    :  [address] [place] [address]    Patient Supplied?:  No                Patient tolerated injection well. Patient advised to wait 20 minutes in the office following the injection. No signs/symptoms of reaction noted after 20 minutes.

## 2020-10-15 NOTE — PROGRESS NOTES
SUBJECTIVE:    Patient ID: Deon Lund is a 56 y.o. male. Chief Complaint   Patient presents with    Toe Injury     wound check       HPI: office visit  He is here today to follow-up on his toe. He is going to go see wound care. He is doing a little better with his toe. He has been trying to wear good shoes. He is been trying to keep it clean and is trying to keep it dry. He is doing all the right things that he can think of. His medication does seem to be working relatively well. His blood sugars have been better. He is really been trying to watch his diet. Is not had any recent falls or injuries. He is struggling with a lot more arthritic and back pain. He is getting some relief from his medicines. His blood pressures have not been bad at home. He is complaining of some significant right shoulder pain. He says is been a while since I gave him an injection. That does typically help quite a bit. He is not had any significant injury other than trying to help lift and pull on his wife after her recovery from significant Covid 19. Review of Systems   Constitutional: Positive for fatigue. Musculoskeletal: Positive for arthralgias, back pain, gait problem and myalgias. Skin: Positive for color change and rash. Neurological: Positive for weakness. All other systems reviewed and are negative. OBJECTIVE:  /60   Pulse 52   Temp 96.4 °F (35.8 °C)   Resp 18   Ht 6' 4\" (1.93 m)   Wt (!) 410 lb (186 kg)   SpO2 95% Comment: ra  BMI 49.91 kg/m²    Wt Readings from Last 3 Encounters:   10/15/20 (!) 410 lb (186 kg)   10/06/20 (!) 421 lb (191 kg)   10/01/20 (!) 418 lb (189.6 kg)     BP Readings from Last 3 Encounters:   10/15/20 120/60   10/06/20 112/68   10/01/20 102/60      Pulse Readings from Last 3 Encounters:   10/15/20 52   10/06/20 84   10/01/20 64     Body mass index is 49.91 kg/m².    Resp Readings from Last 3 Encounters:   10/15/20 18   10/06/20 20   10/01/20 18 Past medical, surgical, family and social history were reviewed and updated with the patient. Physical Exam  Vitals signs and nursing note reviewed. Constitutional:       Appearance: He is well-developed. HENT:      Head: Normocephalic and atraumatic. Right Ear: Hearing and external ear normal.      Left Ear: Hearing and external ear normal.      Nose: Nose normal.      Mouth/Throat:      Pharynx: Uvula midline. Eyes:      General: Lids are normal.      Conjunctiva/sclera: Conjunctivae normal.      Pupils: Pupils are equal, round, and reactive to light. Neck:      Musculoskeletal: Normal range of motion and neck supple. Thyroid: No thyroid mass or thyromegaly. Vascular: No carotid bruit. Trachea: Trachea and phonation normal.   Cardiovascular:      Rate and Rhythm: Normal rate and regular rhythm. Pulses: Normal pulses. Heart sounds: Normal heart sounds, S1 normal and S2 normal. No murmur. No friction rub. No gallop. Pulmonary:      Effort: Pulmonary effort is normal.   Musculoskeletal:      Right shoulder: He exhibits decreased range of motion, tenderness, pain, spasm and decreased strength. Left shoulder: He exhibits decreased range of motion and tenderness. Left elbow: Tenderness found. Right knee: He exhibits decreased range of motion. Tenderness found. Left knee: He exhibits decreased range of motion. Tenderness found. Lumbar back: He exhibits decreased range of motion, tenderness, pain and spasm. Right upper leg: He exhibits tenderness. Skin:     General: Skin is warm and dry. Comments: Right great toe open area. There is a minimal amount of drainage. There is no redness there is no warmth. Neurological:      Mental Status: He is alert and oriented to person, place, and time. Psychiatric:         Attention and Perception: Attention and perception normal.         Mood and Affect: Affect normal. Mood is anxious. Speech: Speech normal.         Behavior: Behavior normal. Behavior is cooperative. Thought Content: Thought content normal.         Cognition and Memory: Cognition and memory normal.          No results found for requested labs within last 30 days. Hemoglobin A1C (%)   Date Value   09/17/2020 8.1 (H)     Microscopic Examination (no units)   Date Value   12/28/2017 Not Indicated     Microalbumin, Random Urine (ug/mL)   Date Value   09/17/2020 76.2     LDL Calculated (mg/dL)   Date Value   12/17/2019 72       Lab Results   Component Value Date    WBC 6.6 08/25/2020    NEUTROABS 4.7 02/21/2018    HGB 12.6 08/25/2020    HCT 39.3 08/25/2020    HCT 41.6 06/05/2012    MCV 94 08/25/2020     08/25/2020     06/05/2012     Lab Results   Component Value Date    TSH 2.12 05/18/2018       ASSESSMENT:    Diagnosis Orders   1. Diabetic ulcer of toe of right foot associated with type 2 diabetes mellitus, unspecified ulcer stage ([facility] [address]     2. Chronic right shoulder pain  [address] - [place] DRAIN/INJECT LARGE JOINT/BURSA   3. Essential hypertension     4. Type 2 diabetes mellitus with diabetic polyneuropathy, with long-term current use of insulin ([address]     5. Osteoarthritis of multiple joints, unspecified osteoarthritis type     6. Chronic bilateral low back pain with right-sided sciatica          PLAN:  Orders Placed This Encounter   Medications    methylPREDNISolone acetate (DEPO-MEDROL) injection 40 mg    lidocaine 1 % injection 1 mL    cyanocobalamin injection 1,000 mcg    Steroid injection was performed by Jhonathan Paul MD using Plain Lidocaine 1% 1ml and Depo-Medrol 40mg 1ml Intra-Articular in the right shoulder No complications or reactions noted. Patient tolerated procedure well. Medications Discontinued During This Encounter   Medication Reason    cyanocobalamin injection 1,000 mcg        Controlled Substances Monitoring:      Please note: This chart was generated using Dragon dictation software. Although every effort was made to ensure the accuracy of this automated transcription, some errors in transcription may have occurred. Adequate: hears normal conversation without difficulty

## 2020-10-27 RX ORDER — PROMETHAZINE HYDROCHLORIDE 25 MG/1
TABLET ORAL
Qty: 30 TABLET | Refills: 0 | Status: SHIPPED | OUTPATIENT
Start: 2020-10-27 | End: 2020-12-16 | Stop reason: SDUPTHER

## 2020-11-18 ASSESSMENT — ENCOUNTER SYMPTOMS
BACK PAIN: 1
COLOR CHANGE: 1

## 2020-11-19 ENCOUNTER — VIRTUAL VISIT (OUTPATIENT)
Dept: FAMILY MEDICINE CLINIC | Age: 60
End: 2020-11-19
Payer: MEDICARE

## 2020-11-19 PROCEDURE — G2025 DIS SITE TELE SVCS RHC/FQHC: HCPCS | Performed by: FAMILY MEDICINE

## 2020-11-19 RX ORDER — HYDROCODONE BITARTRATE AND ACETAMINOPHEN 10; 325 MG/1; MG/1
TABLET ORAL
Qty: 150 TABLET | Refills: 0 | Status: SHIPPED | OUTPATIENT
Start: 2020-11-19 | End: 2020-12-16 | Stop reason: SDUPTHER

## 2020-11-19 RX ORDER — OXYCODONE HYDROCHLORIDE 30 MG/1
30 TABLET ORAL EVERY 4 HOURS PRN
Qty: 150 TABLET | Refills: 0 | Status: SHIPPED | OUTPATIENT
Start: 2020-11-19 | End: 2020-12-16 | Stop reason: SDUPTHER

## 2020-11-19 ASSESSMENT — PATIENT HEALTH QUESTIONNAIRE - PHQ9
SUM OF ALL RESPONSES TO PHQ9 QUESTIONS 1 & 2: 0
SUM OF ALL RESPONSES TO PHQ QUESTIONS 1-9: 0
2. FEELING DOWN, DEPRESSED OR HOPELESS: 0
SUM OF ALL RESPONSES TO PHQ QUESTIONS 1-9: 0
SUM OF ALL RESPONSES TO PHQ QUESTIONS 1-9: 0
1. LITTLE INTEREST OR PLEASURE IN DOING THINGS: 0

## 2020-11-19 ASSESSMENT — ENCOUNTER SYMPTOMS
COLOR CHANGE: 1
BACK PAIN: 1

## 2020-11-19 NOTE — PROGRESS NOTES
Chief Complaint   Patient presents with    Wound Check     Left Great Toe    Diabetes    Back Pain       Have you seen any other physician or provider since your last visit no    Have you had any other diagnostic tests since your last visit? no    Have you changed or stopped any medications since your last visit? no     Patient stated he has his Cologuard Kit and will complete and send back in     Diabetic retinal exam completed this year?  Yes and Records requested from Dr. Stewart Quiroz                       * If yes please have patient sign a records release to obtain record to update Health Maintenance                       * If no, please order referral for patient to be scheduled

## 2020-11-19 NOTE — PROGRESS NOTES
SUBJECTIVE:    Patient ID: Juan Moody is a 56 y.o. male. Chief Complaint   Patient presents with    Wound Check     left great toe     Dressing Change       HPI: office visit  He is here today to follow-up on that left great toe lesion. He does feel like it is a little bit better. He is not having any significant drainage. Is not had any red noticed warmth or swelling. He is struggling with his fatigue. He still try to take care of his wife. His blood sugars have been up and down. He is not had any high blood pressures. He denies any chest pain or shortness of breath. He does have an appointment with wound care soon    Review of Systems   Constitutional: Positive for fatigue. Musculoskeletal: Positive for arthralgias, back pain, gait problem and myalgias. Skin: Positive for color change and rash. Neurological: Positive for weakness. All other systems reviewed and are negative. OBJECTIVE:  /68   Pulse 84   Resp 20   Ht 6' 4\" (1.93 m)   Wt (!) 421 lb (191 kg)   SpO2 98% Comment: room air  BMI 51.25 kg/m²    Wt Readings from Last 3 Encounters:   10/15/20 (!) 410 lb (186 kg)   10/06/20 (!) 421 lb (191 kg)   10/01/20 (!) 418 lb (189.6 kg)     BP Readings from Last 3 Encounters:   10/15/20 120/60   10/06/20 112/68   10/01/20 102/60      Pulse Readings from Last 3 Encounters:   10/15/20 52   10/06/20 84   10/01/20 64     Body mass index is 51.25 kg/m². Resp Readings from Last 3 Encounters:   10/15/20 18   10/06/20 20   10/01/20 18     Past medical, surgical, family and social history were reviewed and updated with the patient. Physical Exam  Vitals signs and nursing note reviewed. Constitutional:       Appearance: He is well-developed. HENT:      Head: Normocephalic and atraumatic. Right Ear: Hearing and external ear normal.      Left Ear: Hearing and external ear normal.      Nose: Nose normal.      Mouth/Throat:      Pharynx: Uvula midline.    Eyes: General: Lids are normal.      Conjunctiva/sclera: Conjunctivae normal.      Pupils: Pupils are equal, round, and reactive to light. Neck:      Musculoskeletal: Normal range of motion and neck supple. Thyroid: No thyroid mass or thyromegaly. Vascular: No carotid bruit. Trachea: Trachea and phonation normal.   Cardiovascular:      Rate and Rhythm: Normal rate and regular rhythm. Pulses: Normal pulses. Heart sounds: Normal heart sounds, S1 normal and S2 normal. No murmur. No friction rub. No gallop. Pulmonary:      Effort: Pulmonary effort is normal.   Musculoskeletal:      Right shoulder: He exhibits decreased range of motion, tenderness, pain, spasm and decreased strength. Left shoulder: He exhibits decreased range of motion and tenderness. Left elbow: Tenderness found. Right knee: He exhibits decreased range of motion. Tenderness found. Left knee: He exhibits decreased range of motion. Tenderness found. Lumbar back: He exhibits decreased range of motion, tenderness, pain and spasm. Right upper leg: He exhibits tenderness. Skin:     General: Skin is warm and dry. Comments: Right great toe open area. There is a minimal amount of drainage. There is no redness there is no warmth. Neurological:      Mental Status: He is alert and oriented to person, place, and time. Psychiatric:         Attention and Perception: Attention and perception normal.         Mood and Affect: Affect normal. Mood is anxious. Speech: Speech normal.         Behavior: Behavior normal. Behavior is cooperative. Thought Content: Thought content normal.         Cognition and Memory: Cognition and memory normal.          No results found for requested labs within last 30 days.      Hemoglobin A1C (%)   Date Value   09/17/2020 8.1 (H)     Microscopic Examination (no units)   Date Value   12/28/2017 Not Indicated     Microalbumin, Random Urine (ug/mL)   Date Value

## 2020-11-19 NOTE — PROGRESS NOTES
SUBJECTIVE:    Patient ID: Ilia Ruiz is a 56 y.o. male. Chief Complaint   Patient presents with    Wound Check     Left Great Toe    Diabetes    Back Pain       HPI: Ilia Ruiz is a 56 y.o. male evaluated via telephone on 11/19/2020. Consent:  He and/or health care decision maker is aware that that he may receive a bill for this telephone service, depending on his insurance coverage, and has provided verbal consent to proceed: yes      Documentation:  I communicated with the patient and/or health care decision maker about . His diabetic foot ulcer, his blood sugars, his legs and back pain  Details of this discussion including any medical advice provided: to him      I affirm this is a Patient Initiated Episode with a Patient who has not had a related appointment within my department in the past 7 days or scheduled within the next 24 hours. Patient identification was verified at the start of the visit: yes    Total Time: 12 minutes    Note: not billable if this call serves to triage the patient into an appointment for the relevant concern      Krista Ponce   He is doing some better with his foot ulcer. He is seeing podiatry. He has seen cardiology who seems to think he is doing ok. He is having good blood pressures and blood sugars. He is having some good results with his medication. He is having some increase in his arthritic pain with the cold and damp weather. He has not had any new issues. Review of Systems   Constitutional: Positive for fatigue. Musculoskeletal: Positive for arthralgias, back pain, gait problem and myalgias. Skin: Positive for color change and rash. Neurological: Positive for weakness. All other systems reviewed and are negative. OBJECTIVE:  There were no vitals taken for this visit.    Wt Readings from Last 3 Encounters:   10/15/20 (!) 410 lb (186 kg)   10/06/20 (!) 421 lb (191 kg)   10/01/20 (!) 418 lb (189.6 kg)     BP Readings from Last 3 Encounters:   10/15/20 120/60   10/06/20 112/68   10/01/20 102/60      Pulse Readings from Last 3 Encounters:   10/15/20 52   10/06/20 84   10/01/20 64     There is no height or weight on file to calculate BMI. Resp Readings from Last 3 Encounters:   10/15/20 18   10/06/20 20   10/01/20 18     Past medical, surgical, family and social history were reviewed and updated with the patient. Physical Exam  Neurological:      Mental Status: He is alert and oriented to person, place, and time. Mental status is at baseline. Psychiatric:         Attention and Perception: Attention and perception normal.         Mood and Affect: Mood and affect normal.         Speech: Speech normal.         Behavior: Behavior normal. Behavior is cooperative. Thought Content: Thought content normal.         Cognition and Memory: Cognition and memory normal.         Judgment: Judgment normal.          No results found for requested labs within last 30 days. Hemoglobin A1C (%)   Date Value   09/17/2020 8.1 (H)     Microscopic Examination (no units)   Date Value   12/28/2017 Not Indicated     Microalbumin, Random Urine (ug/mL)   Date Value   09/17/2020 76.2     LDL Calculated (mg/dL)   Date Value   12/17/2019 72       Lab Results   Component Value Date    WBC 6.6 08/25/2020    NEUTROABS 4.7 02/21/2018    HGB 12.6 08/25/2020    HCT 39.3 08/25/2020    HCT 41.6 06/05/2012    MCV 94 08/25/2020     08/25/2020     06/05/2012     Lab Results   Component Value Date    TSH 2.12 05/18/2018       ASSESSMENT:    Diagnosis Orders   1. Diabetic ulcer of toe of right foot associated with type 2 diabetes mellitus, unspecified ulcer stage ([facility] [address]     2. Chronic bilateral low back pain with right-sided sciatica  oxyCODONE (OXY-IR) 30 MG immediate release tablet    HYDROcodone-acetaminophen (NORCO)  MG per tablet   3. Type 2 diabetes mellitus with other specified complication, with long-term current use of insulin ([facility] [address]     4. Essential hypertension     5. Osteoarthritis of multiple joints, unspecified osteoarthritis type     6. Morbid obesity ([facility] [address]          PLAN:  Orders Placed This Encounter   Medications    oxyCODONE (OXY-IR) 30 MG immediate release tablet     Sig: Take 1 tablet by mouth every 4 hours as needed for Pain for up to 30 days. Dispense:  150 tablet     Refill:  0     Reduce doses taken as pain becomes manageable    HYDROcodone-acetaminophen (NORCO)  MG per tablet     Sig: TAKE ONE TABLET BY MOUTH EVERY 4 HOURS AS NEEDED FOR BREAKTHROUGH PAIN     Dispense:  150 tablet     Refill:  0     Reduce doses taken as pain becomes manageable        Medications Discontinued During This Encounter   Medication Reason    oxyCODONE (OXY-IR) 30 MG immediate release tablet REORDER    HYDROcodone-acetaminophen (NORCO)  MG per tablet REORDER       Controlled Substances Monitoring:      Please note: This chart was generated using Dragon dictation software. Although every effort was made to ensure the accuracy of this automated transcription, some errors in transcription may have occurred.

## 2020-11-30 NOTE — TELEPHONE ENCOUNTER
Refill request received from pharmacy.  Medication pending for approval.       Last Office Visit With PCP:  11/19/2020    Next Visit Date:  Future Appointments   Date Time Provider [place]   12/17/2020  2:15 PM Yuly Chamberlain MD [address] [place] [address]

## 2020-12-01 RX ORDER — CARISOPRODOL 350 MG/1
TABLET ORAL
Qty: 120 TABLET | Refills: 2 | Status: SHIPPED | OUTPATIENT
Start: 2020-12-01 | End: 2020-12-16 | Stop reason: SDUPTHER

## 2020-12-16 ENCOUNTER — VIRTUAL VISIT (OUTPATIENT)
Dept: FAMILY MEDICINE CLINIC | Age: 60
End: 2020-12-16
Payer: MEDICARE

## 2020-12-16 ENCOUNTER — HOSPITAL ENCOUNTER (OUTPATIENT)
Facility: HOSPITAL | Age: 60
Discharge: HOME | End: 2020-12-16
Payer: MEDICARE

## 2020-12-16 PROCEDURE — 36415 COLL VENOUS BLD VENIPUNCTURE: CPT

## 2020-12-16 PROCEDURE — 2022F DILAT RTA XM EVC RTNOPTHY: CPT | Performed by: FAMILY MEDICINE

## 2020-12-16 PROCEDURE — 3017F COLORECTAL CA SCREEN DOC REV: CPT | Performed by: FAMILY MEDICINE

## 2020-12-16 PROCEDURE — G8482 FLU IMMUNIZE ORDER/ADMIN: HCPCS | Performed by: FAMILY MEDICINE

## 2020-12-16 PROCEDURE — G8427 DOCREV CUR MEDS BY ELIG CLIN: HCPCS | Performed by: FAMILY MEDICINE

## 2020-12-16 PROCEDURE — 96372 THER/PROPH/DIAG INJ SC/IM: CPT | Performed by: FAMILY MEDICINE

## 2020-12-16 PROCEDURE — G8417 CALC BMI ABV UP PARAM F/U: HCPCS | Performed by: FAMILY MEDICINE

## 2020-12-16 PROCEDURE — 1036F TOBACCO NON-USER: CPT | Performed by: FAMILY MEDICINE

## 2020-12-16 PROCEDURE — 3052F HG A1C>EQUAL 8.0%<EQUAL 9.0%: CPT | Performed by: FAMILY MEDICINE

## 2020-12-16 PROCEDURE — 99213 OFFICE O/P EST LOW 20 MIN: CPT | Performed by: FAMILY MEDICINE

## 2020-12-16 RX ORDER — METHYLPREDNISOLONE ACETATE 40 MG/ML
40 INJECTION, SUSPENSION INTRA-ARTICULAR; INTRALESIONAL; INTRAMUSCULAR; SOFT TISSUE ONCE
Status: COMPLETED | OUTPATIENT
Start: 2020-12-16 | End: 2020-12-16

## 2020-12-16 RX ORDER — DIAZEPAM 5 MG/1
5 TABLET ORAL EVERY 8 HOURS PRN
Qty: 15 TABLET | Refills: 0 | Status: SHIPPED | OUTPATIENT
Start: 2020-12-16 | End: 2020-12-26

## 2020-12-16 RX ORDER — CYANOCOBALAMIN 1000 UG/ML
1000 INJECTION INTRAMUSCULAR; SUBCUTANEOUS ONCE
Status: COMPLETED | OUTPATIENT
Start: 2020-12-16 | End: 2020-12-16

## 2020-12-16 RX ORDER — OXYCODONE HYDROCHLORIDE 30 MG/1
30 TABLET ORAL EVERY 4 HOURS PRN
Qty: 150 TABLET | Refills: 0 | Status: SHIPPED | OUTPATIENT
Start: 2020-12-16 | End: 2021-02-17 | Stop reason: SDUPTHER

## 2020-12-16 RX ORDER — OXYCODONE HYDROCHLORIDE 30 MG/1
30 TABLET ORAL EVERY 4 HOURS PRN
Qty: 150 TABLET | Refills: 0 | Status: SHIPPED | OUTPATIENT
Start: 2020-12-16 | End: 2020-12-16 | Stop reason: SDUPTHER

## 2020-12-16 RX ORDER — CARISOPRODOL 350 MG/1
TABLET ORAL
Qty: 120 TABLET | Refills: 2 | Status: SHIPPED | OUTPATIENT
Start: 2020-12-16 | End: 2021-02-14

## 2020-12-16 RX ORDER — MONTELUKAST SODIUM 10 MG/1
TABLET ORAL
Qty: 90 TABLET | Refills: 3 | Status: SHIPPED | OUTPATIENT
Start: 2020-12-16 | End: 2021-03-02 | Stop reason: SDUPTHER

## 2020-12-16 RX ORDER — HYDROCODONE BITARTRATE AND ACETAMINOPHEN 10; 325 MG/1; MG/1
TABLET ORAL
Qty: 150 TABLET | Refills: 0 | Status: SHIPPED | OUTPATIENT
Start: 2020-12-16 | End: 2020-12-16 | Stop reason: SDUPTHER

## 2020-12-16 RX ORDER — LIDOCAINE HYDROCHLORIDE 10 MG/ML
20 INJECTION, SOLUTION INFILTRATION; PERINEURAL ONCE
Status: COMPLETED | OUTPATIENT
Start: 2020-12-16 | End: 2020-12-16

## 2020-12-16 RX ORDER — GABAPENTIN 800 MG/1
TABLET ORAL
Qty: 120 TABLET | Refills: 2 | Status: SHIPPED | OUTPATIENT
Start: 2020-12-16 | End: 2021-03-02 | Stop reason: SDUPTHER

## 2020-12-16 RX ORDER — SIMVASTATIN 40 MG
TABLET ORAL
Qty: 90 TABLET | Refills: 3 | Status: SHIPPED | OUTPATIENT
Start: 2020-12-16 | End: 2021-03-02 | Stop reason: SDUPTHER

## 2020-12-16 RX ORDER — PROMETHAZINE HYDROCHLORIDE 25 MG/1
TABLET ORAL
Qty: 30 TABLET | Refills: 0 | Status: SHIPPED | OUTPATIENT
Start: 2020-12-16 | End: 2021-01-20

## 2020-12-16 RX ORDER — PANTOPRAZOLE SODIUM 40 MG/1
40 TABLET, DELAYED RELEASE ORAL
Qty: 90 TABLET | Refills: 3 | Status: SHIPPED | OUTPATIENT
Start: 2020-12-16 | End: 2021-03-02 | Stop reason: SDUPTHER

## 2020-12-16 RX ORDER — EMPAGLIFLOZIN 25 MG/1
TABLET, FILM COATED ORAL
Qty: 90 TABLET | Refills: 1 | Status: SHIPPED | OUTPATIENT
Start: 2020-12-16 | End: 2021-03-02 | Stop reason: SDUPTHER

## 2020-12-16 RX ORDER — HYDROCODONE BITARTRATE AND ACETAMINOPHEN 10; 325 MG/1; MG/1
TABLET ORAL
Qty: 150 TABLET | Refills: 0 | Status: SHIPPED | OUTPATIENT
Start: 2020-12-16 | End: 2021-02-17 | Stop reason: SDUPTHER

## 2020-12-16 RX ADMIN — CYANOCOBALAMIN 1000 MCG: 1000 INJECTION INTRAMUSCULAR; SUBCUTANEOUS at 16:27

## 2020-12-16 RX ADMIN — METHYLPREDNISOLONE ACETATE 40 MG: 40 INJECTION, SUSPENSION INTRA-ARTICULAR; INTRALESIONAL; INTRAMUSCULAR; SOFT TISSUE at 16:31

## 2020-12-16 RX ADMIN — LIDOCAINE HYDROCHLORIDE 20 ML: 10 INJECTION, SOLUTION INFILTRATION; PERINEURAL at 16:29

## 2020-12-16 ASSESSMENT — ENCOUNTER SYMPTOMS
COLOR CHANGE: 1
BACK PAIN: 1

## 2020-12-16 ASSESSMENT — PATIENT HEALTH QUESTIONNAIRE - PHQ9
SUM OF ALL RESPONSES TO PHQ QUESTIONS 1-9: 2
2. FEELING DOWN, DEPRESSED OR HOPELESS: 1
SUM OF ALL RESPONSES TO PHQ QUESTIONS 1-9: 2
SUM OF ALL RESPONSES TO PHQ QUESTIONS 1-9: 2
SUM OF ALL RESPONSES TO PHQ9 QUESTIONS 1 & 2: 2
1. LITTLE INTEREST OR PLEASURE IN DOING THINGS: 1

## 2020-12-16 NOTE — PROGRESS NOTES
SUBJECTIVE:    Patient ID: Dorsie Paget is a 56 y.o. male. Chief Complaint   Patient presents with    Diabetes    Anxiety    Otalgia    Cough           Office visit  He is really struggling with the mental issues due to the covid . He is feeling anxious more than ever. He is emotional.  He says he is tearful at times. He is really struggling trying to take care of his wife. He is complaining of a significant amount of shoulder pain. He says he is having difficulty sleeping due to that. He says even with the pain medicine he can get relief. He is having still some cough. He still having some ear pain. He still had some congestion. He has not had any further fevers. He says he just still does not feel good. He says his blood sugars have not been too awful bad. His blood pressures have been pretty good when he is checked it. He has not had any recent falls or injuries though he does still struggle a lot with his back. He says he is getting some medication relief. Review of Systems   Constitutional: Positive for fatigue. Musculoskeletal: Positive for arthralgias, back pain, gait problem and myalgias. Skin: Positive for color change and rash. Neurological: Positive for weakness. All other systems reviewed and are negative. OBJECTIVE:  There were no vitals taken for this visit. Wt Readings from Last 3 Encounters:   10/15/20 (!) 410 lb (186 kg)   10/06/20 (!) 421 lb (191 kg)   10/01/20 (!) 418 lb (189.6 kg)     BP Readings from Last 3 Encounters:   10/15/20 120/60   10/06/20 112/68   10/01/20 102/60      Pulse Readings from Last 3 Encounters:   10/15/20 52   10/06/20 84   10/01/20 64     There is no height or weight on file to calculate BMI. Resp Readings from Last 3 Encounters:   10/15/20 18   10/06/20 20   10/01/20 18     Past medical, surgical, family and social history were reviewed and updated with the patient.      Physical Exam  Neurological: Mental Status: He is alert and oriented to person, place, and time. Mental status is at baseline. Psychiatric:         Attention and Perception: Attention and perception normal.         Mood and Affect: Mood and affect normal.         Speech: Speech normal.         Behavior: Behavior normal. Behavior is cooperative. Thought Content:  Thought content normal.         Cognition and Memory: Cognition and memory normal.         Judgment: Judgment normal.          Results in Past 30 Days  Result Component Current Result Ref Range Previous Result Ref Range   Alb 4.1 (12/16/2020) 3.8 - 4.8 g/dL Not in Time Range    Albumin/Globulin Ratio 1.3 (12/16/2020) 1.2 - 2.2 Not in Time Range    Alkaline Phosphatase 71 (12/16/2020) 39 - 117 IU/L Not in Time Range    ALT 25 (12/16/2020) 0 - 44 IU/L Not in Time Range    AST 25 (12/16/2020) 0 - 40 IU/L Not in Time Range    BUN 10 (12/16/2020) 8 - 27 mg/dL Not in Time Range    Calcium 9.1 (12/16/2020) 8.6 - 10.2 mg/dL Not in Time Range    Chloride 106 (12/16/2020) 96 - 106 mmol/L Not in Time Range    CO2 23 (12/16/2020) 20 - 29 mmol/L Not in Time Range    CREATININE 0.80 (12/16/2020) 0.76 - 1.27 mg/dL Not in Time Range    GFR  111 (12/16/2020) >59 mL/min/1.73 Not in Time Range    GFR Non- 96 (12/16/2020) >59 mL/min/1.73 Not in Time Range    Globulin 3.2 (12/16/2020) 1.5 - 4.5 g/dL Not in Time Range    Glucose 243 (H) (12/16/2020) 65 - 99 mg/dL Not in Time Range    Potassium 4.6 (12/16/2020) 3.5 - 5.2 mmol/L Not in Time Range    Sodium 142 (12/16/2020) 134 - 144 mmol/L Not in Time Range    Total Bilirubin 0.2 (12/16/2020) 0.0 - 1.2 mg/dL Not in Time Range    Total Protein 7.3 (12/16/2020) 6.0 - 8.5 g/dL Not in Time Range      Hemoglobin A1C (%)   Date Value   12/16/2020 8.6 (H)     Microscopic Examination (no units)   Date Value   12/28/2017 Not Indicated     Microalbumin, Random Urine (ug/mL)   Date Value   09/17/2020 76.2 LDL Calculated (mg/dL)   Date Value   12/17/2019 72       Lab Results   Component Value Date    WBC 5.7 12/16/2020    NEUTROABS 4.7 02/21/2018    HGB 14.3 12/16/2020    HCT 44.6 12/16/2020    HCT 41.6 06/05/2012    MCV 91 12/16/2020     12/16/2020     06/05/2012     Lab Results   Component Value Date    TSH 2.12 05/18/2018       ASSESSMENT:    Diagnosis Orders   1. Anxiety  diazePAM (VALIUM) 5 MG tablet   2. Chronic bilateral low back pain with right-sided sciatica  gabapentin (NEURONTIN) 800 MG tablet    oxyCODONE (OXY-IR) 30 MG immediate release tablet    HYDROcodone-acetaminophen (NORCO)  MG per tablet    DISCONTINUED: oxyCODONE (OXY-IR) 30 MG immediate release tablet    DISCONTINUED: HYDROcodone-acetaminophen (NORCO)  MG per tablet   3. Chronic right shoulder pain  carisoprodol (SOMA) 350 MG tablet    methylPREDNISolone acetate (DEPO-MEDROL) injection 40 mg    lidocaine 1 % injection 20 mL   4. Essential hypertension     5. Type 2 diabetes mellitus with other specified complication, with long-term current use of insulin ([facility] [address]     6. Osteoarthritis of multiple joints, unspecified osteoarthritis type     7. B12 deficiency     8. PTSD (post-traumatic stress disorder)          PLAN:  Orders Placed This Encounter   Medications    DISCONTD: oxyCODONE (OXY-IR) 30 MG immediate release tablet     Sig: Take 1 tablet by mouth every 4 hours as needed for Pain for up to 30 days.      Dispense:  150 tablet     Refill:  0     Reduce doses taken as pain becomes manageable    DISCONTD: HYDROcodone-acetaminophen (NORCO)  MG per tablet     Sig: TAKE ONE TABLET BY MOUTH EVERY 4 HOURS AS NEEDED FOR BREAKTHROUGH PAIN     Dispense:  150 tablet     Refill:  0     Reduce doses taken as pain becomes manageable    empagliflozin (JARDIANCE) 25 MG tablet     Sig: TAKE ONE TABLET BY MOUTH DAILY     Dispense:  90 tablet     Refill:  1     [address] plan    gabapentin (NEURONTIN) 800 MG tablet Sig: Take one tablet by mouth four times a day     Dispense:  120 tablet     Refill:  2    pantoprazole (PROTONIX) 40 MG tablet     Sig: Take 1 tablet by mouth daily (with breakfast) In place of omeprazole     Dispense:  90 tablet     Refill:  3    montelukast (SINGULAIR) 10 MG tablet     Sig: TAKE ONE TABLET BY MOUTH NIGHTLY AS NEEDED FOR ALLERGIES     Dispense:  90 tablet     Refill:  3    simvastatin (ZOCOR) 40 MG tablet     Sig: TAKE ONE TABLET BY MOUTH AT BEDTIME FOR CHOLESTEROL     Dispense:  90 tablet     Refill:  3    Insulin Pen Needle 30G X 8 MM MISC     Si each by Does not apply route 2 times daily DX:  E11.9     Dispense:  100 each     Refill:  5    carisoprodol (SOMA) 350 MG tablet     Sig: TAKE ONE TABLET BY MOUTH FOUR TIMES A DAY AS NEEDED FOR MUSCLE SPASM     Dispense:  120 tablet     Refill:  2    promethazine (PHENERGAN) 25 MG tablet     Sig: Take one tablet by mouth every 6 hours as needed for nausea and vomiting     Dispense:  30 tablet     Refill:  0    oxyCODONE (OXY-IR) 30 MG immediate release tablet     Sig: Take 1 tablet by mouth every 4 hours as needed for Pain for up to 30 days. Dispense:  150 tablet     Refill:  0     Reduce doses taken as pain becomes manageable    HYDROcodone-acetaminophen (NORCO)  MG per tablet     Sig: TAKE ONE TABLET BY MOUTH EVERY 4 HOURS AS NEEDED FOR BREAKTHROUGH PAIN     Dispense:  150 tablet     Refill:  0     Reduce doses taken as pain becomes manageable    cyanocobalamin injection 1,000 mcg    diazePAM (VALIUM) 5 MG tablet     Sig: Take 1 tablet by mouth every 8 hours as needed for Anxiety or Sleep for up to 10 days.      Dispense:  15 tablet     Refill:  0    methylPREDNISolone acetate (DEPO-MEDROL) injection 40 mg    lidocaine 1 % injection 20 mL Steroid injection was performed by Graeme Medley MD using Plain Lidocaine 1% 1ml and Depo-Medrol 40mg 1ml Intra-Articular in the right shoulder. No complications or reactions noted. Patient tolerated procedure well. Medications Discontinued During This Encounter   Medication Reason    pantoprazole (PROTONIX) 40 MG tablet REORDER    montelukast (SINGULAIR) 10 MG tablet REORDER    simvastatin (ZOCOR) 40 MG tablet REORDER    Insulin Pen Needle 30G X 8 MM MISC REORDER    gabapentin (NEURONTIN) 800 MG tablet REORDER    empagliflozin (JARDIANCE) 25 MG tablet REORDER    promethazine (PHENERGAN) 25 MG tablet REORDER    oxyCODONE (OXY-IR) 30 MG immediate release tablet REORDER    HYDROcodone-acetaminophen (NORCO)  MG per tablet REORDER    carisoprodol (SOMA) 350 MG tablet REORDER    oxyCODONE (OXY-IR) 30 MG immediate release tablet REORDER    HYDROcodone-acetaminophen (NORCO)  MG per tablet REORDER    diazePAM (VALIUM) 5 MG tablet REORDER       Controlled Substances Monitoring:      Please note: This chart was generated using Dragon dictation software. Although every effort was made to ensure the accuracy of this automated transcription, some errors in transcription may have occurred.

## 2020-12-16 NOTE — PATIENT INSTRUCTIONS
We are committed to providing you with the best care possible. In order to help us achieve these goals please remember to bring all medications, herbal products, and over the counter supplements with you to each visit. If your provider has ordered testing for you, please be sure to follow up with our office if you have not received results within 7 days after the testing took place. *If you receive a survey after visiting one of our offices, please take time to share your experience concerning your physician office visit. These surveys are confidential and no health information about you is shared. We are eager to improve for you and we are counting on your feedback to help make that happen. · Keep a list of your medicines with you. List all of the prescription medicines, nonprescription medicines, supplements, natural remedies, and vitamins that you take. Tell your healthcare providers who treat you about all of the products you are taking. Your provider can provide you with a form to keep track of them. Just ask. · Follow the directions that come with your medicine, including information about food or alcohol. Make sure you know how and when to take your medicine. Do not take more or less than you are supposed to take. · Keep all medicines out of the reach of children. · Store medicines according to the directions on the label. · Monitor yourself. Learn to know how your body reacts to your new medicine and keep track of how it makes you feel before attempting (If your provider has allowed you to do so) to drive or go to work. · Seek emergency medical attention if you think you have used too much of this medicine. An overdose of any prescription medicine can be fatal. Overdose symptoms may include extreme drowsiness, muscle weakness, confusion, cold and clammy skin, pinpoint pupils, shallow breathing, slow heart rate, fainting, or coma. · Don't share prescription medicines with others, even when they seem to have the same symptoms. What may be good for you may be harmful to others. · If you are no longer taking a prescribed medication and you have pills left please take your pills out of their original containers. Mix crushed pills with an undesirable substance, such as cat litter or used coffee grounds. Put the mixture into a disposable container with a lid, such as an empty margarine tub, or into a sealable bag. Cover up or remove any of your personal information on the empty containers by covering it with black permanent marker or duct tape. Place the sealed container with the mixture, and the empty drug containers, in the trash. · If you use a medication that is in the form of a patch, dispose of used patches by folding them in half so that the sticky sides meet, and then flushing them down a toilet. They should not be placed in the household trash where children or pets can find them. · If you have any questions, ask your provider or pharmacist for more information. · Be sure to keep all appointments for provider visits or tests.   ·

## 2020-12-16 NOTE — PROGRESS NOTES
Chief Complaint   Patient presents with    Diabetes    Anxiety       Have you seen any other physician or provider since your last visit no    Have you had any other diagnostic tests since your last visit? no    Have you changed or stopped any medications since your last visit? no

## 2020-12-21 ENCOUNTER — TELEPHONE (OUTPATIENT)
Dept: FAMILY MEDICINE CLINIC | Age: 60
End: 2020-12-21

## 2020-12-21 RX ORDER — BENZONATATE 200 MG/1
200 CAPSULE ORAL 3 TIMES DAILY PRN
Qty: 30 CAPSULE | Refills: 0 | Status: SHIPPED | OUTPATIENT
Start: 2020-12-21 | End: 2020-12-28

## 2020-12-30 ENCOUNTER — TELEPHONE (OUTPATIENT)
Dept: FAMILY MEDICINE CLINIC | Age: 60
End: 2020-12-30

## 2020-12-30 RX ORDER — BENZONATATE 200 MG/1
200 CAPSULE ORAL 3 TIMES DAILY PRN
Qty: 30 CAPSULE | Refills: 0 | Status: SHIPPED | OUTPATIENT
Start: 2020-12-30 | End: 2021-01-06
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
## 2021-01-14 RX ORDER — FLUCONAZOLE 100 MG/1
100 TABLET ORAL DAILY
Qty: 7 TABLET | Refills: 0 | Status: SHIPPED | OUTPATIENT
Start: 2021-01-14 | End: 2021-01-21

## 2021-01-20 ENCOUNTER — TELEPHONE (OUTPATIENT)
Dept: FAMILY MEDICINE CLINIC | Age: 61
End: 2021-01-20

## 2021-01-20 RX ORDER — PROMETHAZINE HYDROCHLORIDE 25 MG/1
TABLET ORAL
Qty: 30 TABLET | Refills: 0 | Status: SHIPPED | OUTPATIENT
Start: 2021-01-20 | End: 2021-02-17

## 2021-01-20 RX ORDER — LEVOFLOXACIN 500 MG/1
500 TABLET, FILM COATED ORAL DAILY
Qty: 10 TABLET | Refills: 0 | Status: SHIPPED | OUTPATIENT
Start: 2021-01-20 | End: 2021-01-30

## 2021-01-20 RX ORDER — FERROUS SULFATE 325(65) MG
TABLET ORAL
Qty: 90 TABLET | Refills: 5 | Status: SHIPPED | OUTPATIENT
Start: 2021-01-20 | End: 2021-03-02 | Stop reason: SDUPTHER

## 2021-01-20 NOTE — TELEPHONE ENCOUNTER
Refill request received from pharmacy.  Medication pending for approval.       Last Office Visit With PCP:  12/16/2020    Next Visit Date:  Future Appointments   Date Time Provider [place]   3/16/2021 10:00 AM Tim Dent MD [address]

## 2021-02-17 ENCOUNTER — VIRTUAL VISIT (OUTPATIENT)
Dept: FAMILY MEDICINE CLINIC | Age: 61
End: 2021-02-17
Payer: MEDICARE

## 2021-02-17 DIAGNOSIS — F41.9 ANXIETY: ICD-10-CM

## 2021-02-17 DIAGNOSIS — E11.621 DIABETIC ULCER OF OTHER PART OF LEFT FOOT ASSOCIATED WITH TYPE 2 DIABETES MELLITUS, WITH OTHER ULCER SEVERITY (HCC): Primary | ICD-10-CM

## 2021-02-17 DIAGNOSIS — G89.29 CHRONIC BILATERAL LOW BACK PAIN WITH RIGHT-SIDED SCIATICA: ICD-10-CM

## 2021-02-17 DIAGNOSIS — L97.528 DIABETIC ULCER OF OTHER PART OF LEFT FOOT ASSOCIATED WITH TYPE 2 DIABETES MELLITUS, WITH OTHER ULCER SEVERITY (HCC): Primary | ICD-10-CM

## 2021-02-17 DIAGNOSIS — Z79.4 TYPE 2 DIABETES MELLITUS WITH OTHER SPECIFIED COMPLICATION, WITH LONG-TERM CURRENT USE OF INSULIN (HCC): ICD-10-CM

## 2021-02-17 DIAGNOSIS — E11.69 TYPE 2 DIABETES MELLITUS WITH OTHER SPECIFIED COMPLICATION, WITH LONG-TERM CURRENT USE OF INSULIN (HCC): ICD-10-CM

## 2021-02-17 DIAGNOSIS — I10 ESSENTIAL HYPERTENSION: ICD-10-CM

## 2021-02-17 DIAGNOSIS — M54.41 CHRONIC BILATERAL LOW BACK PAIN WITH RIGHT-SIDED SCIATICA: ICD-10-CM

## 2021-02-17 PROCEDURE — 99442 PR PHYS/QHP TELEPHONE EVALUATION 11-20 MIN: CPT | Performed by: FAMILY MEDICINE

## 2021-02-17 RX ORDER — HYDROCODONE BITARTRATE AND ACETAMINOPHEN 10; 325 MG/1; MG/1
TABLET ORAL
Qty: 150 TABLET | Refills: 0 | Status: SHIPPED | OUTPATIENT
Start: 2021-02-17 | End: 2021-03-03 | Stop reason: SDUPTHER

## 2021-02-17 RX ORDER — OXYCODONE HYDROCHLORIDE 30 MG/1
30 TABLET ORAL EVERY 4 HOURS PRN
Qty: 150 TABLET | Refills: 0 | Status: SHIPPED | OUTPATIENT
Start: 2021-02-17 | End: 2021-03-03 | Stop reason: SDUPTHER

## 2021-02-17 RX ORDER — PROMETHAZINE HYDROCHLORIDE 25 MG/1
TABLET ORAL
Qty: 30 TABLET | Refills: 0 | Status: SHIPPED | OUTPATIENT
Start: 2021-02-17 | End: 2021-03-02 | Stop reason: SDUPTHER

## 2021-02-17 ASSESSMENT — ENCOUNTER SYMPTOMS
BACK PAIN: 1
COLOR CHANGE: 1

## 2021-02-17 NOTE — PROGRESS NOTES
SUBJECTIVE:    Patient ID: Jolene Cummins is a 56 y.o. male. Chief Complaint   Patient presents with    Foot Pain       HPI: Jolene Cummins is a 56 y.o. male evaluated via telephone on 2/17/2021. Consent:  He and/or health care decision maker is aware that that he may receive a bill for this telephone service, depending on his insurance coverage, and has provided verbal consent to proceed: yes      Documentation:  I communicated with the patient and/or health care decision maker about diabetic foot ulcer. Details of this discussion including any medical advice provided: to him      I affirm this is a Patient Initiated Episode with a Patient who has not had a related appointment within my department in the past 7 days or scheduled within the next 24 hours. Patient identification was verified at the start of the visit: yes    Total Time: 12 minutes    Note: not billable if this call serves to triage the patient into an appointment for the relevant concern      Vinod [place]     He has a foot ulcer on is right foot on the side. He says it is close to the little toe. He is not havign any drainage. He is not having any fever. He has fallen on the ice at his house. He is weak. He says he is having continued effects from covid. He is having some headaches. He is not remembering well at all. He is frustrated with his situation. He is unable to get out of the house. He is having some fluctuation blood sugars and blood pressures. He is still having some continued issue with his back pain. Review of Systems   Constitutional: Positive for fatigue. Musculoskeletal: Positive for arthralgias, back pain, gait problem and myalgias. Skin: Positive for color change and rash. Neurological: Positive for weakness. All other systems reviewed and are negative. OBJECTIVE:  There were no vitals taken for this visit.    Wt Readings from Last 3 Encounters:   10/15/20 (!) 410 lb (186 kg)   10/06/20 (!) 421 lb (191 kg)   10/01/20 (!) 418 lb (189.6 kg)     BP Readings from Last 3 Encounters:   10/15/20 120/60   10/06/20 112/68   10/01/20 102/60      Pulse Readings from Last 3 Encounters:   10/15/20 52   10/06/20 84   10/01/20 64     There is no height or weight on file to calculate BMI. Resp Readings from Last 3 Encounters:   10/15/20 18   10/06/20 20   10/01/20 18     Past medical, surgical, family and social history were reviewed and updated with the patient. Physical Exam  Neurological:      Mental Status: He is alert and oriented to person, place, and time. Mental status is at baseline. Psychiatric:         Attention and Perception: Attention and perception normal.         Mood and Affect: Mood and affect normal.         Speech: Speech normal.         Behavior: Behavior normal. Behavior is cooperative. Thought Content: Thought content normal.         Cognition and Memory: Cognition and memory normal.         Judgment: Judgment normal.          No results found for requested labs within last 30 days. Hemoglobin A1C (%)   Date Value   12/16/2020 8.6 (H)     Microscopic Examination (no units)   Date Value   12/28/2017 Not Indicated     Microalbumin, Random Urine (ug/mL)   Date Value   09/17/2020 76.2     LDL Calculated (mg/dL)   Date Value   12/17/2019 72       Lab Results   Component Value Date    WBC 5.7 12/16/2020    NEUTROABS 4.7 02/21/2018    HGB 14.3 12/16/2020    HCT 44.6 12/16/2020    HCT 41.6 06/05/2012    MCV 91 12/16/2020     12/16/2020     06/05/2012     Lab Results   Component Value Date    TSH 2.12 05/18/2018       ASSESSMENT:    Diagnosis Orders   1. Diabetic ulcer of other part of left foot associated with type 2 diabetes mellitus, with other ulcer severity ([address]     2.  Chronic bilateral low back pain with right-sided sciatica  DISCONTINUED: oxyCODONE (OXY-IR) 30 MG immediate release tablet    DISCONTINUED: HYDROcodone-acetaminophen (NORCO)  MG per tablet 3. Essential hypertension     4. Type 2 diabetes mellitus with other specified complication, with long-term current use of insulin ([facility] [address]     5. Anxiety          PLAN:  Orders Placed This Encounter   Medications    DISCONTD: oxyCODONE (OXY-IR) 30 MG immediate release tablet     Sig: Take 1 tablet by mouth every 4 hours as needed for Pain for up to 30 days. Dispense:  150 tablet     Refill:  0     Reduce doses taken as pain becomes manageable    DISCONTD: HYDROcodone-acetaminophen (NORCO)  MG per tablet     Sig: TAKE ONE TABLET BY MOUTH EVERY 4 HOURS AS NEEDED FOR BREAKTHROUGH PAIN     Dispense:  150 tablet     Refill:  0     Reduce doses taken as pain becomes manageable        Medications Discontinued During This Encounter   Medication Reason    oxyCODONE (OXY-IR) 30 MG immediate release tablet REORDER    HYDROcodone-acetaminophen (NORCO)  MG per tablet REORDER       Controlled Substances Monitoring:      Please note: This chart was generated using Dragon dictation software. Although every effort was made to ensure the accuracy of this automated transcription, some errors in transcription may have occurred.

## 2021-02-17 NOTE — TELEPHONE ENCOUNTER
Refill request received from pharmacy.  Medication pending for approval.       Last Office Visit With PCP:  2/17/2021    Next Visit Date:  Future Appointments   Date Time Provider [place]   2/22/2021 10:30 AM Vinod Kathleen MD [address]   3/16/2021 10:00 AM Vinod Kathleen MD [facility] up to date

## 2021-03-02 ENCOUNTER — TELEPHONE (OUTPATIENT)
Dept: FAMILY MEDICINE CLINIC | Age: 61
End: 2021-03-02

## 2021-03-02 DIAGNOSIS — M54.41 CHRONIC BILATERAL LOW BACK PAIN WITH RIGHT-SIDED SCIATICA: ICD-10-CM

## 2021-03-02 DIAGNOSIS — G89.29 CHRONIC BILATERAL LOW BACK PAIN WITH RIGHT-SIDED SCIATICA: ICD-10-CM

## 2021-03-02 RX ORDER — MONTELUKAST SODIUM 10 MG/1
TABLET ORAL
Qty: 30 TABLET | Refills: 0 | Status: SHIPPED | OUTPATIENT
Start: 2021-03-02 | End: 2021-03-15 | Stop reason: SDUPTHER

## 2021-03-02 RX ORDER — LANCETS 30 GAUGE
1 EACH MISCELLANEOUS 3 TIMES DAILY
Qty: 100 EACH | Refills: 0 | Status: SHIPPED | OUTPATIENT
Start: 2021-03-02 | End: 2021-04-01

## 2021-03-02 RX ORDER — FERROUS SULFATE 325(65) MG
TABLET ORAL
Qty: 30 TABLET | Refills: 0 | Status: SHIPPED | OUTPATIENT
Start: 2021-03-02 | End: 2021-03-15 | Stop reason: SDUPTHER

## 2021-03-02 RX ORDER — PROMETHAZINE HYDROCHLORIDE 25 MG/1
TABLET ORAL
Qty: 30 TABLET | Refills: 0 | Status: SHIPPED | OUTPATIENT
Start: 2021-03-02 | End: 2021-04-28

## 2021-03-02 RX ORDER — LORATADINE 10 MG/1
TABLET ORAL
Qty: 30 TABLET | Refills: 0 | Status: SHIPPED | OUTPATIENT
Start: 2021-03-02 | End: 2021-03-15 | Stop reason: SDUPTHER

## 2021-03-02 RX ORDER — INSULIN DEGLUDEC INJECTION 100 U/ML
100 INJECTION, SOLUTION SUBCUTANEOUS 2 TIMES DAILY
Qty: 4 PEN | Refills: 0 | COMMUNITY
Start: 2021-03-02 | End: 2021-03-15 | Stop reason: SDUPTHER

## 2021-03-02 RX ORDER — EMPAGLIFLOZIN 25 MG/1
TABLET, FILM COATED ORAL
Qty: 28 TABLET | Refills: 0 | COMMUNITY
Start: 2021-03-02 | End: 2021-03-15 | Stop reason: SDUPTHER

## 2021-03-02 RX ORDER — PANTOPRAZOLE SODIUM 40 MG/1
40 TABLET, DELAYED RELEASE ORAL
Qty: 30 TABLET | Refills: 0 | Status: SHIPPED | OUTPATIENT
Start: 2021-03-02 | End: 2021-03-15 | Stop reason: SDUPTHER

## 2021-03-02 RX ORDER — NITROGLYCERIN 0.4 MG/1
0.4 TABLET SUBLINGUAL EVERY 5 MIN PRN
Qty: 25 TABLET | Refills: 0 | Status: SHIPPED | OUTPATIENT
Start: 2021-03-02 | End: 2021-04-28

## 2021-03-02 RX ORDER — FUROSEMIDE 80 MG
TABLET ORAL
Qty: 45 TABLET | Refills: 0 | Status: SHIPPED | OUTPATIENT
Start: 2021-03-02 | End: 2021-03-15 | Stop reason: SDUPTHER

## 2021-03-02 RX ORDER — GLUCOSAMINE HCL/CHONDROITIN SU 500-400 MG
CAPSULE ORAL
Qty: 100 STRIP | Refills: 0 | Status: SHIPPED | OUTPATIENT
Start: 2021-03-02 | End: 2021-04-28

## 2021-03-02 RX ORDER — NYSTATIN 100000 [USP'U]/G
POWDER TOPICAL
Qty: 60 G | Refills: 0 | Status: SHIPPED | OUTPATIENT
Start: 2021-03-02 | End: 2021-03-15

## 2021-03-02 RX ORDER — SIMVASTATIN 40 MG
TABLET ORAL
Qty: 30 TABLET | Refills: 0 | Status: SHIPPED | OUTPATIENT
Start: 2021-03-02 | End: 2021-03-15 | Stop reason: SDUPTHER

## 2021-03-02 RX ORDER — INSULIN DETEMIR 100 [IU]/ML
INJECTION, SOLUTION SUBCUTANEOUS
Qty: 180 ML | Refills: 2 | Status: SHIPPED | OUTPATIENT
Start: 2021-03-02 | End: 2021-07-15 | Stop reason: SDUPTHER
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
## 2021-03-02 NOTE — TELEPHONE ENCOUNTER
Spoke with [facility], we just need to send 30 day supply of there medication to them. Patient is needing controlled medications also.

## 2021-03-03 RX ORDER — HYDROCODONE BITARTRATE AND ACETAMINOPHEN 10; 325 MG/1; MG/1
TABLET ORAL
Qty: 150 TABLET | Refills: 0 | Status: SHIPPED | OUTPATIENT
Start: 2021-03-03 | End: 2021-03-15 | Stop reason: SDUPTHER

## 2021-03-03 RX ORDER — GABAPENTIN 800 MG/1
TABLET ORAL
Qty: 120 TABLET | Refills: 0 | Status: SHIPPED | OUTPATIENT
Start: 2021-03-03 | End: 2021-03-15 | Stop reason: SDUPTHER

## 2021-03-03 RX ORDER — OXYCODONE HYDROCHLORIDE 30 MG/1
30 TABLET ORAL EVERY 4 HOURS PRN
Qty: 150 TABLET | Refills: 0 | Status: SHIPPED | OUTPATIENT
Start: 2021-03-03 | End: 2021-03-15 | Stop reason: SDUPTHER

## 2021-03-08 ENCOUNTER — TELEPHONE (OUTPATIENT)
Dept: PHARMACY | Facility: HOSPITAL | Age: 61
End: 2021-03-08

## 2021-03-08 DIAGNOSIS — F41.9 ANXIETY: ICD-10-CM

## 2021-03-08 DIAGNOSIS — Z79.4 TYPE 2 DIABETES MELLITUS WITH DIABETIC POLYNEUROPATHY, WITH LONG-TERM CURRENT USE OF INSULIN (HCC): Primary | ICD-10-CM

## 2021-03-08 DIAGNOSIS — E11.42 TYPE 2 DIABETES MELLITUS WITH DIABETIC POLYNEUROPATHY, WITH LONG-TERM CURRENT USE OF INSULIN (HCC): Primary | ICD-10-CM

## 2021-03-08 NOTE — TELEPHONE ENCOUNTER
Patient called, requested refill.        Next Office Visit Date:  Future Appointments   Date Time Provider Marin Sierra   3/16/2021 10:00 AM Kenyetta Washington MD [address] up to date

## 2021-03-08 NOTE — TELEPHONE ENCOUNTER
----- Message from Salud Amaya sent at 3/8/2021  1:50 PM EST -----  Need refill on Valium [facility]
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
## 2021-03-10 DIAGNOSIS — F41.9 ANXIETY: ICD-10-CM

## 2021-03-10 RX ORDER — DIAZEPAM 5 MG/1
5 TABLET ORAL EVERY 8 HOURS PRN
Qty: 60 TABLET | Refills: 1 | Status: SHIPPED | OUTPATIENT
Start: 2021-03-10 | End: 2021-04-28

## 2021-03-10 RX ORDER — DIAZEPAM 5 MG/1
5 TABLET ORAL EVERY 8 HOURS PRN
Qty: 15 TABLET | Refills: 0 | OUTPATIENT
Start: 2021-03-10 | End: 2021-03-20

## 2021-03-15 ENCOUNTER — HOSPITAL ENCOUNTER (OUTPATIENT)
Facility: HOSPITAL | Age: 61
Discharge: HOME | End: 2021-03-15
Payer: MEDICARE

## 2021-03-15 ENCOUNTER — OFFICE VISIT (OUTPATIENT)
Dept: FAMILY MEDICINE CLINIC | Age: 61
End: 2021-03-15
Payer: MEDICARE

## 2021-03-15 ENCOUNTER — TELEPHONE (OUTPATIENT)
Dept: FAMILY MEDICINE CLINIC | Age: 61
End: 2021-03-15

## 2021-03-15 VITALS
SYSTOLIC BLOOD PRESSURE: 124 MMHG | HEIGHT: 76 IN | RESPIRATION RATE: 18 BRPM | DIASTOLIC BLOOD PRESSURE: 72 MMHG | OXYGEN SATURATION: 95 % | BODY MASS INDEX: 38.36 KG/M2 | WEIGHT: 315 LBS | HEART RATE: 68 BPM | TEMPERATURE: 96.8 F

## 2021-03-15 DIAGNOSIS — I10 ESSENTIAL HYPERTENSION: ICD-10-CM

## 2021-03-15 DIAGNOSIS — E11.69 TYPE 2 DIABETES MELLITUS WITH OTHER SPECIFIED COMPLICATION, WITH LONG-TERM CURRENT USE OF INSULIN (HCC): Primary | ICD-10-CM

## 2021-03-15 DIAGNOSIS — F41.9 ANXIETY: ICD-10-CM

## 2021-03-15 DIAGNOSIS — M54.41 CHRONIC BILATERAL LOW BACK PAIN WITH RIGHT-SIDED SCIATICA: ICD-10-CM

## 2021-03-15 DIAGNOSIS — Z79.4 TYPE 2 DIABETES MELLITUS WITH OTHER SPECIFIED COMPLICATION, WITH LONG-TERM CURRENT USE OF INSULIN (HCC): Primary | ICD-10-CM

## 2021-03-15 DIAGNOSIS — Z79.4 TYPE 2 DIABETES MELLITUS WITH OTHER SPECIFIED COMPLICATION, WITH LONG-TERM CURRENT USE OF INSULIN (HCC): ICD-10-CM

## 2021-03-15 DIAGNOSIS — E11.69 TYPE 2 DIABETES MELLITUS WITH OTHER SPECIFIED COMPLICATION, WITH LONG-TERM CURRENT USE OF INSULIN (HCC): ICD-10-CM

## 2021-03-15 DIAGNOSIS — G89.29 CHRONIC BILATERAL LOW BACK PAIN WITH RIGHT-SIDED SCIATICA: ICD-10-CM

## 2021-03-15 PROCEDURE — G8417 CALC BMI ABV UP PARAM F/U: HCPCS | Performed by: FAMILY MEDICINE

## 2021-03-15 PROCEDURE — 2022F DILAT RTA XM EVC RTNOPTHY: CPT | Performed by: FAMILY MEDICINE

## 2021-03-15 PROCEDURE — G8482 FLU IMMUNIZE ORDER/ADMIN: HCPCS | Performed by: FAMILY MEDICINE

## 2021-03-15 PROCEDURE — 36415 COLL VENOUS BLD VENIPUNCTURE: CPT

## 2021-03-15 PROCEDURE — 3017F COLORECTAL CA SCREEN DOC REV: CPT | Performed by: FAMILY MEDICINE

## 2021-03-15 PROCEDURE — 1036F TOBACCO NON-USER: CPT | Performed by: FAMILY MEDICINE

## 2021-03-15 PROCEDURE — G8427 DOCREV CUR MEDS BY ELIG CLIN: HCPCS | Performed by: FAMILY MEDICINE

## 2021-03-15 PROCEDURE — 3052F HG A1C>EQUAL 8.0%<EQUAL 9.0%: CPT | Performed by: FAMILY MEDICINE

## 2021-03-15 PROCEDURE — 99214 OFFICE O/P EST MOD 30 MIN: CPT | Performed by: FAMILY MEDICINE

## 2021-03-15 RX ORDER — NYSTATIN 100000 [USP'U]/G
POWDER TOPICAL
Qty: 60 G | Refills: 0 | Status: SHIPPED | OUTPATIENT
Start: 2021-03-15 | End: 2021-04-28

## 2021-03-15 RX ORDER — EMPAGLIFLOZIN 25 MG/1
TABLET, FILM COATED ORAL
Qty: 90 TABLET | Refills: 3 | Status: SHIPPED | OUTPATIENT
Start: 2021-03-15 | End: 2022-01-18 | Stop reason: SDUPTHER

## 2021-03-15 RX ORDER — MONTELUKAST SODIUM 10 MG/1
TABLET ORAL
Qty: 30 TABLET | Refills: 5 | Status: SHIPPED | OUTPATIENT
Start: 2021-03-15 | End: 2021-07-15 | Stop reason: SDUPTHER

## 2021-03-15 RX ORDER — SIMVASTATIN 40 MG
TABLET ORAL
Qty: 30 TABLET | Refills: 5 | Status: SHIPPED | OUTPATIENT
Start: 2021-03-15 | End: 2021-07-15 | Stop reason: SDUPTHER

## 2021-03-15 RX ORDER — OXYCODONE HYDROCHLORIDE 30 MG/1
30 TABLET ORAL EVERY 4 HOURS PRN
Qty: 150 TABLET | Refills: 0 | Status: SHIPPED | OUTPATIENT
Start: 2021-03-15 | End: 2021-04-13 | Stop reason: SDUPTHER

## 2021-03-15 RX ORDER — HYDROCODONE BITARTRATE AND ACETAMINOPHEN 10; 325 MG/1; MG/1
TABLET ORAL
Qty: 150 TABLET | Refills: 0 | Status: SHIPPED | OUTPATIENT
Start: 2021-03-15 | End: 2021-04-13 | Stop reason: SDUPTHER

## 2021-03-15 RX ORDER — FUROSEMIDE 80 MG
TABLET ORAL
Qty: 45 TABLET | Refills: 5 | Status: SHIPPED | OUTPATIENT
Start: 2021-03-15 | End: 2021-07-15 | Stop reason: SDUPTHER

## 2021-03-15 RX ORDER — PANTOPRAZOLE SODIUM 40 MG/1
40 TABLET, DELAYED RELEASE ORAL
Qty: 30 TABLET | Refills: 5 | Status: SHIPPED | OUTPATIENT
Start: 2021-03-15 | End: 2021-07-15 | Stop reason: SDUPTHER

## 2021-03-15 RX ORDER — FERROUS SULFATE 325(65) MG
TABLET ORAL
Qty: 30 TABLET | Refills: 5 | Status: SHIPPED | OUTPATIENT
Start: 2021-03-15 | End: 2021-07-15 | Stop reason: SDUPTHER

## 2021-03-15 RX ORDER — INSULIN DEGLUDEC INJECTION 100 U/ML
100 INJECTION, SOLUTION SUBCUTANEOUS 2 TIMES DAILY
Qty: 4 PEN | Refills: 0 | Status: SHIPPED | OUTPATIENT
Start: 2021-03-15 | End: 2021-03-16 | Stop reason: SDUPTHER

## 2021-03-15 RX ORDER — GABAPENTIN 800 MG/1
TABLET ORAL
Qty: 120 TABLET | Refills: 2 | Status: SHIPPED | OUTPATIENT
Start: 2021-03-15 | End: 2021-04-30 | Stop reason: SDUPTHER

## 2021-03-15 RX ORDER — LORATADINE 10 MG/1
TABLET ORAL
Qty: 30 TABLET | Refills: 5 | Status: SHIPPED | OUTPATIENT
Start: 2021-03-15 | End: 2021-07-15 | Stop reason: SDUPTHER

## 2021-03-15 ASSESSMENT — PATIENT HEALTH QUESTIONNAIRE - PHQ9
SUM OF ALL RESPONSES TO PHQ QUESTIONS 1-9: 2
SUM OF ALL RESPONSES TO PHQ QUESTIONS 1-9: 2
SUM OF ALL RESPONSES TO PHQ9 QUESTIONS 1 & 2: 2
SUM OF ALL RESPONSES TO PHQ QUESTIONS 1-9: 2

## 2021-03-15 ASSESSMENT — ENCOUNTER SYMPTOMS
BACK PAIN: 1
COLOR CHANGE: 1
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
## 2021-03-15 NOTE — TELEPHONE ENCOUNTER
Refill request received from pharmacy.  Medication pending for approval.       Last Office Visit With PCP:  2/17/2021    Next Visit Date:  Future Appointments   Date Time Provider Marin Sierra   3/15/2021  1:00 PM Ashley Patterson MD [address]

## 2021-03-15 NOTE — PROGRESS NOTES
Chief Complaint   Patient presents with    Diabetes       Have you seen any other physician or provider since your last visit no    Have you had any other diagnostic tests since your last visit? no    Have you changed or stopped any medications since your last visit? no

## 2021-03-15 NOTE — TELEPHONE ENCOUNTER
I have faxed referral and records to [facility], they will contact the patient with appointment date/time.

## 2021-03-16 RX ORDER — INSULIN DEGLUDEC INJECTION 100 U/ML
100 INJECTION, SOLUTION SUBCUTANEOUS 2 TIMES DAILY
Qty: 5 PEN | Refills: 2 | Status: SHIPPED | OUTPATIENT
Start: 2021-03-16 | End: 2021-03-17 | Stop reason: ALTCHOICE

## 2021-03-17 DIAGNOSIS — E11.42 TYPE 2 DIABETES MELLITUS WITH DIABETIC POLYNEUROPATHY, WITH LONG-TERM CURRENT USE OF INSULIN (HCC): Primary | ICD-10-CM

## 2021-03-17 DIAGNOSIS — Z79.4 TYPE 2 DIABETES MELLITUS WITH DIABETIC POLYNEUROPATHY, WITH LONG-TERM CURRENT USE OF INSULIN (HCC): Primary | ICD-10-CM

## 2021-03-23 ENCOUNTER — TELEPHONE (OUTPATIENT)
Dept: FAMILY MEDICINE CLINIC | Age: 61
End: 2021-03-23

## 2021-03-23 NOTE — TELEPHONE ENCOUNTER
Patient has a foot ulcer. Jefry @ home is wanting to use Alginate and clean dry dressing for it. Is this okay? They are going to evaluate him for PT, OT, and MSW.

## 2021-04-01 ENCOUNTER — TELEPHONE (OUTPATIENT)
Dept: FAMILY MEDICINE CLINIC | Age: 61
End: 2021-04-01

## 2021-04-01 RX ORDER — CEFDINIR 300 MG/1
300 CAPSULE ORAL 2 TIMES DAILY
Qty: 20 CAPSULE | Refills: 0 | Status: SHIPPED | OUTPATIENT
Start: 2021-04-01 | End: 2021-04-11

## 2021-04-01 RX ORDER — LANCETS
EACH MISCELLANEOUS
Qty: 100 EACH | Refills: 5 | Status: SHIPPED | OUTPATIENT
Start: 2021-04-01 | End: 2021-09-29

## 2021-04-01 NOTE — TELEPHONE ENCOUNTER
Refill request received from pharmacy.  Medication pending for approval.       Last Office Visit With PCP:  3/15/2021    Next Visit Date:  Future Appointments   Date Time Provider Marin Sierra   4/13/2021  2:15 PM MD [facility] [address]

## 2021-04-01 NOTE — TELEPHONE ENCOUNTER
Patient took shower with bandage and socks on. Left outer foot is showing all signs of infection. It is warm to touch, drainage,  and painful for patient. They are requesting medication to be called into pharmacy.

## 2021-04-05 ENCOUNTER — CARE COORDINATION (OUTPATIENT)
Dept: CARE COORDINATION | Age: 61
End: 2021-04-05

## 2021-04-13 ENCOUNTER — OFFICE VISIT (OUTPATIENT)
Dept: FAMILY MEDICINE CLINIC | Age: 61
End: 2021-04-13
Payer: MEDICARE

## 2021-04-13 VITALS
BODY MASS INDEX: 38.36 KG/M2 | HEART RATE: 56 BPM | DIASTOLIC BLOOD PRESSURE: 80 MMHG | WEIGHT: 315 LBS | SYSTOLIC BLOOD PRESSURE: 148 MMHG | RESPIRATION RATE: 18 BRPM | OXYGEN SATURATION: 93 % | TEMPERATURE: 97.9 F | HEIGHT: 76 IN

## 2021-04-13 DIAGNOSIS — E11.42 TYPE 2 DIABETES MELLITUS WITH DIABETIC POLYNEUROPATHY, WITH LONG-TERM CURRENT USE OF INSULIN (HCC): Primary | ICD-10-CM

## 2021-04-13 DIAGNOSIS — M54.41 CHRONIC BILATERAL LOW BACK PAIN WITH RIGHT-SIDED SCIATICA: ICD-10-CM

## 2021-04-13 DIAGNOSIS — L97.528 DIABETIC ULCER OF OTHER PART OF LEFT FOOT ASSOCIATED WITH TYPE 2 DIABETES MELLITUS, WITH OTHER ULCER SEVERITY (HCC): ICD-10-CM

## 2021-04-13 DIAGNOSIS — I10 ESSENTIAL HYPERTENSION: ICD-10-CM

## 2021-04-13 DIAGNOSIS — M15.9 OSTEOARTHRITIS OF MULTIPLE JOINTS, UNSPECIFIED OSTEOARTHRITIS TYPE: ICD-10-CM

## 2021-04-13 DIAGNOSIS — Z79.4 TYPE 2 DIABETES MELLITUS WITH DIABETIC POLYNEUROPATHY, WITH LONG-TERM CURRENT USE OF INSULIN (HCC): Primary | ICD-10-CM

## 2021-04-13 DIAGNOSIS — F41.9 ANXIETY: ICD-10-CM

## 2021-04-13 DIAGNOSIS — E11.621 DIABETIC ULCER OF OTHER PART OF LEFT FOOT ASSOCIATED WITH TYPE 2 DIABETES MELLITUS, WITH OTHER ULCER SEVERITY (HCC): ICD-10-CM

## 2021-04-13 DIAGNOSIS — G89.29 CHRONIC BILATERAL LOW BACK PAIN WITH RIGHT-SIDED SCIATICA: ICD-10-CM

## 2021-04-13 PROCEDURE — G8427 DOCREV CUR MEDS BY ELIG CLIN: HCPCS | Performed by: FAMILY MEDICINE

## 2021-04-13 PROCEDURE — 3052F HG A1C>EQUAL 8.0%<EQUAL 9.0%: CPT | Performed by: FAMILY MEDICINE

## 2021-04-13 PROCEDURE — 1036F TOBACCO NON-USER: CPT | Performed by: FAMILY MEDICINE

## 2021-04-13 PROCEDURE — G8417 CALC BMI ABV UP PARAM F/U: HCPCS | Performed by: FAMILY MEDICINE

## 2021-04-13 PROCEDURE — 99214 OFFICE O/P EST MOD 30 MIN: CPT | Performed by: FAMILY MEDICINE

## 2021-04-13 PROCEDURE — 2022F DILAT RTA XM EVC RTNOPTHY: CPT | Performed by: FAMILY MEDICINE

## 2021-04-13 PROCEDURE — 3017F COLORECTAL CA SCREEN DOC REV: CPT | Performed by: FAMILY MEDICINE

## 2021-04-13 RX ORDER — OXYCODONE HYDROCHLORIDE 30 MG/1
30 TABLET ORAL EVERY 4 HOURS PRN
Qty: 150 TABLET | Refills: 0 | Status: SHIPPED | OUTPATIENT
Start: 2021-04-13 | End: 2021-04-30 | Stop reason: SDUPTHER

## 2021-04-13 RX ORDER — HYDROCODONE BITARTRATE AND ACETAMINOPHEN 10; 325 MG/1; MG/1
TABLET ORAL
Qty: 150 TABLET | Refills: 0 | Status: SHIPPED | OUTPATIENT
Start: 2021-04-13 | End: 2021-04-30 | Stop reason: SDUPTHER

## 2021-04-13 ASSESSMENT — ENCOUNTER SYMPTOMS
BACK PAIN: 1
COLOR CHANGE: 1
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
## 2021-04-13 NOTE — PATIENT INSTRUCTIONS
The medication list included in this document is our record of what you are currently taking, including any changes that were made at today's visit.  If you find any differences when compared to your medications at home, or have any questions that were not answered at your visit, please contact the office. We are committed to providing you with the best care possible. In order to help us achieve these goals please remember to bring all medications, herbal products, and over the counter supplements with you to each visit. If your provider has ordered testing for you, please be sure to follow up with our office if you have not received results within 7 days after the testing took place. *If you receive a survey after visiting one of our offices, please take time to share your experience concerning your physician office visit. These surveys are confidential and no health information about you is shared. We are eager to improve for you and we are counting on your feedback to help make that happen. · Keep a list of your medicines with you. List all of the prescription medicines, nonprescription medicines, supplements, natural remedies, and vitamins that you take. Tell your healthcare providers who treat you about all of the products you are taking. Your provider can provide you with a form to keep track of them. Just ask. · Follow the directions that come with your medicine, including information about food or alcohol. Make sure you know how and when to take your medicine. Do not take more or less than you are supposed to take. · Keep all medicines out of the reach of children. · Store medicines according to the directions on the label. · Monitor yourself. Learn to know how your body reacts to your new medicine and keep track of how it makes you feel before attempting (If your provider has allowed you to do so) to drive or go to work.    · Seek emergency medical attention if you think you have used too much of this medicine. An overdose of any prescription medicine can be fatal. Overdose symptoms may include extreme drowsiness, muscle weakness, confusion, cold and clammy skin, pinpoint pupils, shallow breathing, slow heart rate, fainting, or coma. · Don't share prescription medicines with others, even when they seem to have the same symptoms. What may be good for you may be harmful to others. · If you are no longer taking a prescribed medication and you have pills left please take your pills out of their original containers. Mix crushed pills with an undesirable substance, such as cat litter or used coffee grounds. Put the mixture into a disposable container with a lid, such as an empty margarine tub, or into a sealable bag. Cover up or remove any of your personal information on the empty containers by covering it with black permanent marker or duct tape. Place the sealed container with the mixture, and the empty drug containers, in the trash. · If you use a medication that is in the form of a patch, dispose of used patches by folding them in half so that the sticky sides meet, and then flushing them down a toilet. They should not be placed in the household trash where children or pets can find them. · If you have any questions, ask your provider or pharmacist for more information. · Be sure to keep all appointments for provider visits or tests.   ·

## 2021-04-28 DIAGNOSIS — G89.29 CHRONIC BILATERAL LOW BACK PAIN WITH RIGHT-SIDED SCIATICA: Primary | ICD-10-CM

## 2021-04-28 DIAGNOSIS — F41.9 ANXIETY: ICD-10-CM

## 2021-04-28 DIAGNOSIS — M54.41 CHRONIC BILATERAL LOW BACK PAIN WITH RIGHT-SIDED SCIATICA: Primary | ICD-10-CM

## 2021-04-28 RX ORDER — BLOOD SUGAR DIAGNOSTIC
STRIP MISCELLANEOUS
Qty: 100 STRIP | Refills: 11 | Status: SHIPPED | OUTPATIENT
Start: 2021-04-28 | End: 2022-01-18

## 2021-04-28 RX ORDER — NYSTATIN 100000 [USP'U]/G
POWDER TOPICAL
Qty: 60 G | Refills: 0 | Status: SHIPPED | OUTPATIENT
Start: 2021-04-28 | End: 2021-05-20

## 2021-04-28 RX ORDER — CARISOPRODOL 350 MG/1
TABLET ORAL
Qty: 120 TABLET | Refills: 2 | Status: SHIPPED | OUTPATIENT
Start: 2021-04-28 | End: 2021-05-28

## 2021-04-28 RX ORDER — PROMETHAZINE HYDROCHLORIDE 25 MG/1
TABLET ORAL
Qty: 30 TABLET | Refills: 0 | Status: SHIPPED | OUTPATIENT
Start: 2021-04-28 | End: 2021-05-11

## 2021-04-28 RX ORDER — DIAZEPAM 5 MG/1
TABLET ORAL
Qty: 60 TABLET | Refills: 1 | Status: SHIPPED | OUTPATIENT
Start: 2021-04-28 | End: 2021-05-18 | Stop reason: SDUPTHER

## 2021-04-28 RX ORDER — NITROGLYCERIN 0.4 MG/1
TABLET SUBLINGUAL
Qty: 25 TABLET | Refills: 0 | Status: SHIPPED | OUTPATIENT
Start: 2021-04-28 | End: 2021-05-11

## 2021-04-29 ENCOUNTER — CARE COORDINATION (OUTPATIENT)
Dept: CARE COORDINATION | Age: 61
End: 2021-04-29

## 2021-04-29 NOTE — CARE COORDINATION
[facility] Transitions Initial Follow Up Call    Call within 2 business days of discharge: Yes     Patient: Raul Infante Patient : 1960 MRN: <[id number]>    Last Discharge [facility]       Complaint Diagnosis Description Type Department Provider    18  Anasaa Admission (Discharged) [facility] MED SURG Louise Silverman MD          RARS: No data recorded     Spoke with: Attempting to contact for HFU call, unsuccessful. Message left with contact information.      Discharge department/facility: [facility]    Non-face-to-face services provided:  Scheduled appointment with [facility]    Follow Up  Future Appointments   Date Time Provider Marin Sierra   2021  9:45 AM Fidel Bee MD [address] [place] [address] [place]       Cele Alaniz RN

## 2021-04-29 NOTE — CARE COORDINATION
[facility] Transitions Initial Follow Up Call    Call within 2 business days of discharge: Yes     Patient: Ector Rivera Patient : 1960 MRN: <[id number]>    Last Discharge [facility]       Complaint Diagnosis Description Type Department Provider    18  Anasaa Admission (Discharged) [facility] MED SURG Fabian Manuel, [address] [facility]  Diarrhea, foot ulcer, COVID infection, respiratory distress       RARS: No data recorded     Spoke with: Jing King returned my call. He sounds very weak today. He tells me that his back is hurting today and he is just trying to rest.  He denies any needs today. We discussed his fu appointment for tomorrow. He is unclear about medications. He states he is staying at his step mother in laws house. Discharge department/facility: [address]     Non-face-to-face services provided:  Scheduled appointment with [facility]  Obtained and reviewed discharge summary and/or continuity of care documents waiting on remainder of DC documents.      Follow Up  Future Appointments   Date Time Provider Marin Sierra   2021  9:45 AM Dulce Shirley MD [address]       Shaniqua Wilkins RN

## 2021-04-30 ENCOUNTER — OFFICE VISIT (OUTPATIENT)
Dept: FAMILY MEDICINE CLINIC | Age: 61
End: 2021-04-30
Payer: MEDICARE

## 2021-04-30 VITALS
HEIGHT: 76 IN | RESPIRATION RATE: 18 BRPM | WEIGHT: 315 LBS | HEART RATE: 65 BPM | SYSTOLIC BLOOD PRESSURE: 128 MMHG | DIASTOLIC BLOOD PRESSURE: 78 MMHG | BODY MASS INDEX: 38.36 KG/M2 | TEMPERATURE: 97.1 F | OXYGEN SATURATION: 91 %

## 2021-04-30 DIAGNOSIS — R53.1 WEAKNESS: Primary | ICD-10-CM

## 2021-04-30 DIAGNOSIS — G89.29 CHRONIC BILATERAL LOW BACK PAIN WITH RIGHT-SIDED SCIATICA: ICD-10-CM

## 2021-04-30 DIAGNOSIS — M54.41 CHRONIC BILATERAL LOW BACK PAIN WITH RIGHT-SIDED SCIATICA: ICD-10-CM

## 2021-04-30 LAB
BILIRUBIN, POC: NORMAL
BLOOD URINE, POC: NORMAL
CLARITY, POC: CLEAR
COLOR, POC: YELLOW
GLUCOSE URINE, POC: 500
KETONES, POC: NORMAL
LEUKOCYTE EST, POC: NORMAL
NITRITE, POC: NORMAL
PH, POC: 5.5
PROTEIN, POC: NORMAL
SPECIFIC GRAVITY, POC: 1.02
UROBILINOGEN, POC: 0.2

## 2021-04-30 PROCEDURE — 81002 URINALYSIS NONAUTO W/O SCOPE: CPT | Performed by: FAMILY MEDICINE

## 2021-04-30 PROCEDURE — G8417 CALC BMI ABV UP PARAM F/U: HCPCS | Performed by: FAMILY MEDICINE

## 2021-04-30 PROCEDURE — G8427 DOCREV CUR MEDS BY ELIG CLIN: HCPCS | Performed by: FAMILY MEDICINE

## 2021-04-30 PROCEDURE — 3017F COLORECTAL CA SCREEN DOC REV: CPT | Performed by: FAMILY MEDICINE

## 2021-04-30 PROCEDURE — 1036F TOBACCO NON-USER: CPT | Performed by: FAMILY MEDICINE

## 2021-04-30 PROCEDURE — 99213 OFFICE O/P EST LOW 20 MIN: CPT | Performed by: FAMILY MEDICINE

## 2021-04-30 RX ORDER — OXYCODONE HYDROCHLORIDE 30 MG/1
30 TABLET ORAL EVERY 4 HOURS PRN
Qty: 150 TABLET | Refills: 0 | Status: SHIPPED | OUTPATIENT
Start: 2021-04-30 | End: 2021-05-18 | Stop reason: SDUPTHER

## 2021-04-30 RX ORDER — GABAPENTIN 800 MG/1
TABLET ORAL
Qty: 120 TABLET | Refills: 2 | Status: SHIPPED | OUTPATIENT
Start: 2021-04-30 | End: 2021-07-08 | Stop reason: SDUPTHER

## 2021-04-30 RX ORDER — HYDROCODONE BITARTRATE AND ACETAMINOPHEN 10; 325 MG/1; MG/1
TABLET ORAL
Qty: 150 TABLET | Refills: 0 | Status: SHIPPED | OUTPATIENT
Start: 2021-04-30 | End: 2021-05-18 | Stop reason: SDUPTHER

## 2021-04-30 ASSESSMENT — ENCOUNTER SYMPTOMS
BACK PAIN: 1
COLOR CHANGE: 1

## 2021-04-30 NOTE — PROGRESS NOTES
Chief Complaint   Patient presents with    Fatigue    Diarrhea     went to [facility]       Have you seen any other physician or provider since your last visit yes - [place]    Have you had any other diagnostic tests since your last visit? yes - see d/c summary    Have you changed or stopped any medications since your last visit?  yes - see d/c summary

## 2021-04-30 NOTE — PROGRESS NOTES
SUBJECTIVE:    Patient ID: Clotilda Litten is a 56 y.o. male. Chief Complaint   Patient presents with    Fatigue    Diarrhea     went to [facility]       HPI: office visit  He has been in the hospital for a couple days. He had been taking the antibiotics for his foot. He then got his 2nd covid shot. .  He says he went to the hospital for diarrhea that was really bad. He is having less diarrhea now. He is still struggling with his appetite. He is still short of breath. He is relief week. He is resting a lot. His back pain is been really bad. He still struggling tremendously with trying to take care of his wife. He is trying to do things to fix there home  Review of Systems   Constitutional: Positive for fatigue. Musculoskeletal: Positive for arthralgias, back pain, gait problem and myalgias. Skin: Positive for color change and rash. Neurological: Positive for weakness. All other systems reviewed and are negative. OBJECTIVE:  /78   Pulse 65   Temp 97.1 °F (36.2 °C)   Resp 18   Ht 6' 4\" (1.93 m)   Wt (!) 412 lb (186.9 kg)   SpO2 91%   BMI 50.15 kg/m²    Wt Readings from Last 3 Encounters:   04/30/21 (!) 412 lb (186.9 kg)   04/13/21 (!) 416 lb 12.8 oz (189.1 kg)   03/15/21 (!) 413 lb 6.4 oz (187.5 kg)     BP Readings from Last 3 Encounters:   04/30/21 128/78   04/13/21 (!) 148/80   03/15/21 124/72      Pulse Readings from Last 3 Encounters:   04/30/21 65   04/13/21 56   03/15/21 68     Body mass index is 50.15 kg/m². Resp Readings from Last 3 Encounters:   04/30/21 18   04/13/21 18   03/15/21 18     Past medical, surgical, family and social history were reviewed and updated with the patient. Physical Exam  Vitals signs and nursing note reviewed. Constitutional:       Appearance: He is well-developed. HENT:      Head: Normocephalic. Neck:      Musculoskeletal: Normal range of motion and neck supple.    Cardiovascular:      Rate and Rhythm: Normal rate and regular rhythm. Pulmonary:      Effort: Pulmonary effort is normal.      Breath sounds: Normal breath sounds. Musculoskeletal:      Lumbar back: He exhibits decreased range of motion, tenderness, pain and spasm. Skin:     General: Skin is warm and dry. Neurological:      Mental Status: He is alert and oriented to person, place, and time. Psychiatric:         Attention and Perception: Attention and perception normal.         Mood and Affect: Mood is anxious. Affect is tearful. Speech: Speech normal.         Behavior: Behavior normal. Behavior is cooperative. Thought Content: Thought content normal.         Cognition and Memory: Cognition and memory normal.         Judgment: Judgment normal.          No results found for requested labs within last 30 days. Hemoglobin A1C (%)   Date Value   03/15/2021 8.1 (H)     Microscopic Examination (no units)   Date Value   12/28/2017 Not Indicated     Microalbumin, Random Urine (ug/mL)   Date Value   09/17/2020 76.2     LDL Calculated (mg/dL)   Date Value   12/17/2019 72       Lab Results   Component Value Date    WBC 5.7 12/16/2020    NEUTROABS 4.7 02/21/2018    HGB 14.3 12/16/2020    HCT 44.6 12/16/2020    HCT 41.6 06/05/2012    MCV 91 12/16/2020     12/16/2020     06/05/2012     Lab Results   Component Value Date    TSH 2.12 05/18/2018       ASSESSMENT:    Diagnosis Orders   1. Weakness  POCT Urinalysis no Micro   2. Chronic bilateral low back pain with right-sided sciatica  oxyCODONE (OXY-IR) 30 MG immediate release tablet    HYDROcodone-acetaminophen (NORCO)  MG per tablet    gabapentin (NEURONTIN) 800 MG tablet        PLAN:  Orders Placed This Encounter   Medications    oxyCODONE (OXY-IR) 30 MG immediate release tablet     Sig: Take 1 tablet by mouth every 4 hours as needed for Pain for up to 30 days.      Dispense:  150 tablet     Refill:  0     Reduce doses taken as pain becomes manageable    HYDROcodone-acetaminophen (NORCO)  MG per tablet     Sig: TAKE ONE TABLET BY MOUTH EVERY 4 HOURS AS NEEDED FOR BREAKTHROUGH PAIN     Dispense:  150 tablet     Refill:  0     Reduce doses taken as pain becomes manageable    gabapentin (NEURONTIN) 800 MG tablet     Sig: Take one tablet by mouth four times a day     Dispense:  120 tablet     Refill:  2        Medications Discontinued During This Encounter   Medication Reason    gabapentin (NEURONTIN) 800 MG tablet REORDER    oxyCODONE (OXY-IR) 30 MG immediate release tablet REORDER    HYDROcodone-acetaminophen (NORCO)  MG per tablet REORDER       Controlled Substances Monitoring:      Please note: This chart was generated using Dragon dictation software. Although every effort was made to ensure the accuracy of this automated transcription, some errors in transcription may have occurred.

## 2021-05-04 ENCOUNTER — OFFICE VISIT (OUTPATIENT)
Dept: FAMILY MEDICINE CLINIC | Age: 61
End: 2021-05-04
Payer: MEDICARE

## 2021-05-04 ENCOUNTER — HOSPITAL ENCOUNTER (OUTPATIENT)
Facility: HOSPITAL | Age: 61
Discharge: HOME | End: 2021-05-04
Payer: MEDICARE

## 2021-05-04 DIAGNOSIS — L97.528 DIABETIC ULCER OF OTHER PART OF LEFT FOOT ASSOCIATED WITH TYPE 2 DIABETES MELLITUS, WITH OTHER ULCER SEVERITY (HCC): ICD-10-CM

## 2021-05-04 DIAGNOSIS — I10 ESSENTIAL HYPERTENSION: ICD-10-CM

## 2021-05-04 DIAGNOSIS — E11.42 TYPE 2 DIABETES MELLITUS WITH DIABETIC POLYNEUROPATHY, WITH LONG-TERM CURRENT USE OF INSULIN (HCC): ICD-10-CM

## 2021-05-04 DIAGNOSIS — R53.1 WEAKNESS: Primary | ICD-10-CM

## 2021-05-04 DIAGNOSIS — E78.9 LIPID DISORDER: ICD-10-CM

## 2021-05-04 DIAGNOSIS — Z79.4 TYPE 2 DIABETES MELLITUS WITH DIABETIC POLYNEUROPATHY, WITH LONG-TERM CURRENT USE OF INSULIN (HCC): ICD-10-CM

## 2021-05-04 DIAGNOSIS — E55.9 VITAMIN D DEFICIENCY: ICD-10-CM

## 2021-05-04 DIAGNOSIS — G89.29 CHRONIC BILATERAL LOW BACK PAIN WITH RIGHT-SIDED SCIATICA: ICD-10-CM

## 2021-05-04 DIAGNOSIS — M54.41 CHRONIC BILATERAL LOW BACK PAIN WITH RIGHT-SIDED SCIATICA: ICD-10-CM

## 2021-05-04 DIAGNOSIS — E53.8 B12 DEFICIENCY: ICD-10-CM

## 2021-05-04 DIAGNOSIS — M15.9 OSTEOARTHRITIS OF MULTIPLE JOINTS, UNSPECIFIED OSTEOARTHRITIS TYPE: ICD-10-CM

## 2021-05-04 DIAGNOSIS — E11.621 DIABETIC ULCER OF OTHER PART OF LEFT FOOT ASSOCIATED WITH TYPE 2 DIABETES MELLITUS, WITH OTHER ULCER SEVERITY (HCC): ICD-10-CM

## 2021-05-04 PROCEDURE — 3052F HG A1C>EQUAL 8.0%<EQUAL 9.0%: CPT | Performed by: FAMILY MEDICINE

## 2021-05-04 PROCEDURE — 2022F DILAT RTA XM EVC RTNOPTHY: CPT | Performed by: FAMILY MEDICINE

## 2021-05-04 PROCEDURE — 3017F COLORECTAL CA SCREEN DOC REV: CPT | Performed by: FAMILY MEDICINE

## 2021-05-04 PROCEDURE — G8417 CALC BMI ABV UP PARAM F/U: HCPCS | Performed by: FAMILY MEDICINE

## 2021-05-04 PROCEDURE — 36415 COLL VENOUS BLD VENIPUNCTURE: CPT

## 2021-05-04 PROCEDURE — G8427 DOCREV CUR MEDS BY ELIG CLIN: HCPCS | Performed by: FAMILY MEDICINE

## 2021-05-04 PROCEDURE — 99214 OFFICE O/P EST MOD 30 MIN: CPT | Performed by: FAMILY MEDICINE

## 2021-05-04 PROCEDURE — 1036F TOBACCO NON-USER: CPT | Performed by: FAMILY MEDICINE

## 2021-05-04 PROCEDURE — 96372 THER/PROPH/DIAG INJ SC/IM: CPT | Performed by: FAMILY MEDICINE

## 2021-05-04 RX ORDER — CYANOCOBALAMIN 1000 UG/ML
1000 INJECTION INTRAMUSCULAR; SUBCUTANEOUS ONCE
Status: COMPLETED | OUTPATIENT
Start: 2021-05-04 | End: 2021-05-04

## 2021-05-04 RX ADMIN — CYANOCOBALAMIN 1000 MCG: 1000 INJECTION INTRAMUSCULAR; SUBCUTANEOUS at 16:01

## 2021-05-04 ASSESSMENT — ENCOUNTER SYMPTOMS
COLOR CHANGE: 1
BACK PAIN: 1

## 2021-05-04 NOTE — PROGRESS NOTES
Administrations This Visit     cyanocobalamin injection 1,000 mcg     Admin Date  05/04/2021  16:01 Action  Given Dose  1,000 mcg Route  Intramuscular Site  Deltoid Right Administered By  Norbert Foster MA    Ordering Provider: Dahiana Abraham MD    NDC: 67149-610-85    : [address] [place] [address]    Patient Supplied?: No                Patient tolerated injection well. Patient advised to wait 20 minutes in the office following the injection. No signs/symptoms of reaction noted after 20 minutes.
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
## 2021-05-07 RX ORDER — ERGOCALCIFEROL 1.25 MG/1
50000 CAPSULE ORAL WEEKLY
Qty: 12 CAPSULE | Refills: 1 | Status: SHIPPED | OUTPATIENT
Start: 2021-05-07 | End: 2021-05-18 | Stop reason: SDUPTHER

## 2021-05-11 RX ORDER — PROMETHAZINE HYDROCHLORIDE 25 MG/1
TABLET ORAL
Qty: 30 TABLET | Refills: 0 | Status: SHIPPED | OUTPATIENT
Start: 2021-05-11 | End: 2021-05-18 | Stop reason: SDUPTHER

## 2021-05-11 RX ORDER — NITROGLYCERIN 0.4 MG/1
TABLET SUBLINGUAL
Qty: 25 TABLET | Refills: 0 | Status: SHIPPED | OUTPATIENT
Start: 2021-05-11 | End: 2021-08-11

## 2021-05-11 NOTE — TELEPHONE ENCOUNTER
Refill request received from pharmacy.  Medication pending for approval.       Last Office Visit With PCP:  5/4/2021    Next Visit Date:  Future Appointments   Date Time Provider Marin Sierra   5/18/2021  1:30 PM Jeffy Conroy MD [address] up to date

## 2021-05-18 ENCOUNTER — OFFICE VISIT (OUTPATIENT)
Dept: FAMILY MEDICINE CLINIC | Age: 61
End: 2021-05-18
Payer: MEDICARE

## 2021-05-18 VITALS
HEART RATE: 56 BPM | WEIGHT: 315 LBS | OXYGEN SATURATION: 96 % | DIASTOLIC BLOOD PRESSURE: 58 MMHG | SYSTOLIC BLOOD PRESSURE: 122 MMHG | BODY MASS INDEX: 38.36 KG/M2 | TEMPERATURE: 97.1 F | RESPIRATION RATE: 16 BRPM | HEIGHT: 76 IN

## 2021-05-18 DIAGNOSIS — F41.9 ANXIETY: ICD-10-CM

## 2021-05-18 DIAGNOSIS — E53.8 B12 DEFICIENCY: ICD-10-CM

## 2021-05-18 DIAGNOSIS — G89.29 CHRONIC BILATERAL LOW BACK PAIN WITH RIGHT-SIDED SCIATICA: ICD-10-CM

## 2021-05-18 DIAGNOSIS — I10 ESSENTIAL HYPERTENSION: ICD-10-CM

## 2021-05-18 DIAGNOSIS — F32.A DEPRESSION, UNSPECIFIED DEPRESSION TYPE: Primary | ICD-10-CM

## 2021-05-18 DIAGNOSIS — M54.41 CHRONIC BILATERAL LOW BACK PAIN WITH RIGHT-SIDED SCIATICA: ICD-10-CM

## 2021-05-18 PROCEDURE — G8417 CALC BMI ABV UP PARAM F/U: HCPCS | Performed by: FAMILY MEDICINE

## 2021-05-18 PROCEDURE — 3017F COLORECTAL CA SCREEN DOC REV: CPT | Performed by: FAMILY MEDICINE

## 2021-05-18 PROCEDURE — G8427 DOCREV CUR MEDS BY ELIG CLIN: HCPCS | Performed by: FAMILY MEDICINE

## 2021-05-18 PROCEDURE — 99214 OFFICE O/P EST MOD 30 MIN: CPT | Performed by: FAMILY MEDICINE

## 2021-05-18 PROCEDURE — 96372 THER/PROPH/DIAG INJ SC/IM: CPT | Performed by: FAMILY MEDICINE

## 2021-05-18 PROCEDURE — 1036F TOBACCO NON-USER: CPT | Performed by: FAMILY MEDICINE

## 2021-05-18 RX ORDER — CYANOCOBALAMIN 1000 UG/ML
1000 INJECTION INTRAMUSCULAR; SUBCUTANEOUS ONCE
Status: COMPLETED | OUTPATIENT
Start: 2021-05-18 | End: 2021-05-18

## 2021-05-18 RX ORDER — CYANOCOBALAMIN 1000 UG/ML
1000 INJECTION INTRAMUSCULAR; SUBCUTANEOUS ONCE
Status: DISCONTINUED | OUTPATIENT
Start: 2021-05-18 | End: 2021-05-18

## 2021-05-18 RX ORDER — HYDROCODONE BITARTRATE AND ACETAMINOPHEN 10; 325 MG/1; MG/1
TABLET ORAL
Qty: 150 TABLET | Refills: 0 | Status: SHIPPED | OUTPATIENT
Start: 2021-05-18 | End: 2021-06-10 | Stop reason: SDUPTHER

## 2021-05-18 RX ORDER — ERGOCALCIFEROL 1.25 MG/1
50000 CAPSULE ORAL WEEKLY
Qty: 12 CAPSULE | Refills: 1 | Status: SHIPPED | OUTPATIENT
Start: 2021-05-18 | End: 2022-03-21

## 2021-05-18 RX ORDER — DULOXETIN HYDROCHLORIDE 30 MG/1
30 CAPSULE, DELAYED RELEASE ORAL DAILY
Qty: 30 CAPSULE | Refills: 1 | Status: SHIPPED | OUTPATIENT
Start: 2021-05-18 | End: 2021-07-08 | Stop reason: SDUPTHER

## 2021-05-18 RX ORDER — PROMETHAZINE HYDROCHLORIDE 25 MG/1
25 TABLET ORAL EVERY 6 HOURS PRN
Qty: 30 TABLET | Refills: 1 | Status: ON HOLD | OUTPATIENT
Start: 2021-05-18 | End: 2021-06-15 | Stop reason: HOSPADM

## 2021-05-18 RX ORDER — DIAZEPAM 5 MG/1
TABLET ORAL
Qty: 60 TABLET | Refills: 1 | Status: SHIPPED | OUTPATIENT
Start: 2021-05-18 | End: 2021-07-08

## 2021-05-18 RX ORDER — OXYCODONE HYDROCHLORIDE 30 MG/1
30 TABLET ORAL EVERY 4 HOURS PRN
Qty: 150 TABLET | Refills: 0 | Status: SHIPPED | OUTPATIENT
Start: 2021-05-18 | End: 2021-06-10 | Stop reason: SDUPTHER

## 2021-05-18 RX ADMIN — CYANOCOBALAMIN 1000 MCG: 1000 INJECTION INTRAMUSCULAR; SUBCUTANEOUS at 16:44

## 2021-05-18 ASSESSMENT — ENCOUNTER SYMPTOMS
BACK PAIN: 1
COLOR CHANGE: 1
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
## 2021-05-18 NOTE — PROGRESS NOTES
Administrations This Visit     cyanocobalamin injection 1,000 mcg     Admin Date  05/18/2021  16:44 Action  Given Dose  1,000 mcg Route  Intramuscular Site  Deltoid Left Administered By  Linette Russo MA    Ordering Provider: Luisa Hurley MD    NDC: 38317-844-11    : [facility]    Patient Supplied?: No                Patient tolerated injection well. Patient advised to wait 20 minutes in the office following the injection. No signs/symptoms of reaction noted after 20 minutes.

## 2021-05-18 NOTE — PROGRESS NOTES
SUBJECTIVE:    Patient ID: Aster Shelley is a 56 y.o. male. Chief Complaint   Patient presents with    Fatigue     \"feel weak and tired\"     Stress     \"my nerves are shot\"     Memory Loss     \"I can't remember anything - is getting worse\"     Pain     chronic       HPI: office visit  He is in a boot. He has been back to see dr Nigel Washington who thinks he is putting pressure on it all the time  He thinks the boot has helped. He is still feeling bad. He is almost tearful over his house issues. His wife is still ill. He is having some issues still with his [employer]. He says he cant do the things he needs to at home. He is having concerns about where he is going to live. He is still really fatigued. He is not having any chest pain. He is not having any medication problems. he is depressed. He is feeling somewhat hopeless with his situation. He is not checking his blood pressures or blood sugars like he should. He is happy that his wife is surviving but that's all the happy he has. He is having more issues with his memory. Review of Systems   Constitutional: Positive for fatigue. Musculoskeletal: Positive for arthralgias, back pain, gait problem and myalgias. Skin: Positive for color change and rash. Neurological: Positive for weakness. All other systems reviewed and are negative. OBJECTIVE:  BP (!) 122/58   Pulse 56   Temp 97.1 °F (36.2 °C) (Infrared)   Resp 16   Ht 6' 4\" (1.93 m)   Wt (!) 402 lb (182.3 kg)   SpO2 96% Comment: room air  BMI 48.93 kg/m²    Wt Readings from Last 3 Encounters:   05/18/21 (!) 402 lb (182.3 kg)   04/30/21 (!) 412 lb (186.9 kg)   04/13/21 (!) 416 lb 12.8 oz (189.1 kg)     BP Readings from Last 3 Encounters:   05/18/21 (!) 122/58   04/30/21 128/78   04/13/21 (!) 148/80      Pulse Readings from Last 3 Encounters:   05/18/21 56   04/30/21 65   04/13/21 56     Body mass index is 48.93 kg/m².    Resp Readings from Last 3 Encounters:   05/18/21 16 04/30/21 18   04/13/21 18     Past medical, surgical, family and social history were reviewed and updated with the patient. Physical Exam  Vitals and nursing note reviewed. Constitutional:       Appearance: He is well-developed. HENT:      Head: Normocephalic. Cardiovascular:      Rate and Rhythm: Normal rate and regular rhythm. Pulmonary:      Effort: Pulmonary effort is normal.      Breath sounds: Normal breath sounds. Musculoskeletal:      Cervical back: Normal range of motion and neck supple. Lumbar back: Spasms and tenderness present. Decreased range of motion. Skin:     General: Skin is warm and dry. Neurological:      Mental Status: He is alert and oriented to person, place, and time. Psychiatric:         Attention and Perception: Attention and perception normal.         Mood and Affect: Mood is anxious. Affect is tearful. Speech: Speech normal.         Behavior: Behavior normal. Behavior is cooperative. Thought Content:  Thought content normal.         Cognition and Memory: Cognition and memory normal.         Judgment: Judgment normal.          Results in Past 30 Days  Result Component Current Result Ref Range Previous Result Ref Range   Albumin/Globulin Ratio 1.3 (5/4/2021) 1.2 - 2.2 Not in Time Range    Albumin 3.9 (5/4/2021) 3.8 - 4.8 g/dL Not in Time Range    Alkaline Phosphatase 79 (5/4/2021) 39 - 117 IU/L Not in Time Range    ALT 27 (5/4/2021) 0 - 44 IU/L Not in Time Range    AST 32 (5/4/2021) 0 - 40 IU/L Not in Time Range    BUN 16 (5/4/2021) 8 - 27 mg/dL Not in Time Range    Calcium 9.0 (5/4/2021) 8.6 - 10.2 mg/dL Not in Time Range    Chloride 97 (5/4/2021) 96 - 106 mmol/L Not in Time Range    CO2 24 (5/4/2021) 20 - 29 mmol/L Not in Time Range    CREATININE 0.87 (5/4/2021) 0.76 - 1.27 mg/dL Not in Time Range    GFR  107 (5/4/2021) >59 mL/min/1.73 Not in Time Range    GFR Non- 92 (5/4/2021) >59 mL/min/1.73 Not in Time Range Globulin 3.0 (5/4/2021) 1.5 - 4.5 g/dL Not in Time Range    Glucose 250 (H) (5/4/2021) 65 - 99 mg/dL Not in Time Range    Potassium 4.8 (5/4/2021) 3.5 - 5.2 mmol/L Not in Time Range    Sodium 137 (5/4/2021) 134 - 144 mmol/L Not in Time Range    Total Bilirubin 0.3 (5/4/2021) 0.0 - 1.2 mg/dL Not in Time Range    Total Protein 6.9 (5/4/2021) 6.0 - 8.5 g/dL Not in Time Range      Hemoglobin A1C (%)   Date Value   03/15/2021 8.1 (H)     Microscopic Examination (no units)   Date Value   12/28/2017 Not Indicated     Microalbumin, Random Urine (ug/mL)   Date Value   09/17/2020 76.2     LDL Calculated (mg/dL)   Date Value   05/04/2021 71       Lab Results   Component Value Date    WBC 6.0 05/04/2021    NEUTROABS 4.7 02/21/2018    HGB 13.7 05/04/2021    HCT 42.8 05/04/2021    HCT 41.6 06/05/2012    MCV 91 05/04/2021     05/04/2021     06/05/2012     Lab Results   Component Value Date    TSH 2.12 05/18/2018       ASSESSMENT:    Diagnosis Orders   1. Depression, unspecified depression type     2. Chronic bilateral low back pain with right-sided sciatica  oxyCODONE (OXY-IR) 30 MG immediate release tablet    HYDROcodone-acetaminophen (NORCO)  MG per tablet   3. Anxiety  diazePAM (VALIUM) 5 MG tablet   4. B12 deficiency  cyanocobalamin injection 1,000 mcg   5. Essential hypertension          PLAN:  Orders Placed This Encounter   Medications    oxyCODONE (OXY-IR) 30 MG immediate release tablet     Sig: Take 1 tablet by mouth every 4 hours as needed for Pain for up to 30 days.      Dispense:  150 tablet     Refill:  0     Reduce doses taken as pain becomes manageable    HYDROcodone-acetaminophen (NORCO)  MG per tablet     Sig: TAKE ONE TABLET BY MOUTH EVERY 4 HOURS AS NEEDED FOR BREAKTHROUGH PAIN     Dispense:  150 tablet     Refill:  0     Reduce doses taken as pain becomes manageable    promethazine (PHENERGAN) 25 MG tablet     Sig: Take 1 tablet by mouth every 6 hours as needed for Nausea Dispense:  30 tablet     Refill:  1    diazePAM (VALIUM) 5 MG tablet     Sig: TAKE ONE TABLET BY MOUTH EVERY 8 HOURS AS NEEDED FOR ANXIETY OR SLEEP     Dispense:  60 tablet     Refill:  1    vitamin D (ERGOCALCIFEROL) 1.25 MG (70069 UT) CAPS capsule     Sig: Take 1 capsule by mouth once a week     Dispense:  12 capsule     Refill:  1    cyanocobalamin injection 1,000 mcg    DISCONTD: cyanocobalamin injection 1,000 mcg    DULoxetine (CYMBALTA) 30 MG extended release capsule     Sig: Take 1 capsule by mouth daily     Dispense:  30 capsule     Refill:  1        Medications Discontinued During This Encounter   Medication Reason    Insulin Degludec 200 UNIT/ML SOPN DUPLICATE    diazePAM (VALIUM) 5 MG tablet REORDER    oxyCODONE (OXY-IR) 30 MG immediate release tablet REORDER    HYDROcodone-acetaminophen (NORCO)  MG per tablet REORDER    vitamin D (ERGOCALCIFEROL) 1.25 MG (09264 UT) CAPS capsule REORDER    promethazine (PHENERGAN) 25 MG tablet REORDER    cyanocobalamin injection 1,000 mcg        Controlled Substances Monitoring:      Please note: This chart was generated using Dragon dictation software. Although every effort was made to ensure the accuracy of this automated transcription, some errors in transcription may have occurred.

## 2021-05-19 ENCOUNTER — TELEPHONE (OUTPATIENT)
Dept: FAMILY MEDICINE CLINIC | Age: 61
End: 2021-05-19

## 2021-05-19 RX ORDER — FLUCONAZOLE 100 MG/1
100 TABLET ORAL DAILY
Qty: 7 TABLET | Refills: 1 | Status: SHIPPED | OUTPATIENT
Start: 2021-05-19 | End: 2022-07-20

## 2021-05-19 NOTE — TELEPHONE ENCOUNTER
Patient c/o yeast in skin folds     Dr. Adriel Hopson notified, orders to send Diflucan - see medication list for details.

## 2021-05-20 RX ORDER — NYSTATIN 100000 [USP'U]/G
POWDER TOPICAL
Qty: 60 G | Refills: 5 | Status: SHIPPED | OUTPATIENT
Start: 2021-05-20 | End: 2021-10-27

## 2021-05-27 ENCOUNTER — TELEPHONE (OUTPATIENT)
Dept: FAMILY MEDICINE CLINIC | Age: 61
End: 2021-05-27

## 2021-06-10 ENCOUNTER — OFFICE VISIT (OUTPATIENT)
Dept: FAMILY MEDICINE CLINIC | Age: 61
End: 2021-06-10
Payer: MEDICARE

## 2021-06-10 VITALS
HEIGHT: 76 IN | TEMPERATURE: 98.2 F | OXYGEN SATURATION: 93 % | DIASTOLIC BLOOD PRESSURE: 60 MMHG | SYSTOLIC BLOOD PRESSURE: 146 MMHG | WEIGHT: 315 LBS | RESPIRATION RATE: 18 BRPM | HEART RATE: 67 BPM | BODY MASS INDEX: 38.36 KG/M2

## 2021-06-10 DIAGNOSIS — G89.29 CHRONIC BILATERAL LOW BACK PAIN WITH RIGHT-SIDED SCIATICA: ICD-10-CM

## 2021-06-10 DIAGNOSIS — M54.41 CHRONIC BILATERAL LOW BACK PAIN WITH RIGHT-SIDED SCIATICA: ICD-10-CM

## 2021-06-10 DIAGNOSIS — R10.31 RIGHT INGUINAL PAIN: Primary | ICD-10-CM

## 2021-06-10 PROCEDURE — 96372 THER/PROPH/DIAG INJ SC/IM: CPT | Performed by: FAMILY MEDICINE

## 2021-06-10 PROCEDURE — 3017F COLORECTAL CA SCREEN DOC REV: CPT | Performed by: FAMILY MEDICINE

## 2021-06-10 PROCEDURE — G8427 DOCREV CUR MEDS BY ELIG CLIN: HCPCS | Performed by: FAMILY MEDICINE

## 2021-06-10 PROCEDURE — G8417 CALC BMI ABV UP PARAM F/U: HCPCS | Performed by: FAMILY MEDICINE

## 2021-06-10 PROCEDURE — 99214 OFFICE O/P EST MOD 30 MIN: CPT | Performed by: FAMILY MEDICINE

## 2021-06-10 PROCEDURE — 1036F TOBACCO NON-USER: CPT | Performed by: FAMILY MEDICINE

## 2021-06-10 RX ORDER — METHYLPREDNISOLONE SODIUM SUCCINATE 125 MG/2ML
125 INJECTION, POWDER, LYOPHILIZED, FOR SOLUTION INTRAMUSCULAR; INTRAVENOUS ONCE
Status: COMPLETED | OUTPATIENT
Start: 2021-06-10 | End: 2021-06-10

## 2021-06-10 RX ORDER — OXYCODONE HYDROCHLORIDE 30 MG/1
30 TABLET ORAL EVERY 4 HOURS PRN
Qty: 150 TABLET | Refills: 0 | Status: SHIPPED | OUTPATIENT
Start: 2021-06-10 | End: 2021-07-08 | Stop reason: SDUPTHER

## 2021-06-10 RX ORDER — HYDROCODONE BITARTRATE AND ACETAMINOPHEN 10; 325 MG/1; MG/1
TABLET ORAL
Qty: 150 TABLET | Refills: 0 | Status: SHIPPED | OUTPATIENT
Start: 2021-06-10 | End: 2021-07-08 | Stop reason: SDUPTHER

## 2021-06-10 RX ORDER — KETOROLAC TROMETHAMINE 30 MG/ML
60 INJECTION, SOLUTION INTRAMUSCULAR; INTRAVENOUS ONCE
Status: COMPLETED | OUTPATIENT
Start: 2021-06-10 | End: 2021-06-10

## 2021-06-10 RX ADMIN — METHYLPREDNISOLONE SODIUM SUCCINATE 125 MG: 125 INJECTION, POWDER, LYOPHILIZED, FOR SOLUTION INTRAMUSCULAR; INTRAVENOUS at 17:00

## 2021-06-10 RX ADMIN — KETOROLAC TROMETHAMINE 60 MG: 30 INJECTION, SOLUTION INTRAMUSCULAR; INTRAVENOUS at 17:00

## 2021-06-10 SDOH — ECONOMIC STABILITY: FOOD INSECURITY: WITHIN THE PAST 12 MONTHS, THE FOOD YOU BOUGHT JUST DIDN'T LAST AND YOU DIDN'T HAVE MONEY TO GET MORE.: SOMETIMES TRUE

## 2021-06-10 SDOH — ECONOMIC STABILITY: FOOD INSECURITY: WITHIN THE PAST 12 MONTHS, YOU WORRIED THAT YOUR FOOD WOULD RUN OUT BEFORE YOU GOT MONEY TO BUY MORE.: SOMETIMES TRUE

## 2021-06-10 ASSESSMENT — SOCIAL DETERMINANTS OF HEALTH (SDOH): HOW HARD IS IT FOR YOU TO PAY FOR THE VERY BASICS LIKE FOOD, HOUSING, MEDICAL CARE, AND HEATING?: VERY HARD

## 2021-06-10 ASSESSMENT — ENCOUNTER SYMPTOMS
BACK PAIN: 1
COLOR CHANGE: 1

## 2021-06-10 NOTE — PROGRESS NOTES
SUBJECTIVE:    Patient ID: Bruce Mcgee is a 56 y.o. male. Chief Complaint   Patient presents with    Groin Pain     hurt himself doing xray at [facility] office    Foot Pain    Back Pain       HPI: office visit  He is in the office today complaining of some groin pain. He says he is try to do an x-ray at Dr. Chilango Mcdonald office about his foot and somehow managed to get twisted in the wheelchair and hurt his groin area. Is particularly worse on the right. He says that he is really struggling with a lot of pain. Is having a lot of low back pain. Continues to have some pain in that foot. He is glad but he said that Dr. Bob Nielsen is saying that  she does feel like it is healing well. His blood sugars have been pretty good. His blood pressures have not been too bad when he had it checked. He denies any chest pain or shortness of breath. Other than the fact that he says he is in a lot of pain he is doing relatively well with his regular medicines. His anxiety continues to be a problem especially since his wife is so ill. Review of Systems   Constitutional: Positive for fatigue. Musculoskeletal: Positive for arthralgias, back pain, gait problem and myalgias. Skin: Positive for color change and rash. Neurological: Positive for weakness. All other systems reviewed and are negative. OBJECTIVE:  BP (!) 146/60   Pulse 67   Temp 98.2 °F (36.8 °C)   Resp 18   Ht 6' 4\" (1.93 m)   Wt (!) 388 lb 9.6 oz (176.3 kg)   SpO2 93% Comment: ra  BMI 47.30 kg/m²    Wt Readings from Last 3 Encounters:   06/15/21 (!) 382 lb 2 oz (173.3 kg)   06/10/21 (!) 388 lb 9.6 oz (176.3 kg)   05/18/21 (!) 402 lb (182.3 kg)     BP Readings from Last 3 Encounters:   06/15/21 134/65   06/10/21 (!) 146/60   05/18/21 (!) 122/58      Pulse Readings from Last 3 Encounters:   06/15/21 74   06/10/21 67   05/18/21 56     Body mass index is 47.3 kg/m².    Resp Readings from Last 3 Encounters:   06/15/21 16   06/10/21 18 05/18/21 16     Past medical, surgical, family and social history were reviewed and updated with the patient. Physical Exam  Vitals and nursing note reviewed. Constitutional:       Appearance: He is well-developed. HENT:      Head: Normocephalic. Cardiovascular:      Rate and Rhythm: Normal rate and regular rhythm. Pulmonary:      Effort: Pulmonary effort is normal.      Breath sounds: Normal breath sounds. Musculoskeletal:      Cervical back: Normal range of motion and neck supple. Lumbar back: Spasms and tenderness present. Decreased range of motion. Skin:     General: Skin is warm and dry. Neurological:      Mental Status: He is alert and oriented to person, place, and time. Psychiatric:         Attention and Perception: Attention and perception normal.         Mood and Affect: Mood is anxious. Affect is tearful. Speech: Speech normal.         Behavior: Behavior normal. Behavior is cooperative. Thought Content:  Thought content normal.         Cognition and Memory: Cognition and memory normal.         Judgment: Judgment normal.          Results in Past 30 Days  Result Component Current Result Ref Range Previous Result Ref Range   Albumin/Globulin Ratio 1.0 (6/15/2021) 0.8 - 2.0 0.9 (6/14/2021) 0.8 - 2.0   Albumin 3.6 (6/15/2021) 3.4 - 4.8 g/dL 3.4 (6/14/2021) 3.4 - 4.8 g/dL   Alkaline Phosphatase 69 (6/15/2021) 25 - 100 U/L 67 (6/14/2021) 25 - 100 U/L   ALT 23 (6/15/2021) 4 - 36 U/L 20 (6/14/2021) 4 - 36 U/L   AST 26 (6/15/2021) 8 - 33 U/L 20 (6/14/2021) 8 - 33 U/L   BUN 16 (6/15/2021) 6 - 20 mg/dL 16 (6/14/2021) 6 - 20 mg/dL   Calcium 9.1 (6/15/2021) 8.5 - 10.5 mg/dL 9.1 (6/14/2021) 8.5 - 10.5 mg/dL   Chloride 96 (L) (6/15/2021) 98 - 107 mmol/L 101 (6/14/2021) 98 - 107 mmol/L   CO2 34 (H) (6/15/2021) 20 - 30 mmol/L 32 (H) (6/14/2021) 20 - 30 mmol/L   CREATININE 0.8 (6/15/2021) 0.4 - 1.2 mg/dL 0.8 (6/14/2021) 0.4 - 1.2 mg/dL   GFR  >59 (6/15/2021) >59 >59 (6/14/2021) >59   GFR Non- >59 (6/15/2021) >59 >59 (6/14/2021) >59   Globulin 3.7 (6/15/2021) g/dL 3.8 (6/14/2021) g/dL   Glucose 302 (H) (6/15/2021) 74 - 106 mg/dL 195 (6/14/2021) mg/dL   Potassium reflex Magnesium 4.2 (6/15/2021) 3.4 - 5.1 mmol/L 4.7 (6/14/2021) 3.4 - 5.1 mmol/L   Sodium 138 (6/15/2021) 136 - 145 mmol/L 139 (6/14/2021) 136 - 145 mmol/L   Total Bilirubin 0.4 (6/15/2021) 0.3 - 1.2 mg/dL 0.3 (6/14/2021) 0.3 - 1.2 mg/dL   Total Protein 7.3 (6/15/2021) 6.4 - 8.3 g/dL 7.2 (6/14/2021) 6.4 - 8.3 g/dL     Hemoglobin A1C (%)   Date Value   06/14/2021 8.3 (H)     Microscopic Examination (no units)   Date Value   06/14/2021 Not Indicated     LDL Calculated (mg/dL)   Date Value   05/04/2021 71       Lab Results   Component Value Date    WBC 6.2 06/15/2021    NEUTROABS 2.8 06/15/2021    HGB 13.3 06/15/2021    HCT 45.7 06/15/2021    HCT 41.6 06/05/2012    MCV 97.2 06/15/2021     06/15/2021     06/05/2012     Lab Results   Component Value Date    TSH 2.12 05/18/2018       ASSESSMENT:    Diagnosis Orders   1. Right inguinal pain     2. Chronic bilateral low back pain with right-sided sciatica  oxyCODONE (OXY-IR) 30 MG immediate release tablet    HYDROcodone-acetaminophen (NORCO)  MG per tablet        PLAN:  Orders Placed This Encounter   Medications    methylPREDNISolone sodium (SOLU-MEDROL) injection 125 mg    ketorolac (TORADOL) injection 60 mg    oxyCODONE (OXY-IR) 30 MG immediate release tablet     Sig: Take 1 tablet by mouth every 4 hours as needed for Pain for up to 30 days.      Dispense:  150 tablet     Refill:  0     Reduce doses taken as pain becomes manageable    HYDROcodone-acetaminophen (NORCO)  MG per tablet     Sig: TAKE ONE TABLET BY MOUTH EVERY 4 HOURS AS NEEDED FOR BREAKTHROUGH PAIN     Dispense:  150 tablet     Refill:  0     Reduce doses taken as pain becomes manageable        Medications Discontinued During This Encounter   Medication Reason    oxyCODONE (OXY-IR) 30 MG immediate release tablet REORDER    HYDROcodone-acetaminophen (NORCO)  MG per tablet REORDER       Controlled Substances Monitoring:      Please note: This chart was generated using Dragon dictation software. Although every effort was made to ensure the accuracy of this automated transcription, some errors in transcription may have occurred.

## 2021-06-10 NOTE — PROGRESS NOTES
Chief Complaint   Patient presents with    Groin Pain     hurt himself doing xray at [facility] office    Foot Pain    Back Pain       Have you seen any other physician or provider since your last visit yes - [facility]    Have you had any other diagnostic tests since your last visit?  yes - foot xray    Have you changed or stopped any medications since your last visit? no

## 2021-06-10 NOTE — PROGRESS NOTES
Administrations This Visit     ketorolac (TORADOL) injection 60 mg     Admin Date  06/10/2021  17:00 Action  Given Dose  60 mg Route  Intramuscular Site  Dorsogluteal Left Administered By  Oneyda Braden MA    Ordering Provider: Miguel Bar MD    NDC: 72729-664-35    Lot#: [id number]    : [facility]    Patient Supplied?: No          methylPREDNISolone sodium (SOLU-MEDROL) injection 125 mg     Admin Date  06/10/2021  17:00 Action  Given Dose  125 mg Route  Intramuscular Site  Dorsogluteal Right Administered By  Oneyda Braden MA    Ordering Provider: Miguel Bar MD    . [address]: 9433-0127-13    Lot#: [id number]    : [id number] [facility]. Patient Supplied?: No              Patient tolerated injection well. Patient advised to wait 20 minutes in the office following the injection. No signs/symptoms of reaction noted after 20 minutes.

## 2021-06-14 ENCOUNTER — HOSPITAL ENCOUNTER (INPATIENT)
Facility: HOSPITAL | Age: 61
LOS: 1 days | Discharge: HOME HEALTH | DRG: 189 | End: 2021-06-15
Attending: HOSPITALIST | Admitting: INTERNAL MEDICINE
Payer: MEDICARE

## 2021-06-14 ENCOUNTER — APPOINTMENT (OUTPATIENT)
Dept: GENERAL RADIOLOGY | Facility: HOSPITAL | Age: 61
DRG: 189 | End: 2021-06-14
Payer: MEDICARE

## 2021-06-14 DIAGNOSIS — R06.89 CO2 NARCOSIS: Primary | ICD-10-CM

## 2021-06-14 DIAGNOSIS — J96.02 ACUTE RESPIRATORY FAILURE WITH HYPOXIA AND HYPERCAPNIA (HCC): ICD-10-CM

## 2021-06-14 DIAGNOSIS — G47.30 SLEEP APNEA, UNSPECIFIED TYPE: ICD-10-CM

## 2021-06-14 DIAGNOSIS — J96.01 ACUTE RESPIRATORY FAILURE WITH HYPOXIA AND HYPERCAPNIA (HCC): ICD-10-CM

## 2021-06-14 DIAGNOSIS — R41.82 ALTERED MENTAL STATUS, UNSPECIFIED ALTERED MENTAL STATUS TYPE: ICD-10-CM

## 2021-06-14 DIAGNOSIS — E66.01 MORBID OBESITY (HCC): ICD-10-CM

## 2021-06-14 LAB
A/G RATIO: 0.9 (ref 0.8–2)
ALBUMIN SERPL-MCNC: 3.4 G/DL (ref 3.4–4.8)
ALP BLD-CCNC: 67 U/L (ref 25–100)
ALT SERPL-CCNC: 20 U/L (ref 4–36)
AMMONIA: 34 MCG/DL (ref 27–102)
AMPHETAMINE SCREEN, URINE: ABNORMAL
ANION GAP SERPL CALCULATED.3IONS-SCNC: 6 MMOL/L (ref 3–16)
AST SERPL-CCNC: 20 U/L (ref 8–33)
BARBITURATE SCREEN URINE: ABNORMAL
BASE EXCESS ARTERIAL: 5.2 MMOL/L (ref -3–3)
BASE EXCESS ARTERIAL: 6.5 MMOL/L (ref -3–3)
BASOPHILS ABSOLUTE: 0 K/UL (ref 0–0.1)
BASOPHILS RELATIVE PERCENT: 0.3 %
BENZODIAZEPINE SCREEN, URINE: POSITIVE
BILIRUB SERPL-MCNC: 0.3 MG/DL (ref 0.3–1.2)
BILIRUBIN URINE: NEGATIVE
BLOOD, URINE: NEGATIVE
BUN BLDV-MCNC: 16 MG/DL (ref 6–20)
BUPRENORPHINE QUAL, URINE: ABNORMAL
CALCIUM SERPL-MCNC: 9.1 MG/DL (ref 8.5–10.5)
CANNABINOID SCREEN URINE: ABNORMAL
CHLORIDE BLD-SCNC: 101 MMOL/L (ref 98–107)
CHP ED QC CHECK: YES
CLARITY: CLEAR
CO2: 32 MMOL/L (ref 20–30)
COCAINE METABOLITE SCREEN URINE: ABNORMAL
COLOR: YELLOW
CREAT SERPL-MCNC: 0.8 MG/DL (ref 0.4–1.2)
EOSINOPHILS ABSOLUTE: 0.4 K/UL (ref 0–0.4)
EOSINOPHILS RELATIVE PERCENT: 5.4 %
FIO2: 0.28 %
FIO2: 0.28 %
FOLATE: 16.4 NG/ML
GFR AFRICAN AMERICAN: >59
GFR NON-AFRICAN AMERICAN: >59
GLOBULIN: 3.8 G/DL
GLUCOSE BLD-MCNC: 195 MG/DL
GLUCOSE BLD-MCNC: 195 MG/DL (ref 74–106)
GLUCOSE BLD-MCNC: 232 MG/DL (ref 74–106)
GLUCOSE BLD-MCNC: 233 MG/DL (ref 74–106)
GLUCOSE BLD-MCNC: 244 MG/DL (ref 74–106)
GLUCOSE URINE: >=1000 MG/DL
HBA1C MFR BLD: 8.3 %
HCO3 ARTERIAL: 32 MMOL/L (ref 22–26)
HCO3 ARTERIAL: 34.1 MMOL/L (ref 22–26)
HCT VFR BLD CALC: 43.4 % (ref 40–54)
HEMOGLOBIN: 13 G/DL (ref 13–18)
IMMATURE GRANULOCYTES #: 0 K/UL
IMMATURE GRANULOCYTES %: 0.5 % (ref 0–5)
KETONES, URINE: NEGATIVE MG/DL
LACTIC ACID: 1.8 MMOL/L (ref 0.4–2)
LEUKOCYTE ESTERASE, URINE: NEGATIVE
LYMPHOCYTES ABSOLUTE: 2.6 K/UL (ref 1.5–4)
LYMPHOCYTES RELATIVE PERCENT: 39.4 %
Lab: ABNORMAL
MCH RBC QN AUTO: 29.1 PG (ref 27–32)
MCHC RBC AUTO-ENTMCNC: 30 G/DL (ref 31–35)
MCV RBC AUTO: 97.3 FL (ref 80–100)
METHADONE SCREEN, URINE: ABNORMAL
METHAMPHETAMINE, URINE: ABNORMAL
MICROSCOPIC EXAMINATION: ABNORMAL
MONOCYTES ABSOLUTE: 0.7 K/UL (ref 0.2–0.8)
MONOCYTES RELATIVE PERCENT: 10.4 %
NEUTROPHILS ABSOLUTE: 2.9 K/UL (ref 2–7.5)
NEUTROPHILS RELATIVE PERCENT: 44 %
NITRITE, URINE: NEGATIVE
O2 SAT, ARTERIAL: 92.9 %
O2 SAT, ARTERIAL: 96 %
O2 THERAPY: ABNORMAL
O2 THERAPY: ABNORMAL
OPIATE SCREEN URINE: POSITIVE
PCO2 ARTERIAL: 57.5 MMHG (ref 35–45)
PCO2 ARTERIAL: 64.4 MMHG (ref 35–45)
PDW BLD-RTO: 13.4 % (ref 11–16)
PERFORMED ON: ABNORMAL
PH ARTERIAL: 7.34 (ref 7.35–7.45)
PH ARTERIAL: 7.36 (ref 7.35–7.45)
PH UA: 5.5 (ref 5–8)
PHENCYCLIDINE SCREEN URINE: ABNORMAL
PLATELET # BLD: 132 K/UL (ref 150–400)
PMV BLD AUTO: 13 FL (ref 6–10)
PO2 ARTERIAL: 70.3 MMHG (ref 80–100)
PO2 ARTERIAL: 83.5 MMHG (ref 80–100)
POTASSIUM REFLEX MAGNESIUM: 4.7 MMOL/L (ref 3.4–5.1)
PRO-BNP: 136 PG/ML (ref 0–1800)
PROPOXYPHENE SCREEN, URINE: ABNORMAL
PROTEIN UA: NEGATIVE MG/DL
RBC # BLD: 4.46 M/UL (ref 4.5–6)
SARS-COV-2, NAAT: NOT DETECTED
SODIUM BLD-SCNC: 139 MMOL/L (ref 136–145)
SPECIFIC GRAVITY UA: 1.01 (ref 1–1.03)
TCO2 ARTERIAL: 33.8 MMOL/L (ref 24–30)
TCO2 ARTERIAL: 36.1 MMOL/L (ref 24–30)
TOTAL CK: 50 U/L (ref 26–174)
TOTAL PROTEIN: 7.2 G/DL (ref 6.4–8.3)
TRICYCLIC, URINE: ABNORMAL
TROPONIN: <0.3 NG/ML
UR OXYCODONE RAPID SCREEN: POSITIVE
URINE REFLEX TO CULTURE: ABNORMAL
URINE TYPE: ABNORMAL
UROBILINOGEN, URINE: 0.2 E.U./DL
VITAMIN B-12: 415 PG/ML (ref 211–911)
WBC # BLD: 6.5 K/UL (ref 4–11)

## 2021-06-14 PROCEDURE — 83880 ASSAY OF NATRIURETIC PEPTIDE: CPT

## 2021-06-14 PROCEDURE — 6360000002 HC RX W HCPCS: Performed by: PHYSICIAN ASSISTANT

## 2021-06-14 PROCEDURE — 82607 VITAMIN B-12: CPT

## 2021-06-14 PROCEDURE — 1200000000 HC SEMI PRIVATE

## 2021-06-14 PROCEDURE — 85025 COMPLETE CBC W/AUTO DIFF WBC: CPT

## 2021-06-14 PROCEDURE — 82746 ASSAY OF FOLIC ACID SERUM: CPT

## 2021-06-14 PROCEDURE — 82550 ASSAY OF CK (CPK): CPT

## 2021-06-14 PROCEDURE — 81003 URINALYSIS AUTO W/O SCOPE: CPT

## 2021-06-14 PROCEDURE — 87635 SARS-COV-2 COVID-19 AMP PRB: CPT

## 2021-06-14 PROCEDURE — 82140 ASSAY OF AMMONIA: CPT

## 2021-06-14 PROCEDURE — 99285 EMERGENCY DEPT VISIT HI MDM: CPT

## 2021-06-14 PROCEDURE — 84484 ASSAY OF TROPONIN QUANT: CPT

## 2021-06-14 PROCEDURE — 83036 HEMOGLOBIN GLYCOSYLATED A1C: CPT

## 2021-06-14 PROCEDURE — 36415 COLL VENOUS BLD VENIPUNCTURE: CPT

## 2021-06-14 PROCEDURE — 71045 X-RAY EXAM CHEST 1 VIEW: CPT

## 2021-06-14 PROCEDURE — 87040 BLOOD CULTURE FOR BACTERIA: CPT

## 2021-06-14 PROCEDURE — 80307 DRUG TEST PRSMV CHEM ANLYZR: CPT

## 2021-06-14 PROCEDURE — 80053 COMPREHEN METABOLIC PANEL: CPT

## 2021-06-14 PROCEDURE — 82803 BLOOD GASES ANY COMBINATION: CPT

## 2021-06-14 PROCEDURE — 2500000003 HC RX 250 WO HCPCS: Performed by: PHYSICIAN ASSISTANT

## 2021-06-14 PROCEDURE — 6370000000 HC RX 637 (ALT 250 FOR IP): Performed by: PHYSICIAN ASSISTANT

## 2021-06-14 PROCEDURE — 83605 ASSAY OF LACTIC ACID: CPT

## 2021-06-14 PROCEDURE — 93005 ELECTROCARDIOGRAM TRACING: CPT

## 2021-06-14 PROCEDURE — 94660 CPAP INITIATION&MGMT: CPT

## 2021-06-14 PROCEDURE — 36600 WITHDRAWAL OF ARTERIAL BLOOD: CPT

## 2021-06-14 RX ORDER — ACETAMINOPHEN 325 MG/1
650 TABLET ORAL EVERY 6 HOURS PRN
Status: DISCONTINUED | OUTPATIENT
Start: 2021-06-14 | End: 2021-06-15 | Stop reason: HOSPADM

## 2021-06-14 RX ORDER — DEXTROSE MONOHYDRATE 25 G/50ML
12.5 INJECTION, SOLUTION INTRAVENOUS PRN
Status: DISCONTINUED | OUTPATIENT
Start: 2021-06-14 | End: 2021-06-15 | Stop reason: HOSPADM

## 2021-06-14 RX ORDER — INSULIN GLARGINE 100 [IU]/ML
80 INJECTION, SOLUTION SUBCUTANEOUS 2 TIMES DAILY
Status: DISCONTINUED | OUTPATIENT
Start: 2021-06-14 | End: 2021-06-15 | Stop reason: HOSPADM

## 2021-06-14 RX ORDER — ALOGLIPTIN 12.5 MG/1
25 TABLET, FILM COATED ORAL DAILY
Status: DISCONTINUED | OUTPATIENT
Start: 2021-06-14 | End: 2021-06-15 | Stop reason: HOSPADM

## 2021-06-14 RX ORDER — DIAZEPAM 2 MG/1
2 TABLET ORAL EVERY 8 HOURS PRN
Status: DISCONTINUED | OUTPATIENT
Start: 2021-06-14 | End: 2021-06-15 | Stop reason: HOSPADM

## 2021-06-14 RX ORDER — ONDANSETRON 4 MG/1
4 TABLET, ORALLY DISINTEGRATING ORAL EVERY 8 HOURS PRN
Status: DISCONTINUED | OUTPATIENT
Start: 2021-06-14 | End: 2021-06-15 | Stop reason: HOSPADM

## 2021-06-14 RX ORDER — IPRATROPIUM BROMIDE AND ALBUTEROL SULFATE 2.5; .5 MG/3ML; MG/3ML
1 SOLUTION RESPIRATORY (INHALATION)
Status: DISCONTINUED | OUTPATIENT
Start: 2021-06-14 | End: 2021-06-14

## 2021-06-14 RX ORDER — CETIRIZINE HYDROCHLORIDE 5 MG/1
5 TABLET ORAL DAILY
Status: DISCONTINUED | OUTPATIENT
Start: 2021-06-14 | End: 2021-06-15 | Stop reason: HOSPADM

## 2021-06-14 RX ORDER — POLYETHYLENE GLYCOL 3350 17 G/17G
17 POWDER, FOR SOLUTION ORAL DAILY PRN
Status: DISCONTINUED | OUTPATIENT
Start: 2021-06-14 | End: 2021-06-15 | Stop reason: HOSPADM

## 2021-06-14 RX ORDER — DEXTROSE MONOHYDRATE 50 MG/ML
100 INJECTION, SOLUTION INTRAVENOUS PRN
Status: DISCONTINUED | OUTPATIENT
Start: 2021-06-14 | End: 2021-06-15 | Stop reason: HOSPADM

## 2021-06-14 RX ORDER — FERROUS SULFATE TAB EC 324 MG (65 MG FE EQUIVALENT) 324 (65 FE) MG
325 TABLET DELAYED RESPONSE ORAL
Status: DISCONTINUED | OUTPATIENT
Start: 2021-06-14 | End: 2021-06-15 | Stop reason: ALTCHOICE

## 2021-06-14 RX ORDER — ONDANSETRON 2 MG/ML
4 INJECTION INTRAMUSCULAR; INTRAVENOUS EVERY 6 HOURS PRN
Status: DISCONTINUED | OUTPATIENT
Start: 2021-06-14 | End: 2021-06-15 | Stop reason: HOSPADM

## 2021-06-14 RX ORDER — ASPIRIN 81 MG/1
81 TABLET ORAL DAILY
Status: DISCONTINUED | OUTPATIENT
Start: 2021-06-14 | End: 2021-06-15 | Stop reason: HOSPADM

## 2021-06-14 RX ORDER — FUROSEMIDE 40 MG/1
40 TABLET ORAL NIGHTLY
Status: DISCONTINUED | OUTPATIENT
Start: 2021-06-14 | End: 2021-06-15 | Stop reason: HOSPADM

## 2021-06-14 RX ORDER — NICOTINE POLACRILEX 4 MG
15 LOZENGE BUCCAL PRN
Status: DISCONTINUED | OUTPATIENT
Start: 2021-06-14 | End: 2021-06-15 | Stop reason: HOSPADM

## 2021-06-14 RX ORDER — HYDROCODONE BITARTRATE AND ACETAMINOPHEN 10; 325 MG/1; MG/1
1 TABLET ORAL EVERY 4 HOURS PRN
Status: DISCONTINUED | OUTPATIENT
Start: 2021-06-14 | End: 2021-06-15 | Stop reason: HOSPADM

## 2021-06-14 RX ORDER — ACETAMINOPHEN 650 MG/1
650 SUPPOSITORY RECTAL EVERY 6 HOURS PRN
Status: DISCONTINUED | OUTPATIENT
Start: 2021-06-14 | End: 2021-06-15 | Stop reason: HOSPADM

## 2021-06-14 RX ORDER — OXYCODONE HYDROCHLORIDE 15 MG/1
30 TABLET ORAL EVERY 6 HOURS PRN
Status: DISCONTINUED | OUTPATIENT
Start: 2021-06-14 | End: 2021-06-15 | Stop reason: HOSPADM

## 2021-06-14 RX ORDER — DULOXETIN HYDROCHLORIDE 30 MG/1
30 CAPSULE, DELAYED RELEASE ORAL DAILY
Status: DISCONTINUED | OUTPATIENT
Start: 2021-06-14 | End: 2021-06-15 | Stop reason: HOSPADM

## 2021-06-14 RX ORDER — IPRATROPIUM BROMIDE AND ALBUTEROL SULFATE 2.5; .5 MG/3ML; MG/3ML
1 SOLUTION RESPIRATORY (INHALATION) EVERY 4 HOURS PRN
Status: DISCONTINUED | OUTPATIENT
Start: 2021-06-14 | End: 2021-06-15 | Stop reason: HOSPADM

## 2021-06-14 RX ORDER — ATORVASTATIN CALCIUM 20 MG/1
20 TABLET, FILM COATED ORAL DAILY
Status: DISCONTINUED | OUTPATIENT
Start: 2021-06-14 | End: 2021-06-15 | Stop reason: HOSPADM

## 2021-06-14 RX ORDER — GABAPENTIN 400 MG/1
800 CAPSULE ORAL 4 TIMES DAILY
Status: DISCONTINUED | OUTPATIENT
Start: 2021-06-14 | End: 2021-06-15 | Stop reason: HOSPADM

## 2021-06-14 RX ORDER — FUROSEMIDE 40 MG/1
80 TABLET ORAL DAILY
Status: DISCONTINUED | OUTPATIENT
Start: 2021-06-14 | End: 2021-06-15 | Stop reason: HOSPADM

## 2021-06-14 RX ORDER — PANTOPRAZOLE SODIUM 40 MG/1
40 TABLET, DELAYED RELEASE ORAL
Status: DISCONTINUED | OUTPATIENT
Start: 2021-06-15 | End: 2021-06-15 | Stop reason: HOSPADM

## 2021-06-14 RX ADMIN — INSULIN LISPRO 1 UNITS: 100 INJECTION, SOLUTION INTRAVENOUS; SUBCUTANEOUS at 20:12

## 2021-06-14 RX ADMIN — FUROSEMIDE 80 MG: 40 TABLET ORAL at 16:26

## 2021-06-14 RX ADMIN — CETIRIZINE HYDROCHLORIDE 5 MG: 5 TABLET ORAL at 16:27

## 2021-06-14 RX ADMIN — INSULIN LISPRO 2 UNITS: 100 INJECTION, SOLUTION INTRAVENOUS; SUBCUTANEOUS at 17:30

## 2021-06-14 RX ADMIN — FUROSEMIDE 40 MG: 40 TABLET ORAL at 20:11

## 2021-06-14 RX ADMIN — MICONAZOLE NITRATE: 2 POWDER TOPICAL at 20:11

## 2021-06-14 RX ADMIN — ENOXAPARIN SODIUM 60 MG: 60 INJECTION SUBCUTANEOUS at 16:26

## 2021-06-14 RX ADMIN — HYDROCODONE BITARTRATE AND ACETAMINOPHEN 1 TABLET: 10; 325 TABLET ORAL at 20:11

## 2021-06-14 RX ADMIN — FERROUS SULFATE TAB EC 324 MG (65 MG FE EQUIVALENT) 325 MG: 324 (65 FE) TABLET DELAYED RESPONSE at 16:27

## 2021-06-14 RX ADMIN — ASPIRIN 81 MG: 81 TABLET, COATED ORAL at 16:27

## 2021-06-14 RX ADMIN — METFORMIN HYDROCHLORIDE 1000 MG: 500 TABLET ORAL at 17:31

## 2021-06-14 RX ADMIN — ALOGLIPTIN 25 MG: 12.5 TABLET, FILM COATED ORAL at 16:26

## 2021-06-14 RX ADMIN — ATORVASTATIN CALCIUM 20 MG: 20 TABLET, FILM COATED ORAL at 16:27

## 2021-06-14 RX ADMIN — INSULIN GLARGINE 80 UNITS: 100 INJECTION, SOLUTION SUBCUTANEOUS at 20:12

## 2021-06-14 RX ADMIN — GABAPENTIN 800 MG: 400 CAPSULE ORAL at 16:27

## 2021-06-14 RX ADMIN — GABAPENTIN 800 MG: 400 CAPSULE ORAL at 20:11

## 2021-06-14 RX ADMIN — DIAZEPAM 2 MG: 2 TABLET ORAL at 20:11

## 2021-06-14 RX ADMIN — OXYCODONE HYDROCHLORIDE 30 MG: 15 TABLET ORAL at 16:28

## 2021-06-14 ASSESSMENT — PAIN SCALES - GENERAL
PAINLEVEL_OUTOF10: 7
PAINLEVEL_OUTOF10: 7
PAINLEVEL_OUTOF10: 8

## 2021-06-14 ASSESSMENT — PAIN DESCRIPTION - FREQUENCY: FREQUENCY: CONTINUOUS

## 2021-06-14 ASSESSMENT — PAIN DESCRIPTION - ORIENTATION: ORIENTATION: LOWER

## 2021-06-14 ASSESSMENT — PAIN DESCRIPTION - DESCRIPTORS: DESCRIPTORS: ACHING

## 2021-06-14 ASSESSMENT — PAIN DESCRIPTION - PAIN TYPE: TYPE: CHRONIC PAIN

## 2021-06-14 ASSESSMENT — PAIN DESCRIPTION - LOCATION: LOCATION: BACK

## 2021-06-14 NOTE — PROGRESS NOTES
Placed call to [facility] [place] to get progress notes, wound care instructions, labs and any radiology notes    Left a msg for them to call or to fax over the documents

## 2021-06-14 NOTE — FLOWSHEET NOTE
06/14/21 1525   Wound 06/14/21 Pedal Anterior;Left;Lateral Left lateral pedal.   Date First Assessed/Time First Assessed: 06/14/21 1525   Present on Hospital Admission: Yes  Primary Wound Type: Diabetic Ulcer  Location: Pedal  Wound Location Orientation: Anterior;Left;Lateral  Wound Description (Comments): Left lateral pedal.   Wound Image     Wound Etiology Diabetic   Dressing Status New drainage noted; Old drainage noted  (Pt reports has been in place for around 1 week. )   Wound Cleansed Irrigated with saline;Cleansed with saline; Soap and water   Dressing/Treatment Alginate with Ag;Non adherent; Foam   Offloading for Diabetic Foot Ulcers Yes (type)   Dressing Change Due 06/16/21   Wound Length (cm) 1.2 cm   Wound Width (cm) 1.4 cm   Wound Depth (cm) 0.3 cm   Wound Surface Area (cm^2) 1.68 cm^2   Wound Volume (cm^3) 0.5 cm^3   Undermining Starts ___ O'Clock 6   Undermining Ends___ O'Clock 7   Undermining Maxium Distance (cm) 0.7   Wound Assessment Pink/red;Slough;Pale granulation tissue   Drainage Amount Small  (dressing had been in place for 1 week.)   Drainage Description Sanguinous; Yellow   Odor Moderate   Krystle-wound Assessment Maceration;Fragile;Blanchable erythema   Margins Unattached edges; Undefined edges

## 2021-06-14 NOTE — ED PROVIDER NOTES
[address] [facility] [address] ENCOUNTER      Pt Name: Roberto Rush  MRN: [id number]  YOB: 1960  Date of evaluation: 6/14/2021  Provider: Juan M Stout, [facility]       Chief Complaint   Patient presents with    Fatigue    Shortness of Breath         HISTORY OF PRESENT ILLNESS  (Location/Symptom, Timing/Onset, Context/Setting, Quality, Duration, Modifying Factors, Severity.)   Roberto Rush is a 56 y.o. male who presents to the emergency department for change in mental status. Patient was brought in by [facility] for call by patient's family that he has had ongoing and worsening altered mental status for approximately the past week. Upon arrival of EMS the patient was basically sitting up on the couch asleep but he did awake easily with tactile verbal stimuli. Patient's pulse ox was 88% on room air. Patient does not use supplemental oxygen at home. States he does not use BiPAP or CPAP machine. Patient does seem a little somnolent. He is able to answer questions but it does seem to take him a little while to get his answers out. He does know that he is at the hospital in [place]. He knew today was Monday. Patient did not know the month. He did not know the year. Patient thought it was 2001 instead of 2021. Thought it was May instead of June. Over the patient does know that Cheyenne Paiz is president. Patient states he is never had any shortness of breath and he does not really understand how his O2 level was low when he feels perfectly fine. Patient admits that he was asleep when EMS showed up states he feels fine now and felt fine then to. However EMS states that family advised that these his symptoms have progressively worsened over the last week. He is also diabetic and uses both oral and insulin management. He is on pain medication at home along with benzodiazepines for chronic issues.   Family states he has had increased lower extremity edema to the bilateral lower extremities. He does have chronic lower extremity pain also. Patient does take oxycodone, hydrocodone, gabapentin and Valium which could alter his mental status. Patient denies any headaches out of ordinary. He denies any fevers or chills. Denies any numbness tingling or weakness of the extremities out of ordinary. Denies any visual changes. Denies any difficulty with speech. Denies any numbness tingling of the face. Denies any facial droop or slurred speech. Denies any change in hearing. Denies any sore throat. Denies any nausea vomiting or diarrhea. Denies any leanne pain out of ordinary. Patient denies any dysuria or change urinary frequency. Denies any head injury or trauma. Nursing notes were reviewed. REVIEW OFSYSTEMS    (2-9 systems for level 4, 10 or more for level 5)   ROS:  General:  No fevers, no chills, no weakness, + somnolence  Cardiovascular:  No chest pain, no palpitations  Respiratory:  No shortness of breath, no cough, no wheezing  Gastrointestinal:  No pain, no nausea, no vomiting, no diarrhea  Musculoskeletal:  + Chronic lower extremity pain, no joint pain  Skin:  No rash, no easy bruising  Neurologic:  No speech problems, no headache, no extremity weakness, + somnolence  Psychiatric:  No anxiety  Genitourinary:  No dysuria, no hematuria    Except as noted above the remainder of the review of systems was reviewed and negative.        PAST MEDICAL HISTORY     Past Medical History:   Diagnosis Date    Asthma     CHF (congestive heart failure), NYHA class I, acute on chronic, combined ([facility] [address] 12/28/2017    Diabetes mellitus ([facility] [address]     Essential hypertension 1/2/2018    Hearing loss     Hyperlipidemia     Low back pain     Obesity     Osteoarthritis of multiple joints 3/22/2017    Right shoulder pain     Shoulder pain, right     Type II or unspecified type diabetes mellitus without mention of complication, not stated as ONE TABLET BY MOUTH NIGHTLY AS NEEDED FOR ALLERGIES    NITROGLYCERIN (NITROSTAT) 0.4 MG SL TABLET    PLACE 1 TABLET UNDER THE TONGUE EVERY 5 MINUTES AS NEEDED FOR CHEST PAIN (MAX 3 DOSES) IF NO RELIEF AFTER 1 DOSE, CALL 911    NYSTATIN (NYSTATIN) 573984 UNIT/GM POWDER    APPLY TOPICALLY 3 TIMES DAILY AS DIRECTED    OXYCODONE (OXY-IR) 30 MG IMMEDIATE RELEASE TABLET    Take 1 tablet by mouth every 4 hours as needed for Pain for up to 30 days. PANTOPRAZOLE (PROTONIX) 40 MG TABLET    Take 1 tablet by mouth daily (with breakfast) In place of omeprazole    PROMETHAZINE (PHENERGAN) 25 MG TABLET    Take 1 tablet by mouth every 6 hours as needed for Nausea    SIMVASTATIN (ZOCOR) 40 MG TABLET    TAKE ONE TABLET BY MOUTH AT BEDTIME FOR CHOLESTEROL    SITAGLIPTIN (JANUVIA) 100 MG TABLET    Take 1 tablet by mouth every morning (before breakfast)    TRIAMCINOLONE (KENALOG) 0.1 % CREAM    Apply topically 2 times daily. 80 gram tube    VITAMIN D (ERGOCALCIFEROL) 1.25 MG (43996 UT) CAPS CAPSULE    Take 1 capsule by mouth once a week       ALLERGIES     Patient has no known allergies.     FAMILY HISTORY       Family History   Problem Relation Age of Onset    Cancer Mother     Depression Mother     Diabetes Mother     Early Death Mother     Heart Disease Mother     Kidney Disease Mother     Stroke Mother     Vision Loss Mother     Cancer Father     Hearing Loss Father     Diabetes Maternal Grandmother     Heart Disease Maternal Grandmother     Cancer Maternal Grandfather           SOCIAL HISTORY       Social History     Socioeconomic History    Marital status:      Spouse name: None    Number of children: None    Years of education: None    Highest education level: None   Occupational History    None   Tobacco Use    Smoking status: Former Smoker     Packs/day: 1.00     Years: 30.00     Pack years: 30.00     Types: Cigarettes     Start date: 5     Quit date: 1998     Years since quittin.7    Smokeless tobacco: Never Used   Substance and Sexual Activity    Alcohol use: No     Alcohol/week: 0.0 standard drinks    Drug use: No    Sexual activity: None   Other Topics Concern    None   Social History Narrative    None     Social Determinants of Health     Financial Resource Strain: High Risk    Difficulty of Paying Living Expenses: Very hard   Food Insecurity: Food Insecurity Present    Worried About Running Out of Food in the Last Year: Sometimes true    Mau of Food in the Last Year: Sometimes true   Transportation Needs:     Lack of Transportation (Medical):  Lack of Transportation (Non-Medical):    Physical Activity:     Days of Exercise per Week:     Minutes of Exercise per Session:    Stress:     Feeling of Stress :    Social Connections:     Frequency of Communication with Friends and Family:     Frequency of Social Gatherings with Friends and Family:     Attends Shinto Services:     Active Member of Clubs or Organizations:     Attends Club or Organization Meetings:     Marital Status:    Intimate Partner Violence:     Fear of Current or Ex-Partner:     Emotionally Abused:     Physically Abused:     Sexually Abused:          PHYSICAL EXAM    (up to 7 for level 4, 8 or more for level 5)     ED Triage Vitals   BP Temp Temp src Pulse Resp SpO2 Height Weight   -- -- -- -- -- -- -- --       Physical Exam  General :Patient is awake, alert, oriented, in no acute distress, nontoxic appearing  HEENT: Pupils are equally round and reactive to light, EOMI, conjunctivae clear. Oral mucosa is moist, no exudate. Uvula is midline  Cardiac: Heart regular rate, rhythm, no murmurs, rubs, or gallops, +1 pitting edema to the bilateral lower extremities with patient is already wearing compression hoses  Lungs: Lungs are clear to auscultation, there is no wheezing, rhonchi, or rales. There is no use of accessory muscles. Abdomen: Abdomen is soft, nontender, nondistended.  There is no firm or pulsatile masses, no rebound rigidity or guarding. Musculoskeletal: 5 out of 5 strength in all 4 extremities. No focal muscle deficits are appreciated  Neuro: Motor intact, sensory intact, level of consciousness is slightly decreased secondary to some mild somnolence but does respond to tactile verbal stimuli easily without any difficulty. , cerebellar function is normal, patient is oriented to person and place variable on time. Dermatology: Skin is warm and dry  Psych: Mentation is grossly normal, cognition is grossly normal. Affect is appropriate. DIAGNOSTIC RESULTS     EKG: All EKG's are interpreted by the Emergency Department Physician who either signs or Co-signs this chart in the [address] a cardiologist.    The EKG interpreted by me shows sinus rhythm. Ventricular rate 54 bpm, NE interval is 132 ms, QRS durations 100 ms, QT/QTc interval is 434/412 ms. No acute T wave inversions concerning for acute myocardial ischemia. No ST elevations concerning for acute myocardial infarction. RADIOLOGY:   Non-plain film images such as CT, Ultrasound and MRI are read by the radiologist. Plain radiographic images are visualized and preliminarily interpreted by the emergency physician with the below findings:      ? Radiologist's Report Reviewed:  XR CHEST PORTABLE   Final Result      Mild linear atelectasis in the left lung base. The lungs are otherwise clear. Top normal heart size.             ED BEDSIDE ULTRASOUND:   Performed by ED Physician - none    LABS:    I have reviewed and interpreted all of the currently available lab results from this visit (ifapplicable):  Results for orders placed or performed during the hospital encounter of 06/14/21   COVID-19, Rapid    Specimen: Nasopharyngeal Swab   Result Value Ref Range    SARS-CoV-2, NAAT Not Detected Not Detected   CBC Auto Differential   Result Value Ref Range    WBC 6.5 4.0 - 11.0 K/uL    RBC 4.46 (L) 4.50 - 6.00 M/uL    Hemoglobin 13.0 13.0 - 18.0 g/dL    Hematocrit 43.4 40.0 - 54.0 %    MCV 97.3 80.0 - 100.0 fL    MCH 29.1 27.0 - 32.0 pg    MCHC 30.0 (L) 31.0 - 35.0 g/dL    RDW 13.4 11.0 - 16.0 %    Platelets 209 (L) 067 - 400 K/uL    MPV 13.0 (H) 6.0 - 10.0 fL    Neutrophils % 44.0 %    Immature Granulocytes % 0.5 0.0 - 5.0 %    Lymphocytes % 39.4 %    Monocytes % 10.4 %    Eosinophils % 5.4 %    Basophils % 0.3 %    Neutrophils Absolute 2.9 2.0 - 7.5 K/uL    Immature Granulocytes # 0.0 K/uL    Lymphocytes Absolute 2.6 1.5 - 4.0 K/uL    Monocytes Absolute 0.7 0.2 - 0.8 K/uL    Eosinophils Absolute 0.4 0.0 - 0.4 K/uL    Basophils Absolute 0.0 0.0 - 0.1 K/uL   Comprehensive Metabolic Panel w/ Reflex to MG   Result Value Ref Range    Sodium 139 136 - 145 mmol/L    Potassium reflex Magnesium 4.7 3.4 - 5.1 mmol/L    Chloride 101 98 - 107 mmol/L    CO2 32 (H) 20 - 30 mmol/L    Anion Gap 6 3 - 16    Glucose 233 (H) 74 - 106 mg/dL    BUN 16 6 - 20 mg/dL    CREATININE 0.8 0.4 - 1.2 mg/dL    GFR Non-African American >59 >59    GFR African American >59 >59    Calcium 9.1 8.5 - 10.5 mg/dL    Total Protein 7.2 6.4 - 8.3 g/dL    Albumin 3.4 3.4 - 4.8 g/dL    Albumin/Globulin Ratio 0.9 0.8 - 2.0    Total Bilirubin 0.3 0.3 - 1.2 mg/dL    Alkaline Phosphatase 67 25 - 100 U/L    ALT 20 4 - 36 U/L    AST 20 8 - 33 U/L    Globulin 3.8 g/dL   Troponin   Result Value Ref Range    Troponin <0.30 <0.30 ng/mL   Brain Natriuretic Peptide   Result Value Ref Range    Pro- 0 - 1,800 pg/mL   Lactic Acid, Plasma   Result Value Ref Range    Lactic Acid 1.8 0.4 - 2.0 mmol/L   Blood Gas, Arterial   Result Value Ref Range    pH, Arterial 7.341 (L) 7.350 - 7.450    pCO2, Arterial 64.4 (H) 35.0 - 45.0 mmHg    pO2, Arterial 70.3 (L) 80.0 - 100.0 mmHg    HCO3, Arterial 34.1 (H) 22.0 - 26.0 mmol/L    Base Excess, Arterial 6.5 (H) -3.0 - 3.0 mmol/L    O2 Sat, Arterial 92.9 >92 %    TCO2, Arterial 36.1 (H) 24.0 - 30.0 mmol/L    O2 Therapy Unknown     FIO2 0.28 Not Established %   POCT Glucose   Result Value Ref Range    Glucose 195 mg/dL    QC OK? yes    POCT Glucose   Result Value Ref Range    POC Glucose 195 (H) 74 - 106 mg/dl    Performed on ACCU-CHEK         All other labs were within normal range or not returned as of this dictation. EMERGENCY DEPARTMENT COURSE and DIFFERENTIAL DIAGNOSIS/MDM:   Vitals:    Vitals:    06/14/21 1130 06/14/21 1145 06/14/21 1146 06/14/21 1154   BP: (!) 123/48 (!) 126/49     Pulse: 52 53     Resp: 12 16  18   Temp:       TempSrc:       SpO2: 95% 94% 94%    Weight:       Height:           MEDICATIONS ADMINISTERED IN ED:  Medications - No data to display    After initial evaluation and examination I did have a conversation with the patient about the upcoming plan, treatment and possible disposition which they were agreeable to the times dictation. Patient advised that we perform portable chest radiograph since he was mildly hypoxic prior to arrival of EMS but his pulse ox was 87% there. Once upon arrival here we turned his oxygen off and he did drop down to 88% which does meet criteria for acute hypoxic respiratory failure. Patient was placed on 2 L nasal cannula is now satting 94%. Patient will have CBC, CMP, troponin, BNP, blood culture x1, lactic acid, UA, urine drug screen, fingerstick glucose, arterial blood gas and a twelve-lead EKG performed. Patient will also have COVID-19 screening performed because I do believe he will require admission to the hospital.  After discussing and speaking with the patient I am going not perform CT scan the head at this time because he does seem to be answering questions appropriately he is just a little confused on the time such as a month and year but he does seem to be acting appropriately he is following simple commands. I do believe the patient's altered mental status is most likely secondary to either hypoxia or a CO2 narcosis.   If these 2 do not seem to be contributing to the patient's the symptoms then we will progress and perform CT scan of the head if needed however we will hold off at this time. Patient resting comfortably stretcher no acute distress nontoxic-appearing. Covid screen was    Blood work show white count 6500, hemoglobin is 13.0, hematocrit is 43.4, platelet count is 739. Finger stick glucose was 195. Comprehensive metabolic panel was benign except for CO2 of 32 which is probably chronic for this patient glucose of 233. Troponin was nondetectable less than 0.30. proBNP was 136. Lactic acid was 1.8. ABG showed pH is 7.341, PCO2 was elevated at 64.4, PO2 was 70.3, bicarb is 34.1, base excess is 6.5.    UA pending at time of admission    Urine drug screen pending at time of admission    Portable chest radiograph read by radiology as mild linear atelectasis in the left lung base. The lungs are otherwise clear. Top normal heart size. Patient's radiological diagnostic studies were discussed with him and he does state his understanding. Patient advised that because of the ABG findings his a CO2 level is a little high and is probably what is contributing to his somnolence and sleepiness. Patient will be placed on BiPAP. Patient was placed on BiPAP and is clinically improved he is awake talking tapping his fingers on the bench as he is no longer somnolent. Discussed the case with on-call hospitalist and she is agreeable to accept the patient for admission here without repeat blood gas since he is been on the BiPAP for less than an hour. Again patient is on multiple medications which could help contribute to somnolence and lead to a CO2 narcosis. Navdeep Domingo accepted the patient for admission for CO2 narcosis and acute hypoxic respiratory failure with pulse ox of 88% or less. CONSULTS:  None    PROCEDURES:  Procedures    CRITICAL CARE TIME    Total Critical Care time was 0 minutes, excluding separately reportable procedures.    There was a high probability of clinically significant/life threatening deterioration in the patient's condition which required my urgent intervention. FINAL IMPRESSION      1. CO2 narcosis    2. Acute respiratory failure with hypoxia and hypercapnia (HCC)          DISPOSITION/PLAN   DISPOSITION        PATIENT REFERRED TO:  No follow-up provider specified. DISCHARGE MEDICATIONS:  New Prescriptions    No medications on file       Comment: Please note this report has been produced using speech recognition software and may contain errorsrelated to that system including errors in grammar, punctuation, and spelling, as well as words and phrases that may be inappropriate. If there are any questions or concerns please feel free to contact the dictating providerfor clarification.     Denise Holt DO  Attending Emergency Physician              Denise Holt DO  06/14/21 2384

## 2021-06-14 NOTE — ED TRIAGE NOTES
Pt arrived by EMS today. EMS states when they arrived he was 87% PSO2 and was placed on 3L NC and went up to 94%  HHN said he was lethargic today and had a hard time waking him. Pts wife states that he has been different the past week. Pt is diabetic, denies any cardiac problems. EMS noted pitting edema in both lower extremities. Ana Millan, student.

## 2021-06-14 NOTE — PROGRESS NOTES
Spoke with dr Jennifer Rousseau ( after office hours)     She stated that her staff is gone for the day but she heard my voice mail and that she would have them fax all the info over first thing in the morning. She stated that the pt was seen last week and is seen at least every 2 wks   Stated pt had a recent xray and in showed NO infection.    Wound care that her office is doing on pts foot is collagen, hydrofera blue, and dry dressing (dr stated just to keep dry and try not to put weight on the foot)      also stated that her office call today to have pt scheduled for a MRI

## 2021-06-14 NOTE — FLOWSHEET NOTE
06/14/21 1630   Assessment   Charting Type Admission   Neurological   Neuro (WDL) WDL   Maddi Coma Scale   Eye Opening 4   Best Verbal Response 5   Best Motor Response 6   Paige Coma Scale Score 15   Cardiac Monitor   Telemetry Monitor On No   Gastrointestinal   Abdominal (WDL) X   Abdomen Inspection Rotund   Last BM (including prior to admit) 06/15/21   Tenderness Soft   RUQ Bowel Sounds Active   LUQ Bowel Sounds Active   RLQ Bowel Sounds Active   LLQ Bowel Sounds Active   Peripheral Vascular   Peripheral Vascular (WDL) X   RLE Edema +1;Pitting   LLE Edema +1;Pitting   Skin Color/Condition   Skin Color/Condition (WDL) WDL   Musculoskeletal   Musculoskeletal (WDL) X   RL Extremity Weakness   LL Extremity Weakness   Genitourinary   Genitourinary (WD) WDL   Wound 06/14/21 Pedal Anterior;Left;Lateral Left lateral pedal.   Date First Assessed/Time First Assessed: 06/14/21 1525   Present on Hospital Admission: Yes  Primary Wound Type: Diabetic Ulcer  Location: Pedal  Wound Location Orientation: Anterior;Left;Lateral  Wound Description (Comments): Left lateral pedal.   Dressing Status Clean;Dry; Intact   Psychosocial   Psychosocial (WD) WDL   Admit to 6-2 from ER with AMS and acute respiratory failure with hypercapnia. Pt awake, alert and oriented to name, place, situation, but not oriented to year. Sats 98% on 2 lpm, but found with oxygen off and pt desat to 85%. Recovered with oxygen back on. Takes meds whole with water. Oriented to room, unit and plan of care.

## 2021-06-14 NOTE — ED NOTES
Dr torres on phone with Sanjeev Mota for possible admission to medical unit     [facility] Duty  06/14/21 6501

## 2021-06-15 VITALS
BODY MASS INDEX: 38.36 KG/M2 | DIASTOLIC BLOOD PRESSURE: 65 MMHG | OXYGEN SATURATION: 92 % | SYSTOLIC BLOOD PRESSURE: 134 MMHG | RESPIRATION RATE: 16 BRPM | HEART RATE: 74 BPM | WEIGHT: 315 LBS | HEIGHT: 76 IN | TEMPERATURE: 98.8 F

## 2021-06-15 PROBLEM — S91.302A OPEN WOUND OF LEFT FOOT: Status: ACTIVE | Noted: 2021-06-15

## 2021-06-15 PROBLEM — F41.9 CHRONIC ANXIETY: Status: ACTIVE | Noted: 2021-06-15

## 2021-06-15 PROBLEM — G47.39 OTHER SLEEP APNEA: Status: ACTIVE | Noted: 2017-03-01

## 2021-06-15 PROBLEM — J96.02 ACUTE RESPIRATORY FAILURE WITH HYPERCAPNIA (HCC): Status: ACTIVE | Noted: 2021-06-15

## 2021-06-15 PROBLEM — G89.29 OTHER CHRONIC PAIN: Status: ACTIVE | Noted: 2021-06-15

## 2021-06-15 LAB
A/G RATIO: 1 (ref 0.8–2)
ALBUMIN SERPL-MCNC: 3.6 G/DL (ref 3.4–4.8)
ALP BLD-CCNC: 69 U/L (ref 25–100)
ALT SERPL-CCNC: 23 U/L (ref 4–36)
ANION GAP SERPL CALCULATED.3IONS-SCNC: 8 MMOL/L (ref 3–16)
AST SERPL-CCNC: 26 U/L (ref 8–33)
BASOPHILS ABSOLUTE: 0 K/UL (ref 0–0.1)
BASOPHILS RELATIVE PERCENT: 0.5 %
BILIRUB SERPL-MCNC: 0.4 MG/DL (ref 0.3–1.2)
BUN BLDV-MCNC: 16 MG/DL (ref 6–20)
CALCIUM SERPL-MCNC: 9.1 MG/DL (ref 8.5–10.5)
CHLORIDE BLD-SCNC: 96 MMOL/L (ref 98–107)
CO2: 34 MMOL/L (ref 20–30)
CREAT SERPL-MCNC: 0.8 MG/DL (ref 0.4–1.2)
EOSINOPHILS ABSOLUTE: 0.4 K/UL (ref 0–0.4)
EOSINOPHILS RELATIVE PERCENT: 6.3 %
GFR AFRICAN AMERICAN: >59
GFR NON-AFRICAN AMERICAN: >59
GLOBULIN: 3.7 G/DL
GLUCOSE BLD-MCNC: 257 MG/DL (ref 74–106)
GLUCOSE BLD-MCNC: 302 MG/DL (ref 74–106)
GLUCOSE BLD-MCNC: 307 MG/DL (ref 74–106)
HCT VFR BLD CALC: 45.7 % (ref 40–54)
HEMOGLOBIN: 13.3 G/DL (ref 13–18)
IMMATURE GRANULOCYTES #: 0 K/UL
IMMATURE GRANULOCYTES %: 0.5 % (ref 0–5)
LYMPHOCYTES ABSOLUTE: 2.3 K/UL (ref 1.5–4)
LYMPHOCYTES RELATIVE PERCENT: 37.8 %
MCH RBC QN AUTO: 28.3 PG (ref 27–32)
MCHC RBC AUTO-ENTMCNC: 29.1 G/DL (ref 31–35)
MCV RBC AUTO: 97.2 FL (ref 80–100)
MONOCYTES ABSOLUTE: 0.7 K/UL (ref 0.2–0.8)
MONOCYTES RELATIVE PERCENT: 10.5 %
NEUTROPHILS ABSOLUTE: 2.8 K/UL (ref 2–7.5)
NEUTROPHILS RELATIVE PERCENT: 44.4 %
PDW BLD-RTO: 13.4 % (ref 11–16)
PERFORMED ON: ABNORMAL
PERFORMED ON: ABNORMAL
PLATELET # BLD: 145 K/UL (ref 150–400)
PMV BLD AUTO: 13.2 FL (ref 6–10)
POTASSIUM REFLEX MAGNESIUM: 4.2 MMOL/L (ref 3.4–5.1)
RBC # BLD: 4.7 M/UL (ref 4.5–6)
SODIUM BLD-SCNC: 138 MMOL/L (ref 136–145)
TOTAL PROTEIN: 7.3 G/DL (ref 6.4–8.3)
WBC # BLD: 6.2 K/UL (ref 4–11)

## 2021-06-15 PROCEDURE — 6360000002 HC RX W HCPCS: Performed by: PHYSICIAN ASSISTANT

## 2021-06-15 PROCEDURE — 6370000000 HC RX 637 (ALT 250 FOR IP): Performed by: PHYSICIAN ASSISTANT

## 2021-06-15 PROCEDURE — 2700000000 HC OXYGEN THERAPY PER DAY

## 2021-06-15 PROCEDURE — 94660 CPAP INITIATION&MGMT: CPT

## 2021-06-15 PROCEDURE — 85025 COMPLETE CBC W/AUTO DIFF WBC: CPT

## 2021-06-15 PROCEDURE — 6370000000 HC RX 637 (ALT 250 FOR IP): Performed by: INTERNAL MEDICINE

## 2021-06-15 PROCEDURE — 80053 COMPREHEN METABOLIC PANEL: CPT

## 2021-06-15 PROCEDURE — 36415 COLL VENOUS BLD VENIPUNCTURE: CPT

## 2021-06-15 RX ORDER — CARISOPRODOL 350 MG/1
350 TABLET ORAL 4 TIMES DAILY PRN
COMMUNITY
End: 2021-07-08

## 2021-06-15 RX ORDER — FERROUS SULFATE TAB EC 324 MG (65 MG FE EQUIVALENT) 324 (65 FE) MG
324 TABLET DELAYED RESPONSE ORAL
Status: DISCONTINUED | OUTPATIENT
Start: 2021-06-15 | End: 2021-06-15 | Stop reason: HOSPADM

## 2021-06-15 RX ORDER — FLUCONAZOLE 100 MG/1
100 TABLET ORAL DAILY
COMMUNITY
Start: 2021-06-10 | End: 2021-06-16

## 2021-06-15 RX ORDER — BRIMONIDINE TARTRATE 0.15 %
1 DROPS OPHTHALMIC (EYE) 2 TIMES DAILY
COMMUNITY
End: 2022-10-10

## 2021-06-15 RX ADMIN — ATORVASTATIN CALCIUM 20 MG: 20 TABLET, FILM COATED ORAL at 08:53

## 2021-06-15 RX ADMIN — DIAZEPAM 2 MG: 2 TABLET ORAL at 08:58

## 2021-06-15 RX ADMIN — PANTOPRAZOLE SODIUM 40 MG: 40 TABLET, DELAYED RELEASE ORAL at 08:53

## 2021-06-15 RX ADMIN — INSULIN GLARGINE 80 UNITS: 100 INJECTION, SOLUTION SUBCUTANEOUS at 08:54

## 2021-06-15 RX ADMIN — INSULIN LISPRO 4 UNITS: 100 INJECTION, SOLUTION INTRAVENOUS; SUBCUTANEOUS at 11:13

## 2021-06-15 RX ADMIN — ENOXAPARIN SODIUM 60 MG: 60 INJECTION SUBCUTANEOUS at 08:53

## 2021-06-15 RX ADMIN — ALOGLIPTIN 25 MG: 12.5 TABLET, FILM COATED ORAL at 08:52

## 2021-06-15 RX ADMIN — CETIRIZINE HYDROCHLORIDE 5 MG: 5 TABLET ORAL at 08:52

## 2021-06-15 RX ADMIN — FUROSEMIDE 80 MG: 40 TABLET ORAL at 08:52

## 2021-06-15 RX ADMIN — FERROUS SULFATE TAB EC 324 MG (65 MG FE EQUIVALENT) 324 MG: 324 (65 FE) TABLET DELAYED RESPONSE at 08:52

## 2021-06-15 RX ADMIN — MICONAZOLE NITRATE: 2 POWDER TOPICAL at 08:51

## 2021-06-15 RX ADMIN — METFORMIN HYDROCHLORIDE 1000 MG: 500 TABLET ORAL at 08:53

## 2021-06-15 RX ADMIN — INSULIN LISPRO 3 UNITS: 100 INJECTION, SOLUTION INTRAVENOUS; SUBCUTANEOUS at 08:54

## 2021-06-15 RX ADMIN — ASPIRIN 81 MG: 81 TABLET, COATED ORAL at 08:51

## 2021-06-15 RX ADMIN — DULOXETINE HYDROCHLORIDE 30 MG: 30 CAPSULE, DELAYED RELEASE ORAL at 08:53

## 2021-06-15 RX ADMIN — OXYCODONE HYDROCHLORIDE 30 MG: 15 TABLET ORAL at 08:51

## 2021-06-15 RX ADMIN — GABAPENTIN 800 MG: 400 CAPSULE ORAL at 08:53

## 2021-06-15 ASSESSMENT — PAIN SCALES - GENERAL: PAINLEVEL_OUTOF10: 8

## 2021-06-15 NOTE — CARE COORDINATION
06/15/21 1038   Discharge [address] Prior To Admission [address]   DME Cane;Glucometer   [address]   (unsure)   Potential Assistance Purchasing Medications No   Type of [facility]  (has wound care once weekly)   Patient expects to be discharged to: home   Expected Discharge Date 06/15/21   Discharge planning completed with pt at bedside. Pt states he has no discharge needs for home at this time and wants to go home. Pt states he has home health that comes and changes his dressing on his LLE one time a week but can't remember name of company.

## 2021-06-15 NOTE — FLOWSHEET NOTE
06/14/21 2026   Assessment   Charting Type Shift assessment   Neurological   Neuro (WDL) WDL   Level of Consciousness Alert (0)  (however drowsy at times, drifts to resting with eyes closed, easily awakes to verba. )   Maddi Coma Scale   Eye Opening 4   Best Verbal Response 5   Best Motor Response 6   [place] Coma Scale Score 15   Respiratory   Respiratory Quality/Effort Dyspnea with exertion;Unlabored   Cardiac Monitor   Telemetry Monitor On No   Gastrointestinal   Abdominal (WDL) X   Abdomen Inspection Rotund   Last BM (including prior to admit) 06/14/21   Tenderness Soft   RUQ Bowel Sounds Active   LUQ Bowel Sounds Active   RLQ Bowel Sounds Active   LLQ Bowel Sounds Active   Peripheral Vascular   Peripheral Vascular (WDL) X   RLE Edema +1;Pitting   LLE Edema +1;Pitting   Skin Color/Condition   Skin Color/Condition (WDL) WDL   Musculoskeletal   Musculoskeletal (WDL) X   RL Extremity Weakness   LL Extremity Weakness   Genitourinary   Genitourinary (WDL) WDL   Wound 06/14/21 Pedal Anterior;Left;Lateral Left lateral pedal.   Date First Assessed/Time First Assessed: 06/14/21 1525   Present on Hospital Admission: Yes  Primary Wound Type: Diabetic Ulcer  Location: Pedal  Wound Location Orientation: Anterior;Left;Lateral  Wound Description (Comments): Left lateral pedal.   Wound Etiology Diabetic   Dressing Status Clean;Dry; Intact   Dressing/Treatment Alginate with Ag;Non adherent; Foam   Offloading for Diabetic Foot Ulcers Yes (type)   Dressing Change Due 06/16/21   Psychosocial   Psychosocial (WDL) WDL

## 2021-06-15 NOTE — FLOWSHEET NOTE
06/15/21 1024   Assessment   Charting Type Shift assessment   Neurological   Neuro (WDL) WDL   Level of Consciousness Alert (0)  (however drowsy at times, drifts to resting with eyes closed, easily awakes to verba. )   Maddi Coma Scale   Eye Opening 4   Best Verbal Response 5   Best Motor Response 6   [place] Coma Scale Score 15   Respiratory   Respiratory Quality/Effort Dyspnea with exertion;Unlabored   Cardiac Monitor   Telemetry Monitor On No   Gastrointestinal   Abdominal (WDL) X   Abdomen Inspection Rotund   Tenderness Soft   RUQ Bowel Sounds Active   LUQ Bowel Sounds Active   RLQ Bowel Sounds Active   LLQ Bowel Sounds Active   Peripheral Vascular   Peripheral Vascular (WDL) X   RLE Edema +1;Pitting   LLE Edema +1;Pitting   Skin Color/Condition   Skin Color/Condition (WDL) WDL   Musculoskeletal   Musculoskeletal (WDL) X   RL Extremity Weakness   LL Extremity Weakness   Genitourinary   Genitourinary (WDL) WDL   Wound 06/14/21 Pedal Anterior;Left;Lateral Left lateral pedal.   Date First Assessed/Time First Assessed: 06/14/21 1525   Present on Hospital Admission: Yes  Primary Wound Type: Diabetic Ulcer  Location: Pedal  Wound Location Orientation: Anterior;Left;Lateral  Wound Description (Comments): Left lateral pedal.   Wound Etiology Diabetic   Dressing Status Clean;Dry; Intact   Dressing/Treatment Alginate with Ag;Non adherent; Foam   Offloading for Diabetic Foot Ulcers Yes (type)   Dressing Change Due 06/16/21   Psychosocial   Psychosocial (WDL) WDL   Pt up in room. Ambulatory. O2 on at 2L/nc. No resp distress noted. MRI ordered for Left foot, but due to pt wt exceeding 350 lbs, is unable to have. PA notified.

## 2021-06-15 NOTE — CARE COORDINATION
Talked to Aditya at [facility] about pt qualifying for a Trilogy machine based on his ABGs. [place] will review pt information with insurance and let us know if he qualifies. Faxed pt information to Aditya at [facility].

## 2021-06-15 NOTE — FLOWSHEET NOTE
Pt instructed on discharge, new meds, resumption and discontinued meds, F/U appts. Take home instructions given, reviewed, and pt verbalizes understanding to all. Copies of each in hand at time of departure. IV removed and no complications noted.

## 2021-06-15 NOTE — H&P
Short Stay Summary      Patient ID: Consuelo Cervantes      Patient's PCP: Miguel Bar MD    Admit Date: 6/14/2021     Discharge Date:   6/15/2021    Admitting Physician: Jacob Mccracken MD    Discharge Physician: MITCHELL Loo     Reason for this admission:   Altered mental status    Discharge Diagnoses: Active Hospital Problems    Diagnosis Date Noted    Acute respiratory failure with hypercapnia ([facility] [address] [J96.02] 06/15/2021    Other chronic pain [G89.29] 06/15/2021    Chronic anxiety [F41.9] 06/15/2021    Open wound of left foot [S91.302A] 06/15/2021    Altered mental status [R41.82] 06/14/2021    Essential hypertension [I10] 01/02/2018    Morbid obesity ([address] [E66.01] 12/30/2017    Other sleep apnea [G47.39] 03/01/2017    Type 2 diabetes mellitus with diabetic polyneuropathy ([facility] [address] [E11.42] 08/17/2015       Procedures:  XR CHEST PORTABLE   Final Result      Mild linear atelectasis in the left lung base. The lungs are otherwise clear. Top normal heart size. Consults:   IP CONSULT TO DIETITIAN    HISTORY OF PRESENT ILLNESS:   The patient is a 56 y.o. male with PMH of CHF, DM II, HTN, obesity, chronic pain, chronic anxiety  Who presents due to altered mental status. Patient states he has been feeling more confused and \"cloudy headed\" since he had COVID several months ago and it seems to have gotten worse recently. Yesterday morning he says that the home health worker felt like he was more confused and called the ambulance. When he arrived to the ED he was \"sort of out of it but remembers most of what happened. \" He denies dizziness or headache. No difficulty walking. He denies fever, sweats, chills. No chest pain, SOA, cough, abdominal pain, urinary symptoms. No lower extremity edema. States he only takes his fluid pill as needed. Per ED physician patient was placed on bipap for hypercapnia and mental status improved.  Patient wore bipap overnight and states he feels better today Softclix Lancets MISC fsbs daily 6/17/20  Yes Olive Medrano MD   aspirin 81 MG tablet Take 81 mg by mouth daily. Yes Historical Provider, MD       Vital Signs  Temp: 98.8 °F (37.1 °C)  Pulse: 74  Resp: 16  BP: 134/65  SpO2: 92 %  O2 Device: None (Room air)    Vital signs reviewed in electronic chart. Physical exam  Constitutional:  Well developed, well nourished, morbidly obese, no acute distress. Sitting up in chair. Eyes:  PERRL, conjunctiva normal, EOMI. HENT:  Atraumatic, external ears normal, external nose/nares normal, oropharynx moist, no pharyngeal exudates. Neck:  Supple. No JVD or thyromegaly. Respiratory:  No respiratory distress on room air, normal breath sounds, no rales, no wheezing. Cardiovascular:  Normal rate, normal rhythm, no murmurs, no gallops, no rubs. GI:  Soft, nondistended, obese, normal bowel sounds, nontender, no organomegaly, no mass. Musculoskeletal:  No edema, no tenderness, no obvious deformities. Patient is moving all extremities. Integument:  Well hydrated, no rash. Neurologic:  Alert & oriented x 3,  no focal deficits noted. Strength is equal throughout. Answers all questions appropriately. Follows commands. Psychiatric:  Speech and behavior appropriate. Lab Results   Component Value Date    WBC 6.2 06/15/2021    HGB 13.3 06/15/2021    HCT 45.7 06/15/2021    MCV 97.2 06/15/2021     (L) 06/15/2021       Lab Results   Component Value Date     06/15/2021    K 4.2 06/15/2021    CL 96 (L) 06/15/2021    CO2 34 (H) 06/15/2021    BUN 16 06/15/2021    CREATININE 0.8 06/15/2021    GLUCOSE 302 (H) 06/15/2021    CALCIUM 9.1 06/15/2021    PROT 7.3 06/15/2021    LABALBU 3.6 06/15/2021    BILITOT 0.4 06/15/2021    ALKPHOS 69 06/15/2021    AST 26 06/15/2021    ALT 23 06/15/2021    LABGLOM >59 06/15/2021    GFRAA >59 06/15/2021    AGRATIO 1.0 06/15/2021    GLOB 3.7 06/15/2021        Assessment and Plan/Hospital course:      Active Hospital Problems Diagnosis Date Noted    Acute respiratory failure with hypercapnia (HCC) [J96.02] 06/15/2021    Other chronic pain [G89.29] 06/15/2021    Chronic anxiety [F41.9] 06/15/2021    Open wound of left foot [S91.302A] 06/15/2021    Altered mental status [R41.82] 06/14/2021    Essential hypertension [I10] 01/02/2018    Morbid obesity ([facility] [address] [E66.01] 12/30/2017    Other sleep apnea [G47.39] 03/01/2017    Type 2 diabetes mellitus with diabetic polyneuropathy ([facility] [address] [E11.42] 08/17/2015     Mr. Venu Dukes is a 78-year-old gentleman with past medical history of morbid obesity BMI 46.5, chronic pain, chronic anxiety, hypertension, diabetes with neuropathy, sleep apnea noncompliant with CPAP, peripheral vascular disease, chronic left foot wound who presents due to altered mental status from home. Work-up revealed hypercapnic respiratory failure. Patient was placed on BiPAP with improvement in mental status. Also noted to be on several potential sedating medications including Soma 350 mg 4 times a day as needed for muscle spasms, oxycodone 30 mg every 4 hours as needed for pain, Norco  mg every 4 hours as needed for pain, Valium 5 mg every 8 hours as needed for anxiety or sleep, Phenergan, gabapentin 800 mg 4 times a day. During patient's admission for altered mental status his regimen was decreased. According to [facility] patient only required Oxycodone 30 mg on 6/14 @ 1628 and 6/15 @ 851. Norco 10 was only dispensed on 6/14 @ 2011 and Valium was ordered at 2 mg which he took on 6/14 @ 2011 and 6/15 @ 858. Mental status has improved and at his baseline today and suspect this can be attributed to decreased sedative medications as well as bipap therapy for hypercapnia. Patient's repeat ABG was improved as well. He does have noted history of sleep apnea but states no sleep study in years and he does not use cpap or bipap at home.   Consulted  who is attempting to assist patient with acquiring trilogy machine at home. If this is not possible he needs overnight pulse oximetry and sleep study as outpatient. He would likely benefit from decrease in chronic pain and anxiety regimen especially since he did not require as often as prescribed during his observed stay here and is not having any uncontrolled pain or anxiety symptoms. Defer this to pcp. Discussed all of the above with patient and he expresses understanding. In regards to his chronic left foot wound, did obtain records from podiatrist Dr. Nicho Dasilva who follows closely. Recent xray without definitive evidence of osteomyelitis but recommended MRI for further evaluation. Did attempt to obtain MRI at this facility since Dr. Nicho Dasilva had given patient an order for this and he is at this facility but unfortunatley patient exceeded the weight limit for our machine. He states he has had to go to [facility] for MRI in the past and that's what he will need to do this time as well. Per podiatry note Dr. Jeananne Phoenix staff is working on setting this up for the patient. He will resume wound care and boot as previously ordered by podiatry. Continue home health. Disposition: home with home health   Discharged Condition: Stable  Activity: activity as tolerated  Diet: cardiac diet and diabetic diet  Follow Up: Primary Care Physician in one week   has placed referral for trilogy to be set up as outpatient to be used with naps and bedtime. If this is not possible then patient needs to have overnight pulse oximetry performed (provided an order on discharge to schedule if needed) as well as sleep study for suspected sleep apnea. Continue to monitor fluid balance. Patient states he takes lasix on as needed basis only. If you have worsening swelling in your legs or weight gain > 3 lbs recommend taking lasix as ordered.    Continue Wound care per Dr. Jeananne Phoenix orders and continue BK boot as ordered by Dr. Nicho Dasilva    Discharge Medications:      Current Discharge Medication List Details   fluconazole (DIFLUCAN) 100 MG tablet Take 100 mg by mouth daily      brimonidine (ALPHAGAN P) 0.15 % ophthalmic solution Place 1 drop into both eyes 2 times daily      carisoprodol (SOMA) 350 MG tablet Take 350 mg by mouth 4 times daily as needed for Muscle spasms. oxyCODONE (OXY-IR) 30 MG immediate release tablet Take 1 tablet by mouth every 4 hours as needed for Pain for up to 30 days. Qty: 150 tablet, Refills: 0    Comments: Reduce doses taken as pain becomes manageable  Associated Diagnoses: Chronic bilateral low back pain with right-sided sciatica      HYDROcodone-acetaminophen (NORCO)  MG per tablet TAKE ONE TABLET BY MOUTH EVERY 4 HOURS AS NEEDED FOR BREAKTHROUGH PAIN  Qty: 150 tablet, Refills: 0    Comments: Reduce doses taken as pain becomes manageable  Associated Diagnoses: Chronic bilateral low back pain with right-sided sciatica      nystatin (NYSTATIN) 841165 UNIT/GM powder APPLY TOPICALLY 3 TIMES DAILY AS DIRECTED  Qty: 60 g, Refills: 5      diazePAM (VALIUM) 5 MG tablet TAKE ONE TABLET BY MOUTH EVERY 8 HOURS AS NEEDED FOR ANXIETY OR SLEEP  Qty: 60 tablet, Refills: 1    Associated Diagnoses: Anxiety      vitamin D (ERGOCALCIFEROL) 1.25 MG (79303 UT) CAPS capsule Take 1 capsule by mouth once a week  Qty: 12 capsule, Refills: 1      DULoxetine (CYMBALTA) 30 MG extended release capsule Take 1 capsule by mouth daily  Qty: 30 capsule, Refills: 1      nitroGLYCERIN (NITROSTAT) 0.4 MG SL tablet PLACE 1 TABLET UNDER THE TONGUE EVERY 5 MINUTES AS NEEDED FOR CHEST PAIN (MAX 3 DOSES) IF NO RELIEF AFTER 1 DOSE, CALL 911  Qty: 25 tablet, Refills: 0      gabapentin (NEURONTIN) 800 MG tablet Take one tablet by mouth four times a day  Qty: 120 tablet, Refills: 2    Associated Diagnoses: Chronic bilateral low back pain with right-sided sciatica      ! ! blood glucose test strips (ACCU-CHEK GUIDE) strip TEST BLOOD SUGAR 3 TIMES A DAY & AS NEEDED  Qty: 100 strip, Refills: 11      !!  Accu-Chek Softclix Lancets MISC TEST BLOOD SUGAR THREE TIMES A DAY  Qty: 100 each, Refills: 5      empagliflozin (JARDIANCE) 25 MG tablet TAKE ONE TABLET BY MOUTH DAILY  Qty: 90 tablet, Refills: 3    Comments: 340B plan      SITagliptin (JANUVIA) 100 MG tablet Take 1 tablet by mouth every morning (before breakfast)  Qty: 90 tablet, Refills: 3    Comments: 340B Program Please      ferrous sulfate (FEROSUL) 325 (65 Fe) MG tablet TAKE ONE TABLET BY MOUTH THREE TIMES A DAY WITH MEALS  Qty: 30 tablet, Refills: 5      pantoprazole (PROTONIX) 40 MG tablet Take 1 tablet by mouth daily (with breakfast) In place of omeprazole  Qty: 30 tablet, Refills: 5      montelukast (SINGULAIR) 10 MG tablet TAKE ONE TABLET BY MOUTH NIGHTLY AS NEEDED FOR ALLERGIES  Qty: 30 tablet, Refills: 5      simvastatin (ZOCOR) 40 MG tablet TAKE ONE TABLET BY MOUTH AT BEDTIME FOR CHOLESTEROL  Qty: 30 tablet, Refills: 5      metFORMIN (GLUCOPHAGE) 1000 MG tablet TAKE ONE TABLET BY MOUTH TWICE A DAY FOR SUGAR CONTROL  Qty: 60 tablet, Refills: 5      loratadine (CLARITIN) 10 MG tablet Take one tablet by mouth daily as needed for allergies  Qty: 30 tablet, Refills: 5      furosemide (LASIX) 80 MG tablet TAKE ONE TABLET BY MOUTH EVERY MORNING AND 1TABLET IN THE PM(FLUIDPILL)  Qty: 45 tablet, Refills: 5      Insulin Pen Needle 30G X 8 MM MISC 1 each by Does not apply route 2 times daily DX:  E11.9  Qty: 100 each, Refills: 0      blood glucose monitor kit and supplies Dispense sufficient amount for indicated testing frequency. Qty: 1 kit, Refills: 0    Comments: Brand per patient preference. May round up to next available package size. insulin detemir (LEVEMIR FLEXTOUCH) 100 UNIT/ML injection pen INJECT 100 UNITS UNDER THE SKIN TWO TIMES A DAY M&W 340B Patient  Qty: 180 mL, Refills: 2      !! blood glucose test strips (ACCU-CHEK SYLVESTER PLUS) strip TEST BLOOD SUGAR THREE TIMES A DAY AND AS NEEDED  Qty: 100 strip, Refills: 5      !!  Accu-Chek Softclix Lancets MISC fsbs daily  Qty: 100 each, Refills: 3      aspirin 81 MG tablet Take 81 mg by mouth daily. !! - Potential duplicate medications found. Please discuss with provider. Patient was seen and examined by Dr. Magda Hinton and plan of care reviewed. Signed:  Electronically signed by MITCHELL Callahan on 6/15/2021 at 12:21 PM       Thank you Olive Medrano MD for the opportunity to be involved in this patient's care. If you have any questions or concerns please feel free to contact me at [phone].

## 2021-06-15 NOTE — CARE COORDINATION
Due to the progression of chronic respiratory failure with hypercapnia, the patient requires a Non-Invasive Ventilation. The NIV mode AVAPS-AE will reduce the patient's risk of hospital readmissions and improve her quality of life. AVAPS-AE mode will let the patient completely exhale before giving another breath and give the patient a set tidal volume. For this reason, the BiPAP has been ruled out.

## 2021-06-15 NOTE — PROGRESS NOTES
I reviewed the patient's medication list from [facility] and [address] [place] [address]. I also spoke with the patient about his medications. I added the following medications per pharmacy fill history:  -carisoprodol 350 mg tablets   -fluconazole 100 mg tablet filled 6/10; take one tablet by mouth daily for 7 days     I changed the following medications per pharmacy fill history:   -brimonidine 0.15% from 0.15 drop to 1 drop     I removed the following medications per pharmacy fill history:  -triamcinolone 0.1% cream: pharmacy has not filled this medication in the last 6 months     Of note, patient states he is unsure whether he has been taking Prilosec or Protonix at home but pharmacy fill history shows Protonix has been regularly filled. Patient also states he does not take Cymbalta but pharmacy fill history shows it has been regularly filled. Lastly, per KITTY the pt has been filling the norco and oxycodone several days early the past few months. Notified provider.     Jessie Araujo, pharmacy student

## 2021-06-15 NOTE — PLAN OF CARE
Problem: Falls - Risk of:  Goal: Will remain free from falls  Description: Will remain free from falls  6/15/2021 1027 by Anushka Chang RN  Outcome: Ongoing  6/14/2021 2132 by Oni Sorto RN  Outcome: Ongoing     Problem: Skin Integrity:  Goal: Will show no infection signs and symptoms  Description: Will show no infection signs and symptoms  6/15/2021 1027 by Anushka Chang RN  Outcome: Ongoing

## 2021-06-15 NOTE — DISCHARGE SUMMARY
Short Stay Summary      Patient ID: Bruce Mcgee      Patient's PCP: Mary Acevedo MD    Admit Date: 6/14/2021     Discharge Date:   6/15/2021    Admitting Physician: Sergey Waldrop MD    Discharge Physician: MITCHELL Girard     Reason for this admission:   Altered mental status    Discharge Diagnoses: Active Hospital Problems    Diagnosis Date Noted    Acute respiratory failure with hypercapnia ([facility] [address] [J96.02] 06/15/2021    Other chronic pain [G89.29] 06/15/2021    Chronic anxiety [F41.9] 06/15/2021    Open wound of left foot [S91.302A] 06/15/2021    Altered mental status [R41.82] 06/14/2021    Essential hypertension [I10] 01/02/2018    Morbid obesity ([facility] [address] [E66.01] 12/30/2017    Other sleep apnea [G47.39] 03/01/2017    Type 2 diabetes mellitus with diabetic polyneuropathy ([facility] [address] [E11.42] 08/17/2015       Procedures:  XR CHEST PORTABLE   Final Result      Mild linear atelectasis in the left lung base. The lungs are otherwise clear. Top normal heart size. Consults:   IP CONSULT TO DIETITIAN    HISTORY OF PRESENT ILLNESS:   The patient is a 56 y.o. male with PMH of CHF, DM II, HTN, obesity, chronic pain, chronic anxiety  Who presents due to altered mental status. Patient states he has been feeling more confused and \"cloudy headed\" since he had COVID several months ago and it seems to have gotten worse recently. Yesterday morning he says that the home health worker felt like he was more confused and called the ambulance. When he arrived to the ED he was \"sort of out of it but remembers most of what happened. \" He denies dizziness or headache. No difficulty walking. He denies fever, sweats, chills. No chest pain, SOA, cough, abdominal pain, urinary symptoms. No lower extremity edema. States he only takes his fluid pill as needed. Per ED physician patient was placed on bipap for hypercapnia and mental status improved.  Patient wore bipap overnight and states he feels better today than he has in quite awhile. Also pain and anxiety medication regimen was decreased slightly and he seemed to tolerate this well. No acute distress or complaint of pain today. Of note he Reports he has had a sleep study before but it was at least 1 or more years ago. Does not use cpap or bipap at home. Does not use oxygen at home. Review of system     Constitutional:  Denies fever or chills. Eyes:  Denies change in visual acuity or discharge. HENT:  Denies nasal congestion or sore throat. Respiratory:  Denies cough or shortness of breath. Cardiovascular:  Denies chest pain, palpitation or swelling in LEs. GI:  Denies abdominal pain, nausea, vomiting, bloody stools or diarrhea. :  Denies dysuria or frequency. Musculoskeletal:  Positive for chronic arthralgias and back pain. Integument:  Denies rash or itching. Neurologic:  Denies headache, focal weakness or sensory changes. Positive for AMS- improved  Psychiatric:  Denies depression or anxiety. Past Medical History:      Diagnosis Date    Asthma     CHF (congestive heart failure), NYHA class I, acute on chronic, combined ([facility] [address] 12/28/2017    Diabetes mellitus ([facility] [address]     Essential hypertension 1/2/2018    Hearing loss     Hyperlipidemia     Low back pain     Obesity     Osteoarthritis of multiple joints 3/22/2017    Right shoulder pain     Shoulder pain, right     Type II or unspecified type diabetes mellitus without mention of complication, not stated as uncontrolled        Past Surgical History:      Procedure Laterality Date    TOE SURGERY         Social History:   TOBACCO:   reports that he quit smoking about 22 years ago. His smoking use included cigarettes. He started smoking about 51 years ago. He has a 30.00 pack-year smoking history. He has never used smokeless tobacco.  ETOH:   reports no history of alcohol use.   OCCUPATION:  None     Family History:       Problem Relation Age of Onset   Estela López Cancer Mother     Depression Mother  Diabetes Mother     Early Death Mother     Heart Disease Mother     Kidney Disease Mother     Stroke Mother     Vision Loss Mother     Cancer Father     Hearing Loss Father     Diabetes Maternal Grandmother     Heart Disease Maternal Grandmother     Cancer Maternal Grandfather        Allergies:  Patient has no known allergies. Medications Prior to Admission:    Prior to Admission medications    Medication Sig Start Date End Date Taking? Authorizing Provider   fluconazole (DIFLUCAN) 100 MG tablet Take 100 mg by mouth daily 6/10/21 6/16/21 Yes Historical Provider, MD   brimonidine (ALPHAGAN P) 0.15 % ophthalmic solution Place 1 drop into both eyes 2 times daily   Yes Historical Provider, MD   carisoprodol (SOMA) 350 MG tablet Take 350 mg by mouth 4 times daily as needed for Muscle spasms. Yes Historical Provider, MD   oxyCODONE (OXY-IR) 30 MG immediate release tablet Take 1 tablet by mouth every 4 hours as needed for Pain for up to 30 days.  6/10/21 7/10/21 Yes Lopez Li MD   HYDROcodone-acetaminophen ([facility])  MG per tablet TAKE ONE TABLET BY MOUTH EVERY 4 HOURS AS NEEDED FOR BREAKTHROUGH PAIN 6/10/21 7/9/21 Yes Lopez Li MD   nystatin (NYSTATIN) 059686 UNIT/GM powder APPLY TOPICALLY 3 TIMES DAILY AS DIRECTED 5/20/21  Yes Lopez Li MD   diazePAM (VALIUM) 5 MG tablet TAKE ONE TABLET BY MOUTH EVERY 8 HOURS AS NEEDED FOR ANXIETY OR SLEEP 5/18/21 6/18/21 Yes Lopez Li MD   vitamin D (ERGOCALCIFEROL) 1.25 MG (34781 UT) CAPS capsule Take 1 capsule by mouth once a week 5/18/21  Yes Lopez Li MD   DULoxetine (CYMBALTA) 30 MG extended release capsule Take 1 capsule by mouth daily 5/18/21  Yes Lopez Li MD   nitroGLYCERIN (NITROSTAT) 0.4 MG SL tablet PLACE 1 TABLET UNDER THE TONGUE EVERY 5 MINUTES AS NEEDED FOR CHEST PAIN (MAX 3 DOSES) IF NO RELIEF AFTER 1 DOSE, CALL 911 5/11/21  Yes Lopez Li MD   gabapentin (NEURONTIN) 800 MG tablet Take one tablet by mouth four times a day 4/30/21 7/28/21 Yes Tessa Manriquez MD   blood glucose test strips (ACCU-CHEK GUIDE) strip TEST BLOOD SUGAR 3 TIMES A DAY & AS NEEDED 4/28/21  Yes Tessa Manriquez MD   Accu-Chek Softclix Lancets MISC TEST BLOOD SUGAR THREE TIMES A DAY 4/1/21  Yes Tessa Manriquez MD   empagliflozin (JARDIANCE) 25 MG tablet TAKE ONE TABLET BY MOUTH DAILY 3/15/21  Yes Tessa Manriquez MD   SITagliptin (JANUVIA) 100 MG tablet Take 1 tablet by mouth every morning (before breakfast) 3/15/21  Yes Tessa Manriquez MD   ferrous sulfate (FEROSUL) 325 (65 Fe) MG tablet TAKE ONE TABLET BY MOUTH THREE TIMES A DAY WITH MEALS 3/15/21  Yes Tessa Manriquez MD   pantoprazole (PROTONIX) 40 MG tablet Take 1 tablet by mouth daily (with breakfast) In place of omeprazole 3/15/21  Yes Tessa Manriquez MD   montelukast (SINGULAIR) 10 MG tablet TAKE ONE TABLET BY MOUTH NIGHTLY AS NEEDED FOR ALLERGIES 3/15/21  Yes Tessa Manriquez MD   simvastatin (ZOCOR) 40 MG tablet TAKE ONE TABLET BY MOUTH AT BEDTIME FOR CHOLESTEROL 3/15/21  Yes Tessa Manriquez MD   metFORMIN (GLUCOPHAGE) 1000 MG tablet TAKE ONE TABLET BY MOUTH TWICE A DAY FOR SUGAR CONTROL 3/15/21  Yes Tessa Manriquez MD   loratadine (CLARITIN) 10 MG tablet Take one tablet by mouth daily as needed for allergies 3/15/21  Yes Tessa Manriquez MD   furosemide (LASIX) 80 MG tablet TAKE ONE TABLET BY MOUTH EVERY MORNING AND 1\2 TABLET IN THE PM(FLUIDPILL) 3/15/21  Yes Tessa Manriquez MD   Insulin Pen Needle 30G X 8 MM MISC 1 each by Does not apply route 2 times daily DX:  E11.9 3/2/21  Yes Tessa Manriquez MD   blood glucose monitor kit and supplies Dispense sufficient amount for indicated testing frequency.  3/2/21  Yes Tessa Manriquez MD   insulin detemir (LEVEMIR FLEXTOUCH) 100 UNIT/ML injection pen INJECT 100 UNITS UNDER THE SKIN TWO TIMES A DAY M&W 340B Patient 3/2/21  Yes Tessa Manriquez MD   blood glucose test strips (ACCU-CHEK SYLVESTER PLUS) strip TEST BLOOD SUGAR THREE TIMES A DAY AND AS NEEDED 2/19/21  Yes Tessa Manriquez MD   Accu-Chek Softclix Lancets MISC fsbs daily 6/17/20  Yes Lopez Li MD   aspirin 81 MG tablet Take 81 mg by mouth daily. Yes Historical Provider, MD       Vital Signs  Temp: 98.8 °F (37.1 °C)  Pulse: 74  Resp: 16  BP: 134/65  SpO2: 92 %  O2 Device: None (Room air)    Vital signs reviewed in electronic chart. Physical exam  Constitutional:  Well developed, well nourished, morbidly obese, no acute distress. Sitting up in chair. Eyes:  PERRL, conjunctiva normal, EOMI. HENT:  Atraumatic, external ears normal, external nose/nares normal, oropharynx moist, no pharyngeal exudates. Neck:  Supple. No JVD or thyromegaly. Respiratory:  No respiratory distress on room air, normal breath sounds, no rales, no wheezing. Cardiovascular:  Normal rate, normal rhythm, no murmurs, no gallops, no rubs. GI:  Soft, nondistended, obese, normal bowel sounds, nontender, no organomegaly, no mass. Musculoskeletal:  No edema, no tenderness, no obvious deformities. Patient is moving all extremities. Integument:  Well hydrated, no rash. Neurologic:  Alert & oriented x 3,  no focal deficits noted. Strength is equal throughout. Answers all questions appropriately. Follows commands. Psychiatric:  Speech and behavior appropriate. Lab Results   Component Value Date    WBC 6.2 06/15/2021    HGB 13.3 06/15/2021    HCT 45.7 06/15/2021    MCV 97.2 06/15/2021     (L) 06/15/2021       Lab Results   Component Value Date     06/15/2021    K 4.2 06/15/2021    CL 96 (L) 06/15/2021    CO2 34 (H) 06/15/2021    BUN 16 06/15/2021    CREATININE 0.8 06/15/2021    GLUCOSE 302 (H) 06/15/2021    CALCIUM 9.1 06/15/2021    PROT 7.3 06/15/2021    LABALBU 3.6 06/15/2021    BILITOT 0.4 06/15/2021    ALKPHOS 69 06/15/2021    AST 26 06/15/2021    ALT 23 06/15/2021    LABGLOM >59 06/15/2021    GFRAA >59 06/15/2021    AGRATIO 1.0 06/15/2021    GLOB 3.7 06/15/2021        Assessment and Plan/Hospital course:      Active Hospital Problems Diagnosis Date Noted    Acute respiratory failure with hypercapnia (HCC) [J96.02] 06/15/2021    Other chronic pain [G89.29] 06/15/2021    Chronic anxiety [F41.9] 06/15/2021    Open wound of left foot [S91.302A] 06/15/2021    Altered mental status [R41.82] 06/14/2021    Essential hypertension [I10] 01/02/2018    Morbid obesity ([facility] [address] [E66.01] 12/30/2017    Other sleep apnea [G47.39] 03/01/2017    Type 2 diabetes mellitus with diabetic polyneuropathy ([facility] [address] [E11.42] 08/17/2015     Mr. Brock Carr is a 56-year-old gentleman with past medical history of morbid obesity BMI 46.5, chronic pain, chronic anxiety, hypertension, diabetes with neuropathy, sleep apnea noncompliant with CPAP, peripheral vascular disease, chronic left foot wound who presents due to altered mental status from home. Work-up revealed hypercapnic respiratory failure. Patient was placed on BiPAP with improvement in mental status. Also noted to be on several potential sedating medications including Soma 350 mg 4 times a day as needed for muscle spasms, oxycodone 30 mg every 4 hours as needed for pain, Norco  mg every 4 hours as needed for pain, Valium 5 mg every 8 hours as needed for anxiety or sleep, Phenergan, gabapentin 800 mg 4 times a day. During patient's admission for altered mental status his regimen was decreased. According to [facility] patient only required Oxycodone 30 mg on 6/14 @ 1628 and 6/15 @ 851. Norco 10 was only dispensed on 6/14 @ 2011 and Valium was ordered at 2 mg which he took on 6/14 @ 2011 and 6/15 @ 858. Mental status has improved and at his baseline today and suspect this can be attributed to decreased sedative medications as well as bipap therapy for hypercapnia. Patient's repeat ABG was improved as well. He does have noted history of sleep apnea but states no sleep study in years and he does not use cpap or bipap at home.   Consulted  who is attempting to assist patient with acquiring trilogy machine at Details   fluconazole (DIFLUCAN) 100 MG tablet Take 100 mg by mouth daily      brimonidine (ALPHAGAN P) 0.15 % ophthalmic solution Place 1 drop into both eyes 2 times daily      carisoprodol (SOMA) 350 MG tablet Take 350 mg by mouth 4 times daily as needed for Muscle spasms. oxyCODONE (OXY-IR) 30 MG immediate release tablet Take 1 tablet by mouth every 4 hours as needed for Pain for up to 30 days. Qty: 150 tablet, Refills: 0    Comments: Reduce doses taken as pain becomes manageable  Associated Diagnoses: Chronic bilateral low back pain with right-sided sciatica      HYDROcodone-acetaminophen (NORCO)  MG per tablet TAKE ONE TABLET BY MOUTH EVERY 4 HOURS AS NEEDED FOR BREAKTHROUGH PAIN  Qty: 150 tablet, Refills: 0    Comments: Reduce doses taken as pain becomes manageable  Associated Diagnoses: Chronic bilateral low back pain with right-sided sciatica      nystatin (NYSTATIN) 716764 UNIT/GM powder APPLY TOPICALLY 3 TIMES DAILY AS DIRECTED  Qty: 60 g, Refills: 5      diazePAM (VALIUM) 5 MG tablet TAKE ONE TABLET BY MOUTH EVERY 8 HOURS AS NEEDED FOR ANXIETY OR SLEEP  Qty: 60 tablet, Refills: 1    Associated Diagnoses: Anxiety      vitamin D (ERGOCALCIFEROL) 1.25 MG (60605 UT) CAPS capsule Take 1 capsule by mouth once a week  Qty: 12 capsule, Refills: 1      DULoxetine (CYMBALTA) 30 MG extended release capsule Take 1 capsule by mouth daily  Qty: 30 capsule, Refills: 1      nitroGLYCERIN (NITROSTAT) 0.4 MG SL tablet PLACE 1 TABLET UNDER THE TONGUE EVERY 5 MINUTES AS NEEDED FOR CHEST PAIN (MAX 3 DOSES) IF NO RELIEF AFTER 1 DOSE, CALL 911  Qty: 25 tablet, Refills: 0      gabapentin (NEURONTIN) 800 MG tablet Take one tablet by mouth four times a day  Qty: 120 tablet, Refills: 2    Associated Diagnoses: Chronic bilateral low back pain with right-sided sciatica      ! ! blood glucose test strips (ACCU-CHEK GUIDE) strip TEST BLOOD SUGAR 3 TIMES A DAY & AS NEEDED  Qty: 100 strip, Refills: 11      !!  Accu-Chek Softclix Lancets MISC TEST BLOOD SUGAR THREE TIMES A DAY  Qty: 100 each, Refills: 5      empagliflozin (JARDIANCE) 25 MG tablet TAKE ONE TABLET BY MOUTH DAILY  Qty: 90 tablet, Refills: 3    Comments: 340B plan      SITagliptin (JANUVIA) 100 MG tablet Take 1 tablet by mouth every morning (before breakfast)  Qty: 90 tablet, Refills: 3    Comments: 340B Program Please      ferrous sulfate (FEROSUL) 325 (65 Fe) MG tablet TAKE ONE TABLET BY MOUTH THREE TIMES A DAY WITH MEALS  Qty: 30 tablet, Refills: 5      pantoprazole (PROTONIX) 40 MG tablet Take 1 tablet by mouth daily (with breakfast) In place of omeprazole  Qty: 30 tablet, Refills: 5      montelukast (SINGULAIR) 10 MG tablet TAKE ONE TABLET BY MOUTH NIGHTLY AS NEEDED FOR ALLERGIES  Qty: 30 tablet, Refills: 5      simvastatin (ZOCOR) 40 MG tablet TAKE ONE TABLET BY MOUTH AT BEDTIME FOR CHOLESTEROL  Qty: 30 tablet, Refills: 5      metFORMIN (GLUCOPHAGE) 1000 MG tablet TAKE ONE TABLET BY MOUTH TWICE A DAY FOR SUGAR CONTROL  Qty: 60 tablet, Refills: 5      loratadine (CLARITIN) 10 MG tablet Take one tablet by mouth daily as needed for allergies  Qty: 30 tablet, Refills: 5      furosemide (LASIX) 80 MG tablet TAKE ONE TABLET BY MOUTH EVERY MORNING AND 1TABLET IN THE PM(FLUIDPILL)  Qty: 45 tablet, Refills: 5      Insulin Pen Needle 30G X 8 MM MISC 1 each by Does not apply route 2 times daily DX:  E11.9  Qty: 100 each, Refills: 0      blood glucose monitor kit and supplies Dispense sufficient amount for indicated testing frequency. Qty: 1 kit, Refills: 0    Comments: Brand per patient preference. May round up to next available package size. insulin detemir (LEVEMIR FLEXTOUCH) 100 UNIT/ML injection pen INJECT 100 UNITS UNDER THE SKIN TWO TIMES A DAY M&W 340B Patient  Qty: 180 mL, Refills: 2      !! blood glucose test strips (ACCU-CHEK SYLVESTER PLUS) strip TEST BLOOD SUGAR THREE TIMES A DAY AND AS NEEDED  Qty: 100 strip, Refills: 5      !!  Accu-Chek Softclix Lancets MISC fsbs daily  Qty: 100 each, Refills: 3      aspirin 81 MG tablet Take 81 mg by mouth daily. !! - Potential duplicate medications found. Please discuss with provider. Patient was seen and examined by Dr. Shelton Capps and plan of care reviewed. Signed:  Electronically signed by MITCHELL Franks on 6/15/2021 at 12:21 PM       Thank you Kaylan Chairez MD for the opportunity to be involved in this patient's care. If you have any questions or concerns please feel free to contact me at [phone].

## 2021-06-18 LAB — BLOOD CULTURE, ROUTINE: NORMAL
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
## 2021-07-08 ENCOUNTER — OFFICE VISIT (OUTPATIENT)
Dept: FAMILY MEDICINE CLINIC | Age: 61
End: 2021-07-08
Payer: MEDICARE

## 2021-07-08 VITALS
HEIGHT: 76 IN | SYSTOLIC BLOOD PRESSURE: 128 MMHG | WEIGHT: 315 LBS | TEMPERATURE: 96.7 F | RESPIRATION RATE: 20 BRPM | HEART RATE: 64 BPM | DIASTOLIC BLOOD PRESSURE: 60 MMHG | BODY MASS INDEX: 38.36 KG/M2 | OXYGEN SATURATION: 91 %

## 2021-07-08 DIAGNOSIS — F32.A DEPRESSION, UNSPECIFIED DEPRESSION TYPE: ICD-10-CM

## 2021-07-08 DIAGNOSIS — M54.41 CHRONIC BILATERAL LOW BACK PAIN WITH RIGHT-SIDED SCIATICA: ICD-10-CM

## 2021-07-08 DIAGNOSIS — R53.83 FATIGUE, UNSPECIFIED TYPE: Primary | ICD-10-CM

## 2021-07-08 DIAGNOSIS — Z79.4 TYPE 2 DIABETES MELLITUS WITH DIABETIC POLYNEUROPATHY, WITH LONG-TERM CURRENT USE OF INSULIN (HCC): ICD-10-CM

## 2021-07-08 DIAGNOSIS — F41.9 ANXIETY: ICD-10-CM

## 2021-07-08 DIAGNOSIS — E11.42 TYPE 2 DIABETES MELLITUS WITH DIABETIC POLYNEUROPATHY, WITH LONG-TERM CURRENT USE OF INSULIN (HCC): ICD-10-CM

## 2021-07-08 DIAGNOSIS — I10 ESSENTIAL HYPERTENSION: ICD-10-CM

## 2021-07-08 DIAGNOSIS — G89.29 CHRONIC BILATERAL LOW BACK PAIN WITH RIGHT-SIDED SCIATICA: ICD-10-CM

## 2021-07-08 PROCEDURE — G8427 DOCREV CUR MEDS BY ELIG CLIN: HCPCS | Performed by: FAMILY MEDICINE

## 2021-07-08 PROCEDURE — 1036F TOBACCO NON-USER: CPT | Performed by: FAMILY MEDICINE

## 2021-07-08 PROCEDURE — 3052F HG A1C>EQUAL 8.0%<EQUAL 9.0%: CPT | Performed by: FAMILY MEDICINE

## 2021-07-08 PROCEDURE — 2022F DILAT RTA XM EVC RTNOPTHY: CPT | Performed by: FAMILY MEDICINE

## 2021-07-08 PROCEDURE — G8417 CALC BMI ABV UP PARAM F/U: HCPCS | Performed by: FAMILY MEDICINE

## 2021-07-08 PROCEDURE — 99214 OFFICE O/P EST MOD 30 MIN: CPT | Performed by: FAMILY MEDICINE

## 2021-07-08 PROCEDURE — 3017F COLORECTAL CA SCREEN DOC REV: CPT | Performed by: FAMILY MEDICINE

## 2021-07-08 PROCEDURE — 1111F DSCHRG MED/CURRENT MED MERGE: CPT | Performed by: FAMILY MEDICINE

## 2021-07-08 RX ORDER — DIAZEPAM 5 MG/1
TABLET ORAL
Qty: 60 TABLET | Refills: 1 | Status: CANCELLED | OUTPATIENT
Start: 2021-07-08 | End: 2021-08-08

## 2021-07-08 RX ORDER — HYDROCODONE BITARTRATE AND ACETAMINOPHEN 10; 325 MG/1; MG/1
TABLET ORAL
Qty: 150 TABLET | Refills: 0 | Status: SHIPPED | OUTPATIENT
Start: 2021-07-08 | End: 2021-08-03 | Stop reason: SDUPTHER

## 2021-07-08 RX ORDER — OXYCODONE HYDROCHLORIDE 30 MG/1
30 TABLET ORAL EVERY 4 HOURS PRN
Qty: 150 TABLET | Refills: 0 | Status: SHIPPED | OUTPATIENT
Start: 2021-07-08 | End: 2021-08-03 | Stop reason: SDUPTHER

## 2021-07-08 RX ORDER — GABAPENTIN 800 MG/1
TABLET ORAL
Qty: 120 TABLET | Refills: 2 | Status: SHIPPED | OUTPATIENT
Start: 2021-07-08 | End: 2021-11-02 | Stop reason: SDUPTHER

## 2021-07-08 RX ORDER — DIAZEPAM 5 MG/1
5 TABLET ORAL EVERY 8 HOURS PRN
COMMUNITY
Start: 2021-06-26 | End: 2021-07-08

## 2021-07-08 RX ORDER — DIAZEPAM 2 MG/1
2 TABLET ORAL EVERY 12 HOURS PRN
Qty: 60 TABLET | Refills: 0 | Status: SHIPPED | OUTPATIENT
Start: 2021-07-08 | End: 2021-08-03

## 2021-07-08 RX ORDER — DULOXETIN HYDROCHLORIDE 30 MG/1
30 CAPSULE, DELAYED RELEASE ORAL DAILY
Qty: 30 CAPSULE | Refills: 2 | Status: SHIPPED | OUTPATIENT
Start: 2021-07-08 | End: 2021-08-03

## 2021-07-08 ASSESSMENT — ENCOUNTER SYMPTOMS
COLOR CHANGE: 1
BACK PAIN: 1

## 2021-07-08 NOTE — PROGRESS NOTES
Chief Complaint   Patient presents with    Back Pain     reg f/u    Anxiety       Have you seen any other physician or provider since your last visit no    Have you had any other diagnostic tests since your last visit? no    Have you changed or stopped any medications since your last visit? no     I have recommended that this patient have a colonoscopy but he declines at this time. I have discussed the risks and benefits of this examination with him. The patient verbalizes understanding.
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
## 2021-07-15 ENCOUNTER — OFFICE VISIT (OUTPATIENT)
Dept: FAMILY MEDICINE CLINIC | Age: 61
End: 2021-07-15
Payer: MEDICARE

## 2021-07-15 VITALS
WEIGHT: 315 LBS | SYSTOLIC BLOOD PRESSURE: 122 MMHG | HEIGHT: 76 IN | HEART RATE: 92 BPM | RESPIRATION RATE: 18 BRPM | TEMPERATURE: 96.4 F | DIASTOLIC BLOOD PRESSURE: 62 MMHG | OXYGEN SATURATION: 94 % | BODY MASS INDEX: 38.36 KG/M2

## 2021-07-15 DIAGNOSIS — E11.621 DIABETIC ULCER OF OTHER PART OF LEFT FOOT ASSOCIATED WITH TYPE 2 DIABETES MELLITUS, WITH OTHER ULCER SEVERITY (HCC): ICD-10-CM

## 2021-07-15 DIAGNOSIS — I50.43 CHF (CONGESTIVE HEART FAILURE), NYHA CLASS I, ACUTE ON CHRONIC, COMBINED (HCC): ICD-10-CM

## 2021-07-15 DIAGNOSIS — R06.02 SOB (SHORTNESS OF BREATH): Primary | ICD-10-CM

## 2021-07-15 DIAGNOSIS — L97.528 DIABETIC ULCER OF OTHER PART OF LEFT FOOT ASSOCIATED WITH TYPE 2 DIABETES MELLITUS, WITH OTHER ULCER SEVERITY (HCC): ICD-10-CM

## 2021-07-15 PROCEDURE — 2022F DILAT RTA XM EVC RTNOPTHY: CPT | Performed by: FAMILY MEDICINE

## 2021-07-15 PROCEDURE — 1111F DSCHRG MED/CURRENT MED MERGE: CPT | Performed by: FAMILY MEDICINE

## 2021-07-15 PROCEDURE — 3052F HG A1C>EQUAL 8.0%<EQUAL 9.0%: CPT | Performed by: FAMILY MEDICINE

## 2021-07-15 PROCEDURE — G8427 DOCREV CUR MEDS BY ELIG CLIN: HCPCS | Performed by: FAMILY MEDICINE

## 2021-07-15 PROCEDURE — 1036F TOBACCO NON-USER: CPT | Performed by: FAMILY MEDICINE

## 2021-07-15 PROCEDURE — 99213 OFFICE O/P EST LOW 20 MIN: CPT | Performed by: FAMILY MEDICINE

## 2021-07-15 PROCEDURE — G8417 CALC BMI ABV UP PARAM F/U: HCPCS | Performed by: FAMILY MEDICINE

## 2021-07-15 PROCEDURE — 3017F COLORECTAL CA SCREEN DOC REV: CPT | Performed by: FAMILY MEDICINE

## 2021-07-15 RX ORDER — SIMVASTATIN 40 MG
TABLET ORAL
Qty: 30 TABLET | Refills: 5 | Status: SHIPPED | OUTPATIENT
Start: 2021-07-15 | End: 2022-01-18 | Stop reason: SDUPTHER

## 2021-07-15 RX ORDER — FERROUS SULFATE 325(65) MG
TABLET ORAL
Qty: 30 TABLET | Refills: 5 | Status: SHIPPED | OUTPATIENT
Start: 2021-07-15 | End: 2021-10-27

## 2021-07-15 RX ORDER — PANTOPRAZOLE SODIUM 40 MG/1
40 TABLET, DELAYED RELEASE ORAL
Qty: 30 TABLET | Refills: 5 | Status: SHIPPED | OUTPATIENT
Start: 2021-07-15 | End: 2022-01-18 | Stop reason: SDUPTHER

## 2021-07-15 RX ORDER — LORATADINE 10 MG/1
TABLET ORAL
Qty: 30 TABLET | Refills: 5 | Status: SHIPPED | OUTPATIENT
Start: 2021-07-15 | End: 2022-01-18 | Stop reason: SDUPTHER

## 2021-07-15 RX ORDER — FUROSEMIDE 80 MG
TABLET ORAL
Qty: 45 TABLET | Refills: 5 | Status: SHIPPED | OUTPATIENT
Start: 2021-07-15 | End: 2022-01-18 | Stop reason: SDUPTHER

## 2021-07-15 RX ORDER — MONTELUKAST SODIUM 10 MG/1
TABLET ORAL
Qty: 30 TABLET | Refills: 5 | Status: SHIPPED | OUTPATIENT
Start: 2021-07-15 | End: 2022-01-18 | Stop reason: SDUPTHER

## 2021-07-15 RX ORDER — DOXYCYCLINE HYCLATE 100 MG/1
CAPSULE ORAL
COMMUNITY
Start: 2021-07-13 | End: 2021-08-03

## 2021-07-15 RX ORDER — INSULIN DETEMIR 100 [IU]/ML
INJECTION, SOLUTION SUBCUTANEOUS
Qty: 180 ML | Refills: 2 | Status: SHIPPED | OUTPATIENT
Start: 2021-07-15 | End: 2022-01-18 | Stop reason: SDUPTHER

## 2021-07-15 ASSESSMENT — ENCOUNTER SYMPTOMS
BACK PAIN: 1
COLOR CHANGE: 1

## 2021-07-15 NOTE — PROGRESS NOTES
SUBJECTIVE:    Patient ID: Ricarda Brunner is a 56 y.o. male. Chief Complaint   Patient presents with    Shortness of Breath    Wound Infection     seen indira yesterday       HPI: office visit  He is having continued fatigue  He is falling asleep all the time. He has been using his cpap for about a month. He is still not feeling like he is resting well. He is having some oxygen saturation in the 80s all the time. He is feeling weak and tired all the time. He is having the nurse come out. He is not having any chest pain. He says he saw dr Jeannie Patel who has sent him to infectious disease. She is thinking he may need iv antibotic or a cast or a partial antibiotic. Review of Systems   Constitutional: Positive for fatigue. Musculoskeletal: Positive for arthralgias, back pain, gait problem and myalgias. Skin: Positive for color change and rash. Neurological: Positive for weakness. All other systems reviewed and are negative. OBJECTIVE:  /62   Pulse 92   Temp 96.4 °F (35.8 °C)   Resp 18   Ht 6' 4\" (1.93 m)   Wt (!) 392 lb 6.4 oz (178 kg)   SpO2 94% Comment: ra  BMI 47.76 kg/m²    Wt Readings from Last 3 Encounters:   07/15/21 (!) 392 lb 6.4 oz (178 kg)   07/08/21 (!) 396 lb (179.6 kg)   06/15/21 (!) 382 lb 2 oz (173.3 kg)     BP Readings from Last 3 Encounters:   07/15/21 122/62   07/08/21 128/60   06/15/21 134/65      Pulse Readings from Last 3 Encounters:   07/15/21 92   07/08/21 64   06/15/21 74     Body mass index is 47.76 kg/m². Resp Readings from Last 3 Encounters:   07/15/21 18   07/08/21 20   06/15/21 16     Past medical, surgical, family and social history were reviewed and updated with the patient. Physical Exam  Vitals and nursing note reviewed. Constitutional:       Appearance: He is well-developed. HENT:      Head: Normocephalic. Cardiovascular:      Rate and Rhythm: Normal rate and regular rhythm.    Pulmonary:      Effort: Pulmonary effort is normal.      Breath sounds: Normal breath sounds. Musculoskeletal:      Cervical back: Normal range of motion and neck supple. Lumbar back: Spasms and tenderness present. Decreased range of motion. Skin:     General: Skin is warm and dry. Neurological:      Mental Status: He is alert and oriented to person, place, and time. Psychiatric:         Attention and Perception: Attention and perception normal.         Mood and Affect: Mood is anxious. Affect is tearful. Speech: Speech normal.         Behavior: Behavior normal. Behavior is cooperative. Thought Content: Thought content normal.         Cognition and Memory: Cognition and memory normal.         Judgment: Judgment normal.         No results found for requested labs within last 30 days. Hemoglobin A1C (%)   Date Value   06/14/2021 8.3 (H)     Microscopic Examination (no units)   Date Value   06/14/2021 Not Indicated     LDL Calculated (mg/dL)   Date Value   05/04/2021 71       Lab Results   Component Value Date    WBC 6.2 06/15/2021    NEUTROABS 2.8 06/15/2021    HGB 13.3 06/15/2021    HCT 45.7 06/15/2021    HCT 41.6 06/05/2012    MCV 97.2 06/15/2021     06/15/2021     06/05/2012     Lab Results   Component Value Date    TSH 2.12 05/18/2018       ASSESSMENT:    Diagnosis Orders   1. SOB (shortness of breath)     2. CHF (congestive heart failure), NYHA class I, acute on chronic, combined ([facility]     3.  Diabetic ulcer of other part of left foot associated with type 2 diabetes mellitus, with other ulcer severity ([facility]          PLAN:  Orders Placed This Encounter   Medications    ferrous sulfate (FEROSUL) 325 (65 Fe) MG tablet     Sig: TAKE ONE TABLET BY MOUTH THREE TIMES A DAY WITH MEALS     Dispense:  30 tablet     Refill:  5    pantoprazole (PROTONIX) 40 MG tablet     Sig: Take 1 tablet by mouth daily (with breakfast) In place of omeprazole     Dispense:  30 tablet     Refill:  5    montelukast (SINGULAIR) 10 MG

## 2021-07-22 ENCOUNTER — TRANSCRIBE ORDERS (OUTPATIENT)
Dept: ADMINISTRATIVE | Facility: HOSPITAL | Age: 61
End: 2021-07-22

## 2021-07-22 DIAGNOSIS — I73.9 PERIPHERAL ARTERIAL DISEASE (HCC): Primary | ICD-10-CM

## 2021-07-30 ENCOUNTER — TELEPHONE (OUTPATIENT)
Dept: FAMILY MEDICINE CLINIC | Age: 61
End: 2021-07-30

## 2021-07-30 NOTE — TELEPHONE ENCOUNTER
Pt's Roc Zarate was delivered from the [facility] program, however it went to his old address that flooded on [address] and pt never received the prescription. Pt and I called into [facility] today and updated the address so that the Roc Zarate goes to our clinic [facility]). They are shipping another fill out mid-next week. In regard to the Januvia, the patient never received the attestation letter as it too either went to his old address. Pt looked at the old address but could not find the [employer] and they are going to send the attestation letter to the clinic [facility]). Once we receive it we are going to call the pt to come in and sign in. Instructed pt to let us know if he runs out of his Jauvia samples in the meantime. Pt verbalized understanding.     Ansley Bee, BrittanyD

## 2021-08-03 ENCOUNTER — OFFICE VISIT (OUTPATIENT)
Dept: FAMILY MEDICINE CLINIC | Age: 61
End: 2021-08-03
Payer: MEDICARE

## 2021-08-03 VITALS
DIASTOLIC BLOOD PRESSURE: 60 MMHG | HEIGHT: 76 IN | SYSTOLIC BLOOD PRESSURE: 98 MMHG | WEIGHT: 315 LBS | RESPIRATION RATE: 18 BRPM | HEART RATE: 60 BPM | BODY MASS INDEX: 38.36 KG/M2 | OXYGEN SATURATION: 94 % | TEMPERATURE: 96.3 F

## 2021-08-03 DIAGNOSIS — Z79.4 TYPE 2 DIABETES MELLITUS WITH DIABETIC POLYNEUROPATHY, WITH LONG-TERM CURRENT USE OF INSULIN (HCC): ICD-10-CM

## 2021-08-03 DIAGNOSIS — I10 ESSENTIAL HYPERTENSION: ICD-10-CM

## 2021-08-03 DIAGNOSIS — M54.41 CHRONIC BILATERAL LOW BACK PAIN WITH RIGHT-SIDED SCIATICA: Primary | ICD-10-CM

## 2021-08-03 DIAGNOSIS — G89.29 CHRONIC BILATERAL LOW BACK PAIN WITH RIGHT-SIDED SCIATICA: Primary | ICD-10-CM

## 2021-08-03 DIAGNOSIS — E11.621 DIABETIC ULCER OF OTHER PART OF LEFT FOOT ASSOCIATED WITH TYPE 2 DIABETES MELLITUS, WITH OTHER ULCER SEVERITY (HCC): ICD-10-CM

## 2021-08-03 DIAGNOSIS — L97.528 DIABETIC ULCER OF OTHER PART OF LEFT FOOT ASSOCIATED WITH TYPE 2 DIABETES MELLITUS, WITH OTHER ULCER SEVERITY (HCC): ICD-10-CM

## 2021-08-03 DIAGNOSIS — F41.9 ANXIETY: ICD-10-CM

## 2021-08-03 DIAGNOSIS — E11.42 TYPE 2 DIABETES MELLITUS WITH DIABETIC POLYNEUROPATHY, WITH LONG-TERM CURRENT USE OF INSULIN (HCC): ICD-10-CM

## 2021-08-03 PROCEDURE — G8417 CALC BMI ABV UP PARAM F/U: HCPCS | Performed by: FAMILY MEDICINE

## 2021-08-03 PROCEDURE — 99214 OFFICE O/P EST MOD 30 MIN: CPT | Performed by: FAMILY MEDICINE

## 2021-08-03 PROCEDURE — 1036F TOBACCO NON-USER: CPT | Performed by: FAMILY MEDICINE

## 2021-08-03 PROCEDURE — 2022F DILAT RTA XM EVC RTNOPTHY: CPT | Performed by: FAMILY MEDICINE

## 2021-08-03 PROCEDURE — G8427 DOCREV CUR MEDS BY ELIG CLIN: HCPCS | Performed by: FAMILY MEDICINE

## 2021-08-03 PROCEDURE — 3017F COLORECTAL CA SCREEN DOC REV: CPT | Performed by: FAMILY MEDICINE

## 2021-08-03 PROCEDURE — 3052F HG A1C>EQUAL 8.0%<EQUAL 9.0%: CPT | Performed by: FAMILY MEDICINE

## 2021-08-03 RX ORDER — SULFAMETHOXAZOLE AND TRIMETHOPRIM 800; 160 MG/1; MG/1
TABLET ORAL
COMMUNITY
Start: 2021-07-20 | End: 2022-01-18

## 2021-08-03 RX ORDER — SULFAMETHOXAZOLE AND TRIMETHOPRIM 800; 160 MG/1; MG/1
TABLET ORAL
Status: CANCELLED | OUTPATIENT
Start: 2021-08-03

## 2021-08-03 RX ORDER — HYDROCODONE BITARTRATE AND ACETAMINOPHEN 10; 325 MG/1; MG/1
TABLET ORAL
Qty: 150 TABLET | Refills: 0 | Status: SHIPPED | OUTPATIENT
Start: 2021-08-03 | End: 2021-09-07 | Stop reason: SDUPTHER

## 2021-08-03 RX ORDER — OXYCODONE HYDROCHLORIDE 30 MG/1
30 TABLET ORAL EVERY 4 HOURS PRN
Qty: 150 TABLET | Refills: 0 | Status: SHIPPED | OUTPATIENT
Start: 2021-08-03 | End: 2021-09-07 | Stop reason: SDUPTHER

## 2021-08-03 ASSESSMENT — ENCOUNTER SYMPTOMS
COLOR CHANGE: 1
BACK PAIN: 1

## 2021-08-03 NOTE — PROGRESS NOTES
Chief Complaint   Patient presents with    Extremity Weakness       Have you seen any other physician or provider since your last visit yes - indira    Have you had any other diagnostic tests since your last visit? no    Have you changed or stopped any medications since your last visit? no     I have recommended that this patient have a Colonoscopy but he declines at this time. I have discussed the risks and benefits of this examination with him. The patient verbalizes understanding. Diabetic retinal exam completed this year?  No

## 2021-08-03 NOTE — PROGRESS NOTES
SUBJECTIVE:    Patient ID: Olman Cohn is a 56 y.o. male. Chief Complaint   Patient presents with    Extremity Weakness    Wound Infection     lost his medications that infectious disease place gave him       HPI: office visit  He is having more bilateral leg numbness. He says it is the worst when he is sitting. He says that it is really bad when he is sitting in the car driving. He is having to get out and walk around a couple minutes. He says it gets better quick if he stands. He says he can walk in the store. He is feeling some bettter. He is feeling like his wife is some better. He is essentially homeless and needs to find a place to live. He is not as sleepy off the valium. He has been seen by infectious disease. He says he thinks his medication was stolen. He is having some better blood sugars and blood pressures. He is not having any chest pain or increase in sob. Review of Systems   Constitutional: Positive for fatigue. Musculoskeletal: Positive for arthralgias, back pain, gait problem and myalgias. Skin: Positive for color change and rash. Neurological: Positive for weakness. All other systems reviewed and are negative. OBJECTIVE:  BP 98/60   Pulse 60   Temp 96.3 °F (35.7 °C)   Resp 18   Ht 6' 4\" (1.93 m)   Wt (!) 390 lb 12.8 oz (177.3 kg)   SpO2 94% Comment: ra  BMI 47.57 kg/m²    Wt Readings from Last 3 Encounters:   08/03/21 (!) 390 lb 12.8 oz (177.3 kg)   07/15/21 (!) 392 lb 6.4 oz (178 kg)   07/08/21 (!) 396 lb (179.6 kg)     BP Readings from Last 3 Encounters:   08/03/21 98/60   07/15/21 122/62   07/08/21 128/60      Pulse Readings from Last 3 Encounters:   08/03/21 60   07/15/21 92   07/08/21 64     Body mass index is 47.57 kg/m². Resp Readings from Last 3 Encounters:   08/03/21 18   07/15/21 18   07/08/21 20     Past medical, surgical, family and social history were reviewed and updated with the patient.      Physical Exam  Vitals and nursing note reviewed. Constitutional:       Appearance: He is well-developed. HENT:      Head: Normocephalic. Cardiovascular:      Rate and Rhythm: Normal rate and regular rhythm. Pulmonary:      Effort: Pulmonary effort is normal.      Breath sounds: Normal breath sounds. Musculoskeletal:      Cervical back: Normal range of motion and neck supple. Lumbar back: Spasms and tenderness present. Decreased range of motion. Skin:     General: Skin is warm and dry. Neurological:      Mental Status: He is alert and oriented to person, place, and time. Psychiatric:         Attention and Perception: Attention and perception normal.         Mood and Affect: Mood is anxious. Affect is tearful. Speech: Speech normal.         Behavior: Behavior normal. Behavior is cooperative. Thought Content: Thought content normal.         Cognition and Memory: Cognition and memory normal.         Judgment: Judgment normal.          No results found for requested labs within last 30 days. Hemoglobin A1C (%)   Date Value   06/14/2021 8.3 (H)     Microscopic Examination (no units)   Date Value   06/14/2021 Not Indicated     LDL Calculated (mg/dL)   Date Value   05/04/2021 71       Lab Results   Component Value Date    WBC 6.2 06/15/2021    NEUTROABS 2.8 06/15/2021    HGB 13.3 06/15/2021    HCT 45.7 06/15/2021    HCT 41.6 06/05/2012    MCV 97.2 06/15/2021     06/15/2021     06/05/2012     Lab Results   Component Value Date    TSH 2.12 05/18/2018       ASSESSMENT:    Diagnosis Orders   1. Chronic bilateral low back pain with right-sided sciatica  oxyCODONE (OXY-IR) 30 MG immediate release tablet    HYDROcodone-acetaminophen (NORCO)  MG per tablet    MRI Lumbar Spine W WO Contrast   2. Anxiety     3. Diabetic ulcer of other part of left foot associated with type 2 diabetes mellitus, with other ulcer severity ([address]     4. Essential hypertension     5.  Type 2 diabetes mellitus with diabetic polyneuropathy, with long-term current use of insulin (HCC)          PLAN:  Orders Placed This Encounter   Medications    oxyCODONE (OXY-IR) 30 MG immediate release tablet     Sig: Take 1 tablet by mouth every 4 hours as needed for Pain for up to 30 days. Dispense:  150 tablet     Refill:  0     Reduce doses taken as pain becomes manageable    HYDROcodone-acetaminophen (NORCO)  MG per tablet     Sig: TAKE ONE TABLET BY MOUTH EVERY 4 HOURS AS NEEDED FOR BREAKTHROUGH PAIN     Dispense:  150 tablet     Refill:  0     Reduce doses taken as pain becomes manageable    Misc. Devices (TOTAL COMFORT SEAT CUSHION) MISC     Si Device by Does not apply route daily     Dispense:  1 each     Refill:  0        Medications Discontinued During This Encounter   Medication Reason    diazePAM (VALIUM) 2 MG tablet LIST CLEANUP    doxycycline hyclate (VIBRAMYCIN) 100 MG capsule LIST CLEANUP    DULoxetine (CYMBALTA) 30 MG extended release capsule LIST CLEANUP    oxyCODONE (OXY-IR) 30 MG immediate release tablet REORDER    HYDROcodone-acetaminophen (NORCO)  MG per tablet REORDER       Controlled Substances Monitoring:      Please note: This chart was generated using Dragon dictation software. Although every effort was made to ensure the accuracy of this automated transcription, some errors in transcription may have occurred.

## 2021-08-11 RX ORDER — NITROGLYCERIN 0.4 MG/1
TABLET SUBLINGUAL
Qty: 25 TABLET | Refills: 0 | Status: SHIPPED | OUTPATIENT
Start: 2021-08-11 | End: 2021-09-08

## 2021-08-11 NOTE — TELEPHONE ENCOUNTER
Refill request received from pharmacy. Medication pending for approval.       Last Office Visit With PCP:  8/3/2021    Next Visit Date:  No future appointments.     KITTY SMITH

## 2021-08-27 ENCOUNTER — TELEPHONE (OUTPATIENT)
Dept: FAMILY MEDICINE CLINIC | Age: 61
End: 2021-08-27

## 2021-08-27 NOTE — TELEPHONE ENCOUNTER
Patient scheduled for MRI Lumbar at [facility] on 09/16/21 at 9. Patient needs to arrive at 8:45. Patient's order faxed to [phone]. Patient notified of appointment date/time/location and any special instructions involved with procedure. Patient verbalized understanding of all instructions.

## 2021-09-07 DIAGNOSIS — G89.29 CHRONIC BILATERAL LOW BACK PAIN WITH RIGHT-SIDED SCIATICA: ICD-10-CM

## 2021-09-07 DIAGNOSIS — M54.41 CHRONIC BILATERAL LOW BACK PAIN WITH RIGHT-SIDED SCIATICA: ICD-10-CM

## 2021-09-07 RX ORDER — HYDROCODONE BITARTRATE AND ACETAMINOPHEN 10; 325 MG/1; MG/1
TABLET ORAL
Qty: 150 TABLET | Refills: 0 | Status: SHIPPED | OUTPATIENT
Start: 2021-09-07 | End: 2021-09-20 | Stop reason: SDUPTHER

## 2021-09-07 RX ORDER — OXYCODONE HYDROCHLORIDE 30 MG/1
30 TABLET ORAL EVERY 4 HOURS PRN
Qty: 150 TABLET | Refills: 0 | Status: SHIPPED | OUTPATIENT
Start: 2021-09-07 | End: 2021-09-20 | Stop reason: SDUPTHER

## 2021-09-07 NOTE — TELEPHONE ENCOUNTER
Patient called, requested refill.        Next Office Visit Date:  Future Appointments   Date Time Provider Marin Sierra   9/15/2021  3:45 PM Mulugeta Liriano MD [address] please review via [address]

## 2021-09-08 RX ORDER — NITROGLYCERIN 0.4 MG/1
TABLET SUBLINGUAL
Qty: 25 TABLET | Refills: 0 | Status: SHIPPED | OUTPATIENT
Start: 2021-09-08 | End: 2021-10-06

## 2021-09-08 NOTE — TELEPHONE ENCOUNTER
Patient called, requested refill.        Next Office Visit Date:  Future Appointments   Date Time Provider Marin Sierra   9/15/2021  3:45 PM Joselyn Eubanks MD [address] please review via [address]

## 2021-09-15 ENCOUNTER — OFFICE VISIT (OUTPATIENT)
Dept: FAMILY MEDICINE CLINIC | Age: 61
End: 2021-09-15
Payer: MEDICARE

## 2021-09-15 DIAGNOSIS — G89.29 CHRONIC BILATERAL LOW BACK PAIN WITH RIGHT-SIDED SCIATICA: Primary | ICD-10-CM

## 2021-09-15 DIAGNOSIS — Z12.5 ENCOUNTER FOR PROSTATE CANCER SCREENING: ICD-10-CM

## 2021-09-15 DIAGNOSIS — I10 ESSENTIAL HYPERTENSION: ICD-10-CM

## 2021-09-15 DIAGNOSIS — R53.83 FATIGUE, UNSPECIFIED TYPE: ICD-10-CM

## 2021-09-15 DIAGNOSIS — M54.41 CHRONIC BILATERAL LOW BACK PAIN WITH RIGHT-SIDED SCIATICA: Primary | ICD-10-CM

## 2021-09-15 DIAGNOSIS — M15.9 OSTEOARTHRITIS OF MULTIPLE JOINTS, UNSPECIFIED OSTEOARTHRITIS TYPE: ICD-10-CM

## 2021-09-15 DIAGNOSIS — E11.42 TYPE 2 DIABETES MELLITUS WITH DIABETIC POLYNEUROPATHY, WITH LONG-TERM CURRENT USE OF INSULIN (HCC): ICD-10-CM

## 2021-09-15 DIAGNOSIS — Z79.4 TYPE 2 DIABETES MELLITUS WITH DIABETIC POLYNEUROPATHY, WITH LONG-TERM CURRENT USE OF INSULIN (HCC): ICD-10-CM

## 2021-09-15 PROCEDURE — 3052F HG A1C>EQUAL 8.0%<EQUAL 9.0%: CPT | Performed by: FAMILY MEDICINE

## 2021-09-15 PROCEDURE — 3017F COLORECTAL CA SCREEN DOC REV: CPT | Performed by: FAMILY MEDICINE

## 2021-09-15 PROCEDURE — 1036F TOBACCO NON-USER: CPT | Performed by: FAMILY MEDICINE

## 2021-09-15 PROCEDURE — 2022F DILAT RTA XM EVC RTNOPTHY: CPT | Performed by: FAMILY MEDICINE

## 2021-09-15 PROCEDURE — G8427 DOCREV CUR MEDS BY ELIG CLIN: HCPCS | Performed by: FAMILY MEDICINE

## 2021-09-15 PROCEDURE — G8417 CALC BMI ABV UP PARAM F/U: HCPCS | Performed by: FAMILY MEDICINE

## 2021-09-15 PROCEDURE — 99213 OFFICE O/P EST LOW 20 MIN: CPT | Performed by: FAMILY MEDICINE

## 2021-09-19 ASSESSMENT — ENCOUNTER SYMPTOMS
BACK PAIN: 1
COLOR CHANGE: 1

## 2021-09-20 VITALS
TEMPERATURE: 97.6 F | HEART RATE: 63 BPM | SYSTOLIC BLOOD PRESSURE: 120 MMHG | DIASTOLIC BLOOD PRESSURE: 60 MMHG | WEIGHT: 315 LBS | HEIGHT: 76 IN | BODY MASS INDEX: 38.36 KG/M2 | RESPIRATION RATE: 18 BRPM

## 2021-09-20 RX ORDER — HYDROCODONE BITARTRATE AND ACETAMINOPHEN 10; 325 MG/1; MG/1
TABLET ORAL
Qty: 150 TABLET | Refills: 0 | Status: SHIPPED | OUTPATIENT
Start: 2021-09-20 | End: 2021-10-27

## 2021-09-20 RX ORDER — OXYCODONE HYDROCHLORIDE 30 MG/1
30 TABLET ORAL EVERY 4 HOURS PRN
Qty: 150 TABLET | Refills: 0 | Status: SHIPPED | OUTPATIENT
Start: 2021-09-20 | End: 2021-10-27 | Stop reason: SDUPTHER

## 2021-09-20 NOTE — PROGRESS NOTES
SUBJECTIVE:    Patient ID: Zayda Box is a 56 y.o. male. Chief Complaint   Patient presents with    Back Pain    Foot Problem       HPI: office visit  He is struggling with back and hip pain. He is getting some relief with his medicines. He is still struggling with a lot of anxiety over his wife's illness. He still having difficulty with fatigue. His blood sugars are doing better. His blood pressures have not been too bad. He denies any chest pain or shortness of breath. He is had another surgery on his foot. He has had a significant tachycardia bone and joint surgeons in [place]. Dr. Laura Hall has referred him to them. He saw Dr. Preston Roman who has done surgery on the 31st he is got stitches out today and is seeing them back. It is his left foot and they took the fifth digit he does have follow-up with Dr. Laura Hall on the 20th of this month. He feels like he has done well with this procedure. Is not any recent falls or injuries. Review of Systems   Constitutional: Positive for fatigue. Musculoskeletal: Positive for arthralgias, back pain, gait problem and myalgias. Skin: Positive for color change and rash. Neurological: Positive for weakness. All other systems reviewed and are negative. OBJECTIVE:  /60   Pulse 63   Temp 97.6 °F (36.4 °C) (Infrared)   Resp 18   Ht 6' 4\" (1.93 m)   Wt (!) 378 lb (171.5 kg)   BMI 46.01 kg/m²    Wt Readings from Last 3 Encounters:   09/20/21 (!) 378 lb (171.5 kg)   08/03/21 (!) 390 lb 12.8 oz (177.3 kg)   07/15/21 (!) 392 lb 6.4 oz (178 kg)     BP Readings from Last 3 Encounters:   09/20/21 120/60   08/03/21 98/60   07/15/21 122/62      Pulse Readings from Last 3 Encounters:   09/20/21 63   08/03/21 60   07/15/21 92     Body mass index is 46.01 kg/m². Resp Readings from Last 3 Encounters:   09/20/21 18   08/03/21 18   07/15/21 18     Past medical, surgical, family and social history were reviewed and updated with the patient. Physical Exam  Vitals and nursing note reviewed. Constitutional:       Appearance: He is well-developed. HENT:      Head: Normocephalic. Cardiovascular:      Rate and Rhythm: Normal rate and regular rhythm. Pulmonary:      Effort: Pulmonary effort is normal.      Breath sounds: Normal breath sounds. Musculoskeletal:      Cervical back: Normal range of motion and neck supple. Lumbar back: Spasms and tenderness present. Decreased range of motion. Skin:     General: Skin is warm and dry. Neurological:      Mental Status: He is alert and oriented to person, place, and time. Psychiatric:         Attention and Perception: Attention and perception normal.         Mood and Affect: Mood is anxious. Affect is tearful. Speech: Speech normal.         Behavior: Behavior normal. Behavior is cooperative. Thought Content: Thought content normal.         Cognition and Memory: Cognition and memory normal.         Judgment: Judgment normal.         No results found for requested labs within last 30 days. Hemoglobin A1C (%)   Date Value   06/14/2021 8.3 (H)     Microscopic Examination (no units)   Date Value   06/14/2021 Not Indicated     LDL Calculated (mg/dL)   Date Value   05/04/2021 71       Lab Results   Component Value Date    WBC 6.2 06/15/2021    NEUTROABS 2.8 06/15/2021    HGB 13.3 06/15/2021    HCT 45.7 06/15/2021    HCT 41.6 06/05/2012    MCV 97.2 06/15/2021     06/15/2021     06/05/2012     Lab Results   Component Value Date    TSH 2.12 05/18/2018       ASSESSMENT:    Diagnosis Orders   1. Chronic bilateral low back pain with right-sided sciatica  oxyCODONE (OXY-IR) 30 MG immediate release tablet    HYDROcodone-acetaminophen (NORCO)  MG per tablet   2. Essential hypertension  COMPREHENSIVE METABOLIC PANEL    CBC   3.  Type 2 diabetes mellitus with diabetic polyneuropathy, with long-term current use of insulin (HCC)  COMPREHENSIVE METABOLIC PANEL    HEMOGLOBIN A1C   4. Osteoarthritis of multiple joints, unspecified osteoarthritis type     5. Fatigue, unspecified type  CBC    TSH without Reflex    VITAMIN B12 & FOLATE   6. Encounter for prostate cancer screening  PSA screening        PLAN:  Continue meds as is for now     Medications Discontinued During This Encounter   Medication Reason    oxyCODONE (OXY-IR) 30 MG immediate release tablet REORDER    HYDROcodone-acetaminophen (NORCO)  MG per tablet REORDER       Controlled Substances Monitoring:      Please note: This chart was generated using Dragon dictation software. Although every effort was made to ensure the accuracy of this automated transcription, some errors in transcription may have occurred.

## 2021-09-28 NOTE — TELEPHONE ENCOUNTER
Patient called, requested refill. Next Office Visit Date:  No future appointments.     KITTY please review via [address]

## 2021-09-29 RX ORDER — LANCETS
EACH MISCELLANEOUS
Qty: 100 EACH | Refills: 5 | Status: SHIPPED | OUTPATIENT
Start: 2021-09-29 | End: 2022-01-18

## 2021-10-01 RX ORDER — DULOXETIN HYDROCHLORIDE 30 MG/1
30 CAPSULE, DELAYED RELEASE ORAL DAILY
Qty: 30 CAPSULE | Refills: 2 | Status: SHIPPED | OUTPATIENT
Start: 2021-10-01 | End: 2021-12-23

## 2021-10-06 RX ORDER — NITROGLYCERIN 0.4 MG/1
TABLET SUBLINGUAL
Qty: 25 TABLET | Refills: 0 | Status: SHIPPED | OUTPATIENT
Start: 2021-10-06 | End: 2021-10-27

## 2021-10-18 ENCOUNTER — NURSE ONLY (OUTPATIENT)
Dept: FAMILY MEDICINE CLINIC | Age: 61
End: 2021-10-18
Payer: MEDICARE

## 2021-10-18 DIAGNOSIS — E53.8 B12 DEFICIENCY: Primary | ICD-10-CM

## 2021-10-18 PROCEDURE — 96372 THER/PROPH/DIAG INJ SC/IM: CPT | Performed by: FAMILY MEDICINE

## 2021-10-18 RX ORDER — CYANOCOBALAMIN 1000 UG/ML
1000 INJECTION INTRAMUSCULAR; SUBCUTANEOUS ONCE
Status: COMPLETED | OUTPATIENT
Start: 2021-10-18 | End: 2021-10-18

## 2021-10-18 RX ADMIN — CYANOCOBALAMIN 1000 MCG: 1000 INJECTION INTRAMUSCULAR; SUBCUTANEOUS at 14:57

## 2021-10-18 NOTE — PROGRESS NOTES
Administrations This Visit     cyanocobalamin injection 1,000 mcg     Admin Date  10/18/2021  14:57 Action  Given Dose  1,000 mcg Route  IntraMUSCular Site  Deltoid Left Administered By  Annabelle Rubi MA    Ordering Provider: Manolo Albarran MD    NDC: 03583-173-03    : [address] [place] [address]    Patient Supplied?: No                Patient tolerated injection well. Patient advised to wait 20 minutes in the office following the injection. No signs/symptoms of reaction noted after 20 minutes.

## 2021-10-27 DIAGNOSIS — G89.29 CHRONIC BILATERAL LOW BACK PAIN WITH RIGHT-SIDED SCIATICA: ICD-10-CM

## 2021-10-27 DIAGNOSIS — M54.41 CHRONIC BILATERAL LOW BACK PAIN WITH RIGHT-SIDED SCIATICA: ICD-10-CM

## 2021-10-27 RX ORDER — NITROGLYCERIN 0.4 MG/1
TABLET SUBLINGUAL
Qty: 25 TABLET | Refills: 0 | Status: SHIPPED | OUTPATIENT
Start: 2021-10-27 | End: 2022-02-21

## 2021-10-27 RX ORDER — OXYCODONE HYDROCHLORIDE 30 MG/1
30 TABLET ORAL EVERY 4 HOURS PRN
Qty: 150 TABLET | Refills: 0 | Status: SHIPPED | OUTPATIENT
Start: 2021-10-27 | End: 2021-11-02 | Stop reason: SDUPTHER

## 2021-10-27 RX ORDER — HYDROCODONE BITARTRATE AND ACETAMINOPHEN 10; 325 MG/1; MG/1
TABLET ORAL
Qty: 150 TABLET | Refills: 0 | Status: SHIPPED | OUTPATIENT
Start: 2021-10-27 | End: 2021-11-02 | Stop reason: SDUPTHER

## 2021-10-27 RX ORDER — FERROUS SULFATE 325(65) MG
TABLET ORAL
Qty: 90 TABLET | Refills: 5 | Status: SHIPPED | OUTPATIENT
Start: 2021-10-27 | End: 2022-01-18 | Stop reason: ALTCHOICE

## 2021-10-27 RX ORDER — NYSTATIN 100000 [USP'U]/G
POWDER TOPICAL
Qty: 60 G | Refills: 5 | Status: SHIPPED | OUTPATIENT
Start: 2021-10-27 | End: 2022-05-17

## 2021-10-27 NOTE — TELEPHONE ENCOUNTER
Patient called, requested refill.        Next Office Visit Date:  Future Appointments   Date Time Provider Marin Sierra   2021  2:00 PM Delpha Apgar, MD [address] please review via [address]

## 2021-10-27 NOTE — TELEPHONE ENCOUNTER
Patient called, requested refill.        Next Office Visit Date:  Future Appointments   Date Time Provider Marin Sierra   11/16/2021  2:00 PM Elisha Gamboa MD [address] please review via [address]

## 2021-11-02 ENCOUNTER — OFFICE VISIT (OUTPATIENT)
Dept: FAMILY MEDICINE CLINIC | Age: 61
End: 2021-11-02
Payer: MEDICARE

## 2021-11-02 ENCOUNTER — HOSPITAL ENCOUNTER (OUTPATIENT)
Facility: HOSPITAL | Age: 61
Discharge: HOME | End: 2021-11-02
Payer: MEDICARE

## 2021-11-02 VITALS
HEART RATE: 76 BPM | SYSTOLIC BLOOD PRESSURE: 126 MMHG | WEIGHT: 315 LBS | DIASTOLIC BLOOD PRESSURE: 60 MMHG | BODY MASS INDEX: 38.36 KG/M2 | HEIGHT: 76 IN | TEMPERATURE: 97.3 F | OXYGEN SATURATION: 96 %

## 2021-11-02 DIAGNOSIS — Z12.5 SCREENING FOR PROSTATE CANCER: ICD-10-CM

## 2021-11-02 DIAGNOSIS — G89.29 CHRONIC RIGHT SHOULDER PAIN: Primary | ICD-10-CM

## 2021-11-02 DIAGNOSIS — M54.41 CHRONIC BILATERAL LOW BACK PAIN WITH RIGHT-SIDED SCIATICA: ICD-10-CM

## 2021-11-02 DIAGNOSIS — G89.29 CHRONIC BILATERAL LOW BACK PAIN WITH RIGHT-SIDED SCIATICA: ICD-10-CM

## 2021-11-02 DIAGNOSIS — M25.511 CHRONIC RIGHT SHOULDER PAIN: Primary | ICD-10-CM

## 2021-11-02 DIAGNOSIS — R53.83 FATIGUE, UNSPECIFIED TYPE: ICD-10-CM

## 2021-11-02 DIAGNOSIS — E11.42 TYPE 2 DIABETES MELLITUS WITH DIABETIC POLYNEUROPATHY, WITH LONG-TERM CURRENT USE OF INSULIN (HCC): ICD-10-CM

## 2021-11-02 DIAGNOSIS — M54.41 CHRONIC MIDLINE LOW BACK PAIN WITH RIGHT-SIDED SCIATICA: ICD-10-CM

## 2021-11-02 DIAGNOSIS — Z79.4 TYPE 2 DIABETES MELLITUS WITH DIABETIC POLYNEUROPATHY, WITH LONG-TERM CURRENT USE OF INSULIN (HCC): ICD-10-CM

## 2021-11-02 DIAGNOSIS — M15.9 OSTEOARTHRITIS OF MULTIPLE JOINTS, UNSPECIFIED OSTEOARTHRITIS TYPE: ICD-10-CM

## 2021-11-02 DIAGNOSIS — F32.A DEPRESSION, UNSPECIFIED DEPRESSION TYPE: ICD-10-CM

## 2021-11-02 DIAGNOSIS — I10 ESSENTIAL HYPERTENSION: ICD-10-CM

## 2021-11-02 DIAGNOSIS — G89.29 CHRONIC MIDLINE LOW BACK PAIN WITH RIGHT-SIDED SCIATICA: ICD-10-CM

## 2021-11-02 PROCEDURE — 2022F DILAT RTA XM EVC RTNOPTHY: CPT | Performed by: FAMILY MEDICINE

## 2021-11-02 PROCEDURE — 3017F COLORECTAL CA SCREEN DOC REV: CPT | Performed by: FAMILY MEDICINE

## 2021-11-02 PROCEDURE — 36415 COLL VENOUS BLD VENIPUNCTURE: CPT

## 2021-11-02 PROCEDURE — G8417 CALC BMI ABV UP PARAM F/U: HCPCS | Performed by: FAMILY MEDICINE

## 2021-11-02 PROCEDURE — G8427 DOCREV CUR MEDS BY ELIG CLIN: HCPCS | Performed by: FAMILY MEDICINE

## 2021-11-02 PROCEDURE — 99214 OFFICE O/P EST MOD 30 MIN: CPT | Performed by: FAMILY MEDICINE

## 2021-11-02 PROCEDURE — 20610 DRAIN/INJ JOINT/BURSA W/O US: CPT | Performed by: FAMILY MEDICINE

## 2021-11-02 PROCEDURE — 3051F HG A1C>EQUAL 7.0%<8.0%: CPT | Performed by: FAMILY MEDICINE

## 2021-11-02 PROCEDURE — 90674 CCIIV4 VAC NO PRSV 0.5 ML IM: CPT | Performed by: FAMILY MEDICINE

## 2021-11-02 PROCEDURE — 1036F TOBACCO NON-USER: CPT | Performed by: FAMILY MEDICINE

## 2021-11-02 PROCEDURE — G8482 FLU IMMUNIZE ORDER/ADMIN: HCPCS | Performed by: FAMILY MEDICINE

## 2021-11-02 PROCEDURE — G0008 ADMIN INFLUENZA VIRUS VAC: HCPCS | Performed by: FAMILY MEDICINE

## 2021-11-02 RX ORDER — CYANOCOBALAMIN 1000 UG/ML
1000 INJECTION INTRAMUSCULAR; SUBCUTANEOUS ONCE
Status: COMPLETED | OUTPATIENT
Start: 2021-11-02 | End: 2021-11-02

## 2021-11-02 RX ORDER — LIDOCAINE HYDROCHLORIDE 10 MG/ML
1 INJECTION, SOLUTION INFILTRATION; PERINEURAL ONCE
Status: COMPLETED | OUTPATIENT
Start: 2021-11-02 | End: 2021-11-02

## 2021-11-02 RX ORDER — OXYCODONE HYDROCHLORIDE 30 MG/1
30 TABLET ORAL EVERY 4 HOURS PRN
Qty: 150 TABLET | Refills: 0 | Status: SHIPPED | OUTPATIENT
Start: 2021-11-02 | End: 2021-11-02 | Stop reason: SDUPTHER

## 2021-11-02 RX ORDER — GABAPENTIN 800 MG/1
TABLET ORAL
Qty: 120 TABLET | Refills: 2 | Status: SHIPPED | OUTPATIENT
Start: 2021-11-02 | End: 2022-01-18 | Stop reason: SDUPTHER

## 2021-11-02 RX ORDER — HYDROCODONE BITARTRATE AND ACETAMINOPHEN 10; 325 MG/1; MG/1
TABLET ORAL
Qty: 150 TABLET | Refills: 0 | Status: SHIPPED | OUTPATIENT
Start: 2021-11-02 | End: 2021-11-29 | Stop reason: SDUPTHER

## 2021-11-02 RX ORDER — OXYCODONE HYDROCHLORIDE 30 MG/1
30 TABLET ORAL EVERY 4 HOURS PRN
Qty: 150 TABLET | Refills: 0 | Status: SHIPPED | OUTPATIENT
Start: 2021-11-02 | End: 2021-11-29 | Stop reason: SDUPTHER

## 2021-11-02 RX ORDER — HYDROCODONE BITARTRATE AND ACETAMINOPHEN 10; 325 MG/1; MG/1
TABLET ORAL
Qty: 150 TABLET | Refills: 0 | Status: SHIPPED | OUTPATIENT
Start: 2021-11-02 | End: 2021-11-02 | Stop reason: SDUPTHER

## 2021-11-02 RX ORDER — METHYLPREDNISOLONE ACETATE 40 MG/ML
40 INJECTION, SUSPENSION INTRA-ARTICULAR; INTRALESIONAL; INTRAMUSCULAR; SOFT TISSUE ONCE
Status: COMPLETED | OUTPATIENT
Start: 2021-11-02 | End: 2021-11-02

## 2021-11-02 RX ADMIN — METHYLPREDNISOLONE ACETATE 40 MG: 40 INJECTION, SUSPENSION INTRA-ARTICULAR; INTRALESIONAL; INTRAMUSCULAR; SOFT TISSUE at 10:33

## 2021-11-02 RX ADMIN — LIDOCAINE HYDROCHLORIDE 1 ML: 10 INJECTION, SOLUTION INFILTRATION; PERINEURAL at 10:34

## 2021-11-02 RX ADMIN — CYANOCOBALAMIN 1000 MCG: 1000 INJECTION INTRAMUSCULAR; SUBCUTANEOUS at 09:59

## 2021-11-02 ASSESSMENT — ENCOUNTER SYMPTOMS
BACK PAIN: 1
COLOR CHANGE: 1

## 2021-11-02 NOTE — PROGRESS NOTES
SUBJECTIVE:    Patient ID: Cheyenne Reveles is a 59 y.o. male. Chief Complaint   Patient presents with    Shoulder Pain     right       HPI: office visit  He is in the office today complaining of right shoulder pain. He is gurgling with trying to do what he needs to do at home. He is having a lot of pain even trying to sleep. He says he is getting some relief from injections. He has not had any falls or injuries recently. His wife continues to struggle with significant illness. He is trying to take care of her. Is not had any chest pain. Is having a lot of difficulty with his back pain. His blood sugar seem to be doing quite well. Is not had any significant high blood pressures. He is not having any medication problems that I can tell. Review of Systems   Constitutional: Positive for fatigue. Musculoskeletal: Positive for arthralgias, back pain, gait problem and myalgias. Skin: Positive for color change and rash. Neurological: Positive for weakness. All other systems reviewed and are negative. OBJECTIVE:  /60 (Site: Right Upper Arm, Position: Sitting)   Pulse 76   Temp 97.3 °F (36.3 °C) (Temporal)   Ht 6' 4\" (1.93 m)   Wt (!) 392 lb 12.8 oz (178.2 kg)   SpO2 96%   BMI 47.81 kg/m²    Wt Readings from Last 3 Encounters:   01/18/22 (!) 398 lb 9.6 oz (180.8 kg)   11/16/21 (!) 394 lb 3.2 oz (178.8 kg)   11/02/21 (!) 392 lb 12.8 oz (178.2 kg)     BP Readings from Last 3 Encounters:   01/18/22 120/74   11/16/21 136/60   11/02/21 126/60      Pulse Readings from Last 3 Encounters:   01/18/22 77   11/16/21 63   11/02/21 76     Body mass index is 47.81 kg/m². Resp Readings from Last 3 Encounters:   01/18/22 18   09/20/21 18   08/03/21 18     Past medical, surgical, family and social history were reviewed and updated with the patient. Physical Exam  Vitals and nursing note reviewed. Constitutional:       Appearance: He is well-developed. HENT:      Head: Normocephalic. Cardiovascular:      Rate and Rhythm: Normal rate and regular rhythm. Pulmonary:      Effort: Pulmonary effort is normal.      Breath sounds: Normal breath sounds. Musculoskeletal:      Right shoulder: Tenderness and crepitus present. Decreased range of motion. Cervical back: Normal range of motion and neck supple. Lumbar back: Spasms and tenderness present. Decreased range of motion. Positive right straight leg raise test.   Skin:     General: Skin is warm and dry. Neurological:      Mental Status: He is alert and oriented to person, place, and time. Psychiatric:         Attention and Perception: Attention and perception normal.         Mood and Affect: Mood is anxious. Affect is tearful. Speech: Speech normal.         Behavior: Behavior normal. Behavior is cooperative. Thought Content: Thought content normal.         Cognition and Memory: Cognition and memory normal.         Judgment: Judgment normal.          No results found for requested labs within last 30 days. Hemoglobin A1C (%)   Date Value   11/02/2021 7.1 (H)     Microscopic Examination (no units)   Date Value   06/14/2021 Not Indicated     LDL Calculated (mg/dL)   Date Value   05/04/2021 71       Lab Results   Component Value Date    WBC 6.2 06/15/2021    NEUTROABS 2.8 06/15/2021    HGB 13.3 06/15/2021    HCT 45.7 06/15/2021    HCT 41.6 06/05/2012    MCV 97.2 06/15/2021     06/15/2021     06/05/2012     Lab Results   Component Value Date    TSH 1.540 11/02/2021       ASSESSMENT:    Diagnosis Orders   1. Chronic right shoulder pain  20610 - CT DRAIN/INJECT LARGE JOINT/BURSA   2. Chronic midline low back pain with right-sided sciatica  MRI Lumbar Spine WO Contrast   3. Type 2 diabetes mellitus with diabetic polyneuropathy, with long-term current use of insulin ([address]     4. Essential hypertension     5.  Chronic bilateral low back pain with right-sided sciatica  DISCONTINUED: HYDROcodone-acetaminophen (NORCO)  MG per tablet    DISCONTINUED: oxyCODONE (OXY-IR) 30 MG immediate release tablet    DISCONTINUED: gabapentin (NEURONTIN) 800 MG tablet    DISCONTINUED: HYDROcodone-acetaminophen (NORCO)  MG per tablet    DISCONTINUED: oxyCODONE (OXY-IR) 30 MG immediate release tablet   6. Osteoarthritis of multiple joints, unspecified osteoarthritis type     7. Screening for prostate cancer     8. Fatigue, unspecified type     9. Depression, unspecified depression type          PLAN:  Orders Placed This Encounter   Medications    cyanocobalamin injection 1,000 mcg    methylPREDNISolone acetate (DEPO-MEDROL) injection 40 mg    lidocaine 1 % injection 1 mL    DISCONTD: HYDROcodone-acetaminophen (NORCO)  MG per tablet     Sig: Take one tablet every 4 hours as needed     Dispense:  150 tablet     Refill:  0     Reduce doses taken as pain becomes manageable    DISCONTD: oxyCODONE (OXY-IR) 30 MG immediate release tablet     Sig: Take 1 tablet by mouth every 4 hours as needed for Pain for up to 30 days. Dispense:  150 tablet     Refill:  0     Reduce doses taken as pain becomes manageable    DISCONTD: gabapentin (NEURONTIN) 800 MG tablet     Sig: Take one tablet by mouth four times a day     Dispense:  120 tablet     Refill:  2    DISCONTD: HYDROcodone-acetaminophen (NORCO)  MG per tablet     Sig: Take one tablet every 4 hours as needed. To be filled on or after December 1st     Dispense:  150 tablet     Refill:  0     Reduce doses taken as pain becomes manageable    DISCONTD: oxyCODONE (OXY-IR) 30 MG immediate release tablet     Sig: Take 1 tablet by mouth every 4 hours as needed for Pain for up to 30 days. To be filled on or after December 1st     Dispense:  150 tablet     Refill:  0     Reduce doses taken as pain becomes manageable      Steroid injection was performed by Bandar Phillips MD using Plain Lidocaine 1% 1ml and Depo-Medrol 40mg 1ml Intra-Articular in the right shoulder  .  No complications or reactions noted. Patient tolerated procedure well. Medications Discontinued During This Encounter   Medication Reason    gabapentin (NEURONTIN) 800 MG tablet REORDER    HYDROcodone-acetaminophen (NORCO)  MG per tablet REORDER    oxyCODONE (OXY-IR) 30 MG immediate release tablet REORDER    HYDROcodone-acetaminophen (NORCO)  MG per tablet REORDER    oxyCODONE (OXY-IR) 30 MG immediate release tablet REORDER       Controlled Substances Monitoring: Attestation: The Prescription Monitoring Report for this patient was reviewed today. Yani Lo MD)  Periodic Controlled Substance Monitoring: Possible medication side effects, risk of tolerance/dependence & alternative treatments discussed. ,No signs of potential drug abuse or diversion identified. ,Assessed functional status. ,Obtaining appropriate analgesic effect of treatment. Yani Lo MD)    Please note: This chart was generated using Dragon dictation software. Although every effort was made to ensure the accuracy of this automated transcription, some errors in transcription may have occurred.

## 2021-11-02 NOTE — PROGRESS NOTES
Chief Complaint   Patient presents with    Shoulder Pain     right     Pt here today c/o right shoulder pain     Have you seen any other physician or provider since your last visit yes - no    Have you had any other diagnostic tests since your last visit? no    Have you changed or stopped any medications since your last visit? no       Vaccine Information Sheet, \"Influenza - Inactivated\"  given to Lee Alvarez, or parent/legal guardian of  Lee Alvarez and verbalized understanding. Patient responses:    Have you ever had a reaction to a flu vaccine? No  Do you have any current illness? No  Have you ever had [place] Syndrome? No  Do you have a serious allergy to any of the follow: Neomycin, Polymyxin, Thimerosal, eggs or egg products? No    Flu vaccine given per order. Please see immunization tab. Risks and benefits explained. Current VIS given.       Immunizations Administered     Name Date Dose Route    Influenza, MDCK Quadv, IM, PF (Flucelvax 2 yrs and older) 11/2/2021 0.5 mL Intramuscular    Site: Deltoid- Left    Lot: [id number]    NDC: 87180-894-48

## 2021-11-16 ENCOUNTER — OFFICE VISIT (OUTPATIENT)
Dept: FAMILY MEDICINE CLINIC | Age: 61
End: 2021-11-16
Payer: MEDICARE

## 2021-11-16 VITALS
DIASTOLIC BLOOD PRESSURE: 60 MMHG | SYSTOLIC BLOOD PRESSURE: 136 MMHG | HEIGHT: 76 IN | WEIGHT: 315 LBS | BODY MASS INDEX: 38.36 KG/M2 | TEMPERATURE: 97 F | HEART RATE: 63 BPM | OXYGEN SATURATION: 95 %

## 2021-11-16 DIAGNOSIS — I10 ESSENTIAL HYPERTENSION: ICD-10-CM

## 2021-11-16 DIAGNOSIS — G89.29 CHRONIC MIDLINE LOW BACK PAIN WITH RIGHT-SIDED SCIATICA: Primary | ICD-10-CM

## 2021-11-16 DIAGNOSIS — Z79.4 TYPE 2 DIABETES MELLITUS WITH DIABETIC POLYNEUROPATHY, WITH LONG-TERM CURRENT USE OF INSULIN (HCC): ICD-10-CM

## 2021-11-16 DIAGNOSIS — M54.41 CHRONIC MIDLINE LOW BACK PAIN WITH RIGHT-SIDED SCIATICA: Primary | ICD-10-CM

## 2021-11-16 DIAGNOSIS — F41.9 ANXIETY: ICD-10-CM

## 2021-11-16 DIAGNOSIS — E11.42 TYPE 2 DIABETES MELLITUS WITH DIABETIC POLYNEUROPATHY, WITH LONG-TERM CURRENT USE OF INSULIN (HCC): ICD-10-CM

## 2021-11-16 DIAGNOSIS — M15.9 OSTEOARTHRITIS OF MULTIPLE JOINTS, UNSPECIFIED OSTEOARTHRITIS TYPE: ICD-10-CM

## 2021-11-16 PROCEDURE — 2022F DILAT RTA XM EVC RTNOPTHY: CPT | Performed by: FAMILY MEDICINE

## 2021-11-16 PROCEDURE — G8427 DOCREV CUR MEDS BY ELIG CLIN: HCPCS | Performed by: FAMILY MEDICINE

## 2021-11-16 PROCEDURE — G8482 FLU IMMUNIZE ORDER/ADMIN: HCPCS | Performed by: FAMILY MEDICINE

## 2021-11-16 PROCEDURE — 1036F TOBACCO NON-USER: CPT | Performed by: FAMILY MEDICINE

## 2021-11-16 PROCEDURE — 3017F COLORECTAL CA SCREEN DOC REV: CPT | Performed by: FAMILY MEDICINE

## 2021-11-16 PROCEDURE — 99213 OFFICE O/P EST LOW 20 MIN: CPT | Performed by: FAMILY MEDICINE

## 2021-11-16 PROCEDURE — G8417 CALC BMI ABV UP PARAM F/U: HCPCS | Performed by: FAMILY MEDICINE

## 2021-11-16 PROCEDURE — 3051F HG A1C>EQUAL 7.0%<8.0%: CPT | Performed by: FAMILY MEDICINE

## 2021-11-16 ASSESSMENT — ENCOUNTER SYMPTOMS
COLOR CHANGE: 1
BACK PAIN: 1

## 2021-11-16 NOTE — PROGRESS NOTES
SUBJECTIVE:    Patient ID: Cammy Lynn is a 59 y.o. male. Chief Complaint   Patient presents with    Back Pain       HPI: office visit  He is in the office today complaining of continued issues with worsening arthritic pain. He says he is having more functional ability issues all the time. Is not had any recent falls or injuries. He is trying to do things around the house. He says he is really struggling. His anxiety has been a little better since his wife's been better. His blood pressures have been too bad. His blood sugars have been doing pretty good he thinks. He says he still struggles with just shoulder pain hip pain back pain. He says he just having to use a cane most all the time. Is not had any significant amount of swelling. He denies any chest pain. Review of Systems   Constitutional: Positive for fatigue. Musculoskeletal: Positive for arthralgias, back pain, gait problem and myalgias. Skin: Positive for color change and rash. Neurological: Positive for weakness. All other systems reviewed and are negative. OBJECTIVE:  /60 (Site: Right Upper Arm, Position: Sitting)   Pulse 63   Temp 97 °F (36.1 °C) (Temporal)   Ht 6' 4\" (1.93 m)   Wt (!) 394 lb 3.2 oz (178.8 kg)   SpO2 95%   BMI 47.98 kg/m²    Wt Readings from Last 3 Encounters:   11/16/21 (!) 394 lb 3.2 oz (178.8 kg)   11/02/21 (!) 392 lb 12.8 oz (178.2 kg)   09/20/21 (!) 378 lb (171.5 kg)     BP Readings from Last 3 Encounters:   11/16/21 136/60   11/02/21 126/60   09/20/21 120/60      Pulse Readings from Last 3 Encounters:   11/16/21 63   11/02/21 76   09/20/21 63     Body mass index is 47.98 kg/m². Resp Readings from Last 3 Encounters:   09/20/21 18   08/03/21 18   07/15/21 18     Past medical, surgical, family and social history were reviewed and updated with the patient. Physical Exam  Vitals and nursing note reviewed. Constitutional:       Appearance: He is well-developed.    HENT:      Head: Normocephalic. Cardiovascular:      Rate and Rhythm: Normal rate and regular rhythm. Pulmonary:      Effort: Pulmonary effort is normal.      Breath sounds: Normal breath sounds. Musculoskeletal:      Cervical back: Normal range of motion and neck supple. Lumbar back: Spasms and tenderness present. Decreased range of motion. Skin:     General: Skin is warm and dry. Neurological:      Mental Status: He is alert and oriented to person, place, and time. Psychiatric:         Attention and Perception: Attention and perception normal.         Mood and Affect: Mood is anxious. Affect is tearful. Speech: Speech normal.         Behavior: Behavior normal. Behavior is cooperative. Thought Content:  Thought content normal.         Cognition and Memory: Cognition and memory normal.         Judgment: Judgment normal.          Results in Past 30 Days  Result Component Current Result Ref Range Previous Result Ref Range   Albumin/Globulin Ratio 1.2 (11/2/2021) 1.2 - 2.2 Not in Time Range    Albumin 3.7 (L) (11/2/2021) 3.8 - 4.8 g/dL Not in Time Range    Alkaline Phosphatase 75 (11/2/2021) 44 - 121 IU/L Not in Time Range    ALT 17 (11/2/2021) 0 - 44 IU/L Not in Time Range    AST 21 (11/2/2021) 0 - 40 IU/L Not in Time Range    BUN 17 (11/2/2021) 8 - 27 mg/dL Not in Time Range    Calcium 9.2 (11/2/2021) 8.6 - 10.2 mg/dL Not in Time Range    Chloride 105 (11/2/2021) 96 - 106 mmol/L Not in Time Range    CO2 21 (11/2/2021) 20 - 29 mmol/L Not in Time Range    CREATININE 0.93 (11/2/2021) 0.76 - 1.27 mg/dL Not in Time Range    GFR  101 (11/2/2021) >59 mL/min/1.73 Not in Time Range    GFR Non- 87 (11/2/2021) >59 mL/min/1.73 Not in Time Range    Globulin 3.0 (11/2/2021) 1.5 - 4.5 g/dL Not in Time Range    Glucose 172 (H) (11/2/2021) 65 - 99 mg/dL Not in Time Range    Potassium 4.4 (11/2/2021) 3.5 - 5.2 mmol/L Not in Time Range    Sodium 142 (11/2/2021) 134 - 144 mmol/L Not in Time Range    Total Bilirubin <0.2 (11/2/2021) 0.0 - 1.2 mg/dL Not in Time Range    Total Protein 6.7 (11/2/2021) 6.0 - 8.5 g/dL Not in Time Range      Hemoglobin A1C (%)   Date Value   11/02/2021 7.1 (H)     Microscopic Examination (no units)   Date Value   06/14/2021 Not Indicated     LDL Calculated (mg/dL)   Date Value   05/04/2021 71       Lab Results   Component Value Date    WBC 6.2 06/15/2021    NEUTROABS 2.8 06/15/2021    HGB 13.3 06/15/2021    HCT 45.7 06/15/2021    HCT 41.6 06/05/2012    MCV 97.2 06/15/2021     06/15/2021     06/05/2012     Lab Results   Component Value Date    TSH 1.540 11/02/2021       ASSESSMENT:    Diagnosis Orders   1. Chronic midline low back pain with right-sided sciatica     2. Essential hypertension     3. Osteoarthritis of multiple joints, unspecified osteoarthritis type     4. Anxiety     5. Type 2 diabetes mellitus with diabetic polyneuropathy, with long-term current use of insulin ([facility])          PLAN:  Continue meds as is for now. There are no discontinued medications. Controlled Substances Monitoring:      Please note: This chart was generated using Dragon dictation software. Although every effort was made to ensure the accuracy of this automated transcription, some errors in transcription may have occurred.

## 2021-11-29 ENCOUNTER — NURSE ONLY (OUTPATIENT)
Dept: FAMILY MEDICINE CLINIC | Age: 61
End: 2021-11-29
Payer: MEDICARE

## 2021-11-29 ENCOUNTER — TELEPHONE (OUTPATIENT)
Dept: FAMILY MEDICINE CLINIC | Age: 61
End: 2021-11-29

## 2021-11-29 DIAGNOSIS — G89.29 CHRONIC MIDLINE LOW BACK PAIN WITH RIGHT-SIDED SCIATICA: ICD-10-CM

## 2021-11-29 DIAGNOSIS — M54.41 CHRONIC BILATERAL LOW BACK PAIN WITH RIGHT-SIDED SCIATICA: ICD-10-CM

## 2021-11-29 DIAGNOSIS — G89.29 CHRONIC BILATERAL LOW BACK PAIN WITH RIGHT-SIDED SCIATICA: ICD-10-CM

## 2021-11-29 DIAGNOSIS — E53.8 B12 DEFICIENCY: Primary | ICD-10-CM

## 2021-11-29 DIAGNOSIS — M54.41 CHRONIC MIDLINE LOW BACK PAIN WITH RIGHT-SIDED SCIATICA: ICD-10-CM

## 2021-11-29 PROCEDURE — 96372 THER/PROPH/DIAG INJ SC/IM: CPT | Performed by: FAMILY MEDICINE

## 2021-11-29 RX ORDER — OXYCODONE HYDROCHLORIDE 30 MG/1
30 TABLET ORAL EVERY 4 HOURS PRN
Qty: 150 TABLET | Refills: 0 | Status: SHIPPED | OUTPATIENT
Start: 2021-11-29 | End: 2022-01-18 | Stop reason: SDUPTHER

## 2021-11-29 RX ORDER — HYDROCODONE BITARTRATE AND ACETAMINOPHEN 10; 325 MG/1; MG/1
TABLET ORAL
Qty: 150 TABLET | Refills: 0 | Status: SHIPPED | OUTPATIENT
Start: 2021-11-29 | End: 2022-01-18 | Stop reason: SDUPTHER

## 2021-11-29 RX ORDER — CYANOCOBALAMIN 1000 UG/ML
1000 INJECTION INTRAMUSCULAR; SUBCUTANEOUS ONCE
Status: COMPLETED | OUTPATIENT
Start: 2021-11-29 | End: 2021-11-29

## 2021-11-29 RX ORDER — FAMOTIDINE 40 MG/1
40 TABLET, FILM COATED ORAL EVERY EVENING
Qty: 30 TABLET | Refills: 3 | Status: SHIPPED | OUTPATIENT
Start: 2021-11-29 | End: 2022-04-19

## 2021-11-29 RX ADMIN — CYANOCOBALAMIN 1000 MCG: 1000 INJECTION INTRAMUSCULAR; SUBCUTANEOUS at 12:00

## 2021-11-29 NOTE — TELEPHONE ENCOUNTER
Roselyn Moeller asked if he could increase pantoprazole from 40mg daily to BID - he has been taking 2 and says it's helping him

## 2021-11-29 NOTE — TELEPHONE ENCOUNTER
Insurance wont pay for 2 a day.   I can send pepcid for once a day and he can take protonix once a day

## 2021-12-06 DIAGNOSIS — M54.41 CHRONIC MIDLINE LOW BACK PAIN WITH RIGHT-SIDED SCIATICA: Primary | ICD-10-CM

## 2021-12-06 DIAGNOSIS — M25.551 RIGHT HIP PAIN: ICD-10-CM

## 2021-12-06 DIAGNOSIS — G89.29 CHRONIC MIDLINE LOW BACK PAIN WITH RIGHT-SIDED SCIATICA: Primary | ICD-10-CM

## 2021-12-22 NOTE — TELEPHONE ENCOUNTER
Patient called, requested refill.        Next Office Visit Date:  Future Appointments   Date Time Provider [place]   2/16/2022  2:00 PM Pastor Leola MD [address] please review via [address]

## 2021-12-23 RX ORDER — DULOXETIN HYDROCHLORIDE 30 MG/1
CAPSULE, DELAYED RELEASE ORAL
Qty: 30 CAPSULE | Refills: 2 | Status: SHIPPED | OUTPATIENT
Start: 2021-12-23 | End: 2022-03-21

## 2021-12-27 ENCOUNTER — TELEPHONE (OUTPATIENT)
Dept: FAMILY MEDICINE CLINIC | Age: 61
End: 2021-12-27

## 2021-12-27 NOTE — TELEPHONE ENCOUNTER
----- Message from Janeth Tiffanienayanleia sent at 12/23/2021  1:58 PM EST -----  Subject: Message to Provider    QUESTIONS  Information for Provider? Patient is requesting to speak to the doctor or   to a Kati. Patient is requesting a call back. ---------------------------------------------------------------------------  --------------  Johana Ngo INFO  What is the best way for the office to contact you? OK to leave message on   voicemail  Preferred Call Back Phone Number? [phone]  ---------------------------------------------------------------------------  --------------  SCRIPT ANSWERS  Relationship to Patient?  Self

## 2022-01-18 ENCOUNTER — OFFICE VISIT (OUTPATIENT)
Dept: FAMILY MEDICINE CLINIC | Age: 62
End: 2022-01-18
Payer: MEDICARE

## 2022-01-18 VITALS
HEART RATE: 77 BPM | HEIGHT: 76 IN | SYSTOLIC BLOOD PRESSURE: 120 MMHG | RESPIRATION RATE: 18 BRPM | DIASTOLIC BLOOD PRESSURE: 74 MMHG | WEIGHT: 315 LBS | BODY MASS INDEX: 38.36 KG/M2 | OXYGEN SATURATION: 98 %

## 2022-01-18 DIAGNOSIS — R26.89 POOR BALANCE: Primary | ICD-10-CM

## 2022-01-18 DIAGNOSIS — M54.41 CHRONIC BILATERAL LOW BACK PAIN WITH RIGHT-SIDED SCIATICA: ICD-10-CM

## 2022-01-18 DIAGNOSIS — G89.29 CHRONIC BILATERAL LOW BACK PAIN WITH RIGHT-SIDED SCIATICA: ICD-10-CM

## 2022-01-18 DIAGNOSIS — I10 ESSENTIAL HYPERTENSION: ICD-10-CM

## 2022-01-18 PROCEDURE — G8417 CALC BMI ABV UP PARAM F/U: HCPCS | Performed by: FAMILY MEDICINE

## 2022-01-18 PROCEDURE — G8482 FLU IMMUNIZE ORDER/ADMIN: HCPCS | Performed by: FAMILY MEDICINE

## 2022-01-18 PROCEDURE — 1036F TOBACCO NON-USER: CPT | Performed by: FAMILY MEDICINE

## 2022-01-18 PROCEDURE — 99214 OFFICE O/P EST MOD 30 MIN: CPT | Performed by: FAMILY MEDICINE

## 2022-01-18 PROCEDURE — 3017F COLORECTAL CA SCREEN DOC REV: CPT | Performed by: FAMILY MEDICINE

## 2022-01-18 PROCEDURE — G8427 DOCREV CUR MEDS BY ELIG CLIN: HCPCS | Performed by: FAMILY MEDICINE

## 2022-01-18 RX ORDER — PANTOPRAZOLE SODIUM 40 MG/1
40 TABLET, DELAYED RELEASE ORAL
Qty: 90 TABLET | Refills: 3 | Status: SHIPPED | OUTPATIENT
Start: 2022-01-18 | End: 2022-07-14 | Stop reason: SDUPTHER

## 2022-01-18 RX ORDER — GABAPENTIN 800 MG/1
TABLET ORAL
Qty: 120 TABLET | Refills: 2 | Status: SHIPPED | OUTPATIENT
Start: 2022-01-18 | End: 2022-04-19 | Stop reason: SDUPTHER

## 2022-01-18 RX ORDER — MONTELUKAST SODIUM 10 MG/1
TABLET ORAL
Qty: 90 TABLET | Refills: 3 | Status: SHIPPED | OUTPATIENT
Start: 2022-01-18 | End: 2022-07-14 | Stop reason: SDUPTHER

## 2022-01-18 RX ORDER — SIMVASTATIN 40 MG
TABLET ORAL
Qty: 90 TABLET | Refills: 3 | Status: SHIPPED | OUTPATIENT
Start: 2022-01-18 | End: 2022-07-14 | Stop reason: SDUPTHER

## 2022-01-18 RX ORDER — HYDROCODONE BITARTRATE AND ACETAMINOPHEN 10; 325 MG/1; MG/1
TABLET ORAL
Qty: 150 TABLET | Refills: 0 | Status: SHIPPED | OUTPATIENT
Start: 2022-01-18 | End: 2022-02-16 | Stop reason: SDUPTHER

## 2022-01-18 RX ORDER — OXYCODONE HYDROCHLORIDE 30 MG/1
30 TABLET ORAL EVERY 4 HOURS PRN
Qty: 150 TABLET | Refills: 0 | Status: SHIPPED | OUTPATIENT
Start: 2022-01-18 | End: 2022-02-16 | Stop reason: SDUPTHER

## 2022-01-18 RX ORDER — LORATADINE 10 MG/1
TABLET ORAL
Qty: 90 TABLET | Refills: 3 | Status: SHIPPED | OUTPATIENT
Start: 2022-01-18 | End: 2022-07-14 | Stop reason: SDUPTHER

## 2022-01-18 RX ORDER — INSULIN DETEMIR 100 [IU]/ML
INJECTION, SOLUTION SUBCUTANEOUS
Qty: 180 ML | Refills: 2 | Status: SHIPPED | OUTPATIENT
Start: 2022-01-18 | End: 2022-03-18 | Stop reason: SDUPTHER

## 2022-01-18 RX ORDER — EMPAGLIFLOZIN 25 MG/1
TABLET, FILM COATED ORAL
Qty: 90 TABLET | Refills: 3 | Status: SHIPPED | OUTPATIENT
Start: 2022-01-18 | End: 2022-03-18 | Stop reason: SDUPTHER

## 2022-01-18 RX ORDER — FUROSEMIDE 80 MG
TABLET ORAL
Qty: 135 TABLET | Refills: 3 | Status: SHIPPED | OUTPATIENT
Start: 2022-01-18 | End: 2022-07-14 | Stop reason: SDUPTHER

## 2022-01-18 ASSESSMENT — ENCOUNTER SYMPTOMS
BACK PAIN: 1
COLOR CHANGE: 1

## 2022-01-18 NOTE — PROGRESS NOTES
SUBJECTIVE:    Patient ID: Sandra Harden is a 59 y.o. male. Chief Complaint   Patient presents with    Fall     rib pain       HPI: office visit  He is having some right rib pain after an injury at home. He is having some tenderness. He is having some pain with deep inspiration. He is having good blood pressures and blood sugars. He is having some relief with his pain medication. He continues to have weakness more than he used to have. he says he has had that more since covid. He has had some falls due to what he thinks is related to an ear problem. He is not having shortness of breath or chest pain. Review of Systems   Constitutional: Positive for fatigue. Musculoskeletal: Positive for arthralgias, back pain, gait problem and myalgias. Skin: Positive for color change and rash. Neurological: Positive for weakness. All other systems reviewed and are negative. OBJECTIVE:  /74   Pulse 77   Resp 18   Ht 6' 4\" (1.93 m)   Wt (!) 398 lb 9.6 oz (180.8 kg)   SpO2 98% Comment: ra  BMI 48.52 kg/m²    Wt Readings from Last 3 Encounters:   01/18/22 (!) 398 lb 9.6 oz (180.8 kg)   11/16/21 (!) 394 lb 3.2 oz (178.8 kg)   11/02/21 (!) 392 lb 12.8 oz (178.2 kg)     BP Readings from Last 3 Encounters:   01/18/22 120/74   11/16/21 136/60   11/02/21 126/60      Pulse Readings from Last 3 Encounters:   01/18/22 77   11/16/21 63   11/02/21 76     Body mass index is 48.52 kg/m². Resp Readings from Last 3 Encounters:   01/18/22 18   09/20/21 18   08/03/21 18     Past medical, surgical, family and social history were reviewed and updated with the patient. Physical Exam  Vitals and nursing note reviewed. Constitutional:       Appearance: He is well-developed. HENT:      Head: Normocephalic. Cardiovascular:      Rate and Rhythm: Normal rate and regular rhythm. Pulmonary:      Effort: Pulmonary effort is normal.      Breath sounds: Normal breath sounds.    Musculoskeletal:      Cervical back: Normal range of motion and neck supple. Lumbar back: Spasms and tenderness present. Decreased range of motion. Skin:     General: Skin is warm and dry. Neurological:      Mental Status: He is alert and oriented to person, place, and time. Psychiatric:         Attention and Perception: Attention and perception normal.         Mood and Affect: Mood is anxious. Affect is tearful. Speech: Speech normal.         Behavior: Behavior normal. Behavior is cooperative. Thought Content: Thought content normal.         Cognition and Memory: Cognition and memory normal.         Judgment: Judgment normal.          No results found for requested labs within last 30 days. Hemoglobin A1C (%)   Date Value   11/02/2021 7.1 (H)     Microscopic Examination (no units)   Date Value   06/14/2021 Not Indicated     LDL Calculated (mg/dL)   Date Value   05/04/2021 71       Lab Results   Component Value Date    WBC 6.2 06/15/2021    NEUTROABS 2.8 06/15/2021    HGB 13.3 06/15/2021    HCT 45.7 06/15/2021    HCT 41.6 06/05/2012    MCV 97.2 06/15/2021     06/15/2021     06/05/2012     Lab Results   Component Value Date    TSH 1.540 11/02/2021       ASSESSMENT:    Diagnosis Orders   1. Poor balance  External Referral To ENT   2. Chronic bilateral low back pain with right-sided sciatica  gabapentin (NEURONTIN) 800 MG tablet    HYDROcodone-acetaminophen (NORCO)  MG per tablet    oxyCODONE (OXY-IR) 30 MG immediate release tablet   3.  Essential hypertension          PLAN:  Orders Placed This Encounter   Medications    gabapentin (NEURONTIN) 800 MG tablet     Sig: Take one tablet by mouth four times a day     Dispense:  120 tablet     Refill:  2    pantoprazole (PROTONIX) 40 MG tablet     Sig: Take 1 tablet by mouth daily (with breakfast) In place of omeprazole     Dispense:  90 tablet     Refill:  3    montelukast (SINGULAIR) 10 MG tablet     Sig: TAKE ONE TABLET BY MOUTH NIGHTLY AS NEEDED FOR ALLERGIES     Dispense:  90 tablet     Refill:  3    simvastatin (ZOCOR) 40 MG tablet     Sig: TAKE ONE TABLET BY MOUTH AT BEDTIME FOR CHOLESTEROL     Dispense:  90 tablet     Refill:  3    metFORMIN (GLUCOPHAGE) 1000 MG tablet     Sig: TAKE ONE TABLET BY MOUTH TWICE A DAY FOR SUGAR CONTROL     Dispense:  180 tablet     Refill:  3    insulin detemir (LEVEMIR FLEXTOUCH) 100 UNIT/ML injection pen     Sig: INJECT 100 UNITS UNDER THE SKIN TWO TIMES A DAY M&W 340B Patient     Dispense:  180 mL     Refill:  2    loratadine (CLARITIN) 10 MG tablet     Sig: Take one tablet by mouth daily as needed for allergies     Dispense:  90 tablet     Refill:  3    furosemide (LASIX) 80 MG tablet     Sig: TAKE ONE TABLET BY MOUTH EVERY MORNING AND 1\2 TABLET IN THE PM(FLUIDPILL)     Dispense:  135 tablet     Refill:  3    SITagliptin (JANUVIA) 100 MG tablet     Sig: Take 1 tablet by mouth every morning (before breakfast)     Dispense:  90 tablet     Refill:  3     340B Program Please    empagliflozin (JARDIANCE) 25 MG tablet     Sig: TAKE ONE TABLET BY MOUTH DAILY     Dispense:  90 tablet     Refill:  3     340B plan    HYDROcodone-acetaminophen (NORCO)  MG per tablet     Sig: Take one tablet every 4 hours as needed. Dispense:  150 tablet     Refill:  0     Reduce doses taken as pain becomes manageable    oxyCODONE (OXY-IR) 30 MG immediate release tablet     Sig: Take 1 tablet by mouth every 4 hours as needed for Pain for up to 30 days.      Dispense:  150 tablet     Refill:  0     Reduce doses taken as pain becomes manageable        Medications Discontinued During This Encounter   Medication Reason    Accu-Chek Softclix Lancets MISC LIST CLEANUP    blood glucose test strips (ACCU-CHEK SYLVESTER PLUS) strip LIST CLEANUP    blood glucose test strips (ACCU-CHEK GUIDE) strip LIST CLEANUP    ferrous sulfate (FEROSUL) 325 (65 Fe) MG tablet Therapy completed    fluticasone-umeclidin-vilant (Elfego Duncan) 100-62.5-25 MCG/INH AEPB LIST CLEANUP    sulfamethoxazole-trimethoprim (BACTRIM DS;SEPTRA DS) 800-160 MG per tablet LIST CLEANUP    empagliflozin (JARDIANCE) 25 MG tablet REORDER    SITagliptin (JANUVIA) 100 MG tablet REORDER    pantoprazole (PROTONIX) 40 MG tablet REORDER    montelukast (SINGULAIR) 10 MG tablet REORDER    simvastatin (ZOCOR) 40 MG tablet REORDER    metFORMIN (GLUCOPHAGE) 1000 MG tablet REORDER    loratadine (CLARITIN) 10 MG tablet REORDER    furosemide (LASIX) 80 MG tablet REORDER    insulin detemir (LEVEMIR FLEXTOUCH) 100 UNIT/ML injection pen REORDER    gabapentin (NEURONTIN) 800 MG tablet REORDER    HYDROcodone-acetaminophen (NORCO)  MG per tablet REORDER    oxyCODONE (OXY-IR) 30 MG immediate release tablet REORDER       Controlled Substances Monitoring:      Please note: This chart was generated using Dragon dictation software. Although every effort was made to ensure the accuracy of this automated transcription, some errors in transcription may have occurred.

## 2022-02-16 ENCOUNTER — HOSPITAL ENCOUNTER (OUTPATIENT)
Facility: HOSPITAL | Age: 62
Discharge: HOME | End: 2022-02-16
Payer: MEDICARE

## 2022-02-16 ENCOUNTER — OFFICE VISIT (OUTPATIENT)
Dept: FAMILY MEDICINE CLINIC | Age: 62
End: 2022-02-16
Payer: MEDICARE

## 2022-02-16 VITALS
TEMPERATURE: 97.3 F | HEIGHT: 76 IN | HEART RATE: 60 BPM | BODY MASS INDEX: 38.36 KG/M2 | RESPIRATION RATE: 18 BRPM | OXYGEN SATURATION: 98 % | WEIGHT: 315 LBS | SYSTOLIC BLOOD PRESSURE: 128 MMHG | DIASTOLIC BLOOD PRESSURE: 68 MMHG

## 2022-02-16 DIAGNOSIS — E11.42 TYPE 2 DIABETES MELLITUS WITH DIABETIC POLYNEUROPATHY, WITH LONG-TERM CURRENT USE OF INSULIN (HCC): ICD-10-CM

## 2022-02-16 DIAGNOSIS — G89.29 CHRONIC BILATERAL LOW BACK PAIN WITH RIGHT-SIDED SCIATICA: ICD-10-CM

## 2022-02-16 DIAGNOSIS — L97.519 DIABETIC ULCER OF RIGHT GREAT TOE (HCC): Primary | ICD-10-CM

## 2022-02-16 DIAGNOSIS — I10 ESSENTIAL HYPERTENSION: ICD-10-CM

## 2022-02-16 DIAGNOSIS — Z79.4 TYPE 2 DIABETES MELLITUS WITH DIABETIC POLYNEUROPATHY, WITH LONG-TERM CURRENT USE OF INSULIN (HCC): ICD-10-CM

## 2022-02-16 DIAGNOSIS — G89.29 CHRONIC MIDLINE LOW BACK PAIN WITH RIGHT-SIDED SCIATICA: ICD-10-CM

## 2022-02-16 DIAGNOSIS — M15.9 OSTEOARTHRITIS OF MULTIPLE JOINTS, UNSPECIFIED OSTEOARTHRITIS TYPE: ICD-10-CM

## 2022-02-16 DIAGNOSIS — M54.41 CHRONIC MIDLINE LOW BACK PAIN WITH RIGHT-SIDED SCIATICA: ICD-10-CM

## 2022-02-16 DIAGNOSIS — M25.511 CHRONIC RIGHT SHOULDER PAIN: ICD-10-CM

## 2022-02-16 DIAGNOSIS — G47.00 INSOMNIA, UNSPECIFIED TYPE: ICD-10-CM

## 2022-02-16 DIAGNOSIS — E11.621 DIABETIC ULCER OF RIGHT GREAT TOE (HCC): Primary | ICD-10-CM

## 2022-02-16 DIAGNOSIS — G89.29 CHRONIC RIGHT SHOULDER PAIN: ICD-10-CM

## 2022-02-16 DIAGNOSIS — M54.41 CHRONIC BILATERAL LOW BACK PAIN WITH RIGHT-SIDED SCIATICA: ICD-10-CM

## 2022-02-16 PROCEDURE — 99214 OFFICE O/P EST MOD 30 MIN: CPT | Performed by: FAMILY MEDICINE

## 2022-02-16 PROCEDURE — 83036 HEMOGLOBIN GLYCOSYLATED A1C: CPT

## 2022-02-16 PROCEDURE — 85027 COMPLETE CBC AUTOMATED: CPT

## 2022-02-16 PROCEDURE — 20610 DRAIN/INJ JOINT/BURSA W/O US: CPT | Performed by: FAMILY MEDICINE

## 2022-02-16 PROCEDURE — G8417 CALC BMI ABV UP PARAM F/U: HCPCS | Performed by: FAMILY MEDICINE

## 2022-02-16 PROCEDURE — G8427 DOCREV CUR MEDS BY ELIG CLIN: HCPCS | Performed by: FAMILY MEDICINE

## 2022-02-16 PROCEDURE — 3052F HG A1C>EQUAL 8.0%<EQUAL 9.0%: CPT | Performed by: FAMILY MEDICINE

## 2022-02-16 PROCEDURE — G8482 FLU IMMUNIZE ORDER/ADMIN: HCPCS | Performed by: FAMILY MEDICINE

## 2022-02-16 PROCEDURE — 3017F COLORECTAL CA SCREEN DOC REV: CPT | Performed by: FAMILY MEDICINE

## 2022-02-16 PROCEDURE — 80053 COMPREHEN METABOLIC PANEL: CPT

## 2022-02-16 PROCEDURE — 36415 COLL VENOUS BLD VENIPUNCTURE: CPT

## 2022-02-16 PROCEDURE — 1036F TOBACCO NON-USER: CPT | Performed by: FAMILY MEDICINE

## 2022-02-16 PROCEDURE — 2022F DILAT RTA XM EVC RTNOPTHY: CPT | Performed by: FAMILY MEDICINE

## 2022-02-16 RX ORDER — CYANOCOBALAMIN 1000 UG/ML
1000 INJECTION INTRAMUSCULAR; SUBCUTANEOUS ONCE
Status: COMPLETED | OUTPATIENT
Start: 2022-02-16 | End: 2022-02-16

## 2022-02-16 RX ORDER — OXYCODONE HYDROCHLORIDE 30 MG/1
30 TABLET ORAL EVERY 4 HOURS PRN
Qty: 150 TABLET | Refills: 0 | Status: SHIPPED | OUTPATIENT
Start: 2022-02-16 | End: 2022-03-21 | Stop reason: SDUPTHER

## 2022-02-16 RX ORDER — METHYLPREDNISOLONE ACETATE 40 MG/ML
40 INJECTION, SUSPENSION INTRA-ARTICULAR; INTRALESIONAL; INTRAMUSCULAR; SOFT TISSUE ONCE
Status: COMPLETED | OUTPATIENT
Start: 2022-02-16 | End: 2022-02-16

## 2022-02-16 RX ORDER — LIDOCAINE HYDROCHLORIDE 10 MG/ML
1 INJECTION, SOLUTION INFILTRATION; PERINEURAL ONCE
Status: COMPLETED | OUTPATIENT
Start: 2022-02-16 | End: 2022-02-16

## 2022-02-16 RX ORDER — HYDROCODONE BITARTRATE AND ACETAMINOPHEN 10; 325 MG/1; MG/1
TABLET ORAL
Qty: 150 TABLET | Refills: 0 | Status: SHIPPED | OUTPATIENT
Start: 2022-02-16 | End: 2022-03-21 | Stop reason: SDUPTHER

## 2022-02-16 RX ORDER — ZOLPIDEM TARTRATE 5 MG/1
5 TABLET ORAL NIGHTLY PRN
Qty: 30 TABLET | Refills: 2 | Status: SHIPPED | OUTPATIENT
Start: 2022-02-16 | End: 2022-02-18

## 2022-02-16 RX ADMIN — CYANOCOBALAMIN 1000 MCG: 1000 INJECTION INTRAMUSCULAR; SUBCUTANEOUS at 15:18

## 2022-02-16 RX ADMIN — LIDOCAINE HYDROCHLORIDE 1 ML: 10 INJECTION, SOLUTION INFILTRATION; PERINEURAL at 15:19

## 2022-02-16 RX ADMIN — METHYLPREDNISOLONE ACETATE 40 MG: 40 INJECTION, SUSPENSION INTRA-ARTICULAR; INTRALESIONAL; INTRAMUSCULAR; SOFT TISSUE at 15:20

## 2022-02-16 ASSESSMENT — ENCOUNTER SYMPTOMS
BACK PAIN: 1
COLOR CHANGE: 1

## 2022-02-16 NOTE — PROGRESS NOTES
SUBJECTIVE:    Patient ID: Nancy Pastrana is a 59 y.o. male. Chief Complaint   Patient presents with    Wound Check    Back Pain       HPI: office visit  He is havign some continued follow-up about his foot wound. He has been seen Dr. Severo Aguilera. He is having a lot of shoulder pain. Particularly his right shoulder. He is having a lot of more increased arthritic pain with the damp weather. Should he is struggling with his back pain. He still have a lot of fatigue. He has a lot of stress related to his wife's illness. She is in the nursing home right now. He is having difficulty doing with all that. His blood sugars have been up and down. His blood pressures have not been too bad. He is noted with medicine. He has not had any falls. He denies any chest pain or shortness of breath. Review of Systems   Constitutional: Positive for fatigue. Musculoskeletal: Positive for arthralgias, back pain, gait problem and myalgias. Skin: Positive for color change and rash. Neurological: Positive for weakness. All other systems reviewed and are negative. OBJECTIVE:  /68   Pulse 60   Temp 97.3 °F (36.3 °C)   Resp 18   Ht 6' 4\" (1.93 m)   Wt (!) 401 lb (181.9 kg)   SpO2 98% Comment: ra  BMI 48.81 kg/m²    Wt Readings from Last 3 Encounters:   02/16/22 (!) 401 lb (181.9 kg)   01/18/22 (!) 398 lb 9.6 oz (180.8 kg)   11/16/21 (!) 394 lb 3.2 oz (178.8 kg)     BP Readings from Last 3 Encounters:   02/16/22 128/68   01/18/22 120/74   11/16/21 136/60      Pulse Readings from Last 3 Encounters:   02/16/22 60   01/18/22 77   11/16/21 63     Body mass index is 48.81 kg/m². Resp Readings from Last 3 Encounters:   02/16/22 18   01/18/22 18   09/20/21 18     Past medical, surgical, family and social history were reviewed and updated with the patient. Physical Exam  Vitals and nursing note reviewed. Constitutional:       Appearance: He is well-developed. HENT:      Head: Normocephalic. Cardiovascular:      Rate and Rhythm: Normal rate and regular rhythm. Pulmonary:      Effort: Pulmonary effort is normal.      Breath sounds: Normal breath sounds. Musculoskeletal:      Cervical back: Normal range of motion and neck supple. Lumbar back: Spasms and tenderness present. Decreased range of motion. Skin:     General: Skin is warm and dry. Neurological:      Mental Status: He is alert and oriented to person, place, and time. Psychiatric:         Attention and Perception: Attention and perception normal.         Mood and Affect: Mood is anxious. Affect is tearful. Speech: Speech normal.         Behavior: Behavior normal. Behavior is cooperative. Thought Content:  Thought content normal.         Cognition and Memory: Cognition and memory normal.         Judgment: Judgment normal.          Results in Past 30 Days  Result Component Current Result Ref Range Previous Result Ref Range   Albumin/Globulin Ratio 1.2 (2/16/2022) 0.8 - 2.0 Not in Time Range    Albumin 3.7 (2/16/2022) 3.4 - 4.8 g/dL Not in Time Range    Alkaline Phosphatase 75 (2/16/2022) 25 - 100 U/L Not in Time Range    ALT 15 (2/16/2022) 4 - 36 U/L Not in Time Range    AST 15 (2/16/2022) 8 - 33 U/L Not in Time Range    BUN 14 (2/16/2022) 6 - 20 mg/dL Not in Time Range    Calcium 9.0 (2/16/2022) 8.5 - 10.5 mg/dL Not in Time Range    Chloride 105 (2/16/2022) 98 - 107 mmol/L Not in Time Range    CO2 25 (2/16/2022) 20 - 30 mmol/L Not in Time Range    CREATININE 0.9 (2/16/2022) 0.4 - 1.2 mg/dL Not in Time Range    GFR  >59 (2/16/2022) >59 Not in Time Range    GFR Non- >59 (2/16/2022) >59 Not in Time Range    Globulin 3.2 (2/16/2022) Not Established g/dL Not in Time Range    Glucose 110 (H) (2/16/2022) 74 - 106 mg/dL Not in Time Range    Potassium 4.6 (2/16/2022) 3.4 - 5.1 mmol/L Not in Time Range    Sodium 142 (2/16/2022) 136 - 145 mmol/L Not in Time Range    Total Bilirubin <0.2 (L) (2/16/2022) 0.3 - 1.2 mg/dL Not in Time Range    Total Protein 6.9 (2/16/2022) 6.4 - 8.3 g/dL Not in Time Range      Hemoglobin A1C (%)   Date Value   02/16/2022 8.3 (H)     Microscopic Examination (no units)   Date Value   06/14/2021 Not Indicated     LDL Calculated (mg/dL)   Date Value   05/04/2021 71       Lab Results   Component Value Date    WBC 7.0 02/16/2022    NEUTROABS 2.8 06/15/2021    HGB 12.9 02/16/2022    HCT 42.9 02/16/2022    HCT 41.6 06/05/2012    MCV 97.9 02/16/2022     02/16/2022     06/05/2012     Lab Results   Component Value Date    TSH 1.540 11/02/2021       ASSESSMENT:    Diagnosis Orders   1. Diabetic ulcer of right great toe ([facility] [address]     2. Chronic bilateral low back pain with right-sided sciatica  HYDROcodone-acetaminophen (NORCO)  MG per tablet    oxyCODONE (OXY-IR) 30 MG immediate release tablet   3. Chronic right shoulder pain  87202 - DC DRAIN/INJECT LARGE JOINT/BURSA   4. Type 2 diabetes mellitus with diabetic polyneuropathy, with long-term current use of insulin ([facility])  Hemoglobin A1C    Comprehensive Metabolic Panel   5. Osteoarthritis of multiple joints, unspecified osteoarthritis type  37958 - DC DRAIN/INJECT LARGE JOINT/BURSA   6. Essential hypertension  CBC   7. Chronic midline low back pain with right-sided sciatica     8. Insomnia, unspecified type  DISCONTINUED: zolpidem (AMBIEN) 5 MG tablet        PLAN:  Orders Placed This Encounter   Medications    HYDROcodone-acetaminophen (NORCO)  MG per tablet     Sig: Take one tablet every 4 hours as needed. Dispense:  150 tablet     Refill:  0     Reduce doses taken as pain becomes manageable    oxyCODONE (OXY-IR) 30 MG immediate release tablet     Sig: Take 1 tablet by mouth every 4 hours as needed for Pain for up to 30 days.      Dispense:  150 tablet     Refill:  0     Reduce doses taken as pain becomes manageable    cyanocobalamin injection 1,000 mcg    methylPREDNISolone acetate (DEPO-MEDROL) injection 40 mg    lidocaine 1 % injection 1 mL    DISCONTD: zolpidem (AMBIEN) 5 MG tablet     Sig: Take 1 tablet by mouth nightly as needed for Sleep for up to 30 days. Dispense:  30 tablet     Refill:  2      Steroid injection was performed by Zhang Wiggins MD using Plain Lidocaine 1% 1ml and Depo-Medrol 40mg 1ml Intra-Articular in the right shoulder. No complications or reactions noted. Patient tolerated procedure well. Medications Discontinued During This Encounter   Medication Reason    HYDROcodone-acetaminophen (NORCO)  MG per tablet REORDER    oxyCODONE (OXY-IR) 30 MG immediate release tablet REORDER       Controlled Substances Monitoring:      Please note: This chart was generated using Dragon dictation software. Although every effort was made to ensure the accuracy of this automated transcription, some errors in transcription may have occurred.

## 2022-02-16 NOTE — PROGRESS NOTES
Chief Complaint   Patient presents with    Wound Check    Back Pain       Have you seen any other physician or provider since your last visit no    Have you had any other diagnostic tests since your last visit? no    Have you changed or stopped any medications since your last visit? no       Used antibiotic ointment on hand wounds and covered with non stick bandage and also wrapped with coban. Pt tolerated procedure.

## 2022-02-17 ENCOUNTER — TELEPHONE (OUTPATIENT)
Dept: FAMILY MEDICINE CLINIC | Age: 62
End: 2022-02-17

## 2022-02-17 LAB
A/G RATIO: 1.2 (ref 0.8–2)
ALBUMIN SERPL-MCNC: 3.7 G/DL (ref 3.4–4.8)
ALP BLD-CCNC: 75 U/L (ref 25–100)
ALT SERPL-CCNC: 15 U/L (ref 4–36)
ANION GAP SERPL CALCULATED.3IONS-SCNC: 12 MMOL/L (ref 3–16)
AST SERPL-CCNC: 15 U/L (ref 8–33)
BILIRUB SERPL-MCNC: <0.2 MG/DL (ref 0.3–1.2)
BUN BLDV-MCNC: 14 MG/DL (ref 6–20)
CALCIUM SERPL-MCNC: 9 MG/DL (ref 8.5–10.5)
CHLORIDE BLD-SCNC: 105 MMOL/L (ref 98–107)
CO2: 25 MMOL/L (ref 20–30)
CREAT SERPL-MCNC: 0.9 MG/DL (ref 0.4–1.2)
GFR AFRICAN AMERICAN: >59
GFR NON-AFRICAN AMERICAN: >59
GLOBULIN: 3.2 G/DL
GLUCOSE BLD-MCNC: 110 MG/DL (ref 74–106)
HBA1C MFR BLD: 8.3 %
HCT VFR BLD CALC: 42.9 % (ref 40–54)
HEMOGLOBIN: 12.9 G/DL (ref 13–18)
MCH RBC QN AUTO: 29.5 PG (ref 27–32)
MCHC RBC AUTO-ENTMCNC: 30.1 G/DL (ref 31–35)
MCV RBC AUTO: 97.9 FL (ref 80–100)
PDW BLD-RTO: 13.8 % (ref 11–16)
PLATELET # BLD: 172 K/UL (ref 150–400)
PMV BLD AUTO: 13.8 FL (ref 6–10)
POTASSIUM SERPL-SCNC: 4.6 MMOL/L (ref 3.4–5.1)
RBC # BLD: 4.38 M/UL (ref 4.5–6)
SODIUM BLD-SCNC: 142 MMOL/L (ref 136–145)
TOTAL PROTEIN: 6.9 G/DL (ref 6.4–8.3)
WBC # BLD: 7 K/UL (ref 4–11)

## 2022-02-17 NOTE — TELEPHONE ENCOUNTER
Patient called and stated that he seen you yesterday and was wondering if you were still going to send in him an antibiotic for his hands? He was also wondering if you could send in the [place] CR instead of the regular ambien?

## 2022-02-18 DIAGNOSIS — F51.04 PSYCHOPHYSIOLOGICAL INSOMNIA: Primary | ICD-10-CM

## 2022-02-18 RX ORDER — ZOLPIDEM TARTRATE 6.25 MG/1
6.25 TABLET, FILM COATED, EXTENDED RELEASE ORAL NIGHTLY PRN
Qty: 30 TABLET | Refills: 1 | Status: SHIPPED | OUTPATIENT
Start: 2022-02-18 | End: 2022-03-06

## 2022-02-18 RX ORDER — MUPIROCIN CALCIUM 20 MG/G
CREAM TOPICAL
Qty: 30 G | Refills: 0 | Status: SHIPPED | OUTPATIENT
Start: 2022-02-18 | End: 2022-03-20

## 2022-02-18 RX ORDER — CEPHALEXIN 500 MG/1
500 CAPSULE ORAL 3 TIMES DAILY
Qty: 30 CAPSULE | Refills: 0 | Status: SHIPPED | OUTPATIENT
Start: 2022-02-18 | End: 2022-02-28

## 2022-02-21 RX ORDER — BLOOD SUGAR DIAGNOSTIC
STRIP MISCELLANEOUS
Qty: 100 EACH | Refills: 5 | Status: SHIPPED | OUTPATIENT
Start: 2022-02-21 | End: 2022-07-14 | Stop reason: SDUPTHER

## 2022-02-21 RX ORDER — NITROGLYCERIN 0.4 MG/1
0.4 TABLET SUBLINGUAL EVERY 5 MIN PRN
Qty: 25 TABLET | Refills: 0 | Status: SHIPPED | OUTPATIENT
Start: 2022-02-21 | End: 2022-03-21

## 2022-03-01 ENCOUNTER — TELEPHONE (OUTPATIENT)
Dept: FAMILY MEDICINE CLINIC | Age: 62
End: 2022-03-01

## 2022-03-02 ENCOUNTER — TELEPHONE (OUTPATIENT)
Dept: FAMILY MEDICINE CLINIC | Age: 62
End: 2022-03-02

## 2022-03-02 NOTE — TELEPHONE ENCOUNTER
Patient called stating that the pharmacy will not fill his ambien. He stated that he is taking 12.5 mg and all that we have in the chart is the 5 mg.

## 2022-03-02 NOTE — TELEPHONE ENCOUNTER
----- Message from Raquel Amelia sent at 3/2/2022  2:46 PM EST -----  Subject: Medication Problem    QUESTIONS  Name of Medication? zolpidem (AMBIEN CR) 6.25 MG extended release tablet  Patient-reported dosage and instructions? Take 1 tablet by mouth nightly   as needed for Sleep for up to 30 days. What question or problem do you have with the medication? PATIENT CALLED   STATING PHARMACY WILL NOT FILL MEDICATION, NEEDS 12.5MG, PATIENT STATED   HE'S BEEN TRYING TO GET MEDICATION FILLED FOR SOMETIME NOW  Preferred Pharmacy? [facility]  Pharmacy phone number (if available)? [phone]  Additional Information for Provider?   ---------------------------------------------------------------------------  --------------  CALL BACK INFO  What is the best way for the office to contact you? OK to leave message on   voicemail  Preferred Call Back Phone Number? [phone]  ---------------------------------------------------------------------------  --------------  SCRIPT ANSWERS  Relationship to Patient?  Self

## 2022-03-02 NOTE — TELEPHONE ENCOUNTER
Called pt and informed him that [facility] will be taking over his  assistance for Levemir, Jardiance, and Januvia. Pt provided verbal permission for me to send his application and financials to [employer] at [facility].     Brittany CaputoD

## 2022-03-06 DIAGNOSIS — G47.00 INSOMNIA, UNSPECIFIED TYPE: Primary | ICD-10-CM

## 2022-03-06 RX ORDER — ZOLPIDEM TARTRATE 12.5 MG/1
12.5 TABLET, FILM COATED, EXTENDED RELEASE ORAL NIGHTLY PRN
Qty: 30 TABLET | Refills: 0 | Status: SHIPPED | OUTPATIENT
Start: 2022-03-06 | End: 2022-04-19

## 2022-03-07 NOTE — PROGRESS NOTES
SUBJECTIVE:    Patient ID: Kristine Chavez is a 59 y.o. male. No chief complaint on file. HPI:    Patient's medications,allergies, past medical, surgical, social and family histories were reviewed and updated as appropriate. .  Current Outpatient Medications on File Prior to Visit   Medication Sig Dispense Refill    ADVOCATE INSULIN PEN NEEDLES 31G X 8 MM MISC USE WITH INSULIN INJECTIONS TWICE A  each 5    nitroGLYCERIN (NITROSTAT) 0.4 MG SL tablet Place 1 tablet under the tongue every 5 minutes as needed for Chest pain 25 tablet 0    mupirocin (BACTROBAN) 2 % cream Apply 3 times daily. 30 g 0    zolpidem (AMBIEN CR) 6.25 MG extended release tablet Take 1 tablet by mouth nightly as needed for Sleep for up to 30 days. 30 tablet 1    HYDROcodone-acetaminophen (NORCO)  MG per tablet Take one tablet every 4 hours as needed. 150 tablet 0    oxyCODONE (OXY-IR) 30 MG immediate release tablet Take 1 tablet by mouth every 4 hours as needed for Pain for up to 30 days.  150 tablet 0    gabapentin (NEURONTIN) 800 MG tablet Take one tablet by mouth four times a day 120 tablet 2    pantoprazole (PROTONIX) 40 MG tablet Take 1 tablet by mouth daily (with breakfast) In place of omeprazole 90 tablet 3    montelukast (SINGULAIR) 10 MG tablet TAKE ONE TABLET BY MOUTH NIGHTLY AS NEEDED FOR ALLERGIES 90 tablet 3    simvastatin (ZOCOR) 40 MG tablet TAKE ONE TABLET BY MOUTH AT BEDTIME FOR CHOLESTEROL 90 tablet 3    metFORMIN (GLUCOPHAGE) 1000 MG tablet TAKE ONE TABLET BY MOUTH TWICE A DAY FOR SUGAR CONTROL 180 tablet 3    insulin detemir (LEVEMIR FLEXTOUCH) 100 UNIT/ML injection pen INJECT 100 UNITS UNDER THE SKIN TWO TIMES A DAY M&W 340B Patient 180 mL 2    loratadine (CLARITIN) 10 MG tablet Take one tablet by mouth daily as needed for allergies 90 tablet 3    furosemide (LASIX) 80 MG tablet TAKE ONE TABLET BY MOUTH EVERY MORNING AND 1\2 TABLET IN THE PM(FLUIDPILL) 135 tablet 3    SITagliptin (JANUVIA) 100 MG tablet Take 1 tablet by mouth every morning (before breakfast) 90 tablet 3    empagliflozin (JARDIANCE) 25 MG tablet TAKE ONE TABLET BY MOUTH DAILY 90 tablet 3    DULoxetine (CYMBALTA) 30 MG extended release capsule TAKE ONE CAPSULE BY MOUTH DAILY 30 capsule 2    famotidine (PEPCID) 40 MG tablet Take 1 tablet by mouth every evening 30 tablet 3    nystatin (NYSTATIN) 792183 UNIT/GM powder APPLY TOPICALLY 3 TIMES DAILY AS DIRECTED (Patient not taking: Reported on 2/16/2022) 60 g 5    linaCLOtide (LINZESS) 72 MCG CAPS capsule Take 1 capsule by mouth every morning (before breakfast) 30 capsule 5    mupirocin (BACTROBAN) 2 % ointment       Misc. Devices (TOTAL COMFORT SEAT CUSHION) MISC 1 Device by Does not apply route daily (Patient not taking: Reported on 2/16/2022) 1 each 0    brimonidine (ALPHAGAN P) 0.15 % ophthalmic solution Place 1 drop into both eyes 2 times daily      vitamin D (ERGOCALCIFEROL) 1.25 MG (61452 UT) CAPS capsule Take 1 capsule by mouth once a week 12 capsule 1    blood glucose monitor kit and supplies Dispense sufficient amount for indicated testing frequency. (Patient not taking: Reported on 2/16/2022) 1 kit 0    Accu-Chek Softclix Lancets MISC fsbs daily (Patient not taking: Reported on 2/16/2022) 100 each 3    aspirin 81 MG tablet Take 81 mg by mouth daily. No current facility-administered medications on file prior to visit. Review of Systems    OBJECTIVE:  There were no vitals taken for this visit.    Physical Exam    Results in Past 30 Days  Result Component Current Result Ref Range Previous Result Ref Range   Albumin/Globulin Ratio 1.2 (2/16/2022) 0.8 - 2.0 Not in Time Range    Albumin 3.7 (2/16/2022) 3.4 - 4.8 g/dL Not in Time Range    Alkaline Phosphatase 75 (2/16/2022) 25 - 100 U/L Not in Time Range    ALT 15 (2/16/2022) 4 - 36 U/L Not in Time Range    AST 15 (2/16/2022) 8 - 33 U/L Not in Time Range    BUN 14 (2/16/2022) 6 - 20 mg/dL Not in Time Range    Calcium 9.0 (2/16/2022) 8.5 - 10.5 mg/dL Not in Time Range    Chloride 105 (2/16/2022) 98 - 107 mmol/L Not in Time Range    CO2 25 (2/16/2022) 20 - 30 mmol/L Not in Time Range    CREATININE 0.9 (2/16/2022) 0.4 - 1.2 mg/dL Not in Time Range    GFR  >59 (2/16/2022) >59 Not in Time Range    GFR Non- >59 (2/16/2022) >59 Not in Time Range    Globulin 3.2 (2/16/2022) Not Established g/dL Not in Time Range    Glucose 110 (H) (2/16/2022) 74 - 106 mg/dL Not in Time Range    Potassium 4.6 (2/16/2022) 3.4 - 5.1 mmol/L Not in Time Range    Sodium 142 (2/16/2022) 136 - 145 mmol/L Not in Time Range    Total Bilirubin <0.2 (L) (2/16/2022) 0.3 - 1.2 mg/dL Not in Time Range    Total Protein 6.9 (2/16/2022) 6.4 - 8.3 g/dL Not in Time Range        Hemoglobin A1C (%)   Date Value   02/16/2022 8.3 (H)     Microscopic Examination (no units)   Date Value   06/14/2021 Not Indicated     LDL Calculated (mg/dL)   Date Value   05/04/2021 71           Lab Results   Component Value Date    TSH 1.540 11/02/2021         ASSESSMENT/PLAN:     There are no diagnoses linked to this encounter. There are no discontinued medications.

## 2022-03-18 RX ORDER — INSULIN DETEMIR 100 [IU]/ML
INJECTION, SOLUTION SUBCUTANEOUS
Qty: 180 ML | Refills: 2 | Status: SHIPPED | OUTPATIENT
Start: 2022-03-18 | End: 2022-03-21 | Stop reason: SDUPTHER

## 2022-03-18 RX ORDER — EMPAGLIFLOZIN 25 MG/1
TABLET, FILM COATED ORAL
Qty: 90 TABLET | Refills: 3 | Status: SHIPPED | OUTPATIENT
Start: 2022-03-18 | End: 2022-03-21 | Stop reason: SDUPTHER

## 2022-03-21 ENCOUNTER — TELEPHONE (OUTPATIENT)
Dept: FAMILY MEDICINE CLINIC | Age: 62
End: 2022-03-21

## 2022-03-21 DIAGNOSIS — G89.29 CHRONIC BILATERAL LOW BACK PAIN WITH RIGHT-SIDED SCIATICA: ICD-10-CM

## 2022-03-21 DIAGNOSIS — M54.41 CHRONIC BILATERAL LOW BACK PAIN WITH RIGHT-SIDED SCIATICA: ICD-10-CM

## 2022-03-21 RX ORDER — DULOXETIN HYDROCHLORIDE 30 MG/1
CAPSULE, DELAYED RELEASE ORAL
Qty: 30 CAPSULE | Refills: 2 | Status: SHIPPED | OUTPATIENT
Start: 2022-03-21 | End: 2022-06-14

## 2022-03-21 RX ORDER — EMPAGLIFLOZIN 25 MG/1
TABLET, FILM COATED ORAL
Qty: 90 TABLET | Refills: 3 | Status: SHIPPED | OUTPATIENT
Start: 2022-03-21 | End: 2022-04-14

## 2022-03-21 RX ORDER — OXYCODONE HYDROCHLORIDE 30 MG/1
30 TABLET ORAL EVERY 4 HOURS PRN
Qty: 150 TABLET | Refills: 0 | Status: SHIPPED | OUTPATIENT
Start: 2022-03-21 | End: 2022-04-19 | Stop reason: SDUPTHER

## 2022-03-21 RX ORDER — NITROGLYCERIN 0.4 MG/1
TABLET SUBLINGUAL
Qty: 25 TABLET | Refills: 0 | Status: SHIPPED | OUTPATIENT
Start: 2022-03-21 | End: 2022-07-14 | Stop reason: SDUPTHER

## 2022-03-21 RX ORDER — HYDROCODONE BITARTRATE AND ACETAMINOPHEN 10; 325 MG/1; MG/1
TABLET ORAL
Qty: 150 TABLET | Refills: 0 | Status: SHIPPED | OUTPATIENT
Start: 2022-03-21 | End: 2022-04-19 | Stop reason: SDUPTHER

## 2022-03-21 RX ORDER — ERGOCALCIFEROL 1.25 MG/1
CAPSULE ORAL
Qty: 4 CAPSULE | Refills: 1 | Status: SHIPPED | OUTPATIENT
Start: 2022-03-21 | End: 2022-05-17

## 2022-03-21 RX ORDER — INSULIN DETEMIR 100 [IU]/ML
INJECTION, SOLUTION SUBCUTANEOUS
Qty: 180 ML | Refills: 2 | Status: SHIPPED | OUTPATIENT
Start: 2022-03-21 | End: 2022-04-22

## 2022-03-21 NOTE — TELEPHONE ENCOUNTER
Patient called, requested refill.        Next Office Visit Date:  Future Appointments   Date Time Provider Marin Mariela   5/17/2022  2:15 PM Eileen Verdin MD [address] please review via [address]

## 2022-03-21 NOTE — TELEPHONE ENCOUNTER
Patient called and stated that he is going to be out of Jahaira Martinez, and the levemir until [facility] can get them in. Can you send him in something else until then. I looked and we did not have samples.

## 2022-04-12 ENCOUNTER — TELEPHONE (OUTPATIENT)
Dept: FAMILY MEDICINE CLINIC | Age: 62
End: 2022-04-12

## 2022-04-12 ENCOUNTER — NURSE ONLY (OUTPATIENT)
Dept: FAMILY MEDICINE CLINIC | Age: 62
End: 2022-04-12
Payer: MEDICARE

## 2022-04-12 DIAGNOSIS — E53.8 B12 DEFICIENCY: Primary | ICD-10-CM

## 2022-04-12 PROCEDURE — 96372 THER/PROPH/DIAG INJ SC/IM: CPT | Performed by: FAMILY MEDICINE

## 2022-04-12 RX ORDER — CYANOCOBALAMIN 1000 UG/ML
1000 INJECTION INTRAMUSCULAR; SUBCUTANEOUS ONCE
Status: COMPLETED | OUTPATIENT
Start: 2022-04-12 | End: 2022-04-12

## 2022-04-12 RX ADMIN — CYANOCOBALAMIN 1000 MCG: 1000 INJECTION INTRAMUSCULAR; SUBCUTANEOUS at 15:11

## 2022-04-12 NOTE — PROGRESS NOTES
Chief Complaint   Patient presents with    Injections     b12       Administrations This Visit     cyanocobalamin injection 1,000 mcg     Admin Date  04/12/2022  15:11 Action  Given Dose  1,000 mcg Route  IntraMUSCular Site  Deltoid Right Administered By  Nataly Lennon MA    Ordering Provider: Roderick Mclain MD    NDC: 03421-411-22    Lot#:     : [id number] [place] [address]    Patient Supplied?: No              Patient tolerated injection well. Patient advised to wait 20 minutes in the office following the injection. No signs/symptoms of reaction noted after 20 minutes.

## 2022-04-14 ENCOUNTER — TELEPHONE (OUTPATIENT)
Dept: FAMILY MEDICINE CLINIC | Age: 62
End: 2022-04-14

## 2022-04-14 RX ORDER — INSULIN GLARGINE 100 [IU]/ML
100 INJECTION, SOLUTION SUBCUTANEOUS NIGHTLY
Qty: 15 PEN | Refills: 5 | Status: SHIPPED | OUTPATIENT
Start: 2022-04-14 | End: 2022-04-19 | Stop reason: ALTCHOICE

## 2022-04-14 RX ORDER — FERROUS SULFATE 325(65) MG
TABLET ORAL
Qty: 90 TABLET | Refills: 5 | OUTPATIENT
Start: 2022-04-14

## 2022-04-14 NOTE — TELEPHONE ENCOUNTER
Patient called and stated that he is out of [facility], and Januvia. And almost out of the [address]. He was wondering if you could switch it to something else that his insurance will pay for until he can get it covered by [facility].

## 2022-04-19 ENCOUNTER — OFFICE VISIT (OUTPATIENT)
Dept: FAMILY MEDICINE CLINIC | Age: 62
End: 2022-04-19
Payer: MEDICARE

## 2022-04-19 VITALS
RESPIRATION RATE: 18 BRPM | SYSTOLIC BLOOD PRESSURE: 130 MMHG | OXYGEN SATURATION: 93 % | HEART RATE: 68 BPM | TEMPERATURE: 97.2 F | DIASTOLIC BLOOD PRESSURE: 76 MMHG

## 2022-04-19 DIAGNOSIS — G47.00 INSOMNIA, UNSPECIFIED TYPE: ICD-10-CM

## 2022-04-19 DIAGNOSIS — M15.9 OSTEOARTHRITIS OF MULTIPLE JOINTS, UNSPECIFIED OSTEOARTHRITIS TYPE: ICD-10-CM

## 2022-04-19 DIAGNOSIS — G89.29 CHRONIC BILATERAL LOW BACK PAIN WITH RIGHT-SIDED SCIATICA: ICD-10-CM

## 2022-04-19 DIAGNOSIS — E11.42 TYPE 2 DIABETES MELLITUS WITH DIABETIC POLYNEUROPATHY, WITH LONG-TERM CURRENT USE OF INSULIN (HCC): ICD-10-CM

## 2022-04-19 DIAGNOSIS — I10 ESSENTIAL HYPERTENSION: ICD-10-CM

## 2022-04-19 DIAGNOSIS — F41.9 ANXIETY: ICD-10-CM

## 2022-04-19 DIAGNOSIS — J02.9 SORE THROAT: Primary | ICD-10-CM

## 2022-04-19 DIAGNOSIS — M54.41 CHRONIC BILATERAL LOW BACK PAIN WITH RIGHT-SIDED SCIATICA: ICD-10-CM

## 2022-04-19 DIAGNOSIS — Z79.4 TYPE 2 DIABETES MELLITUS WITH DIABETIC POLYNEUROPATHY, WITH LONG-TERM CURRENT USE OF INSULIN (HCC): ICD-10-CM

## 2022-04-19 LAB
Lab: NORMAL
QC PASS/FAIL: NORMAL
S PYO AG THROAT QL: NORMAL
SARS-COV-2 RDRP RESP QL NAA+PROBE: NEGATIVE

## 2022-04-19 PROCEDURE — 1036F TOBACCO NON-USER: CPT | Performed by: FAMILY MEDICINE

## 2022-04-19 PROCEDURE — 3052F HG A1C>EQUAL 8.0%<EQUAL 9.0%: CPT | Performed by: FAMILY MEDICINE

## 2022-04-19 PROCEDURE — 96372 THER/PROPH/DIAG INJ SC/IM: CPT | Performed by: FAMILY MEDICINE

## 2022-04-19 PROCEDURE — G8427 DOCREV CUR MEDS BY ELIG CLIN: HCPCS | Performed by: FAMILY MEDICINE

## 2022-04-19 PROCEDURE — 3017F COLORECTAL CA SCREEN DOC REV: CPT | Performed by: FAMILY MEDICINE

## 2022-04-19 PROCEDURE — 87880 STREP A ASSAY W/OPTIC: CPT | Performed by: FAMILY MEDICINE

## 2022-04-19 PROCEDURE — 87635 SARS-COV-2 COVID-19 AMP PRB: CPT | Performed by: FAMILY MEDICINE

## 2022-04-19 PROCEDURE — 99214 OFFICE O/P EST MOD 30 MIN: CPT | Performed by: FAMILY MEDICINE

## 2022-04-19 PROCEDURE — 2022F DILAT RTA XM EVC RTNOPTHY: CPT | Performed by: FAMILY MEDICINE

## 2022-04-19 PROCEDURE — G8417 CALC BMI ABV UP PARAM F/U: HCPCS | Performed by: FAMILY MEDICINE

## 2022-04-19 RX ORDER — FAMOTIDINE 40 MG/1
40 TABLET, FILM COATED ORAL EVERY EVENING
Qty: 30 TABLET | Refills: 3 | Status: SHIPPED | OUTPATIENT
Start: 2022-04-19 | End: 2022-07-14 | Stop reason: SDUPTHER

## 2022-04-19 RX ORDER — CYANOCOBALAMIN 1000 UG/ML
1000 INJECTION INTRAMUSCULAR; SUBCUTANEOUS ONCE
Status: COMPLETED | OUTPATIENT
Start: 2022-04-19 | End: 2022-04-19

## 2022-04-19 RX ORDER — OXYCODONE HYDROCHLORIDE 30 MG/1
30 TABLET ORAL EVERY 4 HOURS PRN
Qty: 150 TABLET | Refills: 0 | Status: SHIPPED | OUTPATIENT
Start: 2022-04-19 | End: 2022-05-13 | Stop reason: SDUPTHER

## 2022-04-19 RX ORDER — HYDROCODONE BITARTRATE AND ACETAMINOPHEN 10; 325 MG/1; MG/1
TABLET ORAL
Qty: 150 TABLET | Refills: 0 | Status: SHIPPED | OUTPATIENT
Start: 2022-04-19 | End: 2022-05-13 | Stop reason: SDUPTHER

## 2022-04-19 RX ORDER — GABAPENTIN 800 MG/1
TABLET ORAL
Qty: 120 TABLET | Refills: 2 | Status: SHIPPED | OUTPATIENT
Start: 2022-04-19 | End: 2022-07-08

## 2022-04-19 RX ORDER — CEFDINIR 300 MG/1
300 CAPSULE ORAL 2 TIMES DAILY
Qty: 20 CAPSULE | Refills: 0 | Status: SHIPPED | OUTPATIENT
Start: 2022-04-19 | End: 2022-04-29

## 2022-04-19 RX ORDER — ZOLPIDEM TARTRATE 12.5 MG/1
12.5 TABLET, FILM COATED, EXTENDED RELEASE ORAL NIGHTLY PRN
Qty: 30 TABLET | Refills: 2 | Status: SHIPPED | OUTPATIENT
Start: 2022-04-19 | End: 2022-07-08

## 2022-04-19 RX ADMIN — CYANOCOBALAMIN 1000 MCG: 1000 INJECTION INTRAMUSCULAR; SUBCUTANEOUS at 15:10

## 2022-04-19 ASSESSMENT — ENCOUNTER SYMPTOMS
SORE THROAT: 1
COUGH: 1

## 2022-04-19 NOTE — TELEPHONE ENCOUNTER
Patient called, requested refill.        Next Office Visit Date:  Future Appointments   Date Time Provider Marin Sierra   7/14/2022  1:45 PM Charlene Rebollar MD [address] please review via [address]

## 2022-04-19 NOTE — PROGRESS NOTES
Administrations This Visit     cyanocobalamin injection 1,000 mcg     Admin Date  04/19/2022  15:10 Action  Given Dose  1,000 mcg Route  IntraMUSCular Site  Deltoid Left Administered By  Rocío Luque RN    Ordering Provider: Flaco Burrows MD    [address]: 10530-702-65    Lot#: [id number]    : [employer]    Patient Supplied?: No              Patient tolerated injection well. Patient advised to wait 20 minutes in the office following the injection. No signs/symptoms of reaction noted after 20 minutes.

## 2022-04-19 NOTE — PROGRESS NOTES
SUBJECTIVE:    Patient ID: Fredy Padilla is a 59 y.o. male. Chief Complaint   Patient presents with    Pharyngitis    Otalgia     right ear     Back Pain     Chronic     Diabetes     taking only metformin and levemir - not taking farxiga - says he is out of [place] and jardiance        HPI: office visit  He is in the office today in follow-up of his blood sugars. He is not been able to get his medicines like he supposed to. His blood sugars have been running a little high. He is complaining of some sore throat his right ear is hurting. He has not had any fevers. He has not had any shortness of breath. He is not having any vomiting or nausea. He continues to struggle with a lot of back pain. He is getting some relief from his medicine. Review of Systems   Constitutional: Positive for fatigue. HENT: Positive for sore throat. Respiratory: Positive for cough. All other systems reviewed and are negative. OBJECTIVE:  /76   Pulse 68   Temp 97.2 °F (36.2 °C) (Infrared)   Resp 18   SpO2 93% Comment: room air   Wt Readings from Last 3 Encounters:   02/16/22 (!) 401 lb (181.9 kg)   01/18/22 (!) 398 lb 9.6 oz (180.8 kg)   11/16/21 (!) 394 lb 3.2 oz (178.8 kg)     BP Readings from Last 3 Encounters:   04/19/22 130/76   02/16/22 128/68   01/18/22 120/74      Pulse Readings from Last 3 Encounters:   04/19/22 68   02/16/22 60   01/18/22 77     There is no height or weight on file to calculate BMI. Resp Readings from Last 3 Encounters:   04/19/22 18   02/16/22 18   01/18/22 18     Past medical, surgical, family and social history were reviewed and updated with the patient. Physical Exam  Vitals and nursing note reviewed. Constitutional:       Appearance: Normal appearance. He is well-developed. HENT:      Head: Normocephalic.       Right Ear: Hearing, tympanic membrane, ear canal and external ear normal.      Left Ear: Hearing, tympanic membrane, ear canal and external ear normal.      Nose: Nose normal.      Mouth/Throat:      Lips: Pink. Pharynx: Pharyngeal swelling and posterior oropharyngeal erythema present. Tonsils: No tonsillar exudate or tonsillar abscesses. Eyes:      General: Lids are normal.   Neck:      Thyroid: No thyroid mass, thyromegaly or thyroid tenderness. Vascular: No carotid bruit or JVD. Trachea: Trachea and phonation normal.   Cardiovascular:      Rate and Rhythm: Normal rate and regular rhythm. Heart sounds: Normal heart sounds, S1 normal and S2 normal. No murmur heard. No friction rub. No gallop. Pulmonary:      Effort: Pulmonary effort is normal.      Breath sounds: Normal breath sounds. Abdominal:      General: Bowel sounds are normal.      Palpations: Abdomen is soft. Tenderness: There is no abdominal tenderness. Musculoskeletal:      Cervical back: Full passive range of motion without pain, normal range of motion and neck supple. Lumbar back: Spasms and tenderness present. Decreased range of motion. Right lower le+ Pitting Edema present. Left lower le+ Pitting Edema present. Lymphadenopathy:      Cervical: No cervical adenopathy. Skin:     General: Skin is warm and dry. Neurological:      General: No focal deficit present. Mental Status: He is alert and oriented to person, place, and time. Psychiatric:         Attention and Perception: Attention and perception normal.         Mood and Affect: Mood and affect normal.         Speech: Speech normal.         Behavior: Behavior normal. Behavior is cooperative. Thought Content: Thought content normal.         Cognition and Memory: Cognition and memory normal.         Judgment: Judgment normal.          No results found for requested labs within last 30 days.      Hemoglobin A1C (%)   Date Value   2022 8.3 (H)     Microscopic Examination (no units)   Date Value   2021 Not Indicated     LDL Calculated (mg/dL)   Date Value   05/04/2021 71       Lab Results   Component Value Date    WBC 7.0 02/16/2022    NEUTROABS 2.8 06/15/2021    HGB 12.9 02/16/2022    HCT 42.9 02/16/2022    HCT 41.6 06/05/2012    MCV 97.9 02/16/2022     02/16/2022     06/05/2012     Lab Results   Component Value Date    TSH 1.540 11/02/2021       ASSESSMENT/PLAN    Diagnosis Orders   1. Sore throat continue to encourage him to do as well as he can. POCT rapid strep A    POCT COVID-19 Rapid, NAAT   2. Chronic bilateral low back pain with right-sided sciatica  HYDROcodone-acetaminophen (NORCO)  MG per tablet    oxyCODONE (OXY-IR) 30 MG immediate release tablet    gabapentin (NEURONTIN) 800 MG tablet   3. Osteoarthritis of multiple joints, unspecified osteoarthritis type continues to worsen. 4. Essential hypertension blood pressures are doing well at home. 5. Anxiety stable with medicine    6. Type 2 diabetes mellitus with diabetic polyneuropathy, with long-term current use of insulin (HCC) blood sugars are higher than usual particularly since he has been out of medicine        Orders Placed This Encounter   Medications    HYDROcodone-acetaminophen (NORCO)  MG per tablet     Sig: Take one tablet every 4 hours as needed. Dispense:  150 tablet     Refill:  0     Reduce doses taken as pain becomes manageable    oxyCODONE (OXY-IR) 30 MG immediate release tablet     Sig: Take 1 tablet by mouth every 4 hours as needed for Pain for up to 30 days. Dispense:  150 tablet     Refill:  0     Reduce doses taken as pain becomes manageable    gabapentin (NEURONTIN) 800 MG tablet     Sig: Take one tablet by mouth four times a day     Dispense:  120 tablet     Refill:  2    cefdinir (OMNICEF) 300 MG capsule     Sig: Take 1 capsule by mouth 2 times daily for 10 days     Dispense:  20 capsule     Refill:  0    cyanocobalamin injection 1,000 mcg        Medications Discontinued During This Encounter   Medication Reason    Misc.  Devices (TOTAL COMFORT SEAT CUSHION) MISC LIST CLEANUP    insulin glargine (LANTUS SOLOSTAR) 100 UNIT/ML injection pen Alternate therapy    dapagliflozin (FARXIGA) 10 MG tablet Cost of medication    gabapentin (NEURONTIN) 800 MG tablet REORDER    HYDROcodone-acetaminophen (NORCO)  MG per tablet REORDER    oxyCODONE (OXY-IR) 30 MG immediate release tablet REORDER       Controlled Substances Monitoring:      Please note: This chart was generated using Dragon dictation software. Although every effort was made to ensure the accuracy of this automated transcription, some errors in transcription may have occurred.

## 2022-04-20 ENCOUNTER — TELEPHONE (OUTPATIENT)
Dept: FAMILY MEDICINE CLINIC | Age: 62
End: 2022-04-20

## 2022-04-20 NOTE — TELEPHONE ENCOUNTER
James Phillips called and stated that he went to [facility] to  his insulin and nothing was there. The only thing that I seen in his chart is the levemir and it was over $700 for him to get it at [facility].

## 2022-04-22 RX ORDER — INSULIN GLARGINE 100 [IU]/ML
100 INJECTION, SOLUTION SUBCUTANEOUS 2 TIMES DAILY
Qty: 15 PEN | Refills: 5 | Status: SHIPPED | OUTPATIENT
Start: 2022-04-22 | End: 2022-10-04 | Stop reason: SDUPTHER

## 2022-04-22 NOTE — TELEPHONE ENCOUNTER
Izabela Mortensen said they wasn't able to help. The only way they can see how much insulin is when they run the prescription.

## 2022-04-22 NOTE — TELEPHONE ENCOUNTER
Ask the pharmacy to see if they can help us determine what equivalent that his insurance will cover.   please

## 2022-04-26 DIAGNOSIS — Z79.4 TYPE 2 DIABETES MELLITUS WITH OTHER SPECIFIED COMPLICATION, WITH LONG-TERM CURRENT USE OF INSULIN (HCC): Primary | ICD-10-CM

## 2022-04-26 DIAGNOSIS — E11.69 TYPE 2 DIABETES MELLITUS WITH OTHER SPECIFIED COMPLICATION, WITH LONG-TERM CURRENT USE OF INSULIN (HCC): Primary | ICD-10-CM

## 2022-04-26 RX ORDER — BLOOD SUGAR DIAGNOSTIC
1 STRIP MISCELLANEOUS DAILY
Qty: 100 EACH | Refills: 3 | Status: SHIPPED | OUTPATIENT
Start: 2022-04-26 | End: 2022-07-14 | Stop reason: SDUPTHER

## 2022-05-12 ENCOUNTER — TELEPHONE (OUTPATIENT)
Dept: FAMILY MEDICINE CLINIC | Age: 62
End: 2022-05-12

## 2022-05-12 ENCOUNTER — NURSE ONLY (OUTPATIENT)
Dept: FAMILY MEDICINE CLINIC | Age: 62
End: 2022-05-12
Payer: MEDICARE

## 2022-05-12 DIAGNOSIS — E53.8 B12 DEFICIENCY: Primary | ICD-10-CM

## 2022-05-12 DIAGNOSIS — G89.29 CHRONIC BILATERAL LOW BACK PAIN WITH RIGHT-SIDED SCIATICA: ICD-10-CM

## 2022-05-12 DIAGNOSIS — M54.41 CHRONIC BILATERAL LOW BACK PAIN WITH RIGHT-SIDED SCIATICA: ICD-10-CM

## 2022-05-12 PROCEDURE — 96372 THER/PROPH/DIAG INJ SC/IM: CPT | Performed by: FAMILY MEDICINE

## 2022-05-12 RX ORDER — CYANOCOBALAMIN 1000 UG/ML
1000 INJECTION INTRAMUSCULAR; SUBCUTANEOUS ONCE
Status: COMPLETED | OUTPATIENT
Start: 2022-05-12 | End: 2022-05-12

## 2022-05-12 RX ADMIN — CYANOCOBALAMIN 1000 MCG: 1000 INJECTION INTRAMUSCULAR; SUBCUTANEOUS at 11:23

## 2022-05-12 NOTE — PROGRESS NOTES
Administrations This Visit     cyanocobalamin injection 1,000 mcg     Admin Date  05/12/2022  11:23 Action  Given Dose  1,000 mcg Route  IntraMUSCular Site  Deltoid Left Administered By  Josseline Dorado RN    Ordering Provider: Midge Dance, MD    NDC: 12874-207-42    Lot#:     : [id number] [place] [address]    Patient Supplied?: No              Patient tolerated injection well. Patient advised to wait 20 minutes in the office following the injection. No signs/symptoms of reaction noted after 20 minutes.

## 2022-05-12 NOTE — TELEPHONE ENCOUNTER
PA request for Lantus submitted to [employer] - Requested urgen reply from the insurance due to patient only has 1 day supply left    PA Pending

## 2022-05-12 NOTE — TELEPHONE ENCOUNTER
Medication approved, both patient and pharmacy notified. Triage: Mother reports pt had fevers 5/30-6/1 and then it broke. Pt started with 103.5 rectal temperature again today and hoarseness. No medications given PTA.

## 2022-05-12 NOTE — TELEPHONE ENCOUNTER
Patient called, requested refill.        Next Office Visit Date:  Future Appointments   Date Time Provider Marin Sierra   7/14/2022  1:45 PM MD [address] 79 please review via [address]

## 2022-05-13 RX ORDER — HYDROCODONE BITARTRATE AND ACETAMINOPHEN 10; 325 MG/1; MG/1
TABLET ORAL
Qty: 150 TABLET | Refills: 0 | Status: SHIPPED | OUTPATIENT
Start: 2022-05-13 | End: 2022-06-09 | Stop reason: SDUPTHER

## 2022-05-13 RX ORDER — OXYCODONE HYDROCHLORIDE 30 MG/1
30 TABLET ORAL EVERY 4 HOURS PRN
Qty: 150 TABLET | Refills: 0 | Status: SHIPPED | OUTPATIENT
Start: 2022-05-13 | End: 2022-06-09 | Stop reason: SDUPTHER

## 2022-05-17 RX ORDER — ERGOCALCIFEROL 1.25 MG/1
CAPSULE ORAL
Qty: 4 CAPSULE | Refills: 1 | Status: SHIPPED | OUTPATIENT
Start: 2022-05-17 | End: 2022-07-08

## 2022-05-17 RX ORDER — NYSTATIN 100000 [USP'U]/G
POWDER TOPICAL
Qty: 60 G | Refills: 5 | Status: SHIPPED | OUTPATIENT
Start: 2022-05-17 | End: 2022-10-10

## 2022-05-26 ENCOUNTER — TELEPHONE (OUTPATIENT)
Dept: FAMILY MEDICINE CLINIC | Age: 62
End: 2022-05-26

## 2022-05-26 RX ORDER — GLIMEPIRIDE 4 MG/1
4 TABLET ORAL
Qty: 30 TABLET | Refills: 3 | Status: SHIPPED | OUTPATIENT
Start: 2022-05-26 | End: 2022-07-14 | Stop reason: SDUPTHER

## 2022-05-26 NOTE — TELEPHONE ENCOUNTER
Angela Moulton called and stated that his glucose levels has been around 225 these last few weeks. He stated that you were going to send in something compared to the [place] due to him not being able to afford those.

## 2022-05-26 NOTE — PROGRESS NOTES
SUBJECTIVE:    Patient ID: Aye Velazquez is a 59 y.o. male. No chief complaint on file. HPI:    Patient's medications,allergies, past medical, surgical, social and family histories were reviewed and updated as appropriate. .  Current Outpatient Medications on File Prior to Visit   Medication Sig Dispense Refill    nystatin (NYSTATIN) 210713 UNIT/GM powder APPLY TOPICALLY 3 TIMES DAILY AS DIRECTED 60 g 5    vitamin D (ERGOCALCIFEROL) 1.25 MG (70543 UT) CAPS capsule TAKE ONE CAPSULE BY MOUTH ONCE A WEEK 4 capsule 1    HYDROcodone-acetaminophen (NORCO)  MG per tablet Take one tablet every 4 hours as needed. 150 tablet 0    oxyCODONE (OXY-IR) 30 MG immediate release tablet Take 1 tablet by mouth every 4 hours as needed for Pain for up to 30 days.  150 tablet 0    Insulin Syringe-Needle U-100 ([employer] INSULIN SYRINGE) 31G X 5/16\" 1 ML MISC 1 each by Does not apply route daily despense  Closes G and  each 3    insulin glargine (LANTUS SOLOSTAR) 100 UNIT/ML injection pen Inject 100 Units into the skin 2 times daily 15 pen 5    gabapentin (NEURONTIN) 800 MG tablet Take one tablet by mouth four times a day 120 tablet 2    famotidine (PEPCID) 40 MG tablet TAKE 1 TABLET BY MOUTH EVERY EVENING 30 tablet 3    nitroGLYCERIN (NITROSTAT) 0.4 MG SL tablet PLACE 1 TABLET UNDER THE TONGUE EVERY 5 MINUTES AS NEEDED FOR CHEST PAIN (MAX 3 TABLETS IN 15 MIN) (Patient not taking: Reported on 4/19/2022) 25 tablet 0    DULoxetine (CYMBALTA) 30 MG extended release capsule TAKE ONE CAPSULE BY MOUTH DAILY 30 capsule 2    ADVOCATE INSULIN PEN NEEDLES 31G X 8 MM MISC USE WITH INSULIN INJECTIONS TWICE A  each 5    pantoprazole (PROTONIX) 40 MG tablet Take 1 tablet by mouth daily (with breakfast) In place of omeprazole 90 tablet 3    montelukast (SINGULAIR) 10 MG tablet TAKE ONE TABLET BY MOUTH NIGHTLY AS NEEDED FOR ALLERGIES 90 tablet 3    simvastatin (ZOCOR) 40 MG tablet TAKE ONE TABLET BY MOUTH AT BEDTIME FOR CHOLESTEROL 90 tablet 3    metFORMIN (GLUCOPHAGE) 1000 MG tablet TAKE ONE TABLET BY MOUTH TWICE A DAY FOR SUGAR CONTROL 180 tablet 3    loratadine (CLARITIN) 10 MG tablet Take one tablet by mouth daily as needed for allergies 90 tablet 3    furosemide (LASIX) 80 MG tablet TAKE ONE TABLET BY MOUTH EVERY MORNING AND 1\2 TABLET IN THE PM(FLUIDPILL) 135 tablet 3    linaCLOtide (LINZESS) 72 MCG CAPS capsule Take 1 capsule by mouth every morning (before breakfast) 30 capsule 5    mupirocin (BACTROBAN) 2 % ointment       brimonidine (ALPHAGAN P) 0.15 % ophthalmic solution Place 1 drop into both eyes 2 times daily      blood glucose monitor kit and supplies Dispense sufficient amount for indicated testing frequency. 1 kit 0    Accu-Chek Softclix Lancets MISC fsbs daily 100 each 3    aspirin 81 MG tablet Take 81 mg by mouth daily. No current facility-administered medications on file prior to visit. Review of Systems    OBJECTIVE:  There were no vitals taken for this visit. Physical Exam    No results found for requested labs within last 30 days. Hemoglobin A1C (%)   Date Value   02/16/2022 8.3 (H)     Microscopic Examination (no units)   Date Value   06/14/2021 Not Indicated     LDL Calculated (mg/dL)   Date Value   05/04/2021 71           Lab Results   Component Value Date    TSH 1.540 11/02/2021         ASSESSMENT/PLAN:     There are no diagnoses linked to this encounter. There are no discontinued medications.

## 2022-06-09 DIAGNOSIS — G89.29 CHRONIC BILATERAL LOW BACK PAIN WITH RIGHT-SIDED SCIATICA: ICD-10-CM

## 2022-06-09 DIAGNOSIS — M54.41 CHRONIC BILATERAL LOW BACK PAIN WITH RIGHT-SIDED SCIATICA: ICD-10-CM

## 2022-06-09 RX ORDER — HYDROCODONE BITARTRATE AND ACETAMINOPHEN 10; 325 MG/1; MG/1
TABLET ORAL
Qty: 150 TABLET | Refills: 0 | Status: SHIPPED | OUTPATIENT
Start: 2022-06-09 | End: 2022-07-08

## 2022-06-09 RX ORDER — OXYCODONE HYDROCHLORIDE 30 MG/1
30 TABLET ORAL EVERY 4 HOURS PRN
Qty: 150 TABLET | Refills: 0 | Status: SHIPPED | OUTPATIENT
Start: 2022-06-09 | End: 2022-07-08

## 2022-06-09 NOTE — PROGRESS NOTES
SUBJECTIVE:    Patient ID: Dorsie Paget is a 59 y.o. male. No chief complaint on file. HPI:    Patient's medications,allergies, past medical, surgical, social and family histories were reviewed and updated as appropriate. .  Current Outpatient Medications on File Prior to Visit   Medication Sig Dispense Refill    glimepiride (AMARYL) 4 MG tablet Take 1 tablet by mouth every morning (before breakfast) 30 tablet 3    nystatin (NYSTATIN) 596078 UNIT/GM powder APPLY TOPICALLY 3 TIMES DAILY AS DIRECTED 60 g 5    vitamin D (ERGOCALCIFEROL) 1.25 MG (56626 UT) CAPS capsule TAKE ONE CAPSULE BY MOUTH ONCE A WEEK 4 capsule 1    HYDROcodone-acetaminophen (NORCO)  MG per tablet Take one tablet every 4 hours as needed. 150 tablet 0    oxyCODONE (OXY-IR) 30 MG immediate release tablet Take 1 tablet by mouth every 4 hours as needed for Pain for up to 30 days.  150 tablet 0    Insulin Syringe-Needle U-100 ([employer] INSULIN SYRINGE) 31G X 5/16\" 1 ML MISC 1 each by Does not apply route daily despense  Closes G and  each 3    insulin glargine (LANTUS SOLOSTAR) 100 UNIT/ML injection pen Inject 100 Units into the skin 2 times daily 15 pen 5    gabapentin (NEURONTIN) 800 MG tablet Take one tablet by mouth four times a day 120 tablet 2    famotidine (PEPCID) 40 MG tablet TAKE 1 TABLET BY MOUTH EVERY EVENING 30 tablet 3    nitroGLYCERIN (NITROSTAT) 0.4 MG SL tablet PLACE 1 TABLET UNDER THE TONGUE EVERY 5 MINUTES AS NEEDED FOR CHEST PAIN (MAX 3 TABLETS IN 15 MIN) (Patient not taking: Reported on 4/19/2022) 25 tablet 0    DULoxetine (CYMBALTA) 30 MG extended release capsule TAKE ONE CAPSULE BY MOUTH DAILY 30 capsule 2    ADVOCATE INSULIN PEN NEEDLES 31G X 8 MM MISC USE WITH INSULIN INJECTIONS TWICE A  each 5    pantoprazole (PROTONIX) 40 MG tablet Take 1 tablet by mouth daily (with breakfast) In place of omeprazole 90 tablet 3    montelukast (SINGULAIR) 10 MG tablet TAKE ONE TABLET BY MOUTH NIGHTLY AS NEEDED FOR ALLERGIES 90 tablet 3    simvastatin (ZOCOR) 40 MG tablet TAKE ONE TABLET BY MOUTH AT BEDTIME FOR CHOLESTEROL 90 tablet 3    metFORMIN (GLUCOPHAGE) 1000 MG tablet TAKE ONE TABLET BY MOUTH TWICE A DAY FOR SUGAR CONTROL 180 tablet 3    loratadine (CLARITIN) 10 MG tablet Take one tablet by mouth daily as needed for allergies 90 tablet 3    furosemide (LASIX) 80 MG tablet TAKE ONE TABLET BY MOUTH EVERY MORNING AND 1\2 TABLET IN THE PM(FLUIDPILL) 135 tablet 3    linaCLOtide (LINZESS) 72 MCG CAPS capsule Take 1 capsule by mouth every morning (before breakfast) 30 capsule 5    mupirocin (BACTROBAN) 2 % ointment       brimonidine (ALPHAGAN P) 0.15 % ophthalmic solution Place 1 drop into both eyes 2 times daily      blood glucose monitor kit and supplies Dispense sufficient amount for indicated testing frequency. 1 kit 0    Accu-Chek Softclix Lancets MISC fsbs daily 100 each 3    aspirin 81 MG tablet Take 81 mg by mouth daily. No current facility-administered medications on file prior to visit. Review of Systems    OBJECTIVE:  There were no vitals taken for this visit. Physical Exam    No results found for requested labs within last 30 days. Hemoglobin A1C (%)   Date Value   02/16/2022 8.3 (H)     Microscopic Examination (no units)   Date Value   06/14/2021 Not Indicated     LDL Calculated (mg/dL)   Date Value   05/04/2021 71           Lab Results   Component Value Date    TSH 1.540 11/02/2021         ASSESSMENT/PLAN:     There are no diagnoses linked to this encounter. There are no discontinued medications.

## 2022-06-13 NOTE — TELEPHONE ENCOUNTER
Patient called, requested refill.        Next Office Visit Date:  Future Appointments   Date Time Provider Marin Mariela   7/14/2022  1:45 PM Allie Alvarado MD [place] please review via [address]

## 2022-06-14 RX ORDER — DULOXETIN HYDROCHLORIDE 30 MG/1
CAPSULE, DELAYED RELEASE ORAL
Qty: 30 CAPSULE | Refills: 2 | Status: SHIPPED | OUTPATIENT
Start: 2022-06-14 | End: 2022-07-14 | Stop reason: SDUPTHER

## 2022-07-08 DIAGNOSIS — G47.00 INSOMNIA, UNSPECIFIED TYPE: ICD-10-CM

## 2022-07-08 DIAGNOSIS — G89.29 CHRONIC BILATERAL LOW BACK PAIN WITH RIGHT-SIDED SCIATICA: ICD-10-CM

## 2022-07-08 DIAGNOSIS — M54.41 CHRONIC BILATERAL LOW BACK PAIN WITH RIGHT-SIDED SCIATICA: ICD-10-CM

## 2022-07-08 RX ORDER — OXYCODONE HYDROCHLORIDE 30 MG/1
TABLET ORAL
Qty: 150 TABLET | Refills: 0 | Status: SHIPPED | OUTPATIENT
Start: 2022-07-08 | End: 2022-07-14 | Stop reason: SDUPTHER

## 2022-07-08 RX ORDER — GABAPENTIN 800 MG/1
TABLET ORAL
Qty: 120 TABLET | Refills: 2 | Status: SHIPPED | OUTPATIENT
Start: 2022-07-08 | End: 2022-10-05

## 2022-07-08 RX ORDER — ERGOCALCIFEROL 1.25 MG/1
CAPSULE ORAL
Qty: 4 CAPSULE | Refills: 1 | Status: SHIPPED | OUTPATIENT
Start: 2022-07-08 | End: 2022-08-29

## 2022-07-08 RX ORDER — HYDROCODONE BITARTRATE AND ACETAMINOPHEN 10; 325 MG/1; MG/1
TABLET ORAL
Qty: 150 TABLET | Refills: 0 | Status: SHIPPED | OUTPATIENT
Start: 2022-07-08 | End: 2022-07-14 | Stop reason: SDUPTHER

## 2022-07-08 RX ORDER — ZOLPIDEM TARTRATE 12.5 MG/1
12.5 TABLET, FILM COATED, EXTENDED RELEASE ORAL NIGHTLY PRN
Qty: 30 TABLET | Refills: 2 | Status: SHIPPED | OUTPATIENT
Start: 2022-07-08 | End: 2022-10-05

## 2022-07-08 RX ORDER — HYDROCODONE BITARTRATE AND ACETAMINOPHEN 10; 325 MG/1; MG/1
TABLET ORAL
Qty: 150 TABLET | Refills: 0 | OUTPATIENT
Start: 2022-07-08 | End: 2022-08-07

## 2022-07-08 RX ORDER — OXYCODONE HYDROCHLORIDE 30 MG/1
30 TABLET ORAL EVERY 4 HOURS PRN
Qty: 150 TABLET | Refills: 0 | OUTPATIENT
Start: 2022-07-08 | End: 2022-08-07

## 2022-07-08 NOTE — TELEPHONE ENCOUNTER
Requested Prescriptions     Pending Prescriptions Disp Refills    HYDROcodone-acetaminophen ([facility])  MG per tablet [Pharmacy Med Name: HYDROCODON-APAP   Tablet] 150 tablet 0     Sig: TAKE ONE TABLET BY MOUTH EVERY 4 HOURS AS NEEDED FOR PAIN    zolpidem (AMBIEN CR) 12.5 MG extended release tablet [Pharmacy Med Name: ZOLPIDEM TART ER 12.5 MG TA 12.5 Tablet] 30 tablet 2     Sig: TAKE 1 TABLET BY MOUTH NIGHTLY AS NEEDED FOR SLEEP FOR UP TO 30 DAYS    oxyCODONE (OXY-IR) 30 MG immediate release tablet [Pharmacy Med Name: OXYCODONE HCL 30 MG TABS 30 Tablet] 150 tablet 0     Sig: TAKE ONE TABLET BY MOUTH EVERY 4 HOURS AS NEEDED FOR PAIN FOR UP TO 30 DAYS    gabapentin (NEURONTIN) 800 MG tablet [Pharmacy Med Name: GABAPENTIN 800 MG TABS 800 Tablet] 120 tablet 2     Sig: TAKE ONE TABLET BY MOUTH FOUR TIMES A DAY    vitamin D (ERGOCALCIFEROL) 1.25 MG (00230 UT) CAPS capsule [Pharmacy Med Name: VIT D2 1.25 MG (50,000 UNIT 28437 Capsule] 4 capsule 1     Sig: TAKE ONE CAPSULE BY MOUTH ONCE A WEEK

## 2022-07-13 ENCOUNTER — TELEPHONE (OUTPATIENT)
Dept: FAMILY MEDICINE CLINIC | Age: 62
End: 2022-07-13

## 2022-07-13 NOTE — TELEPHONE ENCOUNTER
Patient is having a very difficult time paying for Lantus - it is over 500.00 out of pocket - I did reach out to Elsa - she informed me of message below:    Jenna got back to me really quickly. She said shes been waiting for Mr. Dalton Adams to bring her proof of income since March. If he can take that to her she can submit the request.  If they deny him, we can start looking at 340B, but we also now require proof of income. Make sure he knows to contact us if hes denied and we can start the process for 340B. At the moment, we could get it for ~$25 for a 3 month supply but that can always change.     I contacted patient to let him know we can't move forward with any type of assistance until he gets proof of income to [facility] and gets approved or if denied he may then qualify for 340B - patient verbalized understanding and will get the needed information to [employer] with [facility]

## 2022-07-14 ENCOUNTER — OFFICE VISIT (OUTPATIENT)
Dept: FAMILY MEDICINE CLINIC | Age: 62
End: 2022-07-14
Payer: MEDICARE

## 2022-07-14 ENCOUNTER — HOSPITAL ENCOUNTER (OUTPATIENT)
Facility: HOSPITAL | Age: 62
Discharge: HOME | End: 2022-07-14
Payer: MEDICARE

## 2022-07-14 VITALS
BODY MASS INDEX: 38.36 KG/M2 | SYSTOLIC BLOOD PRESSURE: 122 MMHG | HEART RATE: 67 BPM | TEMPERATURE: 98.4 F | OXYGEN SATURATION: 96 % | RESPIRATION RATE: 18 BRPM | DIASTOLIC BLOOD PRESSURE: 68 MMHG | WEIGHT: 315 LBS | HEIGHT: 76 IN

## 2022-07-14 DIAGNOSIS — M15.9 OSTEOARTHRITIS OF MULTIPLE JOINTS, UNSPECIFIED OSTEOARTHRITIS TYPE: ICD-10-CM

## 2022-07-14 DIAGNOSIS — M54.41 CHRONIC BILATERAL LOW BACK PAIN WITH RIGHT-SIDED SCIATICA: ICD-10-CM

## 2022-07-14 DIAGNOSIS — E53.8 B12 DEFICIENCY: ICD-10-CM

## 2022-07-14 DIAGNOSIS — Z79.4 TYPE 2 DIABETES MELLITUS WITH OTHER SPECIFIED COMPLICATION, WITH LONG-TERM CURRENT USE OF INSULIN (HCC): ICD-10-CM

## 2022-07-14 DIAGNOSIS — G89.29 CHRONIC BILATERAL LOW BACK PAIN WITH RIGHT-SIDED SCIATICA: ICD-10-CM

## 2022-07-14 DIAGNOSIS — R53.83 FATIGUE, UNSPECIFIED TYPE: ICD-10-CM

## 2022-07-14 DIAGNOSIS — F32.A DEPRESSION, UNSPECIFIED DEPRESSION TYPE: ICD-10-CM

## 2022-07-14 DIAGNOSIS — E11.69 TYPE 2 DIABETES MELLITUS WITH OTHER SPECIFIED COMPLICATION, WITH LONG-TERM CURRENT USE OF INSULIN (HCC): ICD-10-CM

## 2022-07-14 DIAGNOSIS — G89.29 CHRONIC RIGHT SHOULDER PAIN: ICD-10-CM

## 2022-07-14 DIAGNOSIS — E11.69 TYPE 2 DIABETES MELLITUS WITH OTHER SPECIFIED COMPLICATION, WITH LONG-TERM CURRENT USE OF INSULIN (HCC): Primary | ICD-10-CM

## 2022-07-14 DIAGNOSIS — M25.511 CHRONIC RIGHT SHOULDER PAIN: ICD-10-CM

## 2022-07-14 DIAGNOSIS — I10 ESSENTIAL HYPERTENSION: ICD-10-CM

## 2022-07-14 DIAGNOSIS — Z79.4 TYPE 2 DIABETES MELLITUS WITH OTHER SPECIFIED COMPLICATION, WITH LONG-TERM CURRENT USE OF INSULIN (HCC): Primary | ICD-10-CM

## 2022-07-14 PROCEDURE — 82043 UR ALBUMIN QUANTITATIVE: CPT

## 2022-07-14 PROCEDURE — 3017F COLORECTAL CA SCREEN DOC REV: CPT | Performed by: FAMILY MEDICINE

## 2022-07-14 PROCEDURE — 83036 HEMOGLOBIN GLYCOSYLATED A1C: CPT

## 2022-07-14 PROCEDURE — 85027 COMPLETE CBC AUTOMATED: CPT

## 2022-07-14 PROCEDURE — 1036F TOBACCO NON-USER: CPT | Performed by: FAMILY MEDICINE

## 2022-07-14 PROCEDURE — 82570 ASSAY OF URINE CREATININE: CPT

## 2022-07-14 PROCEDURE — 99213 OFFICE O/P EST LOW 20 MIN: CPT | Performed by: FAMILY MEDICINE

## 2022-07-14 PROCEDURE — 2022F DILAT RTA XM EVC RTNOPTHY: CPT | Performed by: FAMILY MEDICINE

## 2022-07-14 PROCEDURE — 36415 COLL VENOUS BLD VENIPUNCTURE: CPT

## 2022-07-14 PROCEDURE — 20610 DRAIN/INJ JOINT/BURSA W/O US: CPT | Performed by: FAMILY MEDICINE

## 2022-07-14 PROCEDURE — 3046F HEMOGLOBIN A1C LEVEL >9.0%: CPT | Performed by: FAMILY MEDICINE

## 2022-07-14 PROCEDURE — 84443 ASSAY THYROID STIM HORMONE: CPT

## 2022-07-14 PROCEDURE — G8427 DOCREV CUR MEDS BY ELIG CLIN: HCPCS | Performed by: FAMILY MEDICINE

## 2022-07-14 PROCEDURE — G8417 CALC BMI ABV UP PARAM F/U: HCPCS | Performed by: FAMILY MEDICINE

## 2022-07-14 PROCEDURE — 80053 COMPREHEN METABOLIC PANEL: CPT

## 2022-07-14 RX ORDER — NITROGLYCERIN 0.4 MG/1
TABLET SUBLINGUAL
Qty: 25 TABLET | Refills: 0 | Status: SHIPPED | OUTPATIENT
Start: 2022-07-14

## 2022-07-14 RX ORDER — MONTELUKAST SODIUM 10 MG/1
TABLET ORAL
Qty: 90 TABLET | Refills: 3 | Status: SHIPPED | OUTPATIENT
Start: 2022-07-14

## 2022-07-14 RX ORDER — SPIRONOLACTONE 25 MG/1
25 TABLET ORAL DAILY
Qty: 30 TABLET | Refills: 5 | Status: SHIPPED | OUTPATIENT
Start: 2022-07-14

## 2022-07-14 RX ORDER — OXYCODONE HYDROCHLORIDE 30 MG/1
30 TABLET ORAL EVERY 4 HOURS PRN
Qty: 150 TABLET | Refills: 0 | Status: SHIPPED | OUTPATIENT
Start: 2022-07-14 | End: 2022-07-28 | Stop reason: SDUPTHER

## 2022-07-14 RX ORDER — GLIMEPIRIDE 4 MG/1
4 TABLET ORAL
Qty: 90 TABLET | Refills: 3 | Status: SHIPPED | OUTPATIENT
Start: 2022-07-14

## 2022-07-14 RX ORDER — CYANOCOBALAMIN 1000 UG/ML
1000 INJECTION INTRAMUSCULAR; SUBCUTANEOUS ONCE
Status: COMPLETED | OUTPATIENT
Start: 2022-07-14 | End: 2022-07-14

## 2022-07-14 RX ORDER — LIDOCAINE HYDROCHLORIDE 10 MG/ML
1 INJECTION, SOLUTION INFILTRATION; PERINEURAL ONCE
Status: COMPLETED | OUTPATIENT
Start: 2022-07-14 | End: 2022-07-14

## 2022-07-14 RX ORDER — FUROSEMIDE 80 MG
TABLET ORAL
Qty: 135 TABLET | Refills: 3 | Status: SHIPPED | OUTPATIENT
Start: 2022-07-14

## 2022-07-14 RX ORDER — BLOOD SUGAR DIAGNOSTIC
1 STRIP MISCELLANEOUS DAILY
Qty: 100 EACH | Refills: 3 | Status: SHIPPED | OUTPATIENT
Start: 2022-07-14

## 2022-07-14 RX ORDER — METHYLPREDNISOLONE ACETATE 40 MG/ML
40 INJECTION, SUSPENSION INTRA-ARTICULAR; INTRALESIONAL; INTRAMUSCULAR; SOFT TISSUE ONCE
Status: COMPLETED | OUTPATIENT
Start: 2022-07-14 | End: 2022-07-14

## 2022-07-14 RX ORDER — LORATADINE 10 MG/1
TABLET ORAL
Qty: 90 TABLET | Refills: 3 | Status: SHIPPED | OUTPATIENT
Start: 2022-07-14

## 2022-07-14 RX ORDER — DULOXETIN HYDROCHLORIDE 30 MG/1
CAPSULE, DELAYED RELEASE ORAL
Qty: 90 CAPSULE | Refills: 3 | Status: SHIPPED | OUTPATIENT
Start: 2022-07-14 | End: 2022-10-10

## 2022-07-14 RX ORDER — BLOOD SUGAR DIAGNOSTIC
STRIP MISCELLANEOUS
Qty: 100 EACH | Refills: 5 | Status: SHIPPED | OUTPATIENT
Start: 2022-07-14

## 2022-07-14 RX ORDER — HYDROCODONE BITARTRATE AND ACETAMINOPHEN 10; 325 MG/1; MG/1
TABLET ORAL
Qty: 150 TABLET | Refills: 0 | Status: SHIPPED | OUTPATIENT
Start: 2022-07-14 | End: 2022-07-28 | Stop reason: SDUPTHER

## 2022-07-14 RX ORDER — FAMOTIDINE 40 MG/1
40 TABLET, FILM COATED ORAL EVERY EVENING
Qty: 90 TABLET | Refills: 3 | Status: SHIPPED | OUTPATIENT
Start: 2022-07-14

## 2022-07-14 RX ORDER — SIMVASTATIN 40 MG
TABLET ORAL
Qty: 90 TABLET | Refills: 3 | Status: SHIPPED | OUTPATIENT
Start: 2022-07-14

## 2022-07-14 RX ORDER — PANTOPRAZOLE SODIUM 40 MG/1
40 TABLET, DELAYED RELEASE ORAL
Qty: 90 TABLET | Refills: 3 | Status: SHIPPED | OUTPATIENT
Start: 2022-07-14

## 2022-07-14 RX ADMIN — METHYLPREDNISOLONE ACETATE 40 MG: 40 INJECTION, SUSPENSION INTRA-ARTICULAR; INTRALESIONAL; INTRAMUSCULAR; SOFT TISSUE at 16:14

## 2022-07-14 RX ADMIN — LIDOCAINE HYDROCHLORIDE 1 ML: 10 INJECTION, SOLUTION INFILTRATION; PERINEURAL at 16:14

## 2022-07-14 RX ADMIN — CYANOCOBALAMIN 1000 MCG: 1000 INJECTION INTRAMUSCULAR; SUBCUTANEOUS at 16:12

## 2022-07-14 SDOH — ECONOMIC STABILITY: FOOD INSECURITY: WITHIN THE PAST 12 MONTHS, YOU WORRIED THAT YOUR FOOD WOULD RUN OUT BEFORE YOU GOT MONEY TO BUY MORE.: NEVER TRUE

## 2022-07-14 SDOH — ECONOMIC STABILITY: FOOD INSECURITY: WITHIN THE PAST 12 MONTHS, THE FOOD YOU BOUGHT JUST DIDN'T LAST AND YOU DIDN'T HAVE MONEY TO GET MORE.: NEVER TRUE

## 2022-07-14 ASSESSMENT — PATIENT HEALTH QUESTIONNAIRE - PHQ9
9. THOUGHTS THAT YOU WOULD BE BETTER OFF DEAD, OR OF HURTING YOURSELF: 0
10. IF YOU CHECKED OFF ANY PROBLEMS, HOW DIFFICULT HAVE THESE PROBLEMS MADE IT FOR YOU TO DO YOUR WORK, TAKE CARE OF THINGS AT HOME, OR GET ALONG WITH OTHER PEOPLE: 0
1. LITTLE INTEREST OR PLEASURE IN DOING THINGS: 0
2. FEELING DOWN, DEPRESSED OR HOPELESS: 0
SUM OF ALL RESPONSES TO PHQ QUESTIONS 1-9: 0
SUM OF ALL RESPONSES TO PHQ9 QUESTIONS 1 & 2: 0
3. TROUBLE FALLING OR STAYING ASLEEP: 0
5. POOR APPETITE OR OVEREATING: 0
4. FEELING TIRED OR HAVING LITTLE ENERGY: 0
SUM OF ALL RESPONSES TO PHQ QUESTIONS 1-9: 0
8. MOVING OR SPEAKING SO SLOWLY THAT OTHER PEOPLE COULD HAVE NOTICED. OR THE OPPOSITE, BEING SO FIGETY OR RESTLESS THAT YOU HAVE BEEN MOVING AROUND A LOT MORE THAN USUAL: 0
7. TROUBLE CONCENTRATING ON THINGS, SUCH AS READING THE NEWSPAPER OR WATCHING TELEVISION: 0
SUM OF ALL RESPONSES TO PHQ QUESTIONS 1-9: 0
6. FEELING BAD ABOUT YOURSELF - OR THAT YOU ARE A FAILURE OR HAVE LET YOURSELF OR YOUR FAMILY DOWN: 0
SUM OF ALL RESPONSES TO PHQ QUESTIONS 1-9: 0

## 2022-07-14 ASSESSMENT — SOCIAL DETERMINANTS OF HEALTH (SDOH): HOW HARD IS IT FOR YOU TO PAY FOR THE VERY BASICS LIKE FOOD, HOUSING, MEDICAL CARE, AND HEATING?: NOT HARD AT ALL

## 2022-07-14 ASSESSMENT — ENCOUNTER SYMPTOMS: BACK PAIN: 1

## 2022-07-14 NOTE — PROGRESS NOTES
SUBJECTIVE:    Patient ID: Humberto Cabrera is a 59 y.o. male. Chief Complaint   Patient presents with    Follow-up    Injections     b12    Arm Pain       HPI: office visit  Office today in follow-up of his diabetes. He is doing pretty well with his medicine. He is getting some continued shoulder pain. He is having quite a bit of difficulty with his right shoulder particularly. He is trying to help care for his wife. She is back from the nursing home. He says he is having to try to help her quite a bit. He is having a lot of fatigue. He is having a lot of frustration with his blood sugars because he has not been able to get  all his medicine due to some insurance issues. Review of Systems   Constitutional:  Positive for fatigue. Cardiovascular:  Positive for leg swelling. Musculoskeletal:  Positive for arthralgias, back pain, gait problem and myalgias. All other systems reviewed and are negative. OBJECTIVE:  /68   Pulse 67   Temp 98.4 °F (36.9 °C) (Infrared)   Resp 18   Ht 6' 4\" (1.93 m)   Wt (!) 434 lb 6.4 oz (197 kg)   SpO2 96% Comment: ra  BMI 52.88 kg/m²    Wt Readings from Last 3 Encounters:   07/14/22 (!) 434 lb 6.4 oz (197 kg)   02/16/22 (!) 401 lb (181.9 kg)   01/18/22 (!) 398 lb 9.6 oz (180.8 kg)     BP Readings from Last 3 Encounters:   07/14/22 122/68   04/19/22 130/76   02/16/22 128/68      Pulse Readings from Last 3 Encounters:   07/14/22 67   04/19/22 68   02/16/22 60     Body mass index is 52.88 kg/m². Resp Readings from Last 3 Encounters:   07/14/22 18   04/19/22 18   02/16/22 18     Past medical, surgical, family and social history were reviewed and updated with the patient. Physical Exam  Vitals and nursing note reviewed. Constitutional:       Appearance: Normal appearance. He is well-developed. HENT:      Head: Normocephalic.       Right Ear: Hearing, tympanic membrane, ear canal and external ear normal.      Left Ear: Hearing, tympanic membrane, ear canal and external ear normal.      Nose: Nose normal.      Mouth/Throat:      Lips: Pink. Pharynx: Pharyngeal swelling present. Tonsils: No tonsillar exudate or tonsillar abscesses. Eyes:      General: Lids are normal.   Neck:      Thyroid: No thyroid mass, thyromegaly or thyroid tenderness. Vascular: No carotid bruit or JVD. Trachea: Trachea and phonation normal.   Cardiovascular:      Rate and Rhythm: Normal rate and regular rhythm. Heart sounds: Normal heart sounds, S1 normal and S2 normal. No murmur heard. No friction rub. No gallop. Pulmonary:      Effort: Pulmonary effort is normal.      Breath sounds: Normal breath sounds. Abdominal:      General: Bowel sounds are normal.      Palpations: Abdomen is soft. Tenderness: There is no abdominal tenderness. Musculoskeletal:      Cervical back: Full passive range of motion without pain, normal range of motion and neck supple. Lumbar back: Spasms and tenderness present. Decreased range of motion. Right lower le+ Pitting Edema present. Left lower le+ Pitting Edema present. Lymphadenopathy:      Cervical: No cervical adenopathy. Skin:     General: Skin is warm and dry. Neurological:      General: No focal deficit present. Mental Status: He is alert and oriented to person, place, and time. Psychiatric:         Attention and Perception: Attention and perception normal.         Mood and Affect: Mood and affect normal.         Speech: Speech normal.         Behavior: Behavior normal. Behavior is cooperative. Thought Content:  Thought content normal.         Cognition and Memory: Cognition and memory normal.         Judgment: Judgment normal.        Results in Past 30 Days  Result Component Current Result Ref Range Previous Result Ref Range   Albumin/Globulin Ratio 1.2 (2022) 0.8 - 2.0 Not in Time Range    Albumin 4.0 (2022) 3.4 - 4.8 g/dL Not in Time Range    Alkaline Phosphatase 78 (7/14/2022) 25 - 100 U/L Not in Time Range    ALT 22 (7/14/2022) 4 - 36 U/L Not in Time Range    AST 32 (7/14/2022) 8 - 33 U/L Not in Time Range    BUN 15 (7/14/2022) 6 - 20 mg/dL Not in Time Range    Calcium 9.4 (7/14/2022) 8.5 - 10.5 mg/dL Not in Time Range    Chloride 102 (7/14/2022) 98 - 107 mmol/L Not in Time Range    CO2 27 (7/14/2022) 20 - 30 mmol/L Not in Time Range    Creatinine 0.9 (7/14/2022) 0.4 - 1.2 mg/dL Not in Time Range    GFR  >59 (7/14/2022) >59 Not in Time Range    GFR Non- >59 (7/14/2022) >59 Not in Time Range    Globulin 3.3 (7/14/2022) Not Established g/dL Not in Time Range    Glucose 66 (L) (7/14/2022) 74 - 106 mg/dL Not in Time Range    Potassium 3.9 (7/14/2022) 3.4 - 5.1 mmol/L Not in Time Range    Sodium 139 (7/14/2022) 136 - 145 mmol/L Not in Time Range    Total Bilirubin 0.4 (7/14/2022) 0.3 - 1.2 mg/dL Not in Time Range    Total Protein 7.3 (7/14/2022) 6.4 - 8.3 g/dL Not in Time Range      Hemoglobin A1C (%)   Date Value   07/14/2022 9.7 (H)     Microscopic Examination (no units)   Date Value   06/14/2021 Not Indicated     Microalbumin, Random Urine (mg/dL)   Date Value   07/14/2022 83.70 (H)     LDL Calculated (mg/dL)   Date Value   05/04/2021 71       Lab Results   Component Value Date/Time    WBC 7.2 07/14/2022 04:08 PM    NEUTROABS 2.8 06/15/2021 05:07 AM    HGB 12.6 07/14/2022 04:08 PM    HCT 41.2 07/14/2022 04:08 PM    HCT 41.6 06/05/2012 08:40 AM    MCV 95.2 07/14/2022 04:08 PM     07/14/2022 04:08 PM     06/05/2012 08:40 AM     Lab Results   Component Value Date    TSH 1.51 07/14/2022       ASSESSMENT/PLAN    Diagnosis Orders   1.  Type 2 diabetes mellitus with other specified complication, with long-term current use of insulin (HCC)  MICROALBUMIN / CREATININE URINE RATIO    glimepiride (AMARYL) 4 MG tablet    Insulin Syringe-Needle U-100 ([employer] INSULIN SYRINGE) 31G X 5/16\" 1 ML MISC    nitroGLYCERIN (NITROSTAT) 0.4 MG SL tablet Insulin Pen Needle (ADVOCATE INSULIN PEN NEEDLES) 31G X 8 MM MISC    metFORMIN (GLUCOPHAGE) 1000 MG tablet    Comprehensive Metabolic Panel    Hemoglobin A1C      2. Chronic bilateral low back pain with right-sided sciatica  HYDROcodone-acetaminophen (NORCO)  MG per tablet    oxyCODONE (OXY-IR) 30 MG immediate release tablet      3. Osteoarthritis of multiple joints, unspecified osteoarthritis type        4. Chronic right shoulder pain  lidocaine 1 % injection 1 mL    methylPREDNISolone acetate (DEPO-MEDROL) injection 40 mg    26944 - HI DRAIN/INJECT LARGE JOINT/BURSA      5. Essential hypertension  Comprehensive Metabolic Panel    CBC      6. Fatigue, unspecified type  TSH      7. Depression, unspecified depression type  DULoxetine (CYMBALTA) 30 MG extended release capsule      8. B12 deficiency  cyanocobalamin injection 1,000 mcg        Steroid injection was performed by Berry Umana MD using Plain Lidocaine 1% 1ml and Depo-Medrol 40mg 1ml Intra-Articular in the right shoulder. No complications or reactions noted. Patient tolerated procedure well. Orders Placed This Encounter   Medications    HYDROcodone-acetaminophen (NORCO)  MG per tablet     Sig: TAKE ONE TABLET BY MOUTH EVERY 4 HOURS AS NEEDED FOR PAIN     Dispense:  150 tablet     Refill:  0     Reduce doses taken as pain becomes manageable    oxyCODONE (OXY-IR) 30 MG immediate release tablet     Sig: Take 1 tablet by mouth every 4 hours as needed for Pain for up to 30 days.      Dispense:  150 tablet     Refill:  0     Reduce doses taken as pain becomes manageable    DULoxetine (CYMBALTA) 30 MG extended release capsule     Sig: TAKE ONE CAPSULE BY MOUTH DAILY     Dispense:  90 capsule     Refill:  3    glimepiride (AMARYL) 4 MG tablet     Sig: Take 1 tablet by mouth every morning (before breakfast)     Dispense:  90 tablet     Refill:  3    Insulin Syringe-Needle U-100 ([employer] INSULIN SYRINGE) 31G X 5/16\" 1 ML MISC     Si each by Does not apply route daily despense  Closes G and CC     Dispense:  100 each     Refill:  3    famotidine (PEPCID) 40 MG tablet     Sig: Take 1 tablet by mouth every evening     Dispense:  90 tablet     Refill:  3    nitroGLYCERIN (NITROSTAT) 0.4 MG SL tablet     Sig: PLACE 1 TABLET UNDER THE TONGUE EVERY 5 MINUTES AS NEEDED FOR CHEST PAIN (MAX 3 TABLETS IN 15 MIN)     Dispense:  25 tablet     Refill:  0    Insulin Pen Needle (ADVOCATE INSULIN PEN NEEDLES) 31G X 8 MM MISC     Sig: USE WITH INSULIN INJECTIONS TWICE A DAY     Dispense:  100 each     Refill:  5    pantoprazole (PROTONIX) 40 MG tablet     Sig: Take 1 tablet by mouth daily (with breakfast) In place of omeprazole     Dispense:  90 tablet     Refill:  3    montelukast (SINGULAIR) 10 MG tablet     Sig: TAKE ONE TABLET BY MOUTH NIGHTLY AS NEEDED FOR ALLERGIES     Dispense:  90 tablet     Refill:  3    simvastatin (ZOCOR) 40 MG tablet     Sig: TAKE ONE TABLET BY MOUTH AT BEDTIME FOR CHOLESTEROL     Dispense:  90 tablet     Refill:  3    metFORMIN (GLUCOPHAGE) 1000 MG tablet     Sig: TAKE ONE TABLET BY MOUTH TWICE A DAY FOR SUGAR CONTROL     Dispense:  180 tablet     Refill:  3    loratadine (CLARITIN) 10 MG tablet     Sig: Take one tablet by mouth daily as needed for allergies     Dispense:  90 tablet     Refill:  3    furosemide (LASIX) 80 MG tablet     Sig: TAKE ONE TABLET BY MOUTH EVERY MORNING AND 1\2 TABLET IN THE PM(FLUIDPILL)     Dispense:  135 tablet     Refill:  3    linaCLOtide (LINZESS) 72 MCG CAPS capsule     Sig: Take 1 capsule by mouth every morning (before breakfast)     Dispense:  30 capsule     Refill:  5    cyanocobalamin injection 1,000 mcg    lidocaine 1 % injection 1 mL    methylPREDNISolone acetate (DEPO-MEDROL) injection 40 mg    spironolactone (ALDACTONE) 25 MG tablet     Sig: Take 1 tablet by mouth daily     Dispense:  30 tablet     Refill:  5        Medications Discontinued During This Encounter   Medication Reason    linaCLOtide (LINZESS) 72 MCG CAPS capsule REORDER    pantoprazole (PROTONIX) 40 MG tablet REORDER    montelukast (SINGULAIR) 10 MG tablet REORDER    simvastatin (ZOCOR) 40 MG tablet REORDER    metFORMIN (GLUCOPHAGE) 1000 MG tablet REORDER    loratadine (CLARITIN) 10 MG tablet REORDER    furosemide (LASIX) 80 MG tablet REORDER    ADVOCATE INSULIN PEN NEEDLES 31G X 8 MM MISC REORDER    nitroGLYCERIN (NITROSTAT) 0.4 MG SL tablet REORDER    famotidine (PEPCID) 40 MG tablet REORDER    Insulin Syringe-Needle U-100 ([employer] INSULIN SYRINGE) 31G X 5/16\" 1 ML MISC REORDER    glimepiride (AMARYL) 4 MG tablet REORDER    DULoxetine (CYMBALTA) 30 MG extended release capsule REORDER    HYDROcodone-acetaminophen (NORCO)  MG per tablet REORDER    oxyCODONE (OXY-IR) 30 MG immediate release tablet REORDER       Controlled Substances Monitoring:      Please note: This chart was generated using Dragon dictation software. Although every effort was made to ensure the accuracy of this automated transcription, some errors in transcription may have occurred.

## 2022-07-14 NOTE — PROGRESS NOTES
Chief Complaint   Patient presents with    Follow-up    Injections     b12    Arm Pain       Have you seen any other physician or provider since your last visit no    Have you had any other diagnostic tests since your last visit? no    Have you changed or stopped any medications since your last visit? no

## 2022-07-15 LAB
A/G RATIO: 1.2 (ref 0.8–2)
ALBUMIN SERPL-MCNC: 4 G/DL (ref 3.4–4.8)
ALP BLD-CCNC: 78 U/L (ref 25–100)
ALT SERPL-CCNC: 22 U/L (ref 4–36)
ANION GAP SERPL CALCULATED.3IONS-SCNC: 10 MMOL/L (ref 3–16)
AST SERPL-CCNC: 32 U/L (ref 8–33)
BILIRUB SERPL-MCNC: 0.4 MG/DL (ref 0.3–1.2)
BUN BLDV-MCNC: 15 MG/DL (ref 6–20)
CALCIUM SERPL-MCNC: 9.4 MG/DL (ref 8.5–10.5)
CHLORIDE BLD-SCNC: 102 MMOL/L (ref 98–107)
CO2: 27 MMOL/L (ref 20–30)
CREAT SERPL-MCNC: 0.9 MG/DL (ref 0.4–1.2)
CREATININE URINE: 112.2 MG/DL (ref 1.5–300)
GFR AFRICAN AMERICAN: >59
GFR NON-AFRICAN AMERICAN: >59
GLOBULIN: 3.3 G/DL
GLUCOSE BLD-MCNC: 66 MG/DL (ref 74–106)
HBA1C MFR BLD: 9.7 %
HCT VFR BLD CALC: 41.2 % (ref 40–54)
HEMOGLOBIN: 12.6 G/DL (ref 13–18)
MCH RBC QN AUTO: 29.1 PG (ref 27–32)
MCHC RBC AUTO-ENTMCNC: 30.6 G/DL (ref 31–35)
MCV RBC AUTO: 95.2 FL (ref 80–100)
MICROALBUMIN UR-MCNC: 83.7 MG/DL (ref 0–22)
MICROALBUMIN/CREAT UR-RTO: 746 MG/G (ref 0–30)
PDW BLD-RTO: 13 % (ref 11–16)
PLATELET # BLD: 131 K/UL (ref 150–400)
PMV BLD AUTO: 14.3 FL (ref 6–10)
POTASSIUM SERPL-SCNC: 3.9 MMOL/L (ref 3.4–5.1)
RBC # BLD: 4.33 M/UL (ref 4.5–6)
SODIUM BLD-SCNC: 139 MMOL/L (ref 136–145)
TOTAL PROTEIN: 7.3 G/DL (ref 6.4–8.3)
TSH SERPL DL<=0.05 MIU/L-ACNC: 1.51 UIU/ML (ref 0.27–4.2)
WBC # BLD: 7.2 K/UL (ref 4–11)

## 2022-07-20 RX ORDER — FLUCONAZOLE 100 MG/1
100 TABLET ORAL DAILY
Qty: 7 TABLET | Refills: 1 | Status: SHIPPED | OUTPATIENT
Start: 2022-07-20 | End: 2022-09-07

## 2022-07-28 ENCOUNTER — OFFICE VISIT (OUTPATIENT)
Dept: FAMILY MEDICINE CLINIC | Age: 62
End: 2022-07-28
Payer: MEDICARE

## 2022-07-28 ENCOUNTER — HOSPITAL ENCOUNTER (OUTPATIENT)
Facility: HOSPITAL | Age: 62
Discharge: HOME | End: 2022-07-28
Payer: MEDICARE

## 2022-07-28 VITALS
BODY MASS INDEX: 38.36 KG/M2 | DIASTOLIC BLOOD PRESSURE: 60 MMHG | HEIGHT: 76 IN | HEART RATE: 68 BPM | SYSTOLIC BLOOD PRESSURE: 120 MMHG | WEIGHT: 315 LBS | RESPIRATION RATE: 18 BRPM | OXYGEN SATURATION: 93 % | TEMPERATURE: 97.3 F

## 2022-07-28 DIAGNOSIS — E78.9 LIPID DISORDER: ICD-10-CM

## 2022-07-28 DIAGNOSIS — M54.41 CHRONIC BILATERAL LOW BACK PAIN WITH RIGHT-SIDED SCIATICA: ICD-10-CM

## 2022-07-28 DIAGNOSIS — I10 ESSENTIAL HYPERTENSION: ICD-10-CM

## 2022-07-28 DIAGNOSIS — R53.1 WEAKNESS: ICD-10-CM

## 2022-07-28 DIAGNOSIS — R60.0 PEDAL EDEMA: Primary | ICD-10-CM

## 2022-07-28 DIAGNOSIS — E53.8 B12 DEFICIENCY: ICD-10-CM

## 2022-07-28 DIAGNOSIS — Z79.4 TYPE 2 DIABETES MELLITUS WITH OTHER SPECIFIED COMPLICATION, WITH LONG-TERM CURRENT USE OF INSULIN (HCC): ICD-10-CM

## 2022-07-28 DIAGNOSIS — M15.9 OSTEOARTHRITIS OF MULTIPLE JOINTS, UNSPECIFIED OSTEOARTHRITIS TYPE: ICD-10-CM

## 2022-07-28 DIAGNOSIS — G89.29 CHRONIC BILATERAL LOW BACK PAIN WITH RIGHT-SIDED SCIATICA: ICD-10-CM

## 2022-07-28 DIAGNOSIS — E11.69 TYPE 2 DIABETES MELLITUS WITH OTHER SPECIFIED COMPLICATION, WITH LONG-TERM CURRENT USE OF INSULIN (HCC): ICD-10-CM

## 2022-07-28 LAB
A/G RATIO: 1.2 (ref 0.8–2)
ALBUMIN SERPL-MCNC: 4.1 G/DL (ref 3.4–4.8)
ALP BLD-CCNC: 86 U/L (ref 25–100)
ALT SERPL-CCNC: 29 U/L (ref 4–36)
ANION GAP SERPL CALCULATED.3IONS-SCNC: 13 MMOL/L (ref 3–16)
AST SERPL-CCNC: 38 U/L (ref 8–33)
BILIRUB SERPL-MCNC: 0.4 MG/DL (ref 0.3–1.2)
BUN BLDV-MCNC: 18 MG/DL (ref 6–20)
CALCIUM SERPL-MCNC: 9.4 MG/DL (ref 8.5–10.5)
CHLORIDE BLD-SCNC: 100 MMOL/L (ref 98–107)
CHOLESTEROL, TOTAL: 165 MG/DL (ref 0–200)
CO2: 27 MMOL/L (ref 20–30)
CREAT SERPL-MCNC: 1.1 MG/DL (ref 0.4–1.2)
GFR AFRICAN AMERICAN: >59
GFR NON-AFRICAN AMERICAN: >59
GLOBULIN: 3.4 G/DL
GLUCOSE BLD-MCNC: 135 MG/DL (ref 74–106)
HDLC SERPL-MCNC: 36 MG/DL (ref 40–60)
LDL CHOLESTEROL CALCULATED: 93 MG/DL
POTASSIUM SERPL-SCNC: 4.5 MMOL/L (ref 3.4–5.1)
SODIUM BLD-SCNC: 140 MMOL/L (ref 136–145)
TOTAL PROTEIN: 7.5 G/DL (ref 6.4–8.3)
TRIGL SERPL-MCNC: 179 MG/DL (ref 0–249)
VLDLC SERPL CALC-MCNC: 36 MG/DL

## 2022-07-28 PROCEDURE — 3017F COLORECTAL CA SCREEN DOC REV: CPT | Performed by: FAMILY MEDICINE

## 2022-07-28 PROCEDURE — 80053 COMPREHEN METABOLIC PANEL: CPT

## 2022-07-28 PROCEDURE — G8427 DOCREV CUR MEDS BY ELIG CLIN: HCPCS | Performed by: FAMILY MEDICINE

## 2022-07-28 PROCEDURE — G8417 CALC BMI ABV UP PARAM F/U: HCPCS | Performed by: FAMILY MEDICINE

## 2022-07-28 PROCEDURE — 2022F DILAT RTA XM EVC RTNOPTHY: CPT | Performed by: FAMILY MEDICINE

## 2022-07-28 PROCEDURE — 99213 OFFICE O/P EST LOW 20 MIN: CPT | Performed by: FAMILY MEDICINE

## 2022-07-28 PROCEDURE — 1036F TOBACCO NON-USER: CPT | Performed by: FAMILY MEDICINE

## 2022-07-28 PROCEDURE — 80061 LIPID PANEL: CPT

## 2022-07-28 PROCEDURE — 3046F HEMOGLOBIN A1C LEVEL >9.0%: CPT | Performed by: FAMILY MEDICINE

## 2022-07-28 PROCEDURE — 96372 THER/PROPH/DIAG INJ SC/IM: CPT | Performed by: FAMILY MEDICINE

## 2022-07-28 PROCEDURE — 36415 COLL VENOUS BLD VENIPUNCTURE: CPT

## 2022-07-28 RX ORDER — CYANOCOBALAMIN 1000 UG/ML
1000 INJECTION INTRAMUSCULAR; SUBCUTANEOUS ONCE
Status: COMPLETED | OUTPATIENT
Start: 2022-07-28 | End: 2022-07-28

## 2022-07-28 RX ORDER — OXYCODONE HYDROCHLORIDE 30 MG/1
30 TABLET ORAL EVERY 4 HOURS PRN
Qty: 150 TABLET | Refills: 0 | Status: SHIPPED | OUTPATIENT
Start: 2022-07-28 | End: 2022-08-30 | Stop reason: SDUPTHER

## 2022-07-28 RX ORDER — HYDROCODONE BITARTRATE AND ACETAMINOPHEN 10; 325 MG/1; MG/1
TABLET ORAL
Qty: 150 TABLET | Refills: 0 | Status: SHIPPED | OUTPATIENT
Start: 2022-07-28 | End: 2022-08-30 | Stop reason: SDUPTHER

## 2022-07-28 RX ADMIN — CYANOCOBALAMIN 1000 MCG: 1000 INJECTION INTRAMUSCULAR; SUBCUTANEOUS at 14:06

## 2022-07-28 ASSESSMENT — ENCOUNTER SYMPTOMS: BACK PAIN: 1

## 2022-07-28 NOTE — PROGRESS NOTES
SUBJECTIVE:    Patient ID: Rik Baugh is a 59 y.o. male. Chief Complaint   Patient presents with    Diabetes     Reg f/u    Discuss Labs     Needs to have labs checked       HPI: office visit  Is in the office today in follow-up of diabetes. He still struggling to keep his sugars down. He has not been able to get his regular medicines. He is having some issues with trying to get a few of his medicines due to some insurance issues in the donut hole. He says financially he cannot afford what they want for some of those medicines. He is having still a lot of stress over his wife. She still struggling. They are trying to do better at home. He is doing a lot of stuff he did not use today because she has been so sick. His blood pressures have not been bad. His arthritic symptoms have been worse with the damp and cold weather. He is complaining of some increased weakness. He has had some difficulty with just day-to-day activities due to the fact that he is sat down in bed and down for quite a while. Review of Systems   Constitutional:  Positive for fatigue. Cardiovascular:  Positive for leg swelling. Musculoskeletal:  Positive for arthralgias, back pain, gait problem and myalgias. All other systems reviewed and are negative. OBJECTIVE:  /60   Pulse 68   Temp 97.3 °F (36.3 °C)   Resp 18   Ht 6' 4\" (1.93 m)   Wt (!) 418 lb (189.6 kg)   SpO2 93% Comment: ra  BMI 50.88 kg/m²    Wt Readings from Last 3 Encounters:   08/03/22 (!) 414 lb (187.8 kg)   07/28/22 (!) 418 lb (189.6 kg)   07/14/22 (!) 434 lb 6.4 oz (197 kg)     BP Readings from Last 3 Encounters:   08/03/22 (!) 140/60   07/28/22 120/60   07/14/22 122/68      Pulse Readings from Last 3 Encounters:   08/03/22 85   07/28/22 68   07/14/22 67     Body mass index is 50.88 kg/m².    Resp Readings from Last 3 Encounters:   08/03/22 20   07/28/22 18   07/14/22 18     Past medical, surgical, family and social history were reviewed and updated with the patient. Physical Exam  Vitals and nursing note reviewed. Constitutional:       Appearance: Normal appearance. He is well-developed. HENT:      Head: Normocephalic. Right Ear: Hearing, tympanic membrane, ear canal and external ear normal.      Left Ear: Hearing, tympanic membrane, ear canal and external ear normal.      Nose: Nose normal.      Mouth/Throat:      Lips: Pink. Pharynx: Pharyngeal swelling present. Tonsils: No tonsillar exudate or tonsillar abscesses. Eyes:      General: Lids are normal.   Neck:      Thyroid: No thyroid mass, thyromegaly or thyroid tenderness. Vascular: No carotid bruit or JVD. Trachea: Trachea and phonation normal.   Cardiovascular:      Rate and Rhythm: Normal rate and regular rhythm. Heart sounds: Normal heart sounds, S1 normal and S2 normal. No murmur heard. No friction rub. No gallop. Pulmonary:      Effort: Pulmonary effort is normal.      Breath sounds: Normal breath sounds. Abdominal:      General: Bowel sounds are normal.      Palpations: Abdomen is soft. Tenderness: There is no abdominal tenderness. Musculoskeletal:      Cervical back: Full passive range of motion without pain, normal range of motion and neck supple. Lumbar back: Spasms and tenderness present. Decreased range of motion. Right lower le+ Pitting Edema present. Left lower le+ Pitting Edema present. Lymphadenopathy:      Cervical: No cervical adenopathy. Skin:     General: Skin is warm and dry. Neurological:      General: No focal deficit present. Mental Status: He is alert and oriented to person, place, and time. Psychiatric:         Attention and Perception: Attention and perception normal.         Mood and Affect: Mood and affect normal.         Speech: Speech normal.         Behavior: Behavior normal. Behavior is cooperative. Thought Content:  Thought content normal.         Cognition and Diagnosis Orders   1. Pedal edema        2. B12 deficiency  cyanocobalamin injection 1,000 mcg      3. Type 2 diabetes mellitus with other specified complication, with long-term current use of insulin ([address]        4. Osteoarthritis of multiple joints, unspecified osteoarthritis type        5. Weakness        6. Essential hypertension  Comprehensive Metabolic Panel      7. Lipid disorder  Comprehensive Metabolic Panel    LIPID PANEL      8. Chronic bilateral low back pain with right-sided sciatica  HYDROcodone-acetaminophen (NORCO)  MG per tablet    oxyCODONE (OXY-IR) 30 MG immediate release tablet          Orders Placed This Encounter   Medications    cyanocobalamin injection 1,000 mcg    HYDROcodone-acetaminophen (NORCO)  MG per tablet     Sig: TAKE ONE TABLET BY MOUTH EVERY 4 HOURS AS NEEDED FOR PAIN     Dispense:  150 tablet     Refill:  0     Reduce doses taken as pain becomes manageable    oxyCODONE (OXY-IR) 30 MG immediate release tablet     Sig: Take 1 tablet by mouth every 4 hours as needed for Pain for up to 30 days. Dispense:  150 tablet     Refill:  0     Reduce doses taken as pain becomes manageable        Medications Discontinued During This Encounter   Medication Reason    HYDROcodone-acetaminophen (NORCO)  MG per tablet REORDER    oxyCODONE (OXY-IR) 30 MG immediate release tablet REORDER       Controlled Substances Monitoring:      Please note: This chart was generated using Dragon dictation software. Although every effort was made to ensure the accuracy of this automated transcription, some errors in transcription may have occurred.

## 2022-07-28 NOTE — PROGRESS NOTES
Chief Complaint   Patient presents with    Diabetes     Reg f/u    Discuss Labs     Needs to have labs checked       Have you seen any other physician or provider since your last visit no    Have you had any other diagnostic tests since your last visit? no    Have you changed or stopped any medications since your last visit? no           Administrations This Visit       cyanocobalamin injection 1,000 mcg       Admin Date  07/28/2022  14:06 Action  Given Dose  1,000 mcg Route  IntraMUSCular Site  Deltoid Left Administered By  Herlinda Patel MA    Ordering Provider: Samuel Ochoa MD    NDC: 79041-298-42    : [facility]    Patient Supplied?: No                    Patient tolerated injection well. Patient advised to wait 20 minutes in the office following the injection. No signs/symptoms of reaction noted after 20 minutes.

## 2022-07-28 NOTE — PATIENT INSTRUCTIONS
The medication list included in this document is our record of what you are currently taking, including any changes that were made at today's visit. If you find any differences when compared to your medications at home, or have any questions that were not answered at your visit, please contact the office. We are committed to providing you with the best care possible. In order to help us achieve these goals please remember to bring all medications, herbal products, and over the counter supplements with you to each visit. If your provider has ordered testing for you, please be sure to follow up with our office if you have not received results within 7 days after the testing took place. *If you receive a survey after visiting one of our offices, please take time to share your experience concerning your physician office visit. These surveys are confidential and no health information about you is shared. We are eager to improve for you and we are counting on your feedback to help make that happen. Keep a list of your medicines with you. List all of the prescription medicines, nonprescription medicines, supplements, natural remedies, and vitamins that you take. Tell your healthcare providers who treat you about all of the products you are taking. Your provider can provide you with a form to keep track of them. Just ask. Follow the directions that come with your medicine, including information about food or alcohol. Make sure you know how and when to take your medicine. Do not take more or less than you are supposed to take. Keep all medicines out of the reach of children. Store medicines according to the directions on the label. Monitor yourself. Learn to know how your body reacts to your new medicine and keep track of how it makes you feel before attempting (If your provider has allowed you to do so) to drive or go to work.    Seek emergency medical attention if you think you have used too much of this

## 2022-08-03 ENCOUNTER — OFFICE VISIT (OUTPATIENT)
Dept: FAMILY MEDICINE CLINIC | Age: 62
End: 2022-08-03
Payer: MEDICARE

## 2022-08-03 ENCOUNTER — HOSPITAL ENCOUNTER (OUTPATIENT)
Facility: HOSPITAL | Age: 62
Discharge: HOME | End: 2022-08-03
Payer: MEDICARE

## 2022-08-03 VITALS
BODY MASS INDEX: 38.36 KG/M2 | HEIGHT: 76 IN | DIASTOLIC BLOOD PRESSURE: 60 MMHG | SYSTOLIC BLOOD PRESSURE: 140 MMHG | WEIGHT: 315 LBS | HEART RATE: 85 BPM | RESPIRATION RATE: 20 BRPM | TEMPERATURE: 98.2 F | OXYGEN SATURATION: 92 %

## 2022-08-03 DIAGNOSIS — L97.511 ULCER OF RIGHT FOOT, LIMITED TO BREAKDOWN OF SKIN (HCC): Primary | ICD-10-CM

## 2022-08-03 PROCEDURE — 87077 CULTURE AEROBIC IDENTIFY: CPT

## 2022-08-03 PROCEDURE — G8427 DOCREV CUR MEDS BY ELIG CLIN: HCPCS | Performed by: NURSE PRACTITIONER

## 2022-08-03 PROCEDURE — 99213 OFFICE O/P EST LOW 20 MIN: CPT | Performed by: NURSE PRACTITIONER

## 2022-08-03 PROCEDURE — G8417 CALC BMI ABV UP PARAM F/U: HCPCS | Performed by: NURSE PRACTITIONER

## 2022-08-03 PROCEDURE — 87070 CULTURE OTHR SPECIMN AEROBIC: CPT

## 2022-08-03 PROCEDURE — 87205 SMEAR GRAM STAIN: CPT

## 2022-08-03 PROCEDURE — 3017F COLORECTAL CA SCREEN DOC REV: CPT | Performed by: NURSE PRACTITIONER

## 2022-08-03 PROCEDURE — 1036F TOBACCO NON-USER: CPT | Performed by: NURSE PRACTITIONER

## 2022-08-03 RX ORDER — SULFAMETHOXAZOLE AND TRIMETHOPRIM 800; 160 MG/1; MG/1
1 TABLET ORAL 2 TIMES DAILY
Qty: 14 TABLET | Refills: 0 | Status: SHIPPED | OUTPATIENT
Start: 2022-08-03 | End: 2022-08-10

## 2022-08-03 NOTE — PROGRESS NOTES
Cresencio Miner 59 y.o. presents today for   Chief Complaint   Patient presents with    Insect Bite     Rt foot - redness & swelling    Sore     Rt leg        HPI:  Cresencio Miner states he is unsure if he was bit by something, but he has a sore on the top of his right foot that he is worried about since he has diabetes. He says it has been there several days. He denies pain but admits his feet are numb.     Family History   Problem Relation Age of Onset    Cancer Mother     Depression Mother     Diabetes Mother     Early Death Mother     Heart Disease Mother     Kidney Disease Mother     Stroke Mother     Vision Loss Mother     Cancer Father     Hearing Loss Father     Diabetes Maternal Grandmother     Heart Disease Maternal Grandmother     Cancer Maternal Grandfather         Social History     Socioeconomic History    Marital status:      Spouse name: Not on file    Number of children: Not on file    Years of education: Not on file    Highest education level: Not on file   Occupational History    Not on file   Tobacco Use    Smoking status: Former     Packs/day: 1.00     Years: 30.00     Pack years: 30.00     Types: Cigarettes     Start date: 5     Quit date: 1998     Years since quittin.8    Smokeless tobacco: Never   Substance and Sexual Activity    Alcohol use: No     Alcohol/week: 0.0 standard drinks    Drug use: No    Sexual activity: Not on file   Other Topics Concern    Not on file   Social History Narrative    Not on file     Social Determinants of Health     Financial Resource Strain: Low Risk     Difficulty of Paying Living Expenses: Not hard at all   Food Insecurity: No Food Insecurity    Worried About Running Out of Food in the Last Year: Never true    Ran Out of Food in the Last Year: Never true   Transportation Needs: Not on file   Physical Activity: Not on file   Stress: Not on file   Social Connections: Not on file   Intimate Partner Violence: Not on file   Housing Stability: Not on file        Past Surgical History:   Procedure Laterality Date    TOE SURGERY          Past Medical History:   Diagnosis Date    Asthma     CHF (congestive heart failure), NYHA class I, acute on chronic, combined ([facility] [address] 12/28/2017    Diabetes mellitus ([facility]     Essential hypertension 1/2/2018    Hearing loss     Hyperlipidemia     Low back pain     Obesity     Osteoarthritis of multiple joints 3/22/2017    Right shoulder pain     Shoulder pain, right     Type II or unspecified type diabetes mellitus without mention of complication, not stated as uncontrolled         Current Outpatient Medications   Medication Sig Dispense Refill    sulfamethoxazole-trimethoprim (BACTRIM DS;SEPTRA DS) 800-160 MG per tablet Take 1 tablet by mouth in the morning and 1 tablet before bedtime. Do all this for 7 days. 14 tablet 0    HYDROcodone-acetaminophen (NORCO)  MG per tablet TAKE ONE TABLET BY MOUTH EVERY 4 HOURS AS NEEDED FOR PAIN 150 tablet 0    oxyCODONE (OXY-IR) 30 MG immediate release tablet Take 1 tablet by mouth every 4 hours as needed for Pain for up to 30 days.  150 tablet 0    DULoxetine (CYMBALTA) 30 MG extended release capsule TAKE ONE CAPSULE BY MOUTH DAILY 90 capsule 3    glimepiride (AMARYL) 4 MG tablet Take 1 tablet by mouth every morning (before breakfast) 90 tablet 3    Insulin Syringe-Needle U-100 ([employer] INSULIN SYRINGE) 31G X 5/16\" 1 ML MISC 1 each by Does not apply route daily despense  Closes G and  each 3    famotidine (PEPCID) 40 MG tablet Take 1 tablet by mouth every evening 90 tablet 3    nitroGLYCERIN (NITROSTAT) 0.4 MG SL tablet PLACE 1 TABLET UNDER THE TONGUE EVERY 5 MINUTES AS NEEDED FOR CHEST PAIN (MAX 3 TABLETS IN 15 MIN) 25 tablet 0    Insulin Pen Needle (ADVOCATE INSULIN PEN NEEDLES) 31G X 8 MM MISC USE WITH INSULIN INJECTIONS TWICE A  each 5    pantoprazole (PROTONIX) 40 MG tablet Take 1 tablet by mouth daily (with breakfast) In place of omeprazole 90 tablet 3 montelukast (SINGULAIR) 10 MG tablet TAKE ONE TABLET BY MOUTH NIGHTLY AS NEEDED FOR ALLERGIES 90 tablet 3    simvastatin (ZOCOR) 40 MG tablet TAKE ONE TABLET BY MOUTH AT BEDTIME FOR CHOLESTEROL 90 tablet 3    metFORMIN (GLUCOPHAGE) 1000 MG tablet TAKE ONE TABLET BY MOUTH TWICE A DAY FOR SUGAR CONTROL 180 tablet 3    loratadine (CLARITIN) 10 MG tablet Take one tablet by mouth daily as needed for allergies 90 tablet 3    furosemide (LASIX) 80 MG tablet TAKE ONE TABLET BY MOUTH EVERY MORNING AND 1\2 TABLET IN THE PM(FLUIDPILL) 135 tablet 3    linaCLOtide (LINZESS) 72 MCG CAPS capsule Take 1 capsule by mouth every morning (before breakfast) 30 capsule 5    spironolactone (ALDACTONE) 25 MG tablet Take 1 tablet by mouth daily 30 tablet 5    zolpidem (AMBIEN CR) 12.5 MG extended release tablet TAKE 1 TABLET BY MOUTH NIGHTLY AS NEEDED FOR SLEEP FOR UP TO 30 DAYS 30 tablet 2    gabapentin (NEURONTIN) 800 MG tablet TAKE ONE TABLET BY MOUTH FOUR TIMES A  tablet 2    vitamin D (ERGOCALCIFEROL) 1.25 MG (56700 UT) CAPS capsule TAKE ONE CAPSULE BY MOUTH ONCE A WEEK 4 capsule 1    nystatin (NYSTATIN) 577755 UNIT/GM powder APPLY TOPICALLY 3 TIMES DAILY AS DIRECTED 60 g 5    insulin glargine (LANTUS SOLOSTAR) 100 UNIT/ML injection pen Inject 100 Units into the skin 2 times daily 15 pen 5    mupirocin (BACTROBAN) 2 % ointment       brimonidine (ALPHAGAN P) 0.15 % ophthalmic solution Place 1 drop into both eyes 2 times daily      blood glucose monitor kit and supplies Dispense sufficient amount for indicated testing frequency. 1 kit 0    Accu-Chek Softclix Lancets MISC fsbs daily 100 each 3    aspirin 81 MG tablet Take 81 mg by mouth daily. No current facility-administered medications for this visit. Review of Systems   Skin:  Positive for wound. Sore on top of right foot     All other systems reviewed and are negative.      BP (!) 140/60   Pulse 85   Temp 98.2 °F (36.8 °C) (Infrared)   Resp 20   Ht 6' 4\" (1.93 m)   Wt (!) 414 lb (187.8 kg)   SpO2 92% Comment: ra  BMI 50.39 kg/m²      Physical Exam  Vitals and nursing note reviewed. Constitutional:       Appearance: Normal appearance. He is obese. HENT:      Head: Normocephalic and atraumatic. Nose: Nose normal.      Mouth/Throat:      Mouth: Mucous membranes are moist.      Pharynx: Oropharynx is clear. Eyes:      Extraocular Movements: Extraocular movements intact. Conjunctiva/sclera: Conjunctivae normal.      Pupils: Pupils are equal, round, and reactive to light. Cardiovascular:      Rate and Rhythm: Normal rate and regular rhythm. Pulses: Normal pulses. Heart sounds: Normal heart sounds. Pulmonary:      Effort: Pulmonary effort is normal.      Breath sounds: Normal breath sounds. Abdominal:      General: Bowel sounds are normal.      Palpations: Abdomen is soft. Musculoskeletal:         General: Normal range of motion. Cervical back: Normal range of motion and neck supple. Feet:    Skin:     General: Skin is warm and dry. Capillary Refill: Capillary refill takes less than 2 seconds. Neurological:      General: No focal deficit present. Mental Status: He is alert and oriented to person, place, and time. Psychiatric:         Mood and Affect: Mood normal.         Behavior: Behavior normal.        ASSESSMENT/PLAN    1. Ulcer of right foot, limited to breakdown of skin ([address]  Advised pt to keep foot clean and dry and to apply ointment as directed. Advised pt he can assist wound healing by getting his a1c down and consuming a diabetic diet. Pt admits his insulin is very expensive but he is trying to go through a program to get financial assistance. He should take antibiotic and f/u if wound worsens or persists. He is agreeable to poc. - sulfamethoxazole-trimethoprim (BACTRIM DS;SEPTRA DS) 800-160 MG per tablet; Take 1 tablet by mouth in the morning and 1 tablet before bedtime. Do all this for 7 days. Dispense: 14 tablet;  Refill: 0  - Culture, Wound           BLACK Sen - CNP

## 2022-08-03 NOTE — TELEPHONE ENCOUNTER
Patient called asking if Flo London has any recommendations for canes to use and does she recommend any kind of orthopaedic shoes to give ankle more support Refill request received from pharmacy.  Medication pending for approval.       Last Office Visit With PCP:  10/15/2020    Next Visit Date:  Future Appointments   Date Time Provider [place]   11/19/2020  2:00 PM Roderick Mclain MD [id number]   12/17/2020  2:15 PM Roderick Mclain  [facility]

## 2022-08-06 LAB
GRAM STAIN RESULT: ABNORMAL
WOUND/ABSCESS: ABNORMAL

## 2022-08-29 RX ORDER — ERGOCALCIFEROL 1.25 MG/1
CAPSULE ORAL
Qty: 4 CAPSULE | Refills: 1 | Status: SHIPPED | OUTPATIENT
Start: 2022-08-29 | End: 2022-11-01

## 2022-08-30 DIAGNOSIS — G89.29 CHRONIC BILATERAL LOW BACK PAIN WITH RIGHT-SIDED SCIATICA: ICD-10-CM

## 2022-08-30 DIAGNOSIS — M54.41 CHRONIC BILATERAL LOW BACK PAIN WITH RIGHT-SIDED SCIATICA: ICD-10-CM

## 2022-08-30 RX ORDER — HYDROCODONE BITARTRATE AND ACETAMINOPHEN 10; 325 MG/1; MG/1
TABLET ORAL
Qty: 150 TABLET | Refills: 0 | Status: SHIPPED | OUTPATIENT
Start: 2022-08-30 | End: 2022-10-05 | Stop reason: SDUPTHER

## 2022-08-30 RX ORDER — HYDROCODONE BITARTRATE AND ACETAMINOPHEN 10; 325 MG/1; MG/1
TABLET ORAL
Qty: 150 TABLET | Refills: 0 | Status: SHIPPED | OUTPATIENT
Start: 2022-08-30 | End: 2022-08-30 | Stop reason: SDUPTHER

## 2022-08-30 RX ORDER — OXYCODONE HYDROCHLORIDE 30 MG/1
30 TABLET ORAL EVERY 4 HOURS PRN
Qty: 150 TABLET | Refills: 0 | Status: SHIPPED | OUTPATIENT
Start: 2022-08-30 | End: 2022-08-30 | Stop reason: SDUPTHER

## 2022-08-30 RX ORDER — OXYCODONE HYDROCHLORIDE 30 MG/1
30 TABLET ORAL EVERY 4 HOURS PRN
Qty: 150 TABLET | Refills: 0 | Status: SHIPPED | OUTPATIENT
Start: 2022-08-30 | End: 2022-10-05 | Stop reason: SDUPTHER

## 2022-09-07 RX ORDER — FLUCONAZOLE 100 MG/1
100 TABLET ORAL DAILY
Qty: 7 TABLET | Refills: 1 | Status: SHIPPED | OUTPATIENT
Start: 2022-09-07 | End: 2022-09-14

## 2022-10-03 DIAGNOSIS — G89.29 CHRONIC BILATERAL LOW BACK PAIN WITH RIGHT-SIDED SCIATICA: ICD-10-CM

## 2022-10-03 DIAGNOSIS — M54.41 CHRONIC BILATERAL LOW BACK PAIN WITH RIGHT-SIDED SCIATICA: ICD-10-CM

## 2022-10-03 NOTE — TELEPHONE ENCOUNTER
Patient called, requested refill.        Next Office Visit Date:  Future Appointments   Date Time Provider Marin Sierra   10/25/2022  4:45 PM Maverick Hodgson MD [address] please review via [address]

## 2022-10-04 DIAGNOSIS — G89.29 CHRONIC BILATERAL LOW BACK PAIN WITH RIGHT-SIDED SCIATICA: ICD-10-CM

## 2022-10-04 DIAGNOSIS — M54.41 CHRONIC BILATERAL LOW BACK PAIN WITH RIGHT-SIDED SCIATICA: ICD-10-CM

## 2022-10-04 DIAGNOSIS — G47.00 INSOMNIA, UNSPECIFIED TYPE: ICD-10-CM

## 2022-10-04 RX ORDER — INSULIN GLARGINE 100 [IU]/ML
100 INJECTION, SOLUTION SUBCUTANEOUS 2 TIMES DAILY
Qty: 60 ML | Refills: 2 | Status: SHIPPED | OUTPATIENT
Start: 2022-10-04 | End: 2022-11-03

## 2022-10-04 NOTE — TELEPHONE ENCOUNTER
Patient called, requested refill.        Next Office Visit Date:  Future Appointments   Date Time Provider Marin Sierra   10/25/2022  4:45 PM Henrry Gee MD [address] please review via [address]

## 2022-10-05 RX ORDER — HYDROCODONE BITARTRATE AND ACETAMINOPHEN 10; 325 MG/1; MG/1
TABLET ORAL
Qty: 150 TABLET | Refills: 0 | Status: SHIPPED | OUTPATIENT
Start: 2022-10-05 | End: 2022-10-27 | Stop reason: SDUPTHER

## 2022-10-05 RX ORDER — GABAPENTIN 800 MG/1
TABLET ORAL
Qty: 120 TABLET | Refills: 2 | Status: SHIPPED | OUTPATIENT
Start: 2022-10-05 | End: 2022-10-10

## 2022-10-05 RX ORDER — ZOLPIDEM TARTRATE 12.5 MG/1
12.5 TABLET, FILM COATED, EXTENDED RELEASE ORAL NIGHTLY PRN
Qty: 30 TABLET | Refills: 2 | Status: SHIPPED | OUTPATIENT
Start: 2022-10-05 | End: 2022-11-04

## 2022-10-05 RX ORDER — OXYCODONE HYDROCHLORIDE 30 MG/1
30 TABLET ORAL EVERY 4 HOURS PRN
Qty: 150 TABLET | Refills: 0 | Status: SHIPPED | OUTPATIENT
Start: 2022-10-05 | End: 2022-10-27 | Stop reason: SDUPTHER

## 2022-10-05 RX ORDER — GABAPENTIN 800 MG/1
TABLET ORAL
Qty: 120 TABLET | Refills: 2 | Status: SHIPPED | OUTPATIENT
Start: 2022-10-05 | End: 2022-10-27 | Stop reason: SDUPTHER

## 2022-10-10 ENCOUNTER — OFFICE VISIT (OUTPATIENT)
Dept: FAMILY MEDICINE CLINIC | Age: 62
End: 2022-10-10

## 2022-10-10 VITALS
DIASTOLIC BLOOD PRESSURE: 54 MMHG | RESPIRATION RATE: 18 BRPM | TEMPERATURE: 97.8 F | HEART RATE: 55 BPM | SYSTOLIC BLOOD PRESSURE: 141 MMHG | OXYGEN SATURATION: 85 %

## 2022-10-10 DIAGNOSIS — G89.29 CHRONIC RIGHT SHOULDER PAIN: Primary | ICD-10-CM

## 2022-10-10 DIAGNOSIS — M25.511 CHRONIC RIGHT SHOULDER PAIN: Primary | ICD-10-CM

## 2022-10-10 DIAGNOSIS — Z23 NEED FOR INFLUENZA VACCINATION: ICD-10-CM

## 2022-10-10 RX ORDER — METHYLPREDNISOLONE ACETATE 40 MG/ML
40 INJECTION, SUSPENSION INTRA-ARTICULAR; INTRALESIONAL; INTRAMUSCULAR; SOFT TISSUE ONCE
Status: COMPLETED | OUTPATIENT
Start: 2022-10-10 | End: 2022-10-10

## 2022-10-10 RX ORDER — LIDOCAINE HYDROCHLORIDE 10 MG/ML
1 INJECTION, SOLUTION INFILTRATION; PERINEURAL ONCE
Status: COMPLETED | OUTPATIENT
Start: 2022-10-10 | End: 2022-10-10

## 2022-10-10 RX ORDER — CYANOCOBALAMIN 1000 UG/ML
1000 INJECTION INTRAMUSCULAR; SUBCUTANEOUS ONCE
Status: COMPLETED | OUTPATIENT
Start: 2022-10-10 | End: 2022-10-10

## 2022-10-10 RX ADMIN — CYANOCOBALAMIN 1000 MCG: 1000 INJECTION INTRAMUSCULAR; SUBCUTANEOUS at 14:14

## 2022-10-10 RX ADMIN — METHYLPREDNISOLONE ACETATE 40 MG: 40 INJECTION, SUSPENSION INTRA-ARTICULAR; INTRALESIONAL; INTRAMUSCULAR; SOFT TISSUE at 14:20

## 2022-10-10 RX ADMIN — LIDOCAINE HYDROCHLORIDE 1 ML: 10 INJECTION, SOLUTION INFILTRATION; PERINEURAL at 14:21

## 2022-10-10 NOTE — PROGRESS NOTES
Vaccine Information Sheet, \"Influenza - Inactivated\"  given to Parish Tran, or parent/legal guardian of  Parish Tran and verbalized understanding. Patient responses:    Have you ever had a reaction to a flu vaccine? No  Do you have any current illness? No  Have you ever had Guillian Falun Syndrome? No  Do you have a serious allergy to any of the follow: Neomycin, Polymyxin, Thimerosal, eggs or egg products? No    Flu vaccine given per order. Please see immunization tab. Risks and benefits explained. Current VIS given. Immunizations Administered       Name Date Dose Route    Influenza, FLUCELVAX, (age 10 mo+), MDCK, PF, 0.5mL 10/10/2022 0.5 mL Intramuscular    Site: Deltoid- Left    Lot: [id number]    NDC: 62774-656-15          Patient tolerated injection well. Patient advised to wait 20 minutes in the office following the injection. No signs/symptoms of reaction noted after 20 minutes. Administrations This Visit       cyanocobalamin injection 1,000 mcg       Admin Date  10/10/2022  14:14 Action  Given Dose  1,000 mcg Route  IntraMUSCular Site  Deltoid Left Administered By  Mil Dyer MA    Ordering Provider: Pennie Peraza MD    NDC: 88456-523-46    : [address] [place] [address]    Patient Supplied?: No                  Patient tolerated injection well. Patient advised to wait 20 minutes in the office following the injection. No signs/symptoms of reaction noted after 20 minutes.

## 2022-10-23 ASSESSMENT — ENCOUNTER SYMPTOMS: BACK PAIN: 1

## 2022-10-23 NOTE — PROGRESS NOTES
SUBJECTIVE:    Patient ID: Maral Villalta is a 57 y.o. male. Chief Complaint   Patient presents with    Diabetes       HPI: office visit  He is in the office today in follow-up of his diabetes. He is really struggling with a lot of right shoulder pain. He is having a lot of difficulty just doing what he needs to at home per his wife. He states his shoulder hurts no matter what position he gets in. It is bothering him when he is trying to sleep. He has not had any recent falls or injuries. He denies any chest pain or increasing shortness of breath. He is struggling with his arthritic pain. He is struggling with trying to take care of his household. His wife is really not able at this point. She has back from the nursing home. His blood pressures have been doing pretty good at home. His blood sugars have not been bad. He denies any medication problems at this time. Review of Systems   Constitutional:  Positive for fatigue. Cardiovascular:  Positive for leg swelling. Musculoskeletal:  Positive for arthralgias, back pain, gait problem and myalgias. All other systems reviewed and are negative. OBJECTIVE:  BP (!) 141/54   Pulse 55   Temp 97.8 °F (36.6 °C)   Resp 18   SpO2 (!) 85% Comment: ra   Wt Readings from Last 3 Encounters:   08/03/22 (!) 414 lb (187.8 kg)   07/28/22 (!) 418 lb (189.6 kg)   07/14/22 (!) 434 lb 6.4 oz (197 kg)     BP Readings from Last 3 Encounters:   11/28/22 (!) 170/85   10/10/22 (!) 141/54   08/03/22 (!) 140/60      Pulse Readings from Last 3 Encounters:   11/28/22 63   10/10/22 55   08/03/22 85     There is no height or weight on file to calculate BMI. Resp Readings from Last 3 Encounters:   11/28/22 18   10/10/22 18   08/03/22 20     Past medical, surgical, family and social history were reviewed and updated with the patient. Physical Exam  Vitals and nursing note reviewed. Constitutional:       Appearance: Normal appearance. He is well-developed. HENT:      Head: Normocephalic. Right Ear: Hearing, tympanic membrane, ear canal and external ear normal.      Left Ear: Hearing, tympanic membrane, ear canal and external ear normal.      Nose: Nose normal.      Mouth/Throat:      Lips: Pink. Pharynx: Pharyngeal swelling present. Tonsils: No tonsillar exudate or tonsillar abscesses. Eyes:      General: Lids are normal.   Neck:      Thyroid: No thyroid mass, thyromegaly or thyroid tenderness. Vascular: No carotid bruit or JVD. Trachea: Trachea and phonation normal.   Cardiovascular:      Rate and Rhythm: Normal rate and regular rhythm. Heart sounds: Normal heart sounds, S1 normal and S2 normal. No murmur heard. No friction rub. No gallop. Pulmonary:      Effort: Pulmonary effort is normal.      Breath sounds: Normal breath sounds. Abdominal:      General: Bowel sounds are normal.      Palpations: Abdomen is soft. Tenderness: There is no abdominal tenderness. Musculoskeletal:      Cervical back: Full passive range of motion without pain, normal range of motion and neck supple. Lumbar back: Spasms and tenderness present. Decreased range of motion. Right lower le+ Pitting Edema present. Left lower le+ Pitting Edema present. Lymphadenopathy:      Cervical: No cervical adenopathy. Skin:     General: Skin is warm and dry. Neurological:      General: No focal deficit present. Mental Status: He is alert and oriented to person, place, and time. Psychiatric:         Attention and Perception: Attention and perception normal.         Mood and Affect: Mood and affect normal.         Speech: Speech normal.         Behavior: Behavior normal. Behavior is cooperative. Thought Content:  Thought content normal.         Cognition and Memory: Cognition and memory normal.         Judgment: Judgment normal.        Results in Past 30 Days  Result Component Current Result Ref Range Previous Result Ref Range   Albumin/Globulin Ratio 1.1 (11/28/2022) 0.8 - 2.0 Not in Time Range    Albumin 3.8 (11/28/2022) 3.4 - 4.8 g/dL Not in Time Range    Alkaline Phosphatase 77 (11/28/2022) 25 - 100 U/L Not in Time Range    ALT 26 (11/28/2022) 4 - 36 U/L Not in Time Range    AST 44 (H) (11/28/2022) 8 - 33 U/L Not in Time Range    BUN 17 (11/28/2022) 6 - 20 mg/dL Not in Time Range    Calcium 9.3 (11/28/2022) 8.5 - 10.5 mg/dL Not in Time Range    Chloride 99 (11/28/2022) 98 - 107 mmol/L Not in Time Range    CO2 29 (11/28/2022) 20 - 30 mmol/L Not in Time Range    Creatinine 0.9 (11/28/2022) 0.4 - 1.2 mg/dL Not in Time Range    Est, Glom Filt Rate >60 (11/28/2022) >59 Not in Time Range    Globulin 3.4 (11/28/2022) Not Established g/dL Not in Time Range    Glucose 161 (H) (11/28/2022) 74 - 106 mg/dL Not in Time Range    Potassium 4.6 (11/28/2022) 3.4 - 5.1 mmol/L Not in Time Range    Sodium 136 (11/28/2022) 136 - 145 mmol/L Not in Time Range    Total Bilirubin 0.6 (11/28/2022) 0.3 - 1.2 mg/dL Not in Time Range    Total Protein 7.2 (11/28/2022) 6.4 - 8.3 g/dL Not in Time Range      Hemoglobin A1C (%)   Date Value   07/14/2022 9.7 (H)     Microscopic Examination (no units)   Date Value   06/14/2021 Not Indicated     Microalbumin, Random Urine (mg/dL)   Date Value   07/14/2022 83.70 (H)     LDL Calculated (mg/dL)   Date Value   07/28/2022 93       Lab Results   Component Value Date/Time    WBC 4.9 11/28/2022 03:07 PM    NEUTROABS 2.8 06/15/2021 05:07 AM    HGB 13.0 11/28/2022 03:07 PM    HCT 42.0 11/28/2022 03:07 PM    HCT 41.6 06/05/2012 08:40 AM    MCV 97.9 11/28/2022 03:07 PM     11/28/2022 03:07 PM     06/05/2012 08:40 AM     Lab Results   Component Value Date    TSH 2.03 11/28/2022       ASSESSMENT/PLAN    Diagnosis Orders   1. Chronic right shoulder pain  methylPREDNISolone acetate (DEPO-MEDROL) injection 40 mg    lidocaine 1 % injection 1 mL    20610 - MD DRAIN/INJECT LARGE JOINT/BURSA      2.  Need for influenza vaccination  Influenza, FLUCELVAX, (age 10 mo+), IM, Preservative Free, 0.5 mL      3. B12 deficiency  cyanocobalamin injection 1,000 mcg      Steroid injection was performed by Carlos Alford MD using Plain Lidocaine 1% 1ml and Depo-Medrol 40mg 1ml Intra-Articular in the right shoulder. No complications or reactions noted. Patient tolerated procedure well. Orders Placed This Encounter   Medications    cyanocobalamin injection 1,000 mcg    methylPREDNISolone acetate (DEPO-MEDROL) injection 40 mg    lidocaine 1 % injection 1 mL        Medications Discontinued During This Encounter   Medication Reason    brimonidine (ALPHAGAN P) 0.15 % ophthalmic solution LIST CLEANUP    DULoxetine (CYMBALTA) 30 MG extended release capsule LIST CLEANUP    gabapentin (NEURONTIN) 800 MG tablet LIST CLEANUP    mupirocin (BACTROBAN) 2 % ointment LIST CLEANUP    nystatin (NYSTATIN) [id number] UNIT/GM powder LIST CLEANUP       Controlled Substances Monitoring:      Please note: This chart was generated using Dragon dictation software. Although every effort was made to ensure the accuracy of this automated transcription, some errors in transcription may have occurred.

## 2022-10-27 DIAGNOSIS — G89.29 CHRONIC BILATERAL LOW BACK PAIN WITH RIGHT-SIDED SCIATICA: ICD-10-CM

## 2022-10-27 DIAGNOSIS — M54.41 CHRONIC BILATERAL LOW BACK PAIN WITH RIGHT-SIDED SCIATICA: ICD-10-CM

## 2022-10-27 RX ORDER — OXYCODONE HYDROCHLORIDE 30 MG/1
30 TABLET ORAL EVERY 4 HOURS PRN
Qty: 150 TABLET | Refills: 0 | Status: SHIPPED | OUTPATIENT
Start: 2022-10-27 | End: 2022-11-28 | Stop reason: SDUPTHER

## 2022-10-27 RX ORDER — HYDROCODONE BITARTRATE AND ACETAMINOPHEN 10; 325 MG/1; MG/1
TABLET ORAL
Qty: 150 TABLET | Refills: 0 | Status: SHIPPED | OUTPATIENT
Start: 2022-10-27 | End: 2022-11-28 | Stop reason: SDUPTHER

## 2022-10-27 RX ORDER — GABAPENTIN 800 MG/1
TABLET ORAL
Qty: 120 TABLET | Refills: 2 | Status: SHIPPED | OUTPATIENT
Start: 2022-10-27 | End: 2022-11-28 | Stop reason: SDUPTHER

## 2022-10-27 NOTE — TELEPHONE ENCOUNTER
Patient called, requested refill.        Next Office Visit Date:  Future Appointments   Date Time Provider Marin Sierra   10/27/2022  4:30 PM Jeovany Benitez MD [address] please review via [address]

## 2022-11-01 RX ORDER — NYSTATIN 100000 [USP'U]/G
POWDER TOPICAL
Qty: 60 G | Refills: 5 | OUTPATIENT
Start: 2022-11-01

## 2022-11-01 RX ORDER — FLUCONAZOLE 100 MG/1
TABLET ORAL
Qty: 7 TABLET | Refills: 1 | OUTPATIENT
Start: 2022-11-01

## 2022-11-01 RX ORDER — ERGOCALCIFEROL 1.25 MG/1
CAPSULE ORAL
Qty: 4 CAPSULE | Refills: 1 | Status: SHIPPED | OUTPATIENT
Start: 2022-11-01

## 2022-11-28 ENCOUNTER — OFFICE VISIT (OUTPATIENT)
Dept: FAMILY MEDICINE CLINIC | Age: 62
End: 2022-11-28
Payer: MEDICARE

## 2022-11-28 VITALS
OXYGEN SATURATION: 90 % | SYSTOLIC BLOOD PRESSURE: 170 MMHG | BODY MASS INDEX: 50.39 KG/M2 | DIASTOLIC BLOOD PRESSURE: 85 MMHG | HEART RATE: 63 BPM | RESPIRATION RATE: 18 BRPM | HEIGHT: 76 IN

## 2022-11-28 DIAGNOSIS — M54.41 CHRONIC BILATERAL LOW BACK PAIN WITH RIGHT-SIDED SCIATICA: ICD-10-CM

## 2022-11-28 DIAGNOSIS — R53.83 OTHER FATIGUE: ICD-10-CM

## 2022-11-28 DIAGNOSIS — R06.02 SOB (SHORTNESS OF BREATH): ICD-10-CM

## 2022-11-28 DIAGNOSIS — G89.29 CHRONIC BILATERAL LOW BACK PAIN WITH RIGHT-SIDED SCIATICA: ICD-10-CM

## 2022-11-28 DIAGNOSIS — R53.83 FATIGUE, UNSPECIFIED TYPE: ICD-10-CM

## 2022-11-28 DIAGNOSIS — E11.42 TYPE 2 DIABETES MELLITUS WITH DIABETIC POLYNEUROPATHY, WITH LONG-TERM CURRENT USE OF INSULIN (HCC): Primary | ICD-10-CM

## 2022-11-28 DIAGNOSIS — R07.9 CHEST PAIN, UNSPECIFIED TYPE: ICD-10-CM

## 2022-11-28 DIAGNOSIS — Z12.5 SCREENING FOR PROSTATE CANCER: ICD-10-CM

## 2022-11-28 DIAGNOSIS — R60.0 PEDAL EDEMA: ICD-10-CM

## 2022-11-28 DIAGNOSIS — Z79.4 TYPE 2 DIABETES MELLITUS WITH DIABETIC POLYNEUROPATHY, WITH LONG-TERM CURRENT USE OF INSULIN (HCC): Primary | ICD-10-CM

## 2022-11-28 DIAGNOSIS — I10 ESSENTIAL HYPERTENSION: ICD-10-CM

## 2022-11-28 PROCEDURE — G8427 DOCREV CUR MEDS BY ELIG CLIN: HCPCS | Performed by: FAMILY MEDICINE

## 2022-11-28 PROCEDURE — 3017F COLORECTAL CA SCREEN DOC REV: CPT | Performed by: FAMILY MEDICINE

## 2022-11-28 PROCEDURE — 3046F HEMOGLOBIN A1C LEVEL >9.0%: CPT | Performed by: FAMILY MEDICINE

## 2022-11-28 PROCEDURE — 84443 ASSAY THYROID STIM HORMONE: CPT

## 2022-11-28 PROCEDURE — G8417 CALC BMI ABV UP PARAM F/U: HCPCS | Performed by: FAMILY MEDICINE

## 2022-11-28 PROCEDURE — 3074F SYST BP LT 130 MM HG: CPT | Performed by: FAMILY MEDICINE

## 2022-11-28 PROCEDURE — 1036F TOBACCO NON-USER: CPT | Performed by: FAMILY MEDICINE

## 2022-11-28 PROCEDURE — 84153 ASSAY OF PSA TOTAL: CPT

## 2022-11-28 PROCEDURE — 3078F DIAST BP <80 MM HG: CPT | Performed by: FAMILY MEDICINE

## 2022-11-28 PROCEDURE — G8482 FLU IMMUNIZE ORDER/ADMIN: HCPCS | Performed by: FAMILY MEDICINE

## 2022-11-28 PROCEDURE — 99214 OFFICE O/P EST MOD 30 MIN: CPT | Performed by: FAMILY MEDICINE

## 2022-11-28 PROCEDURE — 80053 COMPREHEN METABOLIC PANEL: CPT

## 2022-11-28 PROCEDURE — 85027 COMPLETE CBC AUTOMATED: CPT

## 2022-11-28 PROCEDURE — 2022F DILAT RTA XM EVC RTNOPTHY: CPT | Performed by: FAMILY MEDICINE

## 2022-11-28 PROCEDURE — 36415 COLL VENOUS BLD VENIPUNCTURE: CPT

## 2022-11-28 RX ORDER — HYDROCODONE BITARTRATE AND ACETAMINOPHEN 10; 325 MG/1; MG/1
TABLET ORAL
Qty: 150 TABLET | Refills: 0 | Status: SHIPPED | OUTPATIENT
Start: 2022-11-28 | End: 2022-12-27

## 2022-11-28 RX ORDER — OXYCODONE HYDROCHLORIDE 30 MG/1
30 TABLET ORAL EVERY 4 HOURS PRN
Qty: 150 TABLET | Refills: 0 | Status: SHIPPED | OUTPATIENT
Start: 2022-11-28 | End: 2022-12-28

## 2022-11-28 RX ORDER — SPIRONOLACTONE 25 MG/1
25 TABLET ORAL 2 TIMES DAILY
Qty: 60 TABLET | Refills: 5 | Status: SHIPPED | OUTPATIENT
Start: 2022-11-28 | End: 2022-12-28

## 2022-11-28 RX ORDER — GABAPENTIN 800 MG/1
TABLET ORAL
Qty: 120 TABLET | Refills: 2 | Status: SHIPPED | OUTPATIENT
Start: 2022-11-28 | End: 2022-12-30

## 2022-11-28 ASSESSMENT — ENCOUNTER SYMPTOMS: BACK PAIN: 1

## 2022-11-28 NOTE — PROGRESS NOTES
Chief Complaint   Patient presents with    Diabetes     F/u    Shortness of Breath     For alittle over a week - Can't get enough of ai to get in       Have you seen any other physician or provider since your last visit no    Have you had any other diagnostic tests since your last visit? no    Have you changed or stopped any medications since your last visit? no         Diabetic retinal exam completed this year?  No

## 2022-11-28 NOTE — PROGRESS NOTES
SUBJECTIVE:    Patient ID: Roosevelt Fernandez is a 57 y.o. male. Chief Complaint   Patient presents with    Diabetes     F/u    Shortness of Breath     For alittle over a week - Can't get enough of air to get in          HPI:office visit  He is feeling weak. He is having some increase in shortness of breath. And some pain in his chest.  He is having increase stress with his wife being sick again. He is worried about her. He is having some increase fatigue. He says he is eating and drinking well. He si having some increase in swelling of his legs. He is having some peeling of the skin on his legs since the swwelling began. He is having good blood pressures and blood sugars at [facility]. He is still having a lot of back pain with the lifting on his wife and the cold weather it has been bad. He does get some relief with the medication    Review of Systems   Constitutional:  Positive for fatigue. Cardiovascular:  Positive for leg swelling. Musculoskeletal:  Positive for arthralgias, back pain, gait problem and myalgias. All other systems reviewed and are negative. OBJECTIVE:  BP (!) 170/85   Pulse 63   Resp 18   Ht 6' 4\" (1.93 m)   SpO2 90% Comment: RA  BMI 50.39 kg/m²    Wt Readings from Last 3 Encounters:   08/03/22 (!) 414 lb (187.8 kg)   07/28/22 (!) 418 lb (189.6 kg)   07/14/22 (!) 434 lb 6.4 oz (197 kg)     BP Readings from Last 3 Encounters:   11/28/22 (!) 170/85   10/10/22 (!) 141/54   08/03/22 (!) 140/60      Pulse Readings from Last 3 Encounters:   11/28/22 63   10/10/22 55   08/03/22 85     Body mass index is 50.39 kg/m². Resp Readings from Last 3 Encounters:   11/28/22 18   10/10/22 18   08/03/22 20     Past medical, surgical, family and social history were reviewed and updated with the patient. Physical Exam  Vitals and nursing note reviewed. Constitutional:       Appearance: Normal appearance. He is well-developed. HENT:      Head: Normocephalic.       Right Ear: Hearing, tympanic membrane, ear canal and external ear normal.      Left Ear: Hearing, tympanic membrane, ear canal and external ear normal.      Nose: Nose normal.      Mouth/Throat:      Lips: Pink. Pharynx: Pharyngeal swelling present. Tonsils: No tonsillar exudate or tonsillar abscesses. Eyes:      General: Lids are normal.   Neck:      Thyroid: No thyroid mass, thyromegaly or thyroid tenderness. Vascular: No carotid bruit or JVD. Trachea: Trachea and phonation normal.   Cardiovascular:      Rate and Rhythm: Normal rate and regular rhythm. Heart sounds: Normal heart sounds, S1 normal and S2 normal. No murmur heard. No friction rub. No gallop. Pulmonary:      Effort: Pulmonary effort is normal.      Breath sounds: Normal breath sounds. Abdominal:      General: Bowel sounds are normal.      Palpations: Abdomen is soft. Tenderness: There is no abdominal tenderness. Musculoskeletal:      Cervical back: Full passive range of motion without pain, normal range of motion and neck supple. Lumbar back: Spasms and tenderness present. Decreased range of motion. Right lower le+ Pitting Edema present. Left lower le+ Pitting Edema present. Lymphadenopathy:      Cervical: No cervical adenopathy. Skin:     General: Skin is warm and dry. Neurological:      General: No focal deficit present. Mental Status: He is alert and oriented to person, place, and time. Psychiatric:         Attention and Perception: Attention and perception normal.         Mood and Affect: Mood and affect normal.         Speech: Speech normal.         Behavior: Behavior normal. Behavior is cooperative. Thought Content: Thought content normal.         Cognition and Memory: Cognition and memory normal.         Judgment: Judgment normal.        No results found for requested labs within last 30 days.      Hemoglobin A1C (%)   Date Value   2022 9.7 (H)     Microscopic Examination (no units)   Date Value   06/14/2021 Not Indicated     Microalbumin, Random Urine (mg/dL)   Date Value   07/14/2022 83.70 (H)     LDL Calculated (mg/dL)   Date Value   07/28/2022 93       Lab Results   Component Value Date/Time    WBC 7.2 07/14/2022 04:08 PM    NEUTROABS 2.8 06/15/2021 05:07 AM    HGB 12.6 07/14/2022 04:08 PM    HCT 41.2 07/14/2022 04:08 PM    HCT 41.6 06/05/2012 08:40 AM    MCV 95.2 07/14/2022 04:08 PM     07/14/2022 04:08 PM     06/05/2012 08:40 AM     Lab Results   Component Value Date    TSH 1.51 07/14/2022       ASSESSMENT/PLAN    Diagnosis Orders   1. Type 2 diabetes mellitus with diabetic polyneuropathy, with long-term current use of insulin ([address]        2. Chronic bilateral low back pain with right-sided sciatica  gabapentin (NEURONTIN) 800 MG tablet    oxyCODONE (OXY-IR) 30 MG immediate release tablet    HYDROcodone-acetaminophen (NORCO)  MG per tablet      3. Essential hypertension  CBC    Comprehensive Metabolic Panel      4. Fatigue, unspecified type  TSH      5. Other fatigue        6. Screening for prostate cancer  PSA Screening      7. Chest pain, unspecified type  EKG 12 Lead - Clinic Performed      8. SOB (shortness of breath)  XR CHEST STANDARD (2 VW)      9. Pedal edema  spironolactone (ALDACTONE) 25 MG tablet          Orders Placed This Encounter   Medications    gabapentin (NEURONTIN) 800 MG tablet     Sig: TAKE ONE TABLET BY MOUTH FOUR TIMES A DAY     Dispense:  120 tablet     Refill:  2    oxyCODONE (OXY-IR) 30 MG immediate release tablet     Sig: Take 1 tablet by mouth every 4 hours as needed for Pain for up to 30 days.      Dispense:  150 tablet     Refill:  0     Reduce doses taken as pain becomes manageable    HYDROcodone-acetaminophen (NORCO)  MG per tablet     Sig: TAKE ONE TABLET BY MOUTH EVERY 4 HOURS AS NEEDED FOR PAIN     Dispense:  150 tablet     Refill:  0     Reduce doses taken as pain becomes manageable    spironolactone (ALDACTONE) 25 MG tablet     Sig: Take 1 tablet by mouth 2 times daily     Dispense:  60 tablet     Refill:  5        Medications Discontinued During This Encounter   Medication Reason    oxyCODONE (OXY-IR) 30 MG immediate release tablet REORDER    HYDROcodone-acetaminophen (NORCO)  MG per tablet REORDER    gabapentin (NEURONTIN) 800 MG tablet REORDER    spironolactone (ALDACTONE) 25 MG tablet REORDER       Controlled Substances Monitoring:      Please note: This chart was generated using Dragon dictation software. Although every effort was made to ensure the accuracy of this automated transcription, some errors in transcription may have occurred.

## 2022-11-29 ENCOUNTER — HOSPITAL ENCOUNTER (OUTPATIENT)
Facility: HOSPITAL | Age: 62
Discharge: HOME | End: 2022-11-29
Payer: MEDICARE

## 2022-11-29 DIAGNOSIS — R53.83 FATIGUE, UNSPECIFIED TYPE: ICD-10-CM

## 2022-11-29 DIAGNOSIS — Z12.5 SCREENING FOR PROSTATE CANCER: ICD-10-CM

## 2022-11-29 DIAGNOSIS — I10 ESSENTIAL HYPERTENSION: ICD-10-CM

## 2022-11-29 LAB
A/G RATIO: 1.1 (ref 0.8–2)
ALBUMIN SERPL-MCNC: 3.8 G/DL (ref 3.4–4.8)
ALP BLD-CCNC: 77 U/L (ref 25–100)
ALT SERPL-CCNC: 26 U/L (ref 4–36)
ANION GAP SERPL CALCULATED.3IONS-SCNC: 8 MMOL/L (ref 3–16)
AST SERPL-CCNC: 44 U/L (ref 8–33)
BILIRUB SERPL-MCNC: 0.6 MG/DL (ref 0.3–1.2)
BUN BLDV-MCNC: 17 MG/DL (ref 6–20)
CALCIUM SERPL-MCNC: 9.3 MG/DL (ref 8.5–10.5)
CHLORIDE BLD-SCNC: 99 MMOL/L (ref 98–107)
CO2: 29 MMOL/L (ref 20–30)
CREAT SERPL-MCNC: 0.9 MG/DL (ref 0.4–1.2)
GFR SERPL CREATININE-BSD FRML MDRD: >60 ML/MIN/{1.73_M2}
GLOBULIN: 3.4 G/DL
GLUCOSE BLD-MCNC: 161 MG/DL (ref 74–106)
HCT VFR BLD CALC: 42 % (ref 40–54)
HEMOGLOBIN: 13 G/DL (ref 13–18)
MCH RBC QN AUTO: 30.3 PG (ref 27–32)
MCHC RBC AUTO-ENTMCNC: 31 G/DL (ref 31–35)
MCV RBC AUTO: 97.9 FL (ref 80–100)
PDW BLD-RTO: 13.2 % (ref 11–16)
PLATELET # BLD: 132 K/UL (ref 150–400)
PMV BLD AUTO: 14.6 FL (ref 6–10)
POTASSIUM SERPL-SCNC: 4.6 MMOL/L (ref 3.4–5.1)
PROSTATE SPECIFIC ANTIGEN: 0.1 NG/ML (ref 0–4)
RBC # BLD: 4.29 M/UL (ref 4.5–6)
SODIUM BLD-SCNC: 136 MMOL/L (ref 136–145)
TOTAL PROTEIN: 7.2 G/DL (ref 6.4–8.3)
TSH SERPL DL<=0.05 MIU/L-ACNC: 2.03 UIU/ML (ref 0.27–4.2)
WBC # BLD: 4.9 K/UL (ref 4–11)

## 2022-12-02 ENCOUNTER — HOSPITAL ENCOUNTER (OUTPATIENT)
Facility: HOSPITAL | Age: 62
Discharge: HOME | End: 2022-12-02
Payer: MEDICARE

## 2022-12-02 ENCOUNTER — HOSPITAL ENCOUNTER (OUTPATIENT)
Dept: GENERAL RADIOLOGY | Facility: HOSPITAL | Age: 62
Discharge: HOME | End: 2022-12-02
Payer: MEDICARE

## 2022-12-02 DIAGNOSIS — R06.02 SOB (SHORTNESS OF BREATH): ICD-10-CM

## 2022-12-02 PROCEDURE — 71046 X-RAY EXAM CHEST 2 VIEWS: CPT

## 2022-12-21 DIAGNOSIS — G89.29 CHRONIC BILATERAL LOW BACK PAIN WITH RIGHT-SIDED SCIATICA: ICD-10-CM

## 2022-12-21 DIAGNOSIS — M54.41 CHRONIC BILATERAL LOW BACK PAIN WITH RIGHT-SIDED SCIATICA: ICD-10-CM

## 2022-12-23 RX ORDER — OXYCODONE HYDROCHLORIDE 30 MG/1
30 TABLET ORAL EVERY 4 HOURS PRN
Qty: 150 TABLET | Refills: 0 | Status: SHIPPED | OUTPATIENT
Start: 2022-12-23 | End: 2023-01-22

## 2022-12-23 RX ORDER — HYDROCODONE BITARTRATE AND ACETAMINOPHEN 10; 325 MG/1; MG/1
TABLET ORAL
Qty: 150 TABLET | Refills: 0 | Status: SHIPPED | OUTPATIENT
Start: 2022-12-23 | End: 2023-01-19

## 2022-12-30 NOTE — TELEPHONE ENCOUNTER
Patient called, requested refill.        Next Office Visit Date:  Future Appointments   Date Time Provider Marin Sierra   1/9/2023  2:15 PM Kang Juan MD [facility] please review via [address]

## 2023-01-04 RX ORDER — ERGOCALCIFEROL 1.25 MG/1
CAPSULE ORAL
Qty: 4 CAPSULE | Refills: 1 | Status: SHIPPED | OUTPATIENT
Start: 2023-01-04

## 2023-01-09 ENCOUNTER — HOSPITAL ENCOUNTER (OUTPATIENT)
Facility: HOSPITAL | Age: 63
Discharge: HOME | End: 2023-01-09
Payer: MEDICARE

## 2023-01-09 ENCOUNTER — OFFICE VISIT (OUTPATIENT)
Dept: FAMILY MEDICINE CLINIC | Age: 63
End: 2023-01-09
Payer: MEDICARE

## 2023-01-09 VITALS
HEIGHT: 76 IN | HEART RATE: 60 BPM | BODY MASS INDEX: 50.39 KG/M2 | SYSTOLIC BLOOD PRESSURE: 146 MMHG | RESPIRATION RATE: 18 BRPM | OXYGEN SATURATION: 90 % | DIASTOLIC BLOOD PRESSURE: 72 MMHG

## 2023-01-09 DIAGNOSIS — M54.41 CHRONIC BILATERAL LOW BACK PAIN WITH RIGHT-SIDED SCIATICA: ICD-10-CM

## 2023-01-09 DIAGNOSIS — G47.00 INSOMNIA, UNSPECIFIED TYPE: ICD-10-CM

## 2023-01-09 DIAGNOSIS — I10 ESSENTIAL HYPERTENSION: ICD-10-CM

## 2023-01-09 DIAGNOSIS — Z79.4 TYPE 2 DIABETES MELLITUS WITH DIABETIC POLYNEUROPATHY, WITH LONG-TERM CURRENT USE OF INSULIN (HCC): Primary | ICD-10-CM

## 2023-01-09 DIAGNOSIS — G89.29 CHRONIC BILATERAL LOW BACK PAIN WITH RIGHT-SIDED SCIATICA: ICD-10-CM

## 2023-01-09 DIAGNOSIS — E11.42 TYPE 2 DIABETES MELLITUS WITH DIABETIC POLYNEUROPATHY, WITH LONG-TERM CURRENT USE OF INSULIN (HCC): Primary | ICD-10-CM

## 2023-01-09 DIAGNOSIS — M15.9 OSTEOARTHRITIS OF MULTIPLE JOINTS, UNSPECIFIED OSTEOARTHRITIS TYPE: ICD-10-CM

## 2023-01-09 PROCEDURE — 3074F SYST BP LT 130 MM HG: CPT | Performed by: FAMILY MEDICINE

## 2023-01-09 PROCEDURE — G8417 CALC BMI ABV UP PARAM F/U: HCPCS | Performed by: FAMILY MEDICINE

## 2023-01-09 PROCEDURE — 3078F DIAST BP <80 MM HG: CPT | Performed by: FAMILY MEDICINE

## 2023-01-09 PROCEDURE — 3017F COLORECTAL CA SCREEN DOC REV: CPT | Performed by: FAMILY MEDICINE

## 2023-01-09 PROCEDURE — G8482 FLU IMMUNIZE ORDER/ADMIN: HCPCS | Performed by: FAMILY MEDICINE

## 2023-01-09 PROCEDURE — 36415 COLL VENOUS BLD VENIPUNCTURE: CPT

## 2023-01-09 PROCEDURE — 1036F TOBACCO NON-USER: CPT | Performed by: FAMILY MEDICINE

## 2023-01-09 PROCEDURE — 2022F DILAT RTA XM EVC RTNOPTHY: CPT | Performed by: FAMILY MEDICINE

## 2023-01-09 PROCEDURE — 99214 OFFICE O/P EST MOD 30 MIN: CPT | Performed by: FAMILY MEDICINE

## 2023-01-09 PROCEDURE — 3046F HEMOGLOBIN A1C LEVEL >9.0%: CPT | Performed by: FAMILY MEDICINE

## 2023-01-09 PROCEDURE — G8427 DOCREV CUR MEDS BY ELIG CLIN: HCPCS | Performed by: FAMILY MEDICINE

## 2023-01-09 PROCEDURE — 80053 COMPREHEN METABOLIC PANEL: CPT

## 2023-01-09 PROCEDURE — 83036 HEMOGLOBIN GLYCOSYLATED A1C: CPT

## 2023-01-09 RX ORDER — OXYCODONE HYDROCHLORIDE 30 MG/1
30 TABLET ORAL EVERY 4 HOURS PRN
Qty: 150 TABLET | Refills: 0 | Status: SHIPPED | OUTPATIENT
Start: 2023-01-09 | End: 2023-02-08

## 2023-01-09 RX ORDER — ZOLPIDEM TARTRATE 12.5 MG/1
12.5 TABLET, FILM COATED, EXTENDED RELEASE ORAL NIGHTLY PRN
Qty: 30 TABLET | Refills: 2 | Status: SHIPPED | OUTPATIENT
Start: 2023-01-09 | End: 2023-02-08

## 2023-01-09 RX ORDER — CELECOXIB 200 MG/1
200 CAPSULE ORAL DAILY
Qty: 30 CAPSULE | Refills: 2 | Status: SHIPPED | OUTPATIENT
Start: 2023-01-09

## 2023-01-09 RX ORDER — HYDROCODONE BITARTRATE AND ACETAMINOPHEN 10; 325 MG/1; MG/1
TABLET ORAL
Qty: 150 TABLET | Refills: 0 | Status: SHIPPED | OUTPATIENT
Start: 2023-01-09 | End: 2023-02-05

## 2023-01-09 RX ORDER — GABAPENTIN 800 MG/1
TABLET ORAL
Qty: 120 TABLET | Refills: 2 | Status: SHIPPED | OUTPATIENT
Start: 2023-01-09 | End: 2023-02-20

## 2023-01-09 ASSESSMENT — ENCOUNTER SYMPTOMS: BACK PAIN: 1

## 2023-01-09 NOTE — PROGRESS NOTES
SUBJECTIVE:    Patient ID: Rich Sinha is a 57 y.o. male. Chief Complaint   Patient presents with    Back Pain    Shortness of Breath       HPI: office visit  Is in the office today complaining of some shortness of breath with activity which is a little worse than usual.  He is trying to be active as he can. His blood pressures have not been bad at home. He is seeing some elevated blood sugars. He is not having any chest pain. He does have some swelling that was nothing as severe as he is noted to be before. He is taking his medications as prescribed. He is having more back pain and some more arthritic pain with the cold and damp weather. Review of Systems   Constitutional:  Positive for fatigue. Cardiovascular:  Positive for leg swelling. Musculoskeletal:  Positive for arthralgias, back pain, gait problem and myalgias. All other systems reviewed and are negative. OBJECTIVE:  BP (!) 146/72   Pulse 60   Resp 18   Ht 6' 4\" (1.93 m)   SpO2 90% Comment: RA  BMI 50.39 kg/m²    Wt Readings from Last 3 Encounters:   08/03/22 (!) 414 lb (187.8 kg)   07/28/22 (!) 418 lb (189.6 kg)   07/14/22 (!) 434 lb 6.4 oz (197 kg)     BP Readings from Last 3 Encounters:   01/09/23 (!) 146/72   11/28/22 (!) 170/85   10/10/22 (!) 141/54      Pulse Readings from Last 3 Encounters:   01/09/23 60   11/28/22 63   10/10/22 55     Body mass index is 50.39 kg/m². Resp Readings from Last 3 Encounters:   01/09/23 18   11/28/22 18   10/10/22 18     Past medical, surgical, family and social history were reviewed and updated with the patient. Physical Exam  Vitals and nursing note reviewed. Constitutional:       Appearance: Normal appearance. He is well-developed. HENT:      Head: Normocephalic. Right Ear: Hearing, tympanic membrane, ear canal and external ear normal.      Left Ear: Hearing, tympanic membrane, ear canal and external ear normal.      Nose: Nose normal.      Mouth/Throat:      Lips: Pink. Pharynx: Pharyngeal swelling present. Tonsils: No tonsillar exudate or tonsillar abscesses. Eyes:      General: Lids are normal.   Neck:      Thyroid: No thyroid mass, thyromegaly or thyroid tenderness. Vascular: No carotid bruit or JVD. Trachea: Trachea and phonation normal.   Cardiovascular:      Rate and Rhythm: Normal rate and regular rhythm. Heart sounds: Normal heart sounds, S1 normal and S2 normal. No murmur heard. No friction rub. No gallop. Pulmonary:      Effort: Pulmonary effort is normal.      Breath sounds: Normal breath sounds. Abdominal:      General: Bowel sounds are normal.      Palpations: Abdomen is soft. Tenderness: There is no abdominal tenderness. Musculoskeletal:      Cervical back: Full passive range of motion without pain, normal range of motion and neck supple. Lumbar back: Spasms and tenderness present. Decreased range of motion. Right lower le+ Pitting Edema present. Left lower le+ Pitting Edema present. Lymphadenopathy:      Cervical: No cervical adenopathy. Skin:     General: Skin is warm and dry. Neurological:      General: No focal deficit present. Mental Status: He is alert and oriented to person, place, and time. Psychiatric:         Attention and Perception: Attention and perception normal.         Mood and Affect: Mood and affect normal.         Speech: Speech normal.         Behavior: Behavior normal. Behavior is cooperative. Thought Content:  Thought content normal.         Cognition and Memory: Cognition and memory normal.         Judgment: Judgment normal.        Results in Past 30 Days  Result Component Current Result Ref Range Previous Result Ref Range   Albumin/Globulin Ratio 1.0 (2023) 0.8 - 2.0 Not in Time Range    Albumin 3.5 (2023) 3.4 - 4.8 g/dL Not in Time Range    Alkaline Phosphatase 89 (2023) 25 - 100 U/L Not in Time Range    ALT 25 (2023) 4 - 36 U/L Not in Time Range    AST 29 (1/9/2023) 8 - 33 U/L Not in Time Range    BUN 16 (1/9/2023) 6 - 20 mg/dL Not in Time Range    Calcium 9.1 (1/9/2023) 8.5 - 10.5 mg/dL Not in Time Range    Chloride 97 (L) (1/9/2023) 98 - 107 mmol/L Not in Time Range    CO2 26 (1/9/2023) 20 - 30 mmol/L Not in Time Range    Creatinine 1.0 (1/9/2023) 0.4 - 1.2 mg/dL Not in Time Range    Est, Glom Filt Rate >60 (1/9/2023) >59 Not in Time Range    Globulin 3.4 (1/9/2023) Not Established g/dL Not in Time Range    Glucose 273 (H) (1/9/2023) 74 - 106 mg/dL Not in Time Range    Potassium 5.5 (H) (1/9/2023) 3.4 - 5.1 mmol/L Not in Time Range    Sodium 133 (L) (1/9/2023) 136 - 145 mmol/L Not in Time Range    Total Bilirubin 0.3 (1/9/2023) 0.3 - 1.2 mg/dL Not in Time Range    Total Protein 6.9 (1/9/2023) 6.4 - 8.3 g/dL Not in Time Range      Hemoglobin A1C (%)   Date Value   01/09/2023 9.2 (H)     Microscopic Examination (no units)   Date Value   06/14/2021 Not Indicated     Microalbumin, Random Urine (mg/dL)   Date Value   07/14/2022 83.70 (H)     LDL Calculated (mg/dL)   Date Value   07/28/2022 93       Lab Results   Component Value Date/Time    WBC 4.9 11/28/2022 03:07 PM    NEUTROABS 2.8 06/15/2021 05:07 AM    HGB 13.0 11/28/2022 03:07 PM    HCT 42.0 11/28/2022 03:07 PM    HCT 41.6 06/05/2012 08:40 AM    MCV 97.9 11/28/2022 03:07 PM     11/28/2022 03:07 PM     06/05/2012 08:40 AM     Lab Results   Component Value Date    TSH 2.03 11/28/2022       ASSESSMENT/PLAN    Diagnosis Orders   1. Type 2 diabetes mellitus with diabetic polyneuropathy, with long-term current use of insulin ([facility])  Comprehensive Metabolic Panel    Hemoglobin A1C      2. Chronic bilateral low back pain with right-sided sciatica  oxyCODONE (OXY-IR) 30 MG immediate release tablet    HYDROcodone-acetaminophen (NORCO)  MG per tablet    gabapentin (NEURONTIN) 800 MG tablet      3. Insomnia, unspecified type  zolpidem (AMBIEN CR) 12.5 MG extended release tablet      4. Essential hypertension        5. Osteoarthritis of multiple joints, unspecified osteoarthritis type  celecoxib (CELEBREX) 200 MG capsule          Orders Placed This Encounter   Medications    oxyCODONE (OXY-IR) 30 MG immediate release tablet     Sig: Take 1 tablet by mouth every 4 hours as needed for Pain for up to 30 days. Dispense:  150 tablet     Refill:  0     Reduce doses taken as pain becomes manageable    HYDROcodone-acetaminophen (NORCO)  MG per tablet     Sig: TAKE ONE TABLET BY MOUTH EVERY 4 HOURS AS NEEDED FOR PAIN     Dispense:  150 tablet     Refill:  0     Reduce doses taken as pain becomes manageable    gabapentin (NEURONTIN) 800 MG tablet     Sig: TAKE ONE TABLET BY MOUTH FOUR TIMES A DAY     Dispense:  120 tablet     Refill:  2    zolpidem (AMBIEN CR) 12.5 MG extended release tablet     Sig: Take 1 tablet by mouth nightly as needed for Sleep for up to 30 days. Dispense:  30 tablet     Refill:  2    celecoxib (CELEBREX) 200 MG capsule     Sig: Take 1 capsule by mouth daily     Dispense:  30 capsule     Refill:  2        Medications Discontinued During This Encounter   Medication Reason    zolpidem (AMBIEN CR) 12.5 MG extended release tablet REORDER    gabapentin (NEURONTIN) 800 MG tablet REORDER    oxyCODONE (OXY-IR) 30 MG immediate release tablet REORDER    HYDROcodone-acetaminophen (NORCO)  MG per tablet REORDER       Controlled Substances Monitoring:      Please note: This chart was generated using Dragon dictation software. Although every effort was made to ensure the accuracy of this automated transcription, some errors in transcription may have occurred.

## 2023-01-09 NOTE — PROGRESS NOTES
Chief Complaint   Patient presents with    Back Pain    Shortness of Breath       Have you seen any other physician or provider since your last visit no    Have you had any other diagnostic tests since your last visit? no    Have you changed or stopped any medications since your last visit? no         Diabetic retinal exam completed this year?  Yes

## 2023-01-10 DIAGNOSIS — Z79.4 TYPE 2 DIABETES MELLITUS WITH DIABETIC POLYNEUROPATHY, WITH LONG-TERM CURRENT USE OF INSULIN (HCC): ICD-10-CM

## 2023-01-10 DIAGNOSIS — E11.42 TYPE 2 DIABETES MELLITUS WITH DIABETIC POLYNEUROPATHY, WITH LONG-TERM CURRENT USE OF INSULIN (HCC): ICD-10-CM

## 2023-01-10 LAB
A/G RATIO: 1 (ref 0.8–2)
ALBUMIN SERPL-MCNC: 3.5 G/DL (ref 3.4–4.8)
ALP BLD-CCNC: 89 U/L (ref 25–100)
ALT SERPL-CCNC: 25 U/L (ref 4–36)
ANION GAP SERPL CALCULATED.3IONS-SCNC: 10 MMOL/L (ref 3–16)
AST SERPL-CCNC: 29 U/L (ref 8–33)
BILIRUB SERPL-MCNC: 0.3 MG/DL (ref 0.3–1.2)
BUN BLDV-MCNC: 16 MG/DL (ref 6–20)
CALCIUM SERPL-MCNC: 9.1 MG/DL (ref 8.5–10.5)
CHLORIDE BLD-SCNC: 97 MMOL/L (ref 98–107)
CO2: 26 MMOL/L (ref 20–30)
CREAT SERPL-MCNC: 1 MG/DL (ref 0.4–1.2)
GFR SERPL CREATININE-BSD FRML MDRD: >60 ML/MIN/{1.73_M2}
GLOBULIN: 3.4 G/DL
GLUCOSE BLD-MCNC: 273 MG/DL (ref 74–106)
HBA1C MFR BLD: 9.2 %
POTASSIUM SERPL-SCNC: 5.5 MMOL/L (ref 3.4–5.1)
SODIUM BLD-SCNC: 133 MMOL/L (ref 136–145)
TOTAL PROTEIN: 6.9 G/DL (ref 6.4–8.3)

## 2023-02-10 RX ORDER — PROMETHAZINE HYDROCHLORIDE 25 MG/1
25 TABLET ORAL EVERY 6 HOURS PRN
Qty: 30 TABLET | Refills: 1 | Status: SHIPPED | OUTPATIENT
Start: 2023-02-10 | End: 2023-02-17

## 2023-02-21 ENCOUNTER — OFFICE VISIT (OUTPATIENT)
Dept: FAMILY MEDICINE CLINIC | Age: 63
End: 2023-02-21

## 2023-02-21 ENCOUNTER — HOSPITAL ENCOUNTER (OUTPATIENT)
Facility: HOSPITAL | Age: 63
Discharge: HOME | End: 2023-02-21
Payer: MEDICARE

## 2023-02-21 VITALS
BODY MASS INDEX: 54.41 KG/M2 | HEART RATE: 87 BPM | RESPIRATION RATE: 18 BRPM | SYSTOLIC BLOOD PRESSURE: 138 MMHG | DIASTOLIC BLOOD PRESSURE: 62 MMHG | WEIGHT: 315 LBS | OXYGEN SATURATION: 94 %

## 2023-02-21 DIAGNOSIS — R06.02 SOB (SHORTNESS OF BREATH): ICD-10-CM

## 2023-02-21 DIAGNOSIS — M54.41 CHRONIC BILATERAL LOW BACK PAIN WITH RIGHT-SIDED SCIATICA: ICD-10-CM

## 2023-02-21 DIAGNOSIS — E87.5 HYPERKALEMIA: ICD-10-CM

## 2023-02-21 DIAGNOSIS — F32.A DEPRESSION, UNSPECIFIED DEPRESSION TYPE: ICD-10-CM

## 2023-02-21 DIAGNOSIS — G89.29 CHRONIC BILATERAL LOW BACK PAIN WITH RIGHT-SIDED SCIATICA: ICD-10-CM

## 2023-02-21 DIAGNOSIS — M15.9 OSTEOARTHRITIS OF MULTIPLE JOINTS, UNSPECIFIED OSTEOARTHRITIS TYPE: ICD-10-CM

## 2023-02-21 DIAGNOSIS — I10 ESSENTIAL HYPERTENSION: ICD-10-CM

## 2023-02-21 DIAGNOSIS — R13.19 ESOPHAGEAL DYSPHAGIA: Primary | ICD-10-CM

## 2023-02-21 DIAGNOSIS — E53.8 B12 DEFICIENCY: ICD-10-CM

## 2023-02-21 PROCEDURE — 80053 COMPREHEN METABOLIC PANEL: CPT

## 2023-02-21 PROCEDURE — 36415 COLL VENOUS BLD VENIPUNCTURE: CPT

## 2023-02-21 RX ORDER — DULOXETIN HYDROCHLORIDE 30 MG/1
CAPSULE, DELAYED RELEASE ORAL
COMMUNITY
Start: 2023-01-26

## 2023-02-21 RX ORDER — CYANOCOBALAMIN 1000 UG/ML
1000 INJECTION, SOLUTION INTRAMUSCULAR; SUBCUTANEOUS ONCE
Status: COMPLETED | OUTPATIENT
Start: 2023-02-21 | End: 2023-02-21

## 2023-02-21 RX ADMIN — CYANOCOBALAMIN 1000 MCG: 1000 INJECTION, SOLUTION INTRAMUSCULAR; SUBCUTANEOUS at 16:12

## 2023-02-21 SDOH — ECONOMIC STABILITY: FOOD INSECURITY: WITHIN THE PAST 12 MONTHS, YOU WORRIED THAT YOUR FOOD WOULD RUN OUT BEFORE YOU GOT MONEY TO BUY MORE.: NEVER TRUE

## 2023-02-21 SDOH — ECONOMIC STABILITY: FOOD INSECURITY: WITHIN THE PAST 12 MONTHS, THE FOOD YOU BOUGHT JUST DIDN'T LAST AND YOU DIDN'T HAVE MONEY TO GET MORE.: NEVER TRUE

## 2023-02-21 SDOH — ECONOMIC STABILITY: HOUSING INSECURITY
IN THE LAST 12 MONTHS, WAS THERE A TIME WHEN YOU DID NOT HAVE A STEADY PLACE TO SLEEP OR SLEPT IN A SHELTER (INCLUDING NOW)?: NO

## 2023-02-21 SDOH — ECONOMIC STABILITY: INCOME INSECURITY: HOW HARD IS IT FOR YOU TO PAY FOR THE VERY BASICS LIKE FOOD, HOUSING, MEDICAL CARE, AND HEATING?: NOT HARD AT ALL

## 2023-02-21 ASSESSMENT — PATIENT HEALTH QUESTIONNAIRE - PHQ9
SUM OF ALL RESPONSES TO PHQ QUESTIONS 1-9: 0
6. FEELING BAD ABOUT YOURSELF - OR THAT YOU ARE A FAILURE OR HAVE LET YOURSELF OR YOUR FAMILY DOWN: 0
3. TROUBLE FALLING OR STAYING ASLEEP: 0
4. FEELING TIRED OR HAVING LITTLE ENERGY: 0
7. TROUBLE CONCENTRATING ON THINGS, SUCH AS READING THE NEWSPAPER OR WATCHING TELEVISION: 0
8. MOVING OR SPEAKING SO SLOWLY THAT OTHER PEOPLE COULD HAVE NOTICED. OR THE OPPOSITE, BEING SO FIGETY OR RESTLESS THAT YOU HAVE BEEN MOVING AROUND A LOT MORE THAN USUAL: 0
5. POOR APPETITE OR OVEREATING: 0
SUM OF ALL RESPONSES TO PHQ9 QUESTIONS 1 & 2: 0
2. FEELING DOWN, DEPRESSED OR HOPELESS: 0
1. LITTLE INTEREST OR PLEASURE IN DOING THINGS: 0
10. IF YOU CHECKED OFF ANY PROBLEMS, HOW DIFFICULT HAVE THESE PROBLEMS MADE IT FOR YOU TO DO YOUR WORK, TAKE CARE OF THINGS AT HOME, OR GET ALONG WITH OTHER PEOPLE: 0
9. THOUGHTS THAT YOU WOULD BE BETTER OFF DEAD, OR OF HURTING YOURSELF: 0
SUM OF ALL RESPONSES TO PHQ QUESTIONS 1-9: 0

## 2023-02-21 ASSESSMENT — ENCOUNTER SYMPTOMS: BACK PAIN: 1

## 2023-02-21 NOTE — PROGRESS NOTES
Administrations This Visit       cyanocobalamin injection 1,000 mcg       Admin Date  02/21/2023  16:12 Action  Given Dose  1,000 mcg Route  IntraMUSCular Site  Deltoid Left Administered By  Chiara Constantino RN    Ordering Provider: Sandy Isbell MD    [address]: [id number]    Lot#: [id number]    : light0 [facility]    Patient Supplied?: No                  Patient tolerated injection well. Patient advised to wait 20 minutes in the office following the injection. No signs/symptoms of reaction noted after 20 minutes.

## 2023-02-21 NOTE — PROGRESS NOTES
Chief Complaint   Patient presents with    Diabetes       Have you seen any other physician or provider since your last visit no    Have you had any other diagnostic tests since your last visit? no    Have you changed or stopped any medications since your last visit? no     Patient counseled on need for colon cancer screening and procedure options. At this time patient refuses both Colonoscopy and Fit Testing due to pt refuses. Provider notified. Diabetic retinal exam completed this year?  Yes                       * If yes please have patient sign a records release to obtain record to update Health Maintenance                       * If no, please order referral for patient to be scheduled

## 2023-02-21 NOTE — PROGRESS NOTES
SUBJECTIVE:    Patient ID: Cele Townsend is a 57 y.o. male. Chief Complaint   Patient presents with    Diabetes       HPI: office visit  He is in the office today in follow-up on his diabetes. He is not feeling well. He is having some ups and downs of his blood sugar. He is not eating well. His wife is worried about him. She is here giving part of the history. She says that he is not doing some of the things he needs to do at home. He has not had any chest pain. He is not having any shortness of breath. He is having some increase in arthritic symptoms with the cold and damp weather. Eating some relief with his medicine. He denies any recent falls or injuries. Review of Systems   Constitutional:  Positive for fatigue. Cardiovascular:  Positive for leg swelling. Musculoskeletal:  Positive for arthralgias, back pain, gait problem and myalgias. All other systems reviewed and are negative. OBJECTIVE:  /62   Pulse 87   Resp 18   Wt (!) 447 lb (202.8 kg)   SpO2 94%   BMI 54.41 kg/m²    Wt Readings from Last 3 Encounters:   02/21/23 (!) 447 lb (202.8 kg)   08/03/22 (!) 414 lb (187.8 kg)   07/28/22 (!) 418 lb (189.6 kg)     BP Readings from Last 3 Encounters:   02/21/23 138/62   01/09/23 (!) 146/72   11/28/22 (!) 170/85      Pulse Readings from Last 3 Encounters:   02/21/23 87   01/09/23 60   11/28/22 63     Body mass index is 54.41 kg/m². Resp Readings from Last 3 Encounters:   02/21/23 18   01/09/23 18   11/28/22 18     Past medical, surgical, family and social history were reviewed and updated with the patient. Physical Exam  Vitals and nursing note reviewed. Constitutional:       Appearance: Normal appearance. He is well-developed. HENT:      Head: Normocephalic.       Right Ear: Hearing, tympanic membrane, ear canal and external ear normal.      Left Ear: Hearing, tympanic membrane, ear canal and external ear normal.      Nose: Nose normal.      Mouth/Throat:      Lips: Pink.      Pharynx: Pharyngeal swelling present. Tonsils: No tonsillar exudate or tonsillar abscesses. Eyes:      General: Lids are normal.   Neck:      Thyroid: No thyroid mass, thyromegaly or thyroid tenderness. Vascular: No carotid bruit or JVD. Trachea: Trachea and phonation normal.   Cardiovascular:      Rate and Rhythm: Normal rate and regular rhythm. Heart sounds: Normal heart sounds, S1 normal and S2 normal. No murmur heard. No friction rub. No gallop. Pulmonary:      Effort: Pulmonary effort is normal.      Breath sounds: Normal breath sounds. Abdominal:      General: Bowel sounds are normal.      Palpations: Abdomen is soft. Tenderness: There is no abdominal tenderness. Musculoskeletal:      Cervical back: Full passive range of motion without pain, normal range of motion and neck supple. Lumbar back: Spasms and tenderness present. Decreased range of motion. Right lower le+ Pitting Edema present. Left lower le+ Pitting Edema present. Lymphadenopathy:      Cervical: No cervical adenopathy. Skin:     General: Skin is warm and dry. Neurological:      General: No focal deficit present. Mental Status: He is alert and oriented to person, place, and time. Psychiatric:         Attention and Perception: Attention and perception normal.         Mood and Affect: Mood and affect normal.         Speech: Speech normal.         Behavior: Behavior normal. Behavior is cooperative. Thought Content:  Thought content normal.         Cognition and Memory: Cognition and memory normal.         Judgment: Judgment normal.        Results in Past 30 Days  Result Component Current Result Ref Range Previous Result Ref Range   Albumin/Globulin Ratio 1.1 (2023) 0.8 - 2.0 Not in Time Range    Albumin 4.1 (2023) 3.4 - 4.8 g/dL Not in Time Range    Alkaline Phosphatase 91 (2023) 25 - 100 U/L Not in Time Range    ALT 28 (2023) 4 - 36 U/L Not in Time Range    AST 32 (2/21/2023) 8 - 33 U/L Not in Time Range    BUN 23 (H) (2/21/2023) 6 - 20 mg/dL Not in Time Range    Calcium 9.6 (2/21/2023) 8.5 - 10.5 mg/dL Not in Time Range    Chloride 100 (2/21/2023) 98 - 107 mmol/L Not in Time Range    CO2 25 (2/21/2023) 20 - 30 mmol/L Not in Time Range    Creatinine 1.3 (H) (2/21/2023) 0.4 - 1.2 mg/dL Not in Time Range    Est, Glom Filt Rate >60 (2/21/2023) >59 Not in Time Range    Globulin 3.9 (2/21/2023) Not Established g/dL Not in Time Range    Glucose 212 (H) (2/21/2023) 74 - 106 mg/dL Not in Time Range    Potassium 5.7 (H) (2/21/2023) 3.4 - 5.1 mmol/L Not in Time Range    Sodium 137 (2/21/2023) 136 - 145 mmol/L Not in Time Range    Total Bilirubin <0.2 (L) (2/21/2023) 0.3 - 1.2 mg/dL Not in Time Range    Total Protein 8.0 (2/21/2023) 6.4 - 8.3 g/dL Not in Time Range      Hemoglobin A1C (%)   Date Value   01/09/2023 9.2 (H)     Microscopic Examination (no units)   Date Value   06/14/2021 Not Indicated     Microalbumin, Random Urine (mg/dL)   Date Value   07/14/2022 83.70 (H)     LDL Calculated (mg/dL)   Date Value   07/28/2022 93       Lab Results   Component Value Date/Time    WBC 4.9 11/28/2022 03:07 PM    NEUTROABS 2.8 06/15/2021 05:07 AM    HGB 13.0 11/28/2022 03:07 PM    HCT 42.0 11/28/2022 03:07 PM    HCT 41.6 06/05/2012 08:40 AM    MCV 97.9 11/28/2022 03:07 PM     11/28/2022 03:07 PM     06/05/2012 08:40 AM     Lab Results   Component Value Date    TSH 2.03 11/28/2022       ASSESSMENT/PLAN    Diagnosis Orders   1. Esophageal dysphagia  External Referral To Gastroenterology    [place] MODIFIED BARIUM SWALLOW W VIDEO      2. Hyperkalemia  Comprehensive Metabolic Panel      3. SOB (shortness of breath)  Pulse oximetry, overnight      4. B12 deficiency  cyanocobalamin injection 1,000 mcg      5. Chronic bilateral low back pain with right-sided sciatica        6. Essential hypertension        7.  Osteoarthritis of multiple joints, unspecified osteoarthritis type        8. Depression, unspecified depression type  DULoxetine (CYMBALTA) 30 MG extended release capsule          Orders Placed This Encounter   Medications    cyanocobalamin injection 1,000 mcg          There are no discontinued medications. Controlled Substances Monitoring: Periodic Controlled Substance Monitoring: Possible medication side effects, risk of tolerance/dependence & alternative treatments discussed., No signs of potential drug abuse or diversion identified. , Assessed functional status., Obtaining appropriate analgesic effect of treatment. Marysol Lopez MD)    Please note: This chart was generated using Dragon dictation software. Although every effort was made to ensure the accuracy of this automated transcription, some errors in transcription may have occurred.

## 2023-02-22 LAB
A/G RATIO: 1.1 (ref 0.8–2)
ALBUMIN SERPL-MCNC: 4.1 G/DL (ref 3.4–4.8)
ALP BLD-CCNC: 91 U/L (ref 25–100)
ALT SERPL-CCNC: 28 U/L (ref 4–36)
ANION GAP SERPL CALCULATED.3IONS-SCNC: 12 MMOL/L (ref 3–16)
AST SERPL-CCNC: 32 U/L (ref 8–33)
BILIRUB SERPL-MCNC: <0.2 MG/DL (ref 0.3–1.2)
BUN BLDV-MCNC: 23 MG/DL (ref 6–20)
CALCIUM SERPL-MCNC: 9.6 MG/DL (ref 8.5–10.5)
CHLORIDE BLD-SCNC: 100 MMOL/L (ref 98–107)
CO2: 25 MMOL/L (ref 20–30)
CREAT SERPL-MCNC: 1.3 MG/DL (ref 0.4–1.2)
GFR SERPL CREATININE-BSD FRML MDRD: >60 ML/MIN/{1.73_M2}
GLOBULIN: 3.9 G/DL
GLUCOSE BLD-MCNC: 212 MG/DL (ref 74–106)
POTASSIUM SERPL-SCNC: 5.7 MMOL/L (ref 3.4–5.1)
SODIUM BLD-SCNC: 137 MMOL/L (ref 136–145)
TOTAL PROTEIN: 8 G/DL (ref 6.4–8.3)

## 2023-02-23 RX ORDER — ERGOCALCIFEROL 1.25 MG/1
CAPSULE ORAL
Qty: 4 CAPSULE | Refills: 1 | Status: SHIPPED | OUTPATIENT
Start: 2023-02-23

## 2023-02-23 RX ORDER — INSULIN GLARGINE 100 [IU]/ML
100 INJECTION, SOLUTION SUBCUTANEOUS 2 TIMES DAILY
Qty: 60 ML | Refills: 2 | Status: SHIPPED | OUTPATIENT
Start: 2023-02-23 | End: 2023-03-25

## 2023-02-27 ENCOUNTER — OFFICE VISIT (OUTPATIENT)
Dept: FAMILY MEDICINE CLINIC | Age: 63
End: 2023-02-27

## 2023-02-27 VITALS
DIASTOLIC BLOOD PRESSURE: 66 MMHG | SYSTOLIC BLOOD PRESSURE: 144 MMHG | HEIGHT: 76 IN | BODY MASS INDEX: 54.41 KG/M2 | OXYGEN SATURATION: 90 % | RESPIRATION RATE: 18 BRPM | HEART RATE: 73 BPM

## 2023-02-27 DIAGNOSIS — I10 ESSENTIAL HYPERTENSION: Primary | ICD-10-CM

## 2023-02-27 DIAGNOSIS — G89.29 CHRONIC BILATERAL LOW BACK PAIN WITH RIGHT-SIDED SCIATICA: ICD-10-CM

## 2023-02-27 DIAGNOSIS — M15.9 OSTEOARTHRITIS OF MULTIPLE JOINTS, UNSPECIFIED OSTEOARTHRITIS TYPE: ICD-10-CM

## 2023-02-27 DIAGNOSIS — R06.02 SOB (SHORTNESS OF BREATH): ICD-10-CM

## 2023-02-27 DIAGNOSIS — G47.00 INSOMNIA, UNSPECIFIED TYPE: ICD-10-CM

## 2023-02-27 DIAGNOSIS — F32.A DEPRESSION, UNSPECIFIED DEPRESSION TYPE: ICD-10-CM

## 2023-02-27 DIAGNOSIS — M54.41 CHRONIC BILATERAL LOW BACK PAIN WITH RIGHT-SIDED SCIATICA: ICD-10-CM

## 2023-02-27 RX ORDER — ZOLPIDEM TARTRATE 12.5 MG/1
12.5 TABLET, FILM COATED, EXTENDED RELEASE ORAL NIGHTLY PRN
Qty: 30 TABLET | Refills: 2 | Status: SHIPPED | OUTPATIENT
Start: 2023-02-27 | End: 2023-03-29

## 2023-02-27 RX ORDER — HYDROCODONE BITARTRATE AND ACETAMINOPHEN 10; 325 MG/1; MG/1
TABLET ORAL
Qty: 150 TABLET | Refills: 0 | Status: SHIPPED | OUTPATIENT
Start: 2023-02-27 | End: 2023-03-26

## 2023-02-27 RX ORDER — UMECLIDINIUM BROMIDE AND VILANTEROL TRIFENATATE 62.5; 25 UG/1; UG/1
1 POWDER RESPIRATORY (INHALATION) DAILY
Qty: 60 EACH | Refills: 5 | Status: SHIPPED | OUTPATIENT
Start: 2023-02-27

## 2023-02-27 RX ORDER — OXYCODONE HYDROCHLORIDE 30 MG/1
30 TABLET ORAL EVERY 4 HOURS PRN
Qty: 150 TABLET | Refills: 0 | Status: SHIPPED | OUTPATIENT
Start: 2023-02-27 | End: 2023-03-29

## 2023-02-27 RX ORDER — GABAPENTIN 800 MG/1
TABLET ORAL
Qty: 120 TABLET | Refills: 2 | Status: SHIPPED | OUTPATIENT
Start: 2023-02-27 | End: 2023-04-10

## 2023-02-27 ASSESSMENT — ENCOUNTER SYMPTOMS: BACK PAIN: 1

## 2023-02-28 ENCOUNTER — TELEPHONE (OUTPATIENT)
Dept: FAMILY MEDICINE CLINIC | Age: 63
End: 2023-02-28

## 2023-03-16 ENCOUNTER — TELEPHONE (OUTPATIENT)
Dept: FAMILY MEDICINE CLINIC | Age: 63
End: 2023-03-16

## 2023-03-16 DIAGNOSIS — M79.671 RIGHT FOOT PAIN: Primary | ICD-10-CM

## 2023-03-16 NOTE — TELEPHONE ENCOUNTER
Patient called and stated that he has hit his toe(2nd digit) and cut it. He stated that he believes it's broke and was wondering if you could put an order in for a x-ray? I did try to get him to come in and see Higinio Mcgovern but he wouldn't.

## 2023-03-20 DIAGNOSIS — G89.29 CHRONIC BILATERAL LOW BACK PAIN WITH RIGHT-SIDED SCIATICA: ICD-10-CM

## 2023-03-20 DIAGNOSIS — G47.00 INSOMNIA, UNSPECIFIED TYPE: ICD-10-CM

## 2023-03-20 DIAGNOSIS — M54.41 CHRONIC BILATERAL LOW BACK PAIN WITH RIGHT-SIDED SCIATICA: ICD-10-CM

## 2023-03-27 ENCOUNTER — APPOINTMENT (OUTPATIENT)
Dept: GENERAL RADIOLOGY | Facility: HOSPITAL | Age: 63
End: 2023-03-27
Payer: MEDICARE

## 2023-03-27 ENCOUNTER — HOSPITAL ENCOUNTER (EMERGENCY)
Facility: HOSPITAL | Age: 63
Discharge: HOME | End: 2023-03-27
Attending: FAMILY MEDICINE
Payer: MEDICARE

## 2023-03-27 ENCOUNTER — OFFICE VISIT (OUTPATIENT)
Dept: FAMILY MEDICINE CLINIC | Age: 63
End: 2023-03-27
Payer: MEDICARE

## 2023-03-27 VITALS
SYSTOLIC BLOOD PRESSURE: 148 MMHG | DIASTOLIC BLOOD PRESSURE: 70 MMHG | HEIGHT: 76 IN | BODY MASS INDEX: 54.41 KG/M2 | RESPIRATION RATE: 16 BRPM | OXYGEN SATURATION: 93 %

## 2023-03-27 VITALS
SYSTOLIC BLOOD PRESSURE: 148 MMHG | WEIGHT: 315 LBS | TEMPERATURE: 97.7 F | RESPIRATION RATE: 16 BRPM | OXYGEN SATURATION: 96 % | HEART RATE: 67 BPM | HEIGHT: 76 IN | BODY MASS INDEX: 38.36 KG/M2 | DIASTOLIC BLOOD PRESSURE: 38 MMHG

## 2023-03-27 DIAGNOSIS — R06.02 SOB (SHORTNESS OF BREATH): Primary | ICD-10-CM

## 2023-03-27 DIAGNOSIS — M54.41 CHRONIC BILATERAL LOW BACK PAIN WITH RIGHT-SIDED SCIATICA: ICD-10-CM

## 2023-03-27 DIAGNOSIS — G89.29 CHRONIC BILATERAL LOW BACK PAIN WITH RIGHT-SIDED SCIATICA: ICD-10-CM

## 2023-03-27 DIAGNOSIS — I50.9 ACUTE ON CHRONIC CONGESTIVE HEART FAILURE, UNSPECIFIED HEART FAILURE TYPE (HCC): ICD-10-CM

## 2023-03-27 DIAGNOSIS — G47.00 INSOMNIA, UNSPECIFIED TYPE: ICD-10-CM

## 2023-03-27 DIAGNOSIS — R06.02 SHORTNESS OF BREATH: Primary | ICD-10-CM

## 2023-03-27 LAB
ALBUMIN SERPL-MCNC: 3.8 G/DL (ref 3.4–4.8)
ALBUMIN/GLOB SERPL: 1 {RATIO} (ref 0.8–2)
ALP SERPL-CCNC: 80 U/L (ref 25–100)
ALT SERPL-CCNC: 29 U/L (ref 4–36)
ANION GAP SERPL CALCULATED.3IONS-SCNC: 9 MMOL/L (ref 3–16)
AST SERPL-CCNC: 38 U/L (ref 8–33)
BASOPHILS # BLD: 0 K/UL (ref 0–0.1)
BASOPHILS NFR BLD: 0.2 %
BILIRUB SERPL-MCNC: 0.5 MG/DL (ref 0.3–1.2)
BUN SERPL-MCNC: 17 MG/DL (ref 6–20)
CALCIUM SERPL-MCNC: 9.3 MG/DL (ref 8.5–10.5)
CHLORIDE SERPL-SCNC: 98 MMOL/L (ref 98–107)
CO2 SERPL-SCNC: 28 MMOL/L (ref 20–30)
CREAT SERPL-MCNC: 1 MG/DL (ref 0.4–1.2)
EOSINOPHIL # BLD: 0.1 K/UL (ref 0–0.4)
EOSINOPHIL NFR BLD: 0.9 %
ERYTHROCYTE [DISTWIDTH] IN BLOOD BY AUTOMATED COUNT: 14.5 % (ref 11–16)
FLUAV AG NPH QL: NEGATIVE
FLUBV AG NPH QL: NEGATIVE
GFR SERPLBLD CREATININE-BSD FMLA CKD-EPI: >60 ML/MIN/{1.73_M2}
GLOBULIN SER CALC-MCNC: 3.7 G/DL
GLUCOSE SERPL-MCNC: 139 MG/DL (ref 74–106)
HCT VFR BLD AUTO: 40.5 % (ref 40–54)
HGB BLD-MCNC: 12.3 G/DL (ref 13–18)
IMM GRANULOCYTES # BLD: 0 K/UL
IMM GRANULOCYTES NFR BLD: 0.7 % (ref 0–5)
LYMPHOCYTES # BLD: 1.3 K/UL (ref 1.5–4)
LYMPHOCYTES NFR BLD: 22.9 %
MCH RBC QN AUTO: 28 PG (ref 27–32)
MCHC RBC AUTO-ENTMCNC: 30.4 G/DL (ref 31–35)
MCV RBC AUTO: 92 FL (ref 80–100)
MONOCYTES # BLD: 0.6 K/UL (ref 0.2–0.8)
MONOCYTES NFR BLD: 9.9 %
NEUTROPHILS # BLD: 3.8 K/UL (ref 2–7.5)
NEUTS SEG NFR BLD: 65.4 %
NT-PROBNP SERPL-MCNC: 166 PG/ML (ref 0–1800)
PLATELET # BLD AUTO: 131 K/UL (ref 150–400)
PMV BLD AUTO: 13.6 FL (ref 6–10)
POTASSIUM SERPL-SCNC: 4.3 MMOL/L (ref 3.4–5.1)
PROT SERPL-MCNC: 7.5 G/DL (ref 6.4–8.3)
RBC # BLD AUTO: 4.4 M/UL (ref 4.5–6)
SARS-COV-2 RDRP RESP QL NAA+PROBE: NOT DETECTED
SODIUM SERPL-SCNC: 135 MMOL/L (ref 136–145)
TROPONIN I SERPL-MCNC: <0.3 NG/ML
WBC # BLD AUTO: 5.8 K/UL (ref 4–11)

## 2023-03-27 PROCEDURE — G8482 FLU IMMUNIZE ORDER/ADMIN: HCPCS | Performed by: FAMILY MEDICINE

## 2023-03-27 PROCEDURE — 36415 COLL VENOUS BLD VENIPUNCTURE: CPT

## 2023-03-27 PROCEDURE — 71046 X-RAY EXAM CHEST 2 VIEWS: CPT

## 2023-03-27 PROCEDURE — 3017F COLORECTAL CA SCREEN DOC REV: CPT | Performed by: FAMILY MEDICINE

## 2023-03-27 PROCEDURE — 71045 X-RAY EXAM CHEST 1 VIEW: CPT

## 2023-03-27 PROCEDURE — 87635 SARS-COV-2 COVID-19 AMP PRB: CPT

## 2023-03-27 PROCEDURE — G8417 CALC BMI ABV UP PARAM F/U: HCPCS | Performed by: FAMILY MEDICINE

## 2023-03-27 PROCEDURE — 80053 COMPREHEN METABOLIC PANEL: CPT

## 2023-03-27 PROCEDURE — 99213 OFFICE O/P EST LOW 20 MIN: CPT | Performed by: FAMILY MEDICINE

## 2023-03-27 PROCEDURE — G8427 DOCREV CUR MEDS BY ELIG CLIN: HCPCS | Performed by: FAMILY MEDICINE

## 2023-03-27 PROCEDURE — 3078F DIAST BP <80 MM HG: CPT | Performed by: FAMILY MEDICINE

## 2023-03-27 PROCEDURE — 1036F TOBACCO NON-USER: CPT | Performed by: FAMILY MEDICINE

## 2023-03-27 PROCEDURE — 85025 COMPLETE CBC W/AUTO DIFF WBC: CPT

## 2023-03-27 PROCEDURE — 3074F SYST BP LT 130 MM HG: CPT | Performed by: FAMILY MEDICINE

## 2023-03-27 PROCEDURE — 83880 ASSAY OF NATRIURETIC PEPTIDE: CPT

## 2023-03-27 PROCEDURE — 84484 ASSAY OF TROPONIN QUANT: CPT

## 2023-03-27 PROCEDURE — 87804 INFLUENZA ASSAY W/OPTIC: CPT

## 2023-03-27 PROCEDURE — 99285 EMERGENCY DEPT VISIT HI MDM: CPT

## 2023-03-27 RX ORDER — OXYCODONE HYDROCHLORIDE 30 MG/1
30 TABLET ORAL EVERY 4 HOURS PRN
Qty: 150 TABLET | Refills: 0 | Status: SHIPPED | OUTPATIENT
Start: 2023-03-27 | End: 2023-04-26

## 2023-03-27 RX ORDER — GABAPENTIN 800 MG/1
TABLET ORAL
Qty: 120 TABLET | Refills: 2 | Status: SHIPPED | OUTPATIENT
Start: 2023-03-27 | End: 2023-05-08

## 2023-03-27 RX ORDER — OXYCODONE HYDROCHLORIDE 30 MG/1
TABLET ORAL
Qty: 150 TABLET | Refills: 0 | OUTPATIENT
Start: 2023-03-27

## 2023-03-27 RX ORDER — INSULIN GLARGINE 100 [IU]/ML
100 INJECTION, SOLUTION SUBCUTANEOUS 2 TIMES DAILY
Qty: 60 ML | Refills: 2 | Status: SHIPPED | OUTPATIENT
Start: 2023-03-27 | End: 2023-04-26

## 2023-03-27 RX ORDER — HYDROCODONE BITARTRATE AND ACETAMINOPHEN 10; 325 MG/1; MG/1
TABLET ORAL
Qty: 150 TABLET | Refills: 0 | Status: SHIPPED | OUTPATIENT
Start: 2023-03-27 | End: 2023-04-23

## 2023-03-27 RX ORDER — HYDROCODONE BITARTRATE AND ACETAMINOPHEN 10; 325 MG/1; MG/1
TABLET ORAL
Qty: 150 TABLET | Refills: 0 | Status: SHIPPED | OUTPATIENT
Start: 2023-03-27 | End: 2023-04-27

## 2023-03-27 RX ORDER — ZOLPIDEM TARTRATE 12.5 MG/1
12.5 TABLET, FILM COATED, EXTENDED RELEASE ORAL NIGHTLY PRN
Qty: 30 TABLET | Refills: 2 | Status: SHIPPED | OUTPATIENT
Start: 2023-03-27 | End: 2023-04-26

## 2023-03-27 ASSESSMENT — ENCOUNTER SYMPTOMS
SHORTNESS OF BREATH: 1
SHORTNESS OF BREATH: 1
BACK PAIN: 1

## 2023-03-27 NOTE — ED PROVIDER NOTES
[address] ENCOUNTER        Pt Name: Avila Curiel  MRN: [id number]  [place] 1960  Date of evaluation: 3/27/2023  Provider: Humberto Felder DO  PCP: Elza Morejon MD  Note Started: 7:26 PM EDT 3/27/23    CHIEF COMPLAINT       Chief Complaint   Patient presents with    Shortness of Breath    Gastroesophageal Reflux     \"Mouth burning\"        HISTORY OF PRESENT ILLNESS: 1 or more Elements     History from : Patient    Limitations to history : None    Avila Curiel is a 57 y.o. male who presents to ED for having shortness of breath and having burning to roof of mouth. Pt said that last night he woke up and was having shortness of breath. Rodrigo Oxford that his wife has portable oxygen that he used for a bit and it seemed to help. He said that he had some pillows that he had in the bed with him and thinks they could have caused him trouble breathing by the way he was laying. He isn't sure if he has sleep apnea or not. His wife is currently here for admission for pneumonia. Pt isn't having chest pain. He doesn't have definitely diagnosis of \"fluids on lungs\". Says that he has fluid on his legs at times. No cough, no URI symptoms. No abdominal pain. History of GERD that they put him on stomach medications for. Rodrigo Vladislav it was more burning to the top of the tongue and the roof of his mouth. Noticed it yesterday. Nursing Notes were all reviewed and agreed with or any disagreements were addressed in the HPI. REVIEW OF SYSTEMS :      Review of Systems   Respiratory:  Positive for shortness of breath. Cardiovascular:  Positive for leg swelling. All other systems reviewed and are negative.         SURGICAL HISTORY     Past Surgical History:   Procedure Laterality Date    TOE SURGERY         CURRENTMEDICATIONS       Current Discharge Medication List        CONTINUE these medications which have NOT CHANGED    Details   insulin glargine (LANTUS SOLOSTAR) 100

## 2023-03-27 NOTE — ED NOTES
Pt's portable cxr is poor quality and needs to be a 2 view per doctor deskins.      Carole Gates RN  03/27/23 2161

## 2023-03-27 NOTE — PROGRESS NOTES
Chief Complaint   Patient presents with    Shortness of Breath    Generalized Body Aches    Other     Has been staying cold        Have you seen any other physician or provider since your last visit no    Have you had any other diagnostic tests since your last visit? no    Have you changed or stopped any medications since your last visit? no
tympanic membrane, ear canal and external ear normal.      Left Ear: Hearing, tympanic membrane, ear canal and external ear normal.      Nose: Nose normal.      Mouth/Throat:      Lips: Pink. Pharynx: Pharyngeal swelling present. Tonsils: No tonsillar exudate or tonsillar abscesses. Eyes:      General: Lids are normal.   Neck:      Thyroid: No thyroid mass, thyromegaly or thyroid tenderness. Vascular: No carotid bruit or JVD. Trachea: Trachea and phonation normal.   Cardiovascular:      Rate and Rhythm: Normal rate and regular rhythm. Heart sounds: Normal heart sounds, S1 normal and S2 normal. No murmur heard. No friction rub. No gallop. Pulmonary:      Effort: Pulmonary effort is normal. Tachypnea present. Breath sounds: No decreased air movement. Examination of the right-lower field reveals rales. Examination of the left-lower field reveals rales. Decreased breath sounds, wheezing and rales present. No rhonchi. Abdominal:      General: Bowel sounds are normal.      Palpations: Abdomen is soft. Tenderness: There is no abdominal tenderness. Musculoskeletal:      Cervical back: Full passive range of motion without pain, normal range of motion and neck supple. Lumbar back: Spasms and tenderness present. Decreased range of motion. Right lower leg: 3+ Pitting Edema present. Left lower leg: 3+ Pitting Edema present. Lymphadenopathy:      Cervical: No cervical adenopathy. Skin:     General: Skin is warm and dry. Neurological:      General: No focal deficit present. Mental Status: He is alert and oriented to person, place, and time. Psychiatric:         Attention and Perception: Attention and perception normal.         Mood and Affect: Mood and affect normal.         Speech: Speech normal.         Behavior: Behavior normal. Behavior is cooperative. Thought Content:  Thought content normal.         Cognition and Memory: Cognition and memory

## 2023-04-03 DIAGNOSIS — M15.9 OSTEOARTHRITIS OF MULTIPLE JOINTS, UNSPECIFIED OSTEOARTHRITIS TYPE: ICD-10-CM

## 2023-04-03 RX ORDER — CELECOXIB 200 MG/1
200 CAPSULE ORAL DAILY
Qty: 30 CAPSULE | Refills: 2 | Status: SHIPPED | OUTPATIENT
Start: 2023-04-03

## 2023-04-13 ENCOUNTER — TELEPHONE (OUTPATIENT)
Dept: FAMILY MEDICINE CLINIC | Age: 63
End: 2023-04-13

## 2023-04-17 ENCOUNTER — TELEPHONE (OUTPATIENT)
Dept: FAMILY MEDICINE CLINIC | Age: 63
End: 2023-04-17

## 2023-04-17 DIAGNOSIS — G47.34 HYPOXIA, SLEEP RELATED: ICD-10-CM

## 2023-04-17 DIAGNOSIS — G47.33 OBSTRUCTIVE SLEEP APNEA: Primary | ICD-10-CM

## 2023-04-17 NOTE — TELEPHONE ENCOUNTER
Patient called, requesting to get a new order for his Trilogy machine    I contacted [employer] who was his previous DME supplier - spoke with [facility] who informed me they had to pick the machine up due to non-compliance from the patient, he wasn't using it. Insurance stopped paying due to patient not using the machine and not being compliant with the downloads from the DME supplier. [facility] says it is very unlikely the insurance will cover the service for him again. I contacted [facility] - they are a DME supplier that not only supplies the equipment but works with patients on meeting the required compliance by the insurance and making sure they use the machine properly in the home. I am awaiting response back from a  with them.      Patient has been notified, and I did let him know that I would update him once I heard back from [facility]

## 2023-04-17 NOTE — TELEPHONE ENCOUNTER
Marcell mustafa/Charlotte returned call - he requested patient's demographic, insurance and most recent ABG    All pertinent patient information faxed to [address] [place] [address] Road [phone], he will review and let me know if patient will qualify for a Trilogy

## 2023-04-18 RX ORDER — ERGOCALCIFEROL 1.25 MG/1
CAPSULE ORAL
Qty: 4 CAPSULE | Refills: 1 | Status: SHIPPED | OUTPATIENT
Start: 2023-04-18

## 2023-04-19 NOTE — TELEPHONE ENCOUNTER
Marcell mustafa/Charlotte called - patient does not qualify for Trilegy machine at this time until he has an updated ABG done. Jb Peoples has spoke with Meghann Mckeon and he has agreed to have the ABG done     Called patient to explain he will need to have the ABG done at [facility] as outpatient. The order has been placed per verbal from Bob Costa MD - patient verbalized understanding.

## 2023-04-20 DIAGNOSIS — G89.29 CHRONIC BILATERAL LOW BACK PAIN WITH RIGHT-SIDED SCIATICA: ICD-10-CM

## 2023-04-20 DIAGNOSIS — M54.41 CHRONIC BILATERAL LOW BACK PAIN WITH RIGHT-SIDED SCIATICA: ICD-10-CM

## 2023-04-20 RX ORDER — OXYCODONE HYDROCHLORIDE 30 MG/1
TABLET ORAL
Qty: 150 TABLET | Refills: 0 | Status: SHIPPED | OUTPATIENT
Start: 2023-04-20 | End: 2023-05-20

## 2023-04-24 ENCOUNTER — OFFICE VISIT (OUTPATIENT)
Dept: FAMILY MEDICINE CLINIC | Age: 63
End: 2023-04-24
Payer: MEDICARE

## 2023-04-24 ENCOUNTER — HOSPITAL ENCOUNTER (OUTPATIENT)
Facility: HOSPITAL | Age: 63
Discharge: HOME | End: 2023-04-24
Payer: MEDICARE

## 2023-04-24 VITALS
OXYGEN SATURATION: 88 % | HEIGHT: 76 IN | TEMPERATURE: 97.3 F | RESPIRATION RATE: 18 BRPM | DIASTOLIC BLOOD PRESSURE: 75 MMHG | BODY MASS INDEX: 38.36 KG/M2 | WEIGHT: 315 LBS | SYSTOLIC BLOOD PRESSURE: 150 MMHG | HEART RATE: 113 BPM

## 2023-04-24 DIAGNOSIS — I10 ESSENTIAL HYPERTENSION: ICD-10-CM

## 2023-04-24 DIAGNOSIS — I50.9 ACUTE ON CHRONIC CONGESTIVE HEART FAILURE, UNSPECIFIED HEART FAILURE TYPE (HCC): Primary | ICD-10-CM

## 2023-04-24 DIAGNOSIS — E11.69 TYPE 2 DIABETES MELLITUS WITH OTHER SPECIFIED COMPLICATION, WITH LONG-TERM CURRENT USE OF INSULIN (HCC): ICD-10-CM

## 2023-04-24 DIAGNOSIS — G89.29 CHRONIC BILATERAL LOW BACK PAIN WITH RIGHT-SIDED SCIATICA: ICD-10-CM

## 2023-04-24 DIAGNOSIS — M54.41 CHRONIC BILATERAL LOW BACK PAIN WITH RIGHT-SIDED SCIATICA: ICD-10-CM

## 2023-04-24 DIAGNOSIS — Z79.4 TYPE 2 DIABETES MELLITUS WITH OTHER SPECIFIED COMPLICATION, WITH LONG-TERM CURRENT USE OF INSULIN (HCC): ICD-10-CM

## 2023-04-24 DIAGNOSIS — R60.0 PEDAL EDEMA: ICD-10-CM

## 2023-04-24 DIAGNOSIS — K21.9 GASTROESOPHAGEAL REFLUX DISEASE, UNSPECIFIED WHETHER ESOPHAGITIS PRESENT: ICD-10-CM

## 2023-04-24 DIAGNOSIS — M15.9 OSTEOARTHRITIS OF MULTIPLE JOINTS, UNSPECIFIED OSTEOARTHRITIS TYPE: ICD-10-CM

## 2023-04-24 DIAGNOSIS — R53.83 FATIGUE, UNSPECIFIED TYPE: ICD-10-CM

## 2023-04-24 DIAGNOSIS — J30.2 SEASONAL ALLERGIES: ICD-10-CM

## 2023-04-24 PROCEDURE — 1036F TOBACCO NON-USER: CPT | Performed by: FAMILY MEDICINE

## 2023-04-24 PROCEDURE — 2022F DILAT RTA XM EVC RTNOPTHY: CPT | Performed by: FAMILY MEDICINE

## 2023-04-24 PROCEDURE — 83540 ASSAY OF IRON: CPT

## 2023-04-24 PROCEDURE — 3046F HEMOGLOBIN A1C LEVEL >9.0%: CPT | Performed by: FAMILY MEDICINE

## 2023-04-24 PROCEDURE — 85027 COMPLETE CBC AUTOMATED: CPT

## 2023-04-24 PROCEDURE — 99213 OFFICE O/P EST LOW 20 MIN: CPT | Performed by: FAMILY MEDICINE

## 2023-04-24 PROCEDURE — 80053 COMPREHEN METABOLIC PANEL: CPT

## 2023-04-24 PROCEDURE — 3074F SYST BP LT 130 MM HG: CPT | Performed by: FAMILY MEDICINE

## 2023-04-24 PROCEDURE — G8417 CALC BMI ABV UP PARAM F/U: HCPCS | Performed by: FAMILY MEDICINE

## 2023-04-24 PROCEDURE — 3017F COLORECTAL CA SCREEN DOC REV: CPT | Performed by: FAMILY MEDICINE

## 2023-04-24 PROCEDURE — 83550 IRON BINDING TEST: CPT

## 2023-04-24 PROCEDURE — 3078F DIAST BP <80 MM HG: CPT | Performed by: FAMILY MEDICINE

## 2023-04-24 PROCEDURE — G8427 DOCREV CUR MEDS BY ELIG CLIN: HCPCS | Performed by: FAMILY MEDICINE

## 2023-04-24 RX ORDER — DOCUSATE SODIUM 100 MG/1
100 CAPSULE, LIQUID FILLED ORAL 2 TIMES DAILY
Qty: 60 CAPSULE | Refills: 2 | Status: SHIPPED | OUTPATIENT
Start: 2023-04-24 | End: 2023-05-24

## 2023-04-24 RX ORDER — GLIMEPIRIDE 4 MG/1
4 TABLET ORAL
Qty: 90 TABLET | Refills: 3 | Status: SHIPPED | OUTPATIENT
Start: 2023-04-24

## 2023-04-24 RX ORDER — SPIRONOLACTONE 25 MG/1
25 TABLET ORAL 2 TIMES DAILY
Qty: 60 TABLET | Refills: 5 | Status: SHIPPED | OUTPATIENT
Start: 2023-04-24 | End: 2023-05-24

## 2023-04-24 RX ORDER — FAMOTIDINE 40 MG/1
40 TABLET, FILM COATED ORAL EVERY EVENING
Qty: 90 TABLET | Refills: 3 | Status: SHIPPED | OUTPATIENT
Start: 2023-04-24

## 2023-04-24 RX ORDER — LORATADINE 10 MG/1
TABLET ORAL
Qty: 90 TABLET | Refills: 3 | Status: SHIPPED | OUTPATIENT
Start: 2023-04-24

## 2023-04-24 RX ORDER — FUROSEMIDE 80 MG
TABLET ORAL
Qty: 135 TABLET | Refills: 3 | Status: CANCELLED | OUTPATIENT
Start: 2023-04-24

## 2023-04-24 RX ORDER — HYDROCODONE BITARTRATE AND ACETAMINOPHEN 10; 325 MG/1; MG/1
TABLET ORAL
Qty: 150 TABLET | Refills: 0 | Status: SHIPPED | OUTPATIENT
Start: 2023-04-24 | End: 2023-05-25

## 2023-04-24 RX ORDER — FLUCONAZOLE 150 MG/1
150 TABLET ORAL ONCE
Qty: 2 TABLET | Refills: 0 | Status: SHIPPED | OUTPATIENT
Start: 2023-04-24 | End: 2023-04-24

## 2023-04-24 RX ORDER — BUMETANIDE 2 MG/1
2 TABLET ORAL DAILY
Qty: 30 TABLET | Refills: 3 | Status: SHIPPED | OUTPATIENT
Start: 2023-04-24

## 2023-04-24 RX ORDER — PANTOPRAZOLE SODIUM 40 MG/1
40 TABLET, DELAYED RELEASE ORAL 2 TIMES DAILY
Qty: 60 TABLET | Refills: 3 | Status: SHIPPED | OUTPATIENT
Start: 2023-04-24

## 2023-04-24 RX ORDER — OXYCODONE HYDROCHLORIDE 30 MG/1
30 TABLET ORAL EVERY 4 HOURS PRN
Qty: 150 TABLET | Refills: 0 | Status: SHIPPED | OUTPATIENT
Start: 2023-04-24 | End: 2023-05-24

## 2023-04-24 RX ORDER — MONTELUKAST SODIUM 10 MG/1
TABLET ORAL
Qty: 90 TABLET | Refills: 3 | Status: SHIPPED | OUTPATIENT
Start: 2023-04-24

## 2023-04-24 RX ORDER — SIMVASTATIN 40 MG
TABLET ORAL
Qty: 90 TABLET | Refills: 3 | Status: SHIPPED | OUTPATIENT
Start: 2023-04-24

## 2023-04-24 ASSESSMENT — ENCOUNTER SYMPTOMS
BACK PAIN: 1
SHORTNESS OF BREATH: 1

## 2023-04-25 ENCOUNTER — TELEPHONE (OUTPATIENT)
Dept: FAMILY MEDICINE CLINIC | Age: 63
End: 2023-04-25

## 2023-04-25 ENCOUNTER — HOSPITAL ENCOUNTER (OUTPATIENT)
Facility: HOSPITAL | Age: 63
Discharge: HOME | End: 2023-04-25
Payer: MEDICARE

## 2023-04-25 DIAGNOSIS — G47.34 HYPOXIA, SLEEP RELATED: ICD-10-CM

## 2023-04-25 DIAGNOSIS — I10 ESSENTIAL HYPERTENSION: ICD-10-CM

## 2023-04-25 DIAGNOSIS — R53.83 FATIGUE, UNSPECIFIED TYPE: ICD-10-CM

## 2023-04-25 DIAGNOSIS — G47.33 OBSTRUCTIVE SLEEP APNEA: ICD-10-CM

## 2023-04-25 LAB
ALBUMIN SERPL-MCNC: 3.7 G/DL (ref 3.4–4.8)
ALBUMIN/GLOB SERPL: 1.1 {RATIO} (ref 0.8–2)
ALP SERPL-CCNC: 70 U/L (ref 25–100)
ALT SERPL-CCNC: 25 U/L (ref 4–36)
ANION GAP SERPL CALCULATED.3IONS-SCNC: 9 MMOL/L (ref 3–16)
AST SERPL-CCNC: 25 U/L (ref 8–33)
BASE EXCESS BLDA CALC-SCNC: 1.9 MMOL/L (ref -3–3)
BILIRUB SERPL-MCNC: 0.3 MG/DL (ref 0.3–1.2)
BUN SERPL-MCNC: 14 MG/DL (ref 6–20)
CALCIUM SERPL-MCNC: 9.6 MG/DL (ref 8.5–10.5)
CHLORIDE SERPL-SCNC: 101 MMOL/L (ref 98–107)
CO2 BLDA-SCNC: 29.6 MMOL/L (ref 24–30)
CO2 SERPL-SCNC: 27 MMOL/L (ref 20–30)
CREAT SERPL-MCNC: 0.9 MG/DL (ref 0.4–1.2)
ERYTHROCYTE [DISTWIDTH] IN BLOOD BY AUTOMATED COUNT: 15.2 % (ref 11–16)
GFR SERPLBLD CREATININE-BSD FMLA CKD-EPI: >60 ML/MIN/{1.73_M2}
GLOBULIN SER CALC-MCNC: 3.4 G/DL
GLUCOSE SERPL-MCNC: 148 MG/DL (ref 74–106)
HCO3 BLDA-SCNC: 28 MMOL/L (ref 22–26)
HCT VFR BLD AUTO: 40.6 % (ref 40–54)
HGB BLD-MCNC: 11.9 G/DL (ref 13–18)
INHALED O2 FLOW RATE: 0.21 %
IRON SATN MFR SERPL: 10 % (ref 20–50)
IRON SERPL-MCNC: 39 UG/DL (ref 59–158)
MCH RBC QN AUTO: 27.2 PG (ref 27–32)
MCHC RBC AUTO-ENTMCNC: 29.3 G/DL (ref 31–35)
MCV RBC AUTO: 92.9 FL (ref 80–100)
O2 THERAPY: ABNORMAL
PCO2 BLDA: 50.5 MMHG (ref 35–45)
PH BLDA: 7.36 [PH] (ref 7.35–7.45)
PLATELET # BLD AUTO: 132 K/UL (ref 150–400)
PMV BLD AUTO: 14.3 FL (ref 6–10)
PO2 BLDA: 72.3 MMHG (ref 80–100)
POTASSIUM SERPL-SCNC: 4.9 MMOL/L (ref 3.4–5.1)
PROT SERPL-MCNC: 7.1 G/DL (ref 6.4–8.3)
RBC # BLD AUTO: 4.37 M/UL (ref 4.5–6)
SAO2 % BLDA: 92.4 %
SODIUM SERPL-SCNC: 137 MMOL/L (ref 136–145)
TIBC SERPL-MCNC: 401 UG/DL (ref 250–450)
WBC # BLD AUTO: 6.8 K/UL (ref 4–11)

## 2023-04-25 PROCEDURE — 36415 COLL VENOUS BLD VENIPUNCTURE: CPT

## 2023-04-25 PROCEDURE — 82803 BLOOD GASES ANY COMBINATION: CPT

## 2023-04-25 RX ORDER — LANOLIN ALCOHOL/MO/W.PET/CERES
325 CREAM (GRAM) TOPICAL
Qty: 90 TABLET | Refills: 0 | Status: SHIPPED | OUTPATIENT
Start: 2023-04-25

## 2023-04-25 RX ORDER — TIZANIDINE 4 MG/1
4 TABLET ORAL EVERY 8 HOURS PRN
Qty: 90 TABLET | Refills: 0 | Status: SHIPPED | OUTPATIENT
Start: 2023-04-25 | End: 2023-05-25

## 2023-05-04 ENCOUNTER — OFFICE VISIT (OUTPATIENT)
Dept: FAMILY MEDICINE CLINIC | Age: 63
End: 2023-05-04

## 2023-05-04 VITALS
HEART RATE: 108 BPM | WEIGHT: 315 LBS | OXYGEN SATURATION: 88 % | SYSTOLIC BLOOD PRESSURE: 118 MMHG | BODY MASS INDEX: 38.36 KG/M2 | RESPIRATION RATE: 18 BRPM | HEIGHT: 76 IN | DIASTOLIC BLOOD PRESSURE: 68 MMHG

## 2023-05-04 DIAGNOSIS — I50.23 ACUTE ON CHRONIC SYSTOLIC CHF (CONGESTIVE HEART FAILURE) (HCC): Primary | ICD-10-CM

## 2023-05-04 DIAGNOSIS — L03.116 CELLULITIS OF LEFT LOWER EXTREMITY: ICD-10-CM

## 2023-05-04 RX ORDER — CEPHALEXIN 500 MG/1
500 CAPSULE ORAL 3 TIMES DAILY
Qty: 30 CAPSULE | Refills: 0 | Status: SHIPPED | OUTPATIENT
Start: 2023-05-04 | End: 2023-05-14

## 2023-05-04 ASSESSMENT — ENCOUNTER SYMPTOMS
SHORTNESS OF BREATH: 1
BACK PAIN: 1

## 2023-05-08 ENCOUNTER — OFFICE VISIT (OUTPATIENT)
Dept: FAMILY MEDICINE CLINIC | Age: 63
End: 2023-05-08

## 2023-05-08 VITALS
SYSTOLIC BLOOD PRESSURE: 138 MMHG | RESPIRATION RATE: 18 BRPM | OXYGEN SATURATION: 87 % | HEART RATE: 110 BPM | HEIGHT: 76 IN | DIASTOLIC BLOOD PRESSURE: 72 MMHG | WEIGHT: 315 LBS | BODY MASS INDEX: 38.36 KG/M2

## 2023-05-08 DIAGNOSIS — I50.9 ACUTE ON CHRONIC CONGESTIVE HEART FAILURE, UNSPECIFIED HEART FAILURE TYPE (HCC): ICD-10-CM

## 2023-05-08 DIAGNOSIS — F41.9 ANXIETY: ICD-10-CM

## 2023-05-08 DIAGNOSIS — L03.116 CELLULITIS OF LEFT LOWER EXTREMITY: Primary | ICD-10-CM

## 2023-05-08 DIAGNOSIS — M54.41 CHRONIC BILATERAL LOW BACK PAIN WITH RIGHT-SIDED SCIATICA: ICD-10-CM

## 2023-05-08 DIAGNOSIS — G89.29 CHRONIC BILATERAL LOW BACK PAIN WITH RIGHT-SIDED SCIATICA: ICD-10-CM

## 2023-05-08 RX ORDER — OXYCODONE HYDROCHLORIDE 30 MG/1
30 TABLET ORAL EVERY 4 HOURS PRN
Qty: 150 TABLET | Refills: 0 | Status: SHIPPED | OUTPATIENT
Start: 2023-05-08 | End: 2023-06-07

## 2023-05-08 RX ORDER — BUMETANIDE 2 MG/1
2 TABLET ORAL 2 TIMES DAILY
Qty: 60 TABLET | Refills: 3 | Status: SHIPPED | OUTPATIENT
Start: 2023-05-08

## 2023-05-08 RX ORDER — HYDROCODONE BITARTRATE AND ACETAMINOPHEN 10; 325 MG/1; MG/1
TABLET ORAL
Qty: 150 TABLET | Refills: 0 | Status: SHIPPED | OUTPATIENT
Start: 2023-05-08 | End: 2023-06-08

## 2023-05-08 RX ORDER — BUSPIRONE HYDROCHLORIDE 7.5 MG/1
7.5 TABLET ORAL 3 TIMES DAILY
Qty: 90 TABLET | Refills: 0 | Status: SHIPPED | OUTPATIENT
Start: 2023-05-08 | End: 2023-06-07

## 2023-05-08 ASSESSMENT — ENCOUNTER SYMPTOMS
BACK PAIN: 1
SHORTNESS OF BREATH: 1

## 2023-05-08 NOTE — PROGRESS NOTES
SUBJECTIVE:    Patient ID: Rachel Campos is a 57 y.o. male. Chief Complaint   Patient presents with    Wound Check       HPI: office visit  He is in the office today in follow-up of his leg swelling. He is doing somewhat better with his wrap. He says his feels like he is getting a little more fluid off. He says he is still having quite a bit of weakness. He still having some frustration with his mobility. He says he still has quite a bit of weakness. He has not had any medication problems that he can tell. Review of Systems   Constitutional:  Positive for chills, fatigue, fever and unexpected weight change. Respiratory:  Positive for shortness of breath. Cardiovascular:  Positive for leg swelling. Musculoskeletal:  Positive for arthralgias, back pain, gait problem and myalgias. Neurological:  Positive for weakness. All other systems reviewed and are negative. OBJECTIVE:  /72   Pulse (!) 110   Resp 18   Ht 6' 4\" (1.93 m)   Wt (!) 468 lb (212.3 kg)   SpO2 (!) 87% Comment: ra  BMI 56.97 kg/m²    Wt Readings from Last 3 Encounters:   05/08/23 (!) 468 lb (212.3 kg)   05/04/23 (!) 465 lb (210.9 kg)   04/24/23 (!) 463 lb (210 kg)     BP Readings from Last 3 Encounters:   05/08/23 138/72   05/04/23 118/68   04/24/23 (!) 150/75      Pulse Readings from Last 3 Encounters:   05/08/23 (!) 110   05/04/23 (!) 108   04/24/23 (!) 113     Body mass index is 56.97 kg/m². Resp Readings from Last 3 Encounters:   05/08/23 18   05/04/23 18   04/24/23 18     Past medical, surgical, family and social history were reviewed and updated with the patient. Physical Exam  Vitals and nursing note reviewed. Constitutional:       Appearance: Normal appearance. He is well-developed. HENT:      Head: Normocephalic.       Right Ear: Hearing, tympanic membrane, ear canal and external ear normal.      Left Ear: Hearing, tympanic membrane, ear canal and external ear normal.      Nose: Nose normal.

## 2023-05-08 NOTE — PROGRESS NOTES
Magaly boot applied to left lower extremity, and kerlex applied. Patient tolerated well and was instructed to stop by the office if dressing came off before apt with Era Sandifer on Monday. Patient verbalized understanding.

## 2023-05-08 NOTE — PROGRESS NOTES
Chief Complaint   Patient presents with    Wound Check       Have you seen any other physician or provider since your last visit no    Have you had any other diagnostic tests since your last visit? no    Have you changed or stopped any medications since your last visit? no

## 2023-05-15 ENCOUNTER — OFFICE VISIT (OUTPATIENT)
Dept: FAMILY MEDICINE CLINIC | Age: 63
End: 2023-05-15
Payer: MEDICARE

## 2023-05-15 VITALS
OXYGEN SATURATION: 91 % | DIASTOLIC BLOOD PRESSURE: 84 MMHG | TEMPERATURE: 97.2 F | WEIGHT: 315 LBS | HEART RATE: 110 BPM | SYSTOLIC BLOOD PRESSURE: 168 MMHG | BODY MASS INDEX: 38.36 KG/M2 | HEIGHT: 76 IN

## 2023-05-15 DIAGNOSIS — R60.0 LEG EDEMA, RIGHT: ICD-10-CM

## 2023-05-15 DIAGNOSIS — L03.116 CELLULITIS OF LEFT LOWER EXTREMITY: ICD-10-CM

## 2023-05-15 DIAGNOSIS — E66.01 MORBID OBESITY (HCC): ICD-10-CM

## 2023-05-15 DIAGNOSIS — Z79.4 TYPE 2 DIABETES MELLITUS WITH OTHER SPECIFIED COMPLICATION, WITH LONG-TERM CURRENT USE OF INSULIN (HCC): ICD-10-CM

## 2023-05-15 DIAGNOSIS — R60.0 PEDAL EDEMA: Primary | ICD-10-CM

## 2023-05-15 DIAGNOSIS — T40.2X5A THERAPEUTIC OPIOID-INDUCED CONSTIPATION (OIC): ICD-10-CM

## 2023-05-15 DIAGNOSIS — K59.03 THERAPEUTIC OPIOID-INDUCED CONSTIPATION (OIC): ICD-10-CM

## 2023-05-15 DIAGNOSIS — E11.69 TYPE 2 DIABETES MELLITUS WITH OTHER SPECIFIED COMPLICATION, WITH LONG-TERM CURRENT USE OF INSULIN (HCC): ICD-10-CM

## 2023-05-15 PROCEDURE — 1036F TOBACCO NON-USER: CPT | Performed by: NURSE PRACTITIONER

## 2023-05-15 PROCEDURE — G8417 CALC BMI ABV UP PARAM F/U: HCPCS | Performed by: NURSE PRACTITIONER

## 2023-05-15 PROCEDURE — 3079F DIAST BP 80-89 MM HG: CPT | Performed by: NURSE PRACTITIONER

## 2023-05-15 PROCEDURE — 3077F SYST BP >= 140 MM HG: CPT | Performed by: NURSE PRACTITIONER

## 2023-05-15 PROCEDURE — 2022F DILAT RTA XM EVC RTNOPTHY: CPT | Performed by: NURSE PRACTITIONER

## 2023-05-15 PROCEDURE — G8427 DOCREV CUR MEDS BY ELIG CLIN: HCPCS | Performed by: NURSE PRACTITIONER

## 2023-05-15 PROCEDURE — 3017F COLORECTAL CA SCREEN DOC REV: CPT | Performed by: NURSE PRACTITIONER

## 2023-05-15 PROCEDURE — 99214 OFFICE O/P EST MOD 30 MIN: CPT | Performed by: NURSE PRACTITIONER

## 2023-05-15 PROCEDURE — 3046F HEMOGLOBIN A1C LEVEL >9.0%: CPT | Performed by: NURSE PRACTITIONER

## 2023-05-15 RX ORDER — LUBIPROSTONE 8 UG/1
8 CAPSULE ORAL DAILY
Qty: 90 CAPSULE | Refills: 1 | Status: SHIPPED | OUTPATIENT
Start: 2023-05-15

## 2023-05-15 ASSESSMENT — ENCOUNTER SYMPTOMS
BACK PAIN: 1
SHORTNESS OF BREATH: 0
COLOR CHANGE: 1
CONSTIPATION: 1

## 2023-05-15 NOTE — PROGRESS NOTES
SUBJECTIVE:    Rachell Ge is a 57 y.o. male    Patient here today for wound recheck. Patient was seen last week by Dr Rita Michael and an Nilam [place]land boot was placed on left lower leg. Patient states that once we removed boot today, leg looks better and swelling has decreased. He states that his right lower leg is also swollen and has one area that is weeping. Patient denies pain but states that when legs swell they do feel tight. Patient states that he is not able to use compression stockings due to his obese abdomen. Patient has an open area on right great toe where he states that he hit his foot on the stove while walking through the house. Patient saw a podiatrist in the past but has not seen one in several years. Patient states that he is severely constipated. He states that he has not been taking linzess due to cost of medication. He states he cannot take miralax as it makes him incontinent of bowel. Discussed with patient his last A1c and diabetes management. Patient states blood sugars get into the high 200's lately but that is significantly improved from months ago. Patient is waiting for assistance with [place] [facility] from [facility]. Wound Check  He was originally treated more than 14 days ago (left lower leg). Previous treatment included oral antibiotics (UNNA boot). There has been no drainage from the wound. The redness has not changed (per patient). The swelling has improved. There is no pain present. He has no difficulty moving the affected extremity or digit. Chief Complaint   Patient presents with    Wound Check        Review of Systems   Constitutional:  Negative for chills and fever. Respiratory:  Negative for shortness of breath. Cardiovascular:  Positive for leg swelling. Negative for chest pain. Gastrointestinal:  Positive for constipation. Musculoskeletal:  Positive for arthralgias (Bilateral knees) and back pain (chronic).    Skin:  Positive for color change

## 2023-05-15 NOTE — PROGRESS NOTES
Chief Complaint   Patient presents with    Wound Check     Patient to the clinic today with a left leg wound check. Have you seen any other physician or provider since your last visit no    Have you had any other diagnostic tests since your last visit? no    Have you changed or stopped any medications since your last visit? no     I have recommended that this patient have a colonoscopy but he declines at this time. I have discussed the risks and benefits of this examination with him. The patient verbalizes understanding. Diabetic retinal exam completed this year?  No                       * If yes please have patient sign a records release to obtain record to update Health Maintenance                       * If no, please order referral for patient to be scheduled

## 2023-05-22 ENCOUNTER — OFFICE VISIT (OUTPATIENT)
Dept: FAMILY MEDICINE CLINIC | Age: 63
End: 2023-05-22
Payer: MEDICARE

## 2023-05-22 VITALS
RESPIRATION RATE: 18 BRPM | TEMPERATURE: 97.2 F | DIASTOLIC BLOOD PRESSURE: 72 MMHG | SYSTOLIC BLOOD PRESSURE: 110 MMHG | BODY MASS INDEX: 38.36 KG/M2 | HEART RATE: 112 BPM | HEIGHT: 76 IN | OXYGEN SATURATION: 93 % | WEIGHT: 315 LBS

## 2023-05-22 DIAGNOSIS — E11.621 DIABETIC ULCER OF TOE OF RIGHT FOOT ASSOCIATED WITH TYPE 2 DIABETES MELLITUS, UNSPECIFIED ULCER STAGE (HCC): Primary | ICD-10-CM

## 2023-05-22 DIAGNOSIS — M54.41 CHRONIC BILATERAL LOW BACK PAIN WITH RIGHT-SIDED SCIATICA: ICD-10-CM

## 2023-05-22 DIAGNOSIS — R60.0 PEDAL EDEMA: ICD-10-CM

## 2023-05-22 DIAGNOSIS — G89.29 CHRONIC BILATERAL LOW BACK PAIN WITH RIGHT-SIDED SCIATICA: ICD-10-CM

## 2023-05-22 DIAGNOSIS — F41.9 ANXIETY: ICD-10-CM

## 2023-05-22 DIAGNOSIS — L97.519 DIABETIC ULCER OF TOE OF RIGHT FOOT ASSOCIATED WITH TYPE 2 DIABETES MELLITUS, UNSPECIFIED ULCER STAGE (HCC): Primary | ICD-10-CM

## 2023-05-22 PROCEDURE — 3046F HEMOGLOBIN A1C LEVEL >9.0%: CPT | Performed by: FAMILY MEDICINE

## 2023-05-22 PROCEDURE — 3017F COLORECTAL CA SCREEN DOC REV: CPT | Performed by: FAMILY MEDICINE

## 2023-05-22 PROCEDURE — 3074F SYST BP LT 130 MM HG: CPT | Performed by: FAMILY MEDICINE

## 2023-05-22 PROCEDURE — 99213 OFFICE O/P EST LOW 20 MIN: CPT | Performed by: FAMILY MEDICINE

## 2023-05-22 PROCEDURE — 2022F DILAT RTA XM EVC RTNOPTHY: CPT | Performed by: FAMILY MEDICINE

## 2023-05-22 PROCEDURE — G8417 CALC BMI ABV UP PARAM F/U: HCPCS | Performed by: FAMILY MEDICINE

## 2023-05-22 PROCEDURE — G8427 DOCREV CUR MEDS BY ELIG CLIN: HCPCS | Performed by: FAMILY MEDICINE

## 2023-05-22 PROCEDURE — 1036F TOBACCO NON-USER: CPT | Performed by: FAMILY MEDICINE

## 2023-05-22 PROCEDURE — 3078F DIAST BP <80 MM HG: CPT | Performed by: FAMILY MEDICINE

## 2023-05-22 RX ORDER — HYDROXYZINE PAMOATE 25 MG/1
25 CAPSULE ORAL 3 TIMES DAILY PRN
Qty: 90 CAPSULE | Refills: 0 | Status: SHIPPED | OUTPATIENT
Start: 2023-05-22 | End: 2023-06-03

## 2023-05-22 RX ORDER — TIZANIDINE 4 MG/1
4 TABLET ORAL EVERY 8 HOURS PRN
Qty: 90 TABLET | Refills: 0 | Status: SHIPPED | OUTPATIENT
Start: 2023-05-22 | End: 2023-06-21

## 2023-05-22 RX ORDER — HYDROCODONE BITARTRATE AND ACETAMINOPHEN 10; 325 MG/1; MG/1
TABLET ORAL
Qty: 150 TABLET | Refills: 0 | Status: SHIPPED | OUTPATIENT
Start: 2023-05-22 | End: 2023-06-22

## 2023-05-22 RX ORDER — GABAPENTIN 800 MG/1
TABLET ORAL
Qty: 120 TABLET | Refills: 2 | Status: SHIPPED | OUTPATIENT
Start: 2023-05-22 | End: 2023-07-10

## 2023-05-22 RX ORDER — FLUCONAZOLE 150 MG/1
150 TABLET ORAL ONCE
Qty: 2 TABLET | Refills: 0 | Status: SHIPPED | OUTPATIENT
Start: 2023-05-22 | End: 2023-05-22

## 2023-05-22 RX ORDER — LANOLIN ALCOHOL/MO/W.PET/CERES
325 CREAM (GRAM) TOPICAL
Qty: 90 TABLET | Refills: 0 | Status: SHIPPED | OUTPATIENT
Start: 2023-05-22

## 2023-05-22 RX ORDER — OXYCODONE HYDROCHLORIDE 30 MG/1
30 TABLET ORAL EVERY 4 HOURS PRN
Qty: 150 TABLET | Refills: 0 | Status: SHIPPED | OUTPATIENT
Start: 2023-05-22 | End: 2023-06-21

## 2023-05-22 ASSESSMENT — ENCOUNTER SYMPTOMS
SHORTNESS OF BREATH: 1
BACK PAIN: 1

## 2023-05-25 RX ORDER — HYDROCODONE BITARTRATE AND ACETAMINOPHEN 10; 325 MG/1; MG/1
TABLET ORAL
Qty: 150 TABLET | Refills: 0 | OUTPATIENT
Start: 2023-05-25 | End: 2023-04-16

## 2023-05-25 RX ORDER — ZOLPIDEM TARTRATE 12.5 MG/1
12.5 TABLET, FILM COATED, EXTENDED RELEASE ORAL NIGHTLY PRN
Qty: 30 TABLET | Refills: 2 | OUTPATIENT
Start: 2023-05-25 | End: 2023-06-24

## 2023-05-25 RX ORDER — OXYCODONE HYDROCHLORIDE 30 MG/1
30 TABLET ORAL EVERY 4 HOURS PRN
Qty: 150 TABLET | Refills: 0 | OUTPATIENT
Start: 2023-05-25 | End: 2023-06-24

## 2023-05-30 ENCOUNTER — OFFICE VISIT (OUTPATIENT)
Dept: FAMILY MEDICINE CLINIC | Age: 63
End: 2023-05-30
Payer: MEDICARE

## 2023-05-30 VITALS
DIASTOLIC BLOOD PRESSURE: 64 MMHG | TEMPERATURE: 98.6 F | HEIGHT: 76 IN | RESPIRATION RATE: 22 BRPM | WEIGHT: 315 LBS | BODY MASS INDEX: 38.36 KG/M2 | OXYGEN SATURATION: 92 % | HEART RATE: 109 BPM | SYSTOLIC BLOOD PRESSURE: 136 MMHG

## 2023-05-30 DIAGNOSIS — R60.0 LEG EDEMA, RIGHT: Primary | ICD-10-CM

## 2023-05-30 PROCEDURE — 1036F TOBACCO NON-USER: CPT | Performed by: NURSE PRACTITIONER

## 2023-05-30 PROCEDURE — G8427 DOCREV CUR MEDS BY ELIG CLIN: HCPCS | Performed by: NURSE PRACTITIONER

## 2023-05-30 PROCEDURE — 3017F COLORECTAL CA SCREEN DOC REV: CPT | Performed by: NURSE PRACTITIONER

## 2023-05-30 PROCEDURE — 3075F SYST BP GE 130 - 139MM HG: CPT | Performed by: NURSE PRACTITIONER

## 2023-05-30 PROCEDURE — 3078F DIAST BP <80 MM HG: CPT | Performed by: NURSE PRACTITIONER

## 2023-05-30 PROCEDURE — G8417 CALC BMI ABV UP PARAM F/U: HCPCS | Performed by: NURSE PRACTITIONER

## 2023-05-30 PROCEDURE — 99213 OFFICE O/P EST LOW 20 MIN: CPT | Performed by: NURSE PRACTITIONER

## 2023-05-30 NOTE — PROGRESS NOTES
Same day wound check
ASSESSMENT/PLAN    1. Leg edema, right  Pt to wear compression socks, keep legs elevated, avoid excessive sodium and keep appt with Dr. Dave Aguirre. He is agreeable to poc. Spent >20 mins with pt during appt.       BLACK Baltazar - CNP

## 2023-06-01 ENCOUNTER — TELEPHONE (OUTPATIENT)
Dept: FAMILY MEDICINE CLINIC | Age: 63
End: 2023-06-01

## 2023-06-01 NOTE — TELEPHONE ENCOUNTER
Patient called and stated that the Buspar isn't working and was wondering if you could send him in valium? He stated that was what helped him a while back.

## 2023-06-03 DIAGNOSIS — F41.9 ANXIETY: Primary | ICD-10-CM

## 2023-06-03 RX ORDER — DIAZEPAM 2 MG/1
2 TABLET ORAL EVERY 8 HOURS PRN
Qty: 60 TABLET | Refills: 0 | Status: SHIPPED | OUTPATIENT
Start: 2023-06-03 | End: 2023-07-03

## 2023-06-03 NOTE — PROGRESS NOTES
SUBJECTIVE:    Patient ID: Ghislaine Lunsford is a 57 y.o. male. No chief complaint on file. HPI:    Patient's medications,allergies, past medical, surgical, social and family histories were reviewed and updated as appropriate. .  Current Outpatient Medications on File Prior to Visit   Medication Sig Dispense Refill    tiZANidine (ZANAFLEX) 4 MG tablet Take 1 tablet by mouth every 8 hours as needed (muscle spasm) 90 tablet 0    ferrous sulfate (FE TABS) 325 (65 Fe) MG EC tablet Take 1 tablet by mouth daily (with breakfast) 90 tablet 0    oxyCODONE (OXY-IR) 30 MG immediate release tablet Take 1 tablet by mouth every 4 hours as needed for Pain for up to 30 days.  Max Daily Amount: 180 mg 150 tablet 0    HYDROcodone-acetaminophen (NORCO)  MG per tablet TAKE ONE TABLET BY MOUTH EVERY 4 HOURS AS NEEDED FOR PAIN 150 tablet 0    gabapentin (NEURONTIN) 800 MG tablet TAKE ONE TABLET BY MOUTH FOUR TIMES A  tablet 2    lubiprostone (AMITIZA) 8 MCG CAPS capsule Take 1 capsule by mouth daily 90 capsule 1    bumetanide (BUMEX) 2 MG tablet Take 1 tablet by mouth 2 times daily 60 tablet 3    spironolactone (ALDACTONE) 25 MG tablet Take 1 tablet by mouth 2 times daily 60 tablet 5    glimepiride (AMARYL) 4 MG tablet Take 1 tablet by mouth every morning (before breakfast) 90 tablet 3    famotidine (PEPCID) 40 MG tablet Take 1 tablet by mouth every evening 90 tablet 3    pantoprazole (PROTONIX) 40 MG tablet Take 1 tablet by mouth in the morning and at bedtime Stop pepcid 60 tablet 3    montelukast (SINGULAIR) 10 MG tablet TAKE ONE TABLET BY MOUTH NIGHTLY AS NEEDED FOR ALLERGIES 90 tablet 3    simvastatin (ZOCOR) 40 MG tablet TAKE ONE TABLET BY MOUTH AT BEDTIME FOR CHOLESTEROL 90 tablet 3    metFORMIN (GLUCOPHAGE) 1000 MG tablet TAKE ONE TABLET BY MOUTH TWICE A DAY FOR SUGAR CONTROL 180 tablet 3    loratadine (CLARITIN) 10 MG tablet Take one tablet by mouth daily as needed for allergies 90 tablet 3    vitamin D

## 2023-06-14 ENCOUNTER — OFFICE VISIT (OUTPATIENT)
Dept: FAMILY MEDICINE CLINIC | Age: 63
End: 2023-06-14

## 2023-06-14 VITALS
HEIGHT: 76 IN | TEMPERATURE: 97.3 F | RESPIRATION RATE: 18 BRPM | BODY MASS INDEX: 38.36 KG/M2 | WEIGHT: 315 LBS | SYSTOLIC BLOOD PRESSURE: 136 MMHG | HEART RATE: 106 BPM | OXYGEN SATURATION: 93 % | DIASTOLIC BLOOD PRESSURE: 68 MMHG

## 2023-06-14 DIAGNOSIS — G89.29 CHRONIC BILATERAL LOW BACK PAIN WITH RIGHT-SIDED SCIATICA: ICD-10-CM

## 2023-06-14 DIAGNOSIS — L03.115 CELLULITIS OF RIGHT LOWER EXTREMITY: Primary | ICD-10-CM

## 2023-06-14 DIAGNOSIS — K59.03 THERAPEUTIC OPIOID-INDUCED CONSTIPATION (OIC): ICD-10-CM

## 2023-06-14 DIAGNOSIS — R60.0 PEDAL EDEMA: ICD-10-CM

## 2023-06-14 DIAGNOSIS — M54.41 CHRONIC BILATERAL LOW BACK PAIN WITH RIGHT-SIDED SCIATICA: ICD-10-CM

## 2023-06-14 DIAGNOSIS — T40.2X5A THERAPEUTIC OPIOID-INDUCED CONSTIPATION (OIC): ICD-10-CM

## 2023-06-14 RX ORDER — LUBIPROSTONE 8 UG/1
8 CAPSULE ORAL DAILY
Qty: 90 CAPSULE | Refills: 1 | Status: CANCELLED | OUTPATIENT
Start: 2023-06-14

## 2023-06-14 RX ORDER — OXYCODONE HYDROCHLORIDE 30 MG/1
30 TABLET ORAL EVERY 4 HOURS PRN
Qty: 150 TABLET | Refills: 0 | Status: SHIPPED | OUTPATIENT
Start: 2023-06-14 | End: 2023-06-21 | Stop reason: SDUPTHER

## 2023-06-14 RX ORDER — TIZANIDINE 4 MG/1
4 TABLET ORAL EVERY 8 HOURS PRN
Qty: 90 TABLET | Refills: 0 | Status: SHIPPED | OUTPATIENT
Start: 2023-06-14 | End: 2023-07-14

## 2023-06-14 RX ORDER — LANOLIN ALCOHOL/MO/W.PET/CERES
325 CREAM (GRAM) TOPICAL
Qty: 90 TABLET | Refills: 0 | Status: SHIPPED | OUTPATIENT
Start: 2023-06-14

## 2023-06-14 RX ORDER — HYDROCODONE BITARTRATE AND ACETAMINOPHEN 10; 325 MG/1; MG/1
TABLET ORAL
Qty: 150 TABLET | Refills: 0 | Status: SHIPPED | OUTPATIENT
Start: 2023-06-14 | End: 2023-06-21 | Stop reason: SDUPTHER

## 2023-06-14 RX ORDER — DOXYCYCLINE HYCLATE 100 MG
100 TABLET ORAL 2 TIMES DAILY
Qty: 14 TABLET | Refills: 0 | Status: SHIPPED | OUTPATIENT
Start: 2023-06-14 | End: 2023-06-21

## 2023-06-16 DIAGNOSIS — F41.9 ANXIETY: ICD-10-CM

## 2023-06-16 DIAGNOSIS — E11.69 TYPE 2 DIABETES MELLITUS WITH OTHER SPECIFIED COMPLICATION, WITH LONG-TERM CURRENT USE OF INSULIN (HCC): ICD-10-CM

## 2023-06-16 DIAGNOSIS — Z79.4 TYPE 2 DIABETES MELLITUS WITH OTHER SPECIFIED COMPLICATION, WITH LONG-TERM CURRENT USE OF INSULIN (HCC): ICD-10-CM

## 2023-06-19 RX ORDER — HYDROXYZINE PAMOATE 25 MG/1
25 CAPSULE ORAL 3 TIMES DAILY PRN
Qty: 90 CAPSULE | Refills: 0 | OUTPATIENT
Start: 2023-06-19 | End: 2023-07-19

## 2023-06-19 RX ORDER — NITROGLYCERIN 0.4 MG/1
TABLET SUBLINGUAL
Qty: 25 TABLET | Refills: 0 | Status: SHIPPED | OUTPATIENT
Start: 2023-06-19

## 2023-06-19 NOTE — TELEPHONE ENCOUNTER
Requested Prescriptions     Signed Prescriptions Disp Refills    nitroGLYCERIN (NITROSTAT) 0.4 MG SL tablet 25 tablet 0     Sig: PLACE 1 TABLET UNDER THE TONGUE EVERY 5 MINUTES AS NEEDED FOR CHEST PAIN (MAX 3 TABLETS IN 15 MIN)     Authorizing Provider: Silvia Almaraz     Ordering User: Gabrielle Fink     Refused Prescriptions Disp Refills    hydrOXYzine pamoate (VISTARIL) 25 MG capsule [Pharmacy Med Name: HYDROXYZINE SHIRLEY 25 MG CAP 25 Capsule] 90 capsule 0     Sig: TAKE 1 CAPSULE BY MOUTH 3 TIMES DAILY AS NEEDED FOR ANXIETY     Refused By: Gabrielle Fink     Reason for Refusal: Medication discontinued

## 2023-06-21 ENCOUNTER — OFFICE VISIT (OUTPATIENT)
Dept: FAMILY MEDICINE CLINIC | Age: 63
End: 2023-06-21
Payer: MEDICARE

## 2023-06-21 VITALS
SYSTOLIC BLOOD PRESSURE: 110 MMHG | HEART RATE: 107 BPM | DIASTOLIC BLOOD PRESSURE: 68 MMHG | TEMPERATURE: 97.3 F | RESPIRATION RATE: 18 BRPM | BODY MASS INDEX: 38.36 KG/M2 | HEIGHT: 76 IN | OXYGEN SATURATION: 90 % | WEIGHT: 315 LBS

## 2023-06-21 DIAGNOSIS — E11.69 TYPE 2 DIABETES MELLITUS WITH OTHER SPECIFIED COMPLICATION, WITH LONG-TERM CURRENT USE OF INSULIN (HCC): ICD-10-CM

## 2023-06-21 DIAGNOSIS — M54.41 CHRONIC BILATERAL LOW BACK PAIN WITH RIGHT-SIDED SCIATICA: ICD-10-CM

## 2023-06-21 DIAGNOSIS — F41.9 ANXIETY: Primary | ICD-10-CM

## 2023-06-21 DIAGNOSIS — G89.29 CHRONIC BILATERAL LOW BACK PAIN WITH RIGHT-SIDED SCIATICA: ICD-10-CM

## 2023-06-21 DIAGNOSIS — Z79.4 TYPE 2 DIABETES MELLITUS WITH OTHER SPECIFIED COMPLICATION, WITH LONG-TERM CURRENT USE OF INSULIN (HCC): ICD-10-CM

## 2023-06-21 DIAGNOSIS — I50.9 ACUTE ON CHRONIC CONGESTIVE HEART FAILURE, UNSPECIFIED HEART FAILURE TYPE (HCC): ICD-10-CM

## 2023-06-21 PROCEDURE — 3046F HEMOGLOBIN A1C LEVEL >9.0%: CPT | Performed by: FAMILY MEDICINE

## 2023-06-21 PROCEDURE — 3078F DIAST BP <80 MM HG: CPT | Performed by: FAMILY MEDICINE

## 2023-06-21 PROCEDURE — G8417 CALC BMI ABV UP PARAM F/U: HCPCS | Performed by: FAMILY MEDICINE

## 2023-06-21 PROCEDURE — G8427 DOCREV CUR MEDS BY ELIG CLIN: HCPCS | Performed by: FAMILY MEDICINE

## 2023-06-21 PROCEDURE — 2022F DILAT RTA XM EVC RTNOPTHY: CPT | Performed by: FAMILY MEDICINE

## 2023-06-21 PROCEDURE — 1036F TOBACCO NON-USER: CPT | Performed by: FAMILY MEDICINE

## 2023-06-21 PROCEDURE — 99213 OFFICE O/P EST LOW 20 MIN: CPT | Performed by: FAMILY MEDICINE

## 2023-06-21 PROCEDURE — 3017F COLORECTAL CA SCREEN DOC REV: CPT | Performed by: FAMILY MEDICINE

## 2023-06-21 PROCEDURE — 3074F SYST BP LT 130 MM HG: CPT | Performed by: FAMILY MEDICINE

## 2023-06-21 RX ORDER — DIAZEPAM 5 MG/1
5 TABLET ORAL EVERY 12 HOURS PRN
Qty: 60 TABLET | Refills: 1 | Status: SHIPPED | OUTPATIENT
Start: 2023-06-21 | End: 2023-08-20

## 2023-06-21 RX ORDER — SPIRONOLACTONE 25 MG/1
TABLET ORAL
Qty: 90 TABLET | Refills: 2 | Status: SHIPPED | OUTPATIENT
Start: 2023-06-21

## 2023-06-21 RX ORDER — OXYCODONE HYDROCHLORIDE 30 MG/1
30 TABLET ORAL EVERY 4 HOURS PRN
Qty: 150 TABLET | Refills: 0 | Status: SHIPPED | OUTPATIENT
Start: 2023-06-21 | End: 2023-07-21

## 2023-06-21 RX ORDER — HYDROCODONE BITARTRATE AND ACETAMINOPHEN 10; 325 MG/1; MG/1
TABLET ORAL
Qty: 150 TABLET | Refills: 0 | Status: SHIPPED | OUTPATIENT
Start: 2023-06-21 | End: 2023-07-22

## 2023-07-05 ENCOUNTER — TELEPHONE (OUTPATIENT)
Dept: PRIMARY CARE CLINIC | Age: 63
End: 2023-07-05

## 2023-07-12 DIAGNOSIS — E11.69 TYPE 2 DIABETES MELLITUS WITH OTHER SPECIFIED COMPLICATION, WITH LONG-TERM CURRENT USE OF INSULIN (HCC): ICD-10-CM

## 2023-07-12 DIAGNOSIS — M54.41 CHRONIC BILATERAL LOW BACK PAIN WITH RIGHT-SIDED SCIATICA: ICD-10-CM

## 2023-07-12 DIAGNOSIS — Z79.4 TYPE 2 DIABETES MELLITUS WITH OTHER SPECIFIED COMPLICATION, WITH LONG-TERM CURRENT USE OF INSULIN (HCC): ICD-10-CM

## 2023-07-12 DIAGNOSIS — G89.29 CHRONIC BILATERAL LOW BACK PAIN WITH RIGHT-SIDED SCIATICA: ICD-10-CM

## 2023-07-12 RX ORDER — DOCUSATE SODIUM 100 MG/1
CAPSULE, LIQUID FILLED ORAL
Qty: 60 CAPSULE | Refills: 2 | Status: SHIPPED | OUTPATIENT
Start: 2023-07-12

## 2023-07-12 RX ORDER — TIZANIDINE 4 MG/1
TABLET ORAL
Qty: 90 TABLET | Refills: 0 | Status: SHIPPED | OUTPATIENT
Start: 2023-07-12

## 2023-07-12 RX ORDER — NITROGLYCERIN 0.4 MG/1
TABLET SUBLINGUAL
Qty: 25 TABLET | Refills: 0 | Status: SHIPPED | OUTPATIENT
Start: 2023-07-12

## 2023-07-14 ENCOUNTER — TELEPHONE (OUTPATIENT)
Dept: FAMILY MEDICINE CLINIC | Age: 63
End: 2023-07-14

## 2023-07-14 DIAGNOSIS — F32.A DEPRESSION, UNSPECIFIED DEPRESSION TYPE: ICD-10-CM

## 2023-07-15 DIAGNOSIS — L03.115 CELLULITIS OF RIGHT LOWER EXTREMITY: ICD-10-CM

## 2023-07-17 RX ORDER — DOXYCYCLINE HYCLATE 100 MG/1
CAPSULE ORAL
Qty: 14 CAPSULE | Refills: 0 | OUTPATIENT
Start: 2023-07-17

## 2023-07-17 RX ORDER — DULOXETIN HYDROCHLORIDE 30 MG/1
CAPSULE, DELAYED RELEASE ORAL
Qty: 30 CAPSULE | Refills: 3 | OUTPATIENT
Start: 2023-07-17

## 2023-07-17 NOTE — TELEPHONE ENCOUNTER
Patient notified and he stated that he didn't want to take the Cymbalta right now and that he would hold on to it.

## 2023-07-19 ENCOUNTER — OFFICE VISIT (OUTPATIENT)
Dept: FAMILY MEDICINE CLINIC | Age: 63
End: 2023-07-19
Payer: MEDICARE

## 2023-07-19 VITALS
HEART RATE: 110 BPM | WEIGHT: 315 LBS | DIASTOLIC BLOOD PRESSURE: 60 MMHG | OXYGEN SATURATION: 92 % | RESPIRATION RATE: 20 BRPM | HEIGHT: 76 IN | BODY MASS INDEX: 38.36 KG/M2 | SYSTOLIC BLOOD PRESSURE: 132 MMHG

## 2023-07-19 DIAGNOSIS — Z00.00 MEDICARE ANNUAL WELLNESS VISIT, SUBSEQUENT: Primary | ICD-10-CM

## 2023-07-19 PROCEDURE — 3017F COLORECTAL CA SCREEN DOC REV: CPT | Performed by: FAMILY MEDICINE

## 2023-07-19 PROCEDURE — 3078F DIAST BP <80 MM HG: CPT | Performed by: FAMILY MEDICINE

## 2023-07-19 PROCEDURE — 3075F SYST BP GE 130 - 139MM HG: CPT | Performed by: FAMILY MEDICINE

## 2023-07-19 PROCEDURE — G0439 PPPS, SUBSEQ VISIT: HCPCS | Performed by: FAMILY MEDICINE

## 2023-07-19 ASSESSMENT — PATIENT HEALTH QUESTIONNAIRE - PHQ9
3. TROUBLE FALLING OR STAYING ASLEEP: 1
SUM OF ALL RESPONSES TO PHQ9 QUESTIONS 1 & 2: 3
6. FEELING BAD ABOUT YOURSELF - OR THAT YOU ARE A FAILURE OR HAVE LET YOURSELF OR YOUR FAMILY DOWN: 0
SUM OF ALL RESPONSES TO PHQ QUESTIONS 1-9: 5
2. FEELING DOWN, DEPRESSED OR HOPELESS: 1
5. POOR APPETITE OR OVEREATING: 0
9. THOUGHTS THAT YOU WOULD BE BETTER OFF DEAD, OR OF HURTING YOURSELF: 0
SUM OF ALL RESPONSES TO PHQ QUESTIONS 1-9: 5
4. FEELING TIRED OR HAVING LITTLE ENERGY: 1
SUM OF ALL RESPONSES TO PHQ QUESTIONS 1-9: 5
8. MOVING OR SPEAKING SO SLOWLY THAT OTHER PEOPLE COULD HAVE NOTICED. OR THE OPPOSITE, BEING SO FIGETY OR RESTLESS THAT YOU HAVE BEEN MOVING AROUND A LOT MORE THAN USUAL: 0
1. LITTLE INTEREST OR PLEASURE IN DOING THINGS: 2
7. TROUBLE CONCENTRATING ON THINGS, SUCH AS READING THE NEWSPAPER OR WATCHING TELEVISION: 0
SUM OF ALL RESPONSES TO PHQ QUESTIONS 1-9: 5
10. IF YOU CHECKED OFF ANY PROBLEMS, HOW DIFFICULT HAVE THESE PROBLEMS MADE IT FOR YOU TO DO YOUR WORK, TAKE CARE OF THINGS AT HOME, OR GET ALONG WITH OTHER PEOPLE: 0

## 2023-07-19 ASSESSMENT — LIFESTYLE VARIABLES
HOW OFTEN DO YOU HAVE A DRINK CONTAINING ALCOHOL: NEVER
HOW MANY STANDARD DRINKS CONTAINING ALCOHOL DO YOU HAVE ON A TYPICAL DAY: PATIENT DOES NOT DRINK

## 2023-07-19 NOTE — PATIENT INSTRUCTIONS
Health Maintenance Due   Topic Date Due    Hepatitis C Screening  1956    Pneumococcal 19-64 Medium Risk (1 of 1 - PPSV23) 08/19/1973    DTaP/Tdap/Td series (1 - Tdap) 08/19/1975    ZOSTER VACCINE AGE 60>  06/19/2014    EYE EXAM RETINAL OR DILATED Q1  03/17/2016    PAP AKA CERVICAL CYTOLOGY  06/26/2016    BREAST CANCER SCRN MAMMOGRAM  12/28/2017       Chief Complaint   Patient presents with    Cholesterol Problem     4 month follow up    Diabetes    Hypertension       1. Have you been to the ER, urgent care clinic since your last visit? Hospitalized since your last visit? No    2. Have you seen or consulted any other health care providers outside of the [facility] since your last visit? Include any pap smears or colon screening. No    3) Do you have an Advance Directive on file? no    4) Are you interested in receiving information on Advance Directives? NO      Patient is accompanied by self I have received verbal consent from Dennis Wallace to discuss any/all medical information while they are present in the room. ordered immunizations, labs, imaging, and/or referrals for you. A list of these orders (if applicable) as well as your Preventive Care list are included within your After Visit Summary for your review. Other Preventive Recommendations:    A preventive eye exam performed by an eye specialist is recommended every 1-2 years to screen for glaucoma; cataracts, macular degeneration, and other eye disorders. A preventive dental visit is recommended every 6 months. Try to get at least 150 minutes of exercise per week or 10,000 steps per day on a pedometer . Order or download the FREE \"Exercise & Physical Activity: Your Everyday Guide\" from [employer]. Call 6-565.220.5785 or search [employer] online. You need 8877-8561 mg of calcium and 5401-3388 IU of vitamin D per day. It is possible to meet your calcium requirement with diet alone, but a vitamin D supplement is usually necessary to meet this goal.  When exposed to the sun, use a sunscreen that protects against both UVA and UVB radiation with an SPF of 30 or greater. Reapply every 2 to 3 hours or after sweating, drying off with a towel, or swimming. Always wear a seat belt when traveling in a car. Always wear a helmet when riding a bicycle or motorcycle.

## 2023-07-19 NOTE — PROGRESS NOTES
Medicare Annual Wellness Visit    Tracy Townsend is here for Medicare AWV    Assessment & Plan   Medicare annual wellness visit, subsequent    Recommendations for Preventive Services Due: see orders and patient instructions/AVS.  Recommended screening schedule for the next 5-10 years is provided to the patient in written form: see Patient Instructions/AVS.     No follow-ups on file. Subjective   The following acute and/or chronic problems were also addressed today:      Patient's complete Health Risk Assessment and screening values have been reviewed and are found in Flowsheets. The following problems were reviewed today and where indicated follow up appointments were made and/or referrals ordered. Positive Risk Factor Screenings with Interventions:    Fall Risk:  Do you feel unsteady or are you worried about falling? : (!) yes  2 or more falls in past year?: (!) yes  Fall with injury in past year?: no     Interventions:    Patient advised to follow-up in this office for further evaluation and treatment     Depression:  PHQ-2 Score: 3  PHQ-9 Total Score: 5    Interpretation:   1-4 = minimal  5-9 = mild  10-14 = moderate  15-19 = moderately severe  20-27 = severe  Interventions:  Life style changes to improve mood reviewed           Opioid Risk: (Low risk score <55) Opioid risk score: 36    Patient is low risk for opioid use disorder or overdose. Last PDMP Ludin Conrad as Reviewed:  Review User Review Instant Review Result   Larry Sandoval 6/13/2023  7:01 PM     Reviewed PDMP [1]     Last Controlled Substance Monitoring Documentation      Two [facility] Office Visit from 2/21/2023 in [address]   Periodic Controlled Substance Monitoring Possible medication side effects, risk of tolerance/dependence & alternative treatments discussed., No signs of potential drug abuse or diversion identified. , Assessed functional status., Obtaining appropriate analgesic effect of treatment.  filed at 02/21/2023

## 2023-07-26 ENCOUNTER — NURSE ONLY (OUTPATIENT)
Dept: FAMILY MEDICINE CLINIC | Age: 63
End: 2023-07-26

## 2023-07-26 DIAGNOSIS — Z48.00 CHANGE OF DRESSING: Primary | ICD-10-CM

## 2023-07-27 NOTE — PROGRESS NOTES
Duoderm applied to right buttocks wound - wound is healing well - no signs of infection or drainage.

## 2023-08-14 DIAGNOSIS — M54.41 CHRONIC BILATERAL LOW BACK PAIN WITH RIGHT-SIDED SCIATICA: ICD-10-CM

## 2023-08-14 DIAGNOSIS — Z79.4 TYPE 2 DIABETES MELLITUS WITH OTHER SPECIFIED COMPLICATION, WITH LONG-TERM CURRENT USE OF INSULIN (HCC): ICD-10-CM

## 2023-08-14 DIAGNOSIS — F41.9 ANXIETY: ICD-10-CM

## 2023-08-14 DIAGNOSIS — E11.69 TYPE 2 DIABETES MELLITUS WITH OTHER SPECIFIED COMPLICATION, WITH LONG-TERM CURRENT USE OF INSULIN (HCC): ICD-10-CM

## 2023-08-14 DIAGNOSIS — G89.29 CHRONIC BILATERAL LOW BACK PAIN WITH RIGHT-SIDED SCIATICA: ICD-10-CM

## 2023-08-15 ENCOUNTER — TELEPHONE (OUTPATIENT)
Dept: FAMILY MEDICINE CLINIC | Age: 63
End: 2023-08-15

## 2023-08-15 DIAGNOSIS — F41.9 ANXIETY: ICD-10-CM

## 2023-08-15 DIAGNOSIS — M54.41 CHRONIC BILATERAL LOW BACK PAIN WITH RIGHT-SIDED SCIATICA: ICD-10-CM

## 2023-08-15 DIAGNOSIS — G89.29 CHRONIC BILATERAL LOW BACK PAIN WITH RIGHT-SIDED SCIATICA: ICD-10-CM

## 2023-08-15 RX ORDER — HYDROCODONE BITARTRATE AND ACETAMINOPHEN 10; 325 MG/1; MG/1
TABLET ORAL
Qty: 50 TABLET | Refills: 0 | Status: SHIPPED | OUTPATIENT
Start: 2023-08-15 | End: 2023-08-21 | Stop reason: SDUPTHER

## 2023-08-15 RX ORDER — DIAZEPAM 5 MG/1
5 TABLET ORAL EVERY 12 HOURS PRN
Qty: 60 TABLET | Refills: 0 | Status: SHIPPED | OUTPATIENT
Start: 2023-08-15 | End: 2023-09-12

## 2023-08-15 RX ORDER — NITROGLYCERIN 0.4 MG/1
TABLET SUBLINGUAL
Qty: 25 TABLET | Refills: 0 | Status: SHIPPED | OUTPATIENT
Start: 2023-08-15

## 2023-08-15 RX ORDER — OXYCODONE HYDROCHLORIDE 30 MG/1
30 TABLET ORAL EVERY 4 HOURS PRN
Qty: 150 TABLET | Refills: 0 | OUTPATIENT
Start: 2023-08-15 | End: 2023-09-14

## 2023-08-15 RX ORDER — INSULIN GLARGINE 100 [IU]/ML
100 INJECTION, SOLUTION SUBCUTANEOUS 2 TIMES DAILY
Qty: 60 ML | Refills: 2 | Status: SHIPPED | OUTPATIENT
Start: 2023-08-15 | End: 2023-09-14

## 2023-08-15 RX ORDER — HYDROCODONE BITARTRATE AND ACETAMINOPHEN 10; 325 MG/1; MG/1
TABLET ORAL
Qty: 150 TABLET | Refills: 0 | Status: CANCELLED | OUTPATIENT
Start: 2023-08-15 | End: 2023-09-15

## 2023-08-15 RX ORDER — OXYCODONE HYDROCHLORIDE 30 MG/1
30 TABLET ORAL EVERY 4 HOURS PRN
Qty: 50 TABLET | Refills: 0 | Status: SHIPPED | OUTPATIENT
Start: 2023-08-15 | End: 2023-08-21 | Stop reason: SDUPTHER

## 2023-08-15 RX ORDER — HYDROCODONE BITARTRATE AND ACETAMINOPHEN 10; 325 MG/1; MG/1
TABLET ORAL
Qty: 150 TABLET | Refills: 0 | OUTPATIENT
Start: 2023-08-15

## 2023-08-15 RX ORDER — TIZANIDINE 4 MG/1
TABLET ORAL
Qty: 90 TABLET | Refills: 0 | Status: SHIPPED | OUTPATIENT
Start: 2023-08-15

## 2023-08-15 RX ORDER — SEMAGLUTIDE 0.68 MG/ML
INJECTION, SOLUTION SUBCUTANEOUS
Qty: 3 ML | Refills: 0 | OUTPATIENT
Start: 2023-08-15

## 2023-08-15 RX ORDER — DIAZEPAM 5 MG/1
TABLET ORAL
Qty: 60 TABLET | Refills: 1 | OUTPATIENT
Start: 2023-08-15

## 2023-08-15 RX ORDER — DIAZEPAM 5 MG/1
5 TABLET ORAL EVERY 12 HOURS PRN
Qty: 60 TABLET | Refills: 1 | Status: CANCELLED | OUTPATIENT
Start: 2023-08-15 | End: 2023-10-14

## 2023-08-15 NOTE — TELEPHONE ENCOUNTER
Patient called, requested refill.        Next Office Visit Date:  Future Appointments   Date Time Provider [address]   9/12/2023  3:30 PM Nelson Frye MD [address] [place] [address]   7/23/2024  3:00 PM Nelson rFye MD [facility] please review via PDMP     Sent to Dr. Boo Dunn for approval. Dr. Jeffrey Buchanan is currently out of the office

## 2023-08-15 NOTE — TELEPHONE ENCOUNTER
----- Message from [place], [place] sent at 8/14/2023  8:52 AM EDT -----  Regarding: refills  Needs refills on hydrocodone, oxycodone, and diazapam

## 2023-08-15 NOTE — TELEPHONE ENCOUNTER
Would recommend weaning meds specially with respiratory status/CHF/obesity. 10-day supply of pain medication were sent.    I would recommend urine drug screen and pill count in the next few weeks

## 2023-08-21 DIAGNOSIS — G89.29 CHRONIC BILATERAL LOW BACK PAIN WITH RIGHT-SIDED SCIATICA: ICD-10-CM

## 2023-08-21 DIAGNOSIS — M54.41 CHRONIC BILATERAL LOW BACK PAIN WITH RIGHT-SIDED SCIATICA: ICD-10-CM

## 2023-08-21 RX ORDER — OXYCODONE HYDROCHLORIDE 30 MG/1
30 TABLET ORAL EVERY 4 HOURS PRN
Qty: 120 TABLET | Refills: 0 | Status: SHIPPED | OUTPATIENT
Start: 2023-08-21 | End: 2023-09-20

## 2023-08-21 RX ORDER — HYDROCODONE BITARTRATE AND ACETAMINOPHEN 10; 325 MG/1; MG/1
TABLET ORAL
Qty: 150 TABLET | Refills: 0 | Status: SHIPPED | OUTPATIENT
Start: 2023-08-21 | End: 2023-10-01

## 2023-08-21 NOTE — TELEPHONE ENCOUNTER
Patient called, requested refill.        Next Office Visit Date:  Future Appointments   Date Time Provider [address]   9/12/2023  3:30 PM Greta Avila MD [address] [place] [address]   7/23/2024  3:00 PM Greta Avila MD [facility] please review via [address]

## 2023-08-28 DIAGNOSIS — E11.69 TYPE 2 DIABETES MELLITUS WITH OTHER SPECIFIED COMPLICATION, WITH LONG-TERM CURRENT USE OF INSULIN (HCC): ICD-10-CM

## 2023-08-28 DIAGNOSIS — Z79.4 TYPE 2 DIABETES MELLITUS WITH OTHER SPECIFIED COMPLICATION, WITH LONG-TERM CURRENT USE OF INSULIN (HCC): ICD-10-CM

## 2023-08-29 DIAGNOSIS — L97.519 DIABETIC ULCER OF TOE OF RIGHT FOOT ASSOCIATED WITH TYPE 2 DIABETES MELLITUS, UNSPECIFIED ULCER STAGE (HCC): ICD-10-CM

## 2023-08-29 DIAGNOSIS — E11.621 DIABETIC ULCER OF TOE OF RIGHT FOOT ASSOCIATED WITH TYPE 2 DIABETES MELLITUS, UNSPECIFIED ULCER STAGE (HCC): ICD-10-CM

## 2023-08-29 NOTE — TELEPHONE ENCOUNTER
Patient called, requested refill.        Next Office Visit Date:  Future Appointments   Date Time Provider [address]   9/12/2023  3:30 PM Abhilash Jimenes MD [address] [place] [address]   7/23/2024  3:00 PM MD Marin Johnson please review via [address]     Patient called needing this for a yeast problem

## 2023-09-01 RX ORDER — FLUCONAZOLE 150 MG/1
150 TABLET ORAL ONCE
Qty: 1 TABLET | Refills: 0 | Status: SHIPPED | OUTPATIENT
Start: 2023-09-01 | End: 2023-09-01

## 2023-09-05 ENCOUNTER — HOSPITAL ENCOUNTER (EMERGENCY)
Facility: HOSPITAL | Age: 63
Discharge: HOME | End: 2023-09-05
Attending: FAMILY MEDICINE
Payer: MEDICARE

## 2023-09-05 ENCOUNTER — APPOINTMENT (OUTPATIENT)
Dept: GENERAL RADIOLOGY | Facility: HOSPITAL | Age: 63
End: 2023-09-05
Attending: EMERGENCY MEDICINE
Payer: MEDICARE

## 2023-09-05 VITALS
HEART RATE: 112 BPM | WEIGHT: 315 LBS | RESPIRATION RATE: 18 BRPM | TEMPERATURE: 99.2 F | HEIGHT: 75 IN | OXYGEN SATURATION: 96 % | DIASTOLIC BLOOD PRESSURE: 98 MMHG | BODY MASS INDEX: 39.17 KG/M2 | SYSTOLIC BLOOD PRESSURE: 134 MMHG

## 2023-09-05 DIAGNOSIS — U07.1 ACUTE COVID-19: Primary | ICD-10-CM

## 2023-09-05 DIAGNOSIS — R09.02 HYPOXIA: ICD-10-CM

## 2023-09-05 LAB
ALBUMIN SERPL-MCNC: 3.7 G/DL (ref 3.4–4.8)
ALBUMIN/GLOB SERPL: 0.9 {RATIO} (ref 0.8–2)
ALP SERPL-CCNC: 72 U/L (ref 25–100)
ALT SERPL-CCNC: 30 U/L (ref 4–36)
ANION GAP SERPL CALCULATED.3IONS-SCNC: 11 MMOL/L (ref 3–16)
AST SERPL-CCNC: 33 U/L (ref 8–33)
BASE EXCESS BLDA CALC-SCNC: 1.6 MMOL/L (ref -3–3)
BASOPHILS # BLD: 0 K/UL (ref 0–0.1)
BASOPHILS NFR BLD: 0.4 %
BILIRUB SERPL-MCNC: 0.4 MG/DL (ref 0.3–1.2)
BUN SERPL-MCNC: 18 MG/DL (ref 6–20)
CALCIUM SERPL-MCNC: 9 MG/DL (ref 8.5–10.5)
CHLORIDE SERPL-SCNC: 99 MMOL/L (ref 98–107)
CO2 BLDA-SCNC: 29 MMOL/L (ref 24–30)
CO2 SERPL-SCNC: 25 MMOL/L (ref 20–30)
CREAT SERPL-MCNC: 1.2 MG/DL (ref 0.4–1.2)
EOSINOPHIL # BLD: 0 K/UL (ref 0–0.4)
EOSINOPHIL NFR BLD: 0 %
ERYTHROCYTE [DISTWIDTH] IN BLOOD BY AUTOMATED COUNT: 13.5 % (ref 11–16)
GFR SERPLBLD CREATININE-BSD FMLA CKD-EPI: >60 ML/MIN/{1.73_M2}
GLOBULIN SER CALC-MCNC: 4 G/DL
GLUCOSE SERPL-MCNC: 153 MG/DL (ref 74–106)
HCO3 BLDA-SCNC: 27.5 MMOL/L (ref 22–26)
HCT VFR BLD AUTO: 44.1 % (ref 40–54)
HGB BLD-MCNC: 13.8 G/DL (ref 13–18)
IMM GRANULOCYTES # BLD: 0.1 K/UL
IMM GRANULOCYTES NFR BLD: 0.6 % (ref 0–5)
INHALED O2 FLOW RATE: 0.28 %
LACTATE BLDV-SCNC: 2.1 MMOL/L (ref 0.4–1.9)
LACTATE BLDV-SCNC: 2.3 MMOL/L (ref 0.4–1.9)
LYMPHOCYTES # BLD: 1.3 K/UL (ref 1.5–4)
LYMPHOCYTES NFR BLD: 15 %
MCH RBC QN AUTO: 31.3 PG (ref 27–32)
MCHC RBC AUTO-ENTMCNC: 31.3 G/DL (ref 31–35)
MCV RBC AUTO: 100 FL (ref 80–100)
MONOCYTES # BLD: 1.2 K/UL (ref 0.2–0.8)
MONOCYTES NFR BLD: 13.9 %
NEUTROPHILS # BLD: 5.9 K/UL (ref 2–7.5)
NEUTS SEG NFR BLD: 70.1 %
O2 THERAPY: ABNORMAL
PCO2 BLDA: 48.3 MMHG (ref 35–45)
PH BLDA: 7.37 [PH] (ref 7.35–7.45)
PLATELET # BLD AUTO: 124 K/UL (ref 150–400)
PMV BLD AUTO: 12.5 FL (ref 6–10)
PO2 BLDA: 51.7 MMHG (ref 80–100)
POTASSIUM SERPL-SCNC: 5 MMOL/L (ref 3.4–5.1)
PROT SERPL-MCNC: 7.7 G/DL (ref 6.4–8.3)
RBC # BLD AUTO: 4.41 M/UL (ref 4.5–6)
SAO2 % BLDA: 85.9 %
SARS-COV-2 RDRP RESP QL NAA+PROBE: DETECTED
SODIUM SERPL-SCNC: 135 MMOL/L (ref 136–145)
TROPONIN, HIGH SENSITIVITY: 21 NG/L (ref 0–22)
TROPONIN, HIGH SENSITIVITY: 23 NG/L (ref 0–22)
WBC # BLD AUTO: 8.5 K/UL (ref 4–11)

## 2023-09-05 PROCEDURE — 82803 BLOOD GASES ANY COMBINATION: CPT

## 2023-09-05 PROCEDURE — 36600 WITHDRAWAL OF ARTERIAL BLOOD: CPT

## 2023-09-05 PROCEDURE — 96374 THER/PROPH/DIAG INJ IV PUSH: CPT

## 2023-09-05 PROCEDURE — 93005 ELECTROCARDIOGRAM TRACING: CPT

## 2023-09-05 PROCEDURE — 84484 ASSAY OF TROPONIN QUANT: CPT

## 2023-09-05 PROCEDURE — 6360000002 HC RX W HCPCS: Performed by: FAMILY MEDICINE

## 2023-09-05 PROCEDURE — 80053 COMPREHEN METABOLIC PANEL: CPT

## 2023-09-05 PROCEDURE — 36415 COLL VENOUS BLD VENIPUNCTURE: CPT

## 2023-09-05 PROCEDURE — 6370000000 HC RX 637 (ALT 250 FOR IP): Performed by: FAMILY MEDICINE

## 2023-09-05 PROCEDURE — 71045 X-RAY EXAM CHEST 1 VIEW: CPT

## 2023-09-05 PROCEDURE — 87040 BLOOD CULTURE FOR BACTERIA: CPT

## 2023-09-05 PROCEDURE — 6370000000 HC RX 637 (ALT 250 FOR IP): Performed by: EMERGENCY MEDICINE

## 2023-09-05 PROCEDURE — 83605 ASSAY OF LACTIC ACID: CPT

## 2023-09-05 PROCEDURE — 99285 EMERGENCY DEPT VISIT HI MDM: CPT

## 2023-09-05 PROCEDURE — 2580000003 HC RX 258: Performed by: FAMILY MEDICINE

## 2023-09-05 PROCEDURE — 87635 SARS-COV-2 COVID-19 AMP PRB: CPT

## 2023-09-05 PROCEDURE — 85025 COMPLETE CBC W/AUTO DIFF WBC: CPT

## 2023-09-05 RX ORDER — DEXAMETHASONE SODIUM PHOSPHATE 10 MG/ML
10 INJECTION INTRAMUSCULAR; INTRAVENOUS ONCE
Status: COMPLETED | OUTPATIENT
Start: 2023-09-05 | End: 2023-09-05

## 2023-09-05 RX ORDER — 0.9 % SODIUM CHLORIDE 0.9 %
1000 INTRAVENOUS SOLUTION INTRAVENOUS ONCE
Status: COMPLETED | OUTPATIENT
Start: 2023-09-05 | End: 2023-09-05

## 2023-09-05 RX ORDER — ALBUTEROL SULFATE 90 UG/1
2 AEROSOL, METERED RESPIRATORY (INHALATION) EVERY 4 HOURS PRN
Qty: 18 G | Refills: 0 | Status: SHIPPED | OUTPATIENT
Start: 2023-09-05

## 2023-09-05 RX ORDER — AZITHROMYCIN 250 MG/1
500 TABLET, FILM COATED ORAL ONCE
Status: COMPLETED | OUTPATIENT
Start: 2023-09-05 | End: 2023-09-05

## 2023-09-05 RX ORDER — AZITHROMYCIN 250 MG/1
250 TABLET, FILM COATED ORAL DAILY
Qty: 4 TABLET | Refills: 0 | Status: SHIPPED | OUTPATIENT
Start: 2023-09-05 | End: 2023-09-09

## 2023-09-05 RX ORDER — DEXAMETHASONE 4 MG/1
4 TABLET ORAL 2 TIMES DAILY WITH MEALS
Qty: 10 TABLET | Refills: 0 | Status: SHIPPED | OUTPATIENT
Start: 2023-09-05 | End: 2023-09-10

## 2023-09-05 RX ORDER — ACETAMINOPHEN 500 MG
1000 TABLET ORAL ONCE
Status: COMPLETED | OUTPATIENT
Start: 2023-09-05 | End: 2023-09-05

## 2023-09-05 RX ADMIN — ACETAMINOPHEN 1000 MG: 500 TABLET ORAL at 18:58

## 2023-09-05 RX ADMIN — AZITHROMYCIN DIHYDRATE 500 MG: 250 TABLET ORAL at 21:51

## 2023-09-05 RX ADMIN — SODIUM CHLORIDE 1000 ML: 9 INJECTION, SOLUTION INTRAVENOUS at 19:50

## 2023-09-05 RX ADMIN — DEXAMETHASONE SODIUM PHOSPHATE 10 MG: 10 INJECTION INTRAMUSCULAR; INTRAVENOUS at 21:51

## 2023-09-05 ASSESSMENT — ENCOUNTER SYMPTOMS
SORE THROAT: 1
COUGH: 1
SHORTNESS OF BREATH: 1

## 2023-09-05 ASSESSMENT — PAIN SCALES - GENERAL: PAINLEVEL_OUTOF10: 10

## 2023-09-05 ASSESSMENT — LIFESTYLE VARIABLES
HOW MANY STANDARD DRINKS CONTAINING ALCOHOL DO YOU HAVE ON A TYPICAL DAY: PATIENT DOES NOT DRINK
HOW OFTEN DO YOU HAVE A DRINK CONTAINING ALCOHOL: NEVER

## 2023-09-05 ASSESSMENT — PAIN - FUNCTIONAL ASSESSMENT: PAIN_FUNCTIONAL_ASSESSMENT: 0-10

## 2023-09-05 NOTE — ED NOTES
Pt presented via [facility]- c/o positive COVID at home today- c/o SOA, cough, Fever- pt denies diarrhea or vomiting- EMS reports confusion- upon arrival patient is Alert and orient to person and place- disoriented to time     Jake Valdez RN  09/05/23 6697

## 2023-09-05 NOTE — ED NOTES
Fela Lo RN talked with patient about who he wanted us to communicate with while he is here- patient advised to speak with Bigg his son- phone number [phone]- Fela Antonio updated daughter- law that patient request we communicate with son and then the son can update the rest of the family      Tawana Gardiner RN  09/05/23 2125

## 2023-09-05 NOTE — ED NOTES
I ask patient about last time he took Tylenol or Motrin and he states \"I have not idea\"- reported to Dr. Dahiana Gracia, RN  09/05/23 9187

## 2023-09-05 NOTE — ED TRIAGE NOTES
Per EMS report, was called to [facility], COVID+, low 80s on RA on arrival to scene, placed on 5LNC improved to 94%. However pt is know refusing to comply with wearing NC. Per EMS. No distress noted at this time, awaiting for ER bed to become available.

## 2023-09-06 NOTE — ED NOTES
Pt left ED in wheelchair with belongings at this time. Pt verbalized understanding of discharge instructions and prescriptions.       Jevon Floyd RN  09/05/23 4963

## 2023-09-06 NOTE — ED PROVIDER NOTES
PM      PATIENT REFERRED TO:    Pt to monitor symptoms at home. Pt to monitor oxygen at home if able. Medications sent to pharmacy. Pt to return for admission if symptoms continue to worsen.           DISCHARGE MEDICATIONS:  New Prescriptions    ALBUTEROL SULFATE HFA (VENTOLIN HFA) 108 (90 BASE) MCG/ACT INHALER    Inhale 2 puffs into the lungs every 4 hours as needed for Shortness of Breath    AZITHROMYCIN (ZITHROMAX) 250 MG TABLET    Take 1 tablet by mouth daily for 4 days    DEXAMETHASONE (DECADRON) 4 MG TABLET    Take 1 tablet by mouth 2 times daily (with meals) for 5 days       DISCONTINUED MEDICATIONS:  Discontinued Medications    No medications on file              (Please note that portions of this note were completed with a voice recognition program.  Efforts were made to edit the dictations but occasionally words are mis-transcribed.)    Hari Márquez DO (electronically signed)            Hari Márquez DO  09/05/23 6284

## 2023-09-07 ENCOUNTER — TELEPHONE (OUTPATIENT)
Dept: FAMILY MEDICINE CLINIC | Age: 63
End: 2023-09-07

## 2023-09-07 NOTE — CARE COORDINATION
Spoke with pt, he says he is feeeling 100% better than when he was in the ED. No SOA though he still is feeling ill. He has placed a call to his PCP and is waiting a call back.

## 2023-09-07 NOTE — TELEPHONE ENCOUNTER
Gerson Riddle called and said he went to [facility] and they diagnosed him with Covid. They gave him Azithromycin, Dexamethasone, and Ventolin HFA inhaler. He was wondering if you thought he needed to take anything else? Abdomen soft, non-tender, no guarding.

## 2023-09-07 NOTE — TELEPHONE ENCOUNTER
Yes he needs to be on Paxlovid. I will send that to the pharmacy for him now.   He can continue to take the azithromycin cough medicine and inhaler

## 2023-09-10 LAB
BACTERIA BLD CULT ORG #2: NORMAL
BACTERIA BLD CULT: NORMAL

## 2023-09-11 DIAGNOSIS — G47.00 INSOMNIA, UNSPECIFIED TYPE: ICD-10-CM

## 2023-09-11 DIAGNOSIS — K21.9 GASTROESOPHAGEAL REFLUX DISEASE, UNSPECIFIED WHETHER ESOPHAGITIS PRESENT: ICD-10-CM

## 2023-09-11 DIAGNOSIS — G89.29 CHRONIC BILATERAL LOW BACK PAIN WITH RIGHT-SIDED SCIATICA: ICD-10-CM

## 2023-09-11 DIAGNOSIS — F41.9 ANXIETY: ICD-10-CM

## 2023-09-11 DIAGNOSIS — I50.9 ACUTE ON CHRONIC CONGESTIVE HEART FAILURE, UNSPECIFIED HEART FAILURE TYPE (HCC): ICD-10-CM

## 2023-09-11 DIAGNOSIS — E11.69 TYPE 2 DIABETES MELLITUS WITH OTHER SPECIFIED COMPLICATION, WITH LONG-TERM CURRENT USE OF INSULIN (HCC): ICD-10-CM

## 2023-09-11 DIAGNOSIS — Z79.4 TYPE 2 DIABETES MELLITUS WITH OTHER SPECIFIED COMPLICATION, WITH LONG-TERM CURRENT USE OF INSULIN (HCC): ICD-10-CM

## 2023-09-11 DIAGNOSIS — M54.41 CHRONIC BILATERAL LOW BACK PAIN WITH RIGHT-SIDED SCIATICA: ICD-10-CM

## 2023-09-11 RX ORDER — HYDROCODONE BITARTRATE AND ACETAMINOPHEN 10; 325 MG/1; MG/1
TABLET ORAL
Qty: 150 TABLET | Refills: 0 | OUTPATIENT
Start: 2023-09-11

## 2023-09-11 RX ORDER — ZOLPIDEM TARTRATE 12.5 MG/1
TABLET, FILM COATED, EXTENDED RELEASE ORAL
Qty: 30 TABLET | Refills: 2 | OUTPATIENT
Start: 2023-09-11

## 2023-09-11 RX ORDER — OXYCODONE HYDROCHLORIDE 30 MG/1
30 TABLET ORAL EVERY 4 HOURS PRN
Qty: 120 TABLET | Refills: 0 | OUTPATIENT
Start: 2023-09-11 | End: 2023-10-11

## 2023-09-11 NOTE — TELEPHONE ENCOUNTER
Patient called, requested refill.        Next Office Visit Date:  Future Appointments   Date Time Provider [address]   9/12/2023  3:30 PM Enrique Allred MD [address] [place] [address]   7/23/2024  3:00 PM MD Marin Guerra please review via [address]

## 2023-09-11 NOTE — TELEPHONE ENCOUNTER
Patient called, requested refill.        Next Office Visit Date:  Future Appointments   Date Time Provider [address]   9/12/2023  3:30 PM Minesh Mahmood MD [address] [place] [address]   7/23/2024  3:00 PM MD Marin Narvaez please review via [phone]

## 2023-09-11 NOTE — TELEPHONE ENCOUNTER
Patient called, requested refill. Next Office Visit Date:  Future Appointments   Date Time Provider [address]   9/12/2023  3:30 PM Blossom Pate MD [address] [place] [address]   7/23/2024  3:00 PM Blossom Pate MD [facility] please review via PDMP   Requested Prescriptions     Pending Prescriptions Disp Refills    Semaglutide,0.25 or 0.5MG/DOS, 2 MG/1.5ML SOPN 3 mL 0     Sig: Inject 0.25 mg into the skin once a week    oxyCODONE (OXY-IR) 30 MG immediate release tablet 120 tablet 0     Sig: Take 1 tablet by mouth every 4 hours as needed for Pain for up to 30 days. Max Daily Amount: 180 mg    HYDROcodone-acetaminophen (NORCO)  MG per tablet 150 tablet 0     Sig: TAKE ONE TABLET BY MOUTH EVERY 4 HOURS AS NEEDED FOR PAIN    tiZANidine (ZANAFLEX) 4 MG tablet 90 tablet 0    diazePAM (VALIUM) 5 MG tablet 60 tablet 0     Sig: Take 1 tablet by mouth every 12 hours as needed for Anxiety or Sleep for up to 60 days.  Max Daily Amount: 10 mg    docusate sodium (COLACE) 100 MG capsule 60 capsule 2    spironolactone (ALDACTONE) 25 MG tablet 90 tablet 2     Sig: Take 2 in the am and 1 in the evening    ferrous sulfate (FE TABS) 325 (65 Fe) MG EC tablet 90 tablet 0     Sig: Take 1 tablet by mouth daily (with breakfast)    gabapentin (NEURONTIN) 800 MG tablet 120 tablet 2     Sig: TAKE ONE TABLET BY MOUTH FOUR TIMES A DAY    bumetanide (BUMEX) 2 MG tablet 60 tablet 3     Sig: Take 1 tablet by mouth 2 times daily    pantoprazole (PROTONIX) 40 MG tablet 60 tablet 3     Sig: Take 1 tablet by mouth in the morning and at bedtime Stop pepcid    empagliflozin (JARDIANCE) 25 MG tablet 30 tablet 3     Sig: Take 1 tablet by mouth daily

## 2023-09-12 RX ORDER — DOCUSATE SODIUM 100 MG/1
CAPSULE, LIQUID FILLED ORAL
Qty: 60 CAPSULE | Refills: 2 | OUTPATIENT
Start: 2023-09-12

## 2023-09-12 RX ORDER — OXYCODONE HYDROCHLORIDE 30 MG/1
30 TABLET ORAL EVERY 4 HOURS PRN
Qty: 120 TABLET | Refills: 0 | Status: SHIPPED | OUTPATIENT
Start: 2023-09-12 | End: 2023-10-12

## 2023-09-12 RX ORDER — PANTOPRAZOLE SODIUM 40 MG/1
40 TABLET, DELAYED RELEASE ORAL 2 TIMES DAILY
Qty: 60 TABLET | Refills: 3 | Status: SHIPPED | OUTPATIENT
Start: 2023-09-12

## 2023-09-12 RX ORDER — LANOLIN ALCOHOL/MO/W.PET/CERES
325 CREAM (GRAM) TOPICAL
Qty: 90 TABLET | Refills: 0 | Status: SHIPPED | OUTPATIENT
Start: 2023-09-12

## 2023-09-12 RX ORDER — SPIRONOLACTONE 25 MG/1
TABLET ORAL
Qty: 90 TABLET | Refills: 2 | Status: SHIPPED | OUTPATIENT
Start: 2023-09-12

## 2023-09-12 RX ORDER — TIZANIDINE 4 MG/1
TABLET ORAL
Qty: 90 TABLET | Refills: 0 | Status: SHIPPED | OUTPATIENT
Start: 2023-09-12

## 2023-09-12 RX ORDER — NITROGLYCERIN 0.4 MG/1
TABLET SUBLINGUAL
Qty: 25 TABLET | Refills: 0 | Status: SHIPPED | OUTPATIENT
Start: 2023-09-12

## 2023-09-12 RX ORDER — GABAPENTIN 800 MG/1
TABLET ORAL
Qty: 120 TABLET | Refills: 2 | Status: SHIPPED | OUTPATIENT
Start: 2023-09-12 | End: 2023-10-30

## 2023-09-12 RX ORDER — ERGOCALCIFEROL 1.25 MG/1
CAPSULE ORAL
Qty: 4 CAPSULE | Refills: 2 | Status: SHIPPED | OUTPATIENT
Start: 2023-09-12

## 2023-09-12 RX ORDER — DIAZEPAM 5 MG/1
5 TABLET ORAL EVERY 12 HOURS PRN
Qty: 60 TABLET | Refills: 0 | OUTPATIENT
Start: 2023-09-12 | End: 2023-11-11

## 2023-09-12 RX ORDER — TIZANIDINE 4 MG/1
TABLET ORAL
Qty: 90 TABLET | Refills: 0 | Status: SHIPPED | OUTPATIENT
Start: 2023-09-12 | End: 2023-09-12

## 2023-09-12 RX ORDER — HYDROCODONE BITARTRATE AND ACETAMINOPHEN 10; 325 MG/1; MG/1
TABLET ORAL
Qty: 150 TABLET | Refills: 0 | Status: SHIPPED | OUTPATIENT
Start: 2023-09-12 | End: 2023-10-22

## 2023-09-12 RX ORDER — BUMETANIDE 2 MG/1
2 TABLET ORAL 2 TIMES DAILY
Qty: 60 TABLET | Refills: 3 | Status: SHIPPED | OUTPATIENT
Start: 2023-09-12

## 2023-09-12 RX ORDER — DIAZEPAM 5 MG/1
TABLET ORAL
Qty: 60 TABLET | Refills: 2 | Status: SHIPPED | OUTPATIENT
Start: 2023-09-12 | End: 2023-09-21 | Stop reason: SDUPTHER

## 2023-09-12 RX ORDER — DOCUSATE SODIUM 100 MG/1
CAPSULE, LIQUID FILLED ORAL
Qty: 60 CAPSULE | Refills: 2 | Status: SHIPPED | OUTPATIENT
Start: 2023-09-12

## 2023-09-13 DIAGNOSIS — Z79.4 TYPE 2 DIABETES MELLITUS WITH OTHER SPECIFIED COMPLICATION, WITH LONG-TERM CURRENT USE OF INSULIN (HCC): Primary | ICD-10-CM

## 2023-09-13 DIAGNOSIS — E11.69 TYPE 2 DIABETES MELLITUS WITH OTHER SPECIFIED COMPLICATION, WITH LONG-TERM CURRENT USE OF INSULIN (HCC): Primary | ICD-10-CM

## 2023-09-13 NOTE — TELEPHONE ENCOUNTER
Pharmacy called and needs this to be the dose:  Refill request received from pharmacy      Next Office Visit Date:  Future Appointments   Date Time Provider [address]   9/21/2023 10:30 AM Nelson Frye MD [address] [place] [address]   7/23/2024  3:00 PM Nelson Frye MD [facility] - Please review via

## 2023-09-20 NOTE — PATIENT INSTRUCTIONS
Pre-Visit chart review completed. All lab and imaging orders reviewed for outstanding orders and none found. Referrals reviewed and none outstanding. All Health Maintenance requirements reviewed and noted for any that are due at upcoming visit. Any hospital admission documentation, ER documentation or consult notes scanned to chart since last visit have been reviewed and noted for provider to review during upcoming visit. The medication list included in this document is our record of what you are currently taking, including any changes that were made at today's visit. If you find any differences when compared to your medications at home, or have any questions that were not answered at your visit, please contact the office. Keep a list of your medicines with you. List all of the prescription medicines, nonprescription medicines, supplements, natural remedies, and vitamins that you take. Tell your healthcare providers who treat you about all of the products you are taking. Your provider can provide you with a form to keep track of them. Just ask. Follow the directions that come with your medicine, including information about food or alcohol. Make sure you know how and when to take your medicine. Do not take more or less than you are supposed to take. Keep all medicines out of the reach of children. Store medicines according to the directions on the label. Monitor yourself. Learn to know how your body reacts to your new medicine and keep track of how it makes you feel before attempting (If your provider has allowed you to do so) to drive or go to work. Seek emergency medical attention if you think you have used too much of this medicine. An overdose of any prescription medicine can be fatal. Overdose symptoms may include extreme drowsiness, muscle weakness, confusion, cold and clammy skin, pinpoint pupils, shallow breathing, slow heart rate, fainting, or coma.   Don't share prescription medicines with others,

## 2023-09-21 ENCOUNTER — OFFICE VISIT (OUTPATIENT)
Dept: FAMILY MEDICINE CLINIC | Age: 63
End: 2023-09-21
Payer: MEDICARE

## 2023-09-21 VITALS
SYSTOLIC BLOOD PRESSURE: 122 MMHG | HEIGHT: 75 IN | TEMPERATURE: 97 F | BODY MASS INDEX: 39.17 KG/M2 | WEIGHT: 315 LBS | RESPIRATION RATE: 18 BRPM | DIASTOLIC BLOOD PRESSURE: 74 MMHG | HEART RATE: 108 BPM | OXYGEN SATURATION: 95 %

## 2023-09-21 DIAGNOSIS — M54.41 CHRONIC BILATERAL LOW BACK PAIN WITH RIGHT-SIDED SCIATICA: ICD-10-CM

## 2023-09-21 DIAGNOSIS — Z79.4 TYPE 2 DIABETES MELLITUS WITH OTHER SPECIFIED COMPLICATION, WITH LONG-TERM CURRENT USE OF INSULIN (HCC): ICD-10-CM

## 2023-09-21 DIAGNOSIS — F41.9 ANXIETY: ICD-10-CM

## 2023-09-21 DIAGNOSIS — G89.29 CHRONIC BILATERAL LOW BACK PAIN WITH RIGHT-SIDED SCIATICA: ICD-10-CM

## 2023-09-21 DIAGNOSIS — M54.50 LUMBAR BACK PAIN: Primary | ICD-10-CM

## 2023-09-21 DIAGNOSIS — E11.69 TYPE 2 DIABETES MELLITUS WITH OTHER SPECIFIED COMPLICATION, WITH LONG-TERM CURRENT USE OF INSULIN (HCC): ICD-10-CM

## 2023-09-21 PROCEDURE — 3078F DIAST BP <80 MM HG: CPT | Performed by: FAMILY MEDICINE

## 2023-09-21 PROCEDURE — 3017F COLORECTAL CA SCREEN DOC REV: CPT | Performed by: FAMILY MEDICINE

## 2023-09-21 PROCEDURE — G8427 DOCREV CUR MEDS BY ELIG CLIN: HCPCS | Performed by: FAMILY MEDICINE

## 2023-09-21 PROCEDURE — 3074F SYST BP LT 130 MM HG: CPT | Performed by: FAMILY MEDICINE

## 2023-09-21 PROCEDURE — G8417 CALC BMI ABV UP PARAM F/U: HCPCS | Performed by: FAMILY MEDICINE

## 2023-09-21 PROCEDURE — 3046F HEMOGLOBIN A1C LEVEL >9.0%: CPT | Performed by: FAMILY MEDICINE

## 2023-09-21 PROCEDURE — 99214 OFFICE O/P EST MOD 30 MIN: CPT | Performed by: FAMILY MEDICINE

## 2023-09-21 PROCEDURE — 2022F DILAT RTA XM EVC RTNOPTHY: CPT | Performed by: FAMILY MEDICINE

## 2023-09-21 PROCEDURE — 1036F TOBACCO NON-USER: CPT | Performed by: FAMILY MEDICINE

## 2023-09-21 RX ORDER — GABAPENTIN 800 MG/1
TABLET ORAL
Qty: 120 TABLET | Refills: 2 | Status: SHIPPED | OUTPATIENT
Start: 2023-09-21 | End: 2023-11-08

## 2023-09-21 RX ORDER — DIAZEPAM 5 MG/1
TABLET ORAL
Qty: 60 TABLET | Refills: 2 | Status: SHIPPED | OUTPATIENT
Start: 2023-09-21 | End: 2023-12-21

## 2023-09-21 RX ORDER — INSULIN GLARGINE 100 [IU]/ML
100 INJECTION, SOLUTION SUBCUTANEOUS 2 TIMES DAILY
Qty: 60 ML | Refills: 2 | Status: SHIPPED | OUTPATIENT
Start: 2023-09-21 | End: 2023-12-20

## 2023-09-21 RX ORDER — OXYCODONE HYDROCHLORIDE 30 MG/1
30 TABLET ORAL EVERY 4 HOURS PRN
Qty: 150 TABLET | Refills: 0 | Status: SHIPPED | OUTPATIENT
Start: 2023-09-21 | End: 2023-10-21

## 2023-09-21 RX ORDER — HYDROCODONE BITARTRATE AND ACETAMINOPHEN 10; 325 MG/1; MG/1
TABLET ORAL
Qty: 150 TABLET | Refills: 0 | Status: SHIPPED | OUTPATIENT
Start: 2023-09-21 | End: 2023-10-31

## 2023-09-21 RX ORDER — BLOOD SUGAR DIAGNOSTIC
STRIP MISCELLANEOUS
Qty: 100 EACH | Refills: 5 | Status: SHIPPED | OUTPATIENT
Start: 2023-09-21

## 2023-09-21 NOTE — PROGRESS NOTES
Chief Complaint   Patient presents with    Hypertension    Diabetes     Reg F/U       Have you seen any other physician or provider since your last visit no    Have you had any other diagnostic tests since your last visit? no    Have you changed or stopped any medications since your last visit? no

## 2023-09-28 ENCOUNTER — TELEPHONE (OUTPATIENT)
Dept: FAMILY MEDICINE CLINIC | Age: 63
End: 2023-09-28

## 2023-09-29 ASSESSMENT — ENCOUNTER SYMPTOMS: BACK PAIN: 1

## 2023-10-04 ENCOUNTER — OFFICE VISIT (OUTPATIENT)
Dept: FAMILY MEDICINE CLINIC | Age: 63
End: 2023-10-04
Payer: MEDICARE

## 2023-10-04 VITALS
WEIGHT: 315 LBS | BODY MASS INDEX: 54.52 KG/M2 | DIASTOLIC BLOOD PRESSURE: 70 MMHG | HEART RATE: 112 BPM | OXYGEN SATURATION: 91 % | SYSTOLIC BLOOD PRESSURE: 144 MMHG

## 2023-10-04 DIAGNOSIS — S81.811A SKIN TEAR OF LOWER LEG WITHOUT COMPLICATION, RIGHT, INITIAL ENCOUNTER: Primary | ICD-10-CM

## 2023-10-04 PROCEDURE — 1036F TOBACCO NON-USER: CPT | Performed by: NURSE PRACTITIONER

## 2023-10-04 PROCEDURE — G8484 FLU IMMUNIZE NO ADMIN: HCPCS | Performed by: NURSE PRACTITIONER

## 2023-10-04 PROCEDURE — 99213 OFFICE O/P EST LOW 20 MIN: CPT | Performed by: NURSE PRACTITIONER

## 2023-10-04 PROCEDURE — 3077F SYST BP >= 140 MM HG: CPT | Performed by: NURSE PRACTITIONER

## 2023-10-04 PROCEDURE — 3017F COLORECTAL CA SCREEN DOC REV: CPT | Performed by: NURSE PRACTITIONER

## 2023-10-04 PROCEDURE — G8417 CALC BMI ABV UP PARAM F/U: HCPCS | Performed by: NURSE PRACTITIONER

## 2023-10-04 PROCEDURE — G8427 DOCREV CUR MEDS BY ELIG CLIN: HCPCS | Performed by: NURSE PRACTITIONER

## 2023-10-04 PROCEDURE — 3078F DIAST BP <80 MM HG: CPT | Performed by: NURSE PRACTITIONER

## 2023-10-04 ASSESSMENT — ENCOUNTER SYMPTOMS
COUGH: 0
SHORTNESS OF BREATH: 0

## 2023-10-04 NOTE — PROGRESS NOTES
Chief Complaint   Patient presents with    Wound Infection     Pt reports he has a wound on his right shin. Pt reports it has been there 3-4 days and he had hit his leg with a gate. Pt reports he has been keeping it covered.         Have you seen any other physician or provider since your last visit no    Have you had any other diagnostic tests since your last visit? no    Have you changed or stopped any medications since your last visit? no
NEEDED FOR CHEST PAIN (MAX 3 TABLETS IN 15 MIN) Yes Minesh Mahmood MD   vitamin D (ERGOCALCIFEROL) 1.25 MG (33509 UT) CAPS capsule TAKE ONE CAPSULE BY MOUTH ONCE A WEEK Yes Minesh Mahmood MD   albuterol sulfate HFA (VENTOLIN HFA) 108 (90 Base) MCG/ACT inhaler Inhale 2 puffs into the lungs every 4 hours as needed for Shortness of Breath Yes Rickie Aguiar DO   glimepiride (AMARYL) 4 MG tablet Take 1 tablet by mouth every morning (before breakfast) Yes Minesh Mahmood MD   montelukast (SINGULAIR) 10 MG tablet TAKE ONE TABLET BY MOUTH NIGHTLY AS NEEDED FOR ALLERGIES Yes Minesh Mahmood MD   simvastatin (ZOCOR) 40 MG tablet TAKE ONE TABLET BY MOUTH AT BEDTIME FOR CHOLESTEROL Yes Minesh Mahmood MD   metFORMIN (GLUCOPHAGE) 1000 MG tablet TAKE ONE TABLET BY MOUTH TWICE A DAY FOR SUGAR CONTROL Yes Minesh Mahmood MD   loratadine (CLARITIN) 10 MG tablet Take one tablet by mouth daily as needed for allergies Yes Minesh Mahmood MD   celecoxib (CELEBREX) 200 MG capsule TAKE 1 CAPSULE BY MOUTH DAILY Yes Minesh Mahmood MD   Insulin Syringe-Needle U-100 ([employer] INSULIN SYRINGE) 31G X 5/16\" 1 ML MISC 1 each by Does not apply route daily despense  Closes G and CC Yes Minesh Mahmood MD   blood glucose monitor kit and supplies Dispense sufficient amount for indicated testing frequency.  Yes Minesh Mahmood MD   Accu-Chek Softclix Lancets MISC fsbs daily Yes Minesh Mahmood MD   aspirin 81 MG tablet Take 1 tablet by mouth daily Yes Provider, MD Gi

## 2023-10-07 DIAGNOSIS — Z79.4 TYPE 2 DIABETES MELLITUS WITH OTHER SPECIFIED COMPLICATION, WITH LONG-TERM CURRENT USE OF INSULIN (HCC): ICD-10-CM

## 2023-10-07 DIAGNOSIS — E11.69 TYPE 2 DIABETES MELLITUS WITH OTHER SPECIFIED COMPLICATION, WITH LONG-TERM CURRENT USE OF INSULIN (HCC): ICD-10-CM

## 2023-10-09 RX ORDER — ALBUTEROL SULFATE 90 UG/1
2 AEROSOL, METERED RESPIRATORY (INHALATION) EVERY 4 HOURS PRN
Qty: 18 G | Refills: 0 | Status: SHIPPED | OUTPATIENT
Start: 2023-10-09

## 2023-10-09 RX ORDER — NITROGLYCERIN 0.4 MG/1
TABLET SUBLINGUAL
Qty: 25 TABLET | Refills: 0 | Status: SHIPPED | OUTPATIENT
Start: 2023-10-09

## 2023-10-09 NOTE — TELEPHONE ENCOUNTER
Refill request received from pharmacy      Next Office Visit Date:  Future Appointments   Date Time Provider [address]   10/19/2023  3:15 PM Minesh Mahmood MD [address] [place] [address]   7/23/2024  3:00 PM MD Marin Narvaez Martinez - Please review via [phone]

## 2023-10-10 ENCOUNTER — OFFICE VISIT (OUTPATIENT)
Dept: FAMILY MEDICINE CLINIC | Age: 63
End: 2023-10-10
Payer: MEDICARE

## 2023-10-10 VITALS
OXYGEN SATURATION: 92 % | HEART RATE: 109 BPM | DIASTOLIC BLOOD PRESSURE: 72 MMHG | SYSTOLIC BLOOD PRESSURE: 120 MMHG | RESPIRATION RATE: 18 BRPM

## 2023-10-10 DIAGNOSIS — L98.9 SKIN LESION: Primary | ICD-10-CM

## 2023-10-10 PROCEDURE — G8427 DOCREV CUR MEDS BY ELIG CLIN: HCPCS | Performed by: NURSE PRACTITIONER

## 2023-10-10 PROCEDURE — 3078F DIAST BP <80 MM HG: CPT | Performed by: NURSE PRACTITIONER

## 2023-10-10 PROCEDURE — 3074F SYST BP LT 130 MM HG: CPT | Performed by: NURSE PRACTITIONER

## 2023-10-10 PROCEDURE — 1036F TOBACCO NON-USER: CPT | Performed by: NURSE PRACTITIONER

## 2023-10-10 PROCEDURE — G8417 CALC BMI ABV UP PARAM F/U: HCPCS | Performed by: NURSE PRACTITIONER

## 2023-10-10 PROCEDURE — 3017F COLORECTAL CA SCREEN DOC REV: CPT | Performed by: NURSE PRACTITIONER

## 2023-10-10 PROCEDURE — G8484 FLU IMMUNIZE NO ADMIN: HCPCS | Performed by: NURSE PRACTITIONER

## 2023-10-10 PROCEDURE — 99213 OFFICE O/P EST LOW 20 MIN: CPT | Performed by: NURSE PRACTITIONER

## 2023-10-10 RX ORDER — TRIAMCINOLONE ACETONIDE 1 MG/G
OINTMENT TOPICAL 2 TIMES DAILY
Qty: 80 G | Refills: 0 | Status: SHIPPED | OUTPATIENT
Start: 2023-10-10 | End: 2023-10-20

## 2023-10-11 NOTE — PROGRESS NOTES
Patient here today for sick visit only. Health Maintenance not reviewed during this visit.
CONTROL 180 tablet 3    loratadine (CLARITIN) 10 MG tablet Take one tablet by mouth daily as needed for allergies 90 tablet 3    celecoxib (CELEBREX) 200 MG capsule TAKE 1 CAPSULE BY MOUTH DAILY 30 capsule 2    Insulin Syringe-Needle U-100 ([employer] INSULIN SYRINGE) 31G X 5/16\" 1 ML MISC 1 each by Does not apply route daily despense  Closes G and  each 3    blood glucose monitor kit and supplies Dispense sufficient amount for indicated testing frequency. 1 kit 0    Accu-Chek Softclix Lancets MISC fsbs daily 100 each 3    aspirin 81 MG tablet Take 1 tablet by mouth daily       No current facility-administered medications for this visit. Review of Systems   Skin:  Positive for rash. Place on left shoulder        /72   Pulse (!) 109   Resp 18   SpO2 92% Comment: RA     Physical Exam  Vitals and nursing note reviewed. Constitutional:       Appearance: Normal appearance. He is obese. HENT:      Head: Normocephalic and atraumatic. Right Ear: External ear normal.      Left Ear: External ear normal.      Nose: Nose normal.      Mouth/Throat:      Mouth: Mucous membranes are moist.      Pharynx: Oropharynx is clear. Eyes:      Extraocular Movements: Extraocular movements intact. Conjunctiva/sclera: Conjunctivae normal.      Pupils: Pupils are equal, round, and reactive to light. Cardiovascular:      Rate and Rhythm: Normal rate and regular rhythm. Pulses: Normal pulses. Heart sounds: Normal heart sounds. Pulmonary:      Effort: Pulmonary effort is normal.      Breath sounds: Normal breath sounds. Abdominal:      General: Bowel sounds are normal.      Palpations: Abdomen is soft. Musculoskeletal:         General: Normal range of motion. Cervical back: Normal range of motion and neck supple. Skin:     General: Skin is warm and dry. Capillary Refill: Capillary refill takes less than 2 seconds. Findings: Lesion present.       Comments: Lesion on top of

## 2023-10-19 ENCOUNTER — HOSPITAL ENCOUNTER (OUTPATIENT)
Facility: HOSPITAL | Age: 63
Discharge: HOME | End: 2023-10-19
Payer: MEDICARE

## 2023-10-19 ENCOUNTER — OFFICE VISIT (OUTPATIENT)
Dept: FAMILY MEDICINE CLINIC | Age: 63
End: 2023-10-19

## 2023-10-19 VITALS
WEIGHT: 315 LBS | DIASTOLIC BLOOD PRESSURE: 78 MMHG | HEART RATE: 112 BPM | BODY MASS INDEX: 38.36 KG/M2 | OXYGEN SATURATION: 99 % | HEIGHT: 76 IN | RESPIRATION RATE: 16 BRPM | SYSTOLIC BLOOD PRESSURE: 124 MMHG | TEMPERATURE: 97.2 F

## 2023-10-19 DIAGNOSIS — M54.41 CHRONIC BILATERAL LOW BACK PAIN WITH RIGHT-SIDED SCIATICA: ICD-10-CM

## 2023-10-19 DIAGNOSIS — I10 ESSENTIAL HYPERTENSION: Primary | ICD-10-CM

## 2023-10-19 DIAGNOSIS — G89.29 CHRONIC RIGHT SHOULDER PAIN: ICD-10-CM

## 2023-10-19 DIAGNOSIS — D50.9 IRON DEFICIENCY ANEMIA, UNSPECIFIED IRON DEFICIENCY ANEMIA TYPE: ICD-10-CM

## 2023-10-19 DIAGNOSIS — E11.69 TYPE 2 DIABETES MELLITUS WITH OTHER SPECIFIED COMPLICATION, WITH LONG-TERM CURRENT USE OF INSULIN (HCC): ICD-10-CM

## 2023-10-19 DIAGNOSIS — E55.9 VITAMIN D DEFICIENCY: ICD-10-CM

## 2023-10-19 DIAGNOSIS — Z23 NEED FOR INFLUENZA VACCINATION: ICD-10-CM

## 2023-10-19 DIAGNOSIS — Z79.4 TYPE 2 DIABETES MELLITUS WITH OTHER SPECIFIED COMPLICATION, WITH LONG-TERM CURRENT USE OF INSULIN (HCC): ICD-10-CM

## 2023-10-19 DIAGNOSIS — M25.511 CHRONIC RIGHT SHOULDER PAIN: ICD-10-CM

## 2023-10-19 DIAGNOSIS — R53.83 FATIGUE, UNSPECIFIED TYPE: ICD-10-CM

## 2023-10-19 DIAGNOSIS — M19.011 OSTEOARTHRITIS OF RIGHT SHOULDER, UNSPECIFIED OSTEOARTHRITIS TYPE: ICD-10-CM

## 2023-10-19 DIAGNOSIS — L98.9 SKIN LESION: ICD-10-CM

## 2023-10-19 DIAGNOSIS — I10 ESSENTIAL HYPERTENSION: ICD-10-CM

## 2023-10-19 DIAGNOSIS — G89.29 CHRONIC BILATERAL LOW BACK PAIN WITH RIGHT-SIDED SCIATICA: ICD-10-CM

## 2023-10-19 PROCEDURE — 36415 COLL VENOUS BLD VENIPUNCTURE: CPT

## 2023-10-19 PROCEDURE — 83036 HEMOGLOBIN GLYCOSYLATED A1C: CPT

## 2023-10-19 PROCEDURE — 80053 COMPREHEN METABOLIC PANEL: CPT

## 2023-10-19 PROCEDURE — 82607 VITAMIN B-12: CPT

## 2023-10-19 PROCEDURE — 85027 COMPLETE CBC AUTOMATED: CPT

## 2023-10-19 PROCEDURE — 83550 IRON BINDING TEST: CPT

## 2023-10-19 PROCEDURE — 83540 ASSAY OF IRON: CPT

## 2023-10-19 PROCEDURE — 82746 ASSAY OF FOLIC ACID SERUM: CPT

## 2023-10-19 PROCEDURE — 82306 VITAMIN D 25 HYDROXY: CPT

## 2023-10-19 RX ORDER — TRIAMCINOLONE ACETONIDE 1 MG/G
OINTMENT TOPICAL 2 TIMES DAILY
Qty: 80 G | Refills: 0 | Status: SHIPPED | OUTPATIENT
Start: 2023-10-19 | End: 2023-10-29

## 2023-10-19 RX ORDER — ALBUTEROL SULFATE 90 UG/1
2 AEROSOL, METERED RESPIRATORY (INHALATION) EVERY 4 HOURS PRN
Qty: 18 G | Refills: 0 | Status: SHIPPED | OUTPATIENT
Start: 2023-10-19

## 2023-10-19 RX ORDER — TIZANIDINE 4 MG/1
TABLET ORAL
Qty: 90 TABLET | Refills: 0 | Status: SHIPPED | OUTPATIENT
Start: 2023-10-19

## 2023-10-19 RX ORDER — METHYLPREDNISOLONE ACETATE 40 MG/ML
40 INJECTION, SUSPENSION INTRA-ARTICULAR; INTRALESIONAL; INTRAMUSCULAR; SOFT TISSUE ONCE
Status: COMPLETED | OUTPATIENT
Start: 2023-10-19 | End: 2023-10-19

## 2023-10-19 RX ORDER — LANOLIN ALCOHOL/MO/W.PET/CERES
325 CREAM (GRAM) TOPICAL
Qty: 90 TABLET | Refills: 0 | Status: SHIPPED | OUTPATIENT
Start: 2023-10-19

## 2023-10-19 RX ORDER — OXYCODONE HYDROCHLORIDE 30 MG/1
30 TABLET ORAL EVERY 4 HOURS PRN
Qty: 150 TABLET | Refills: 0 | Status: SHIPPED | OUTPATIENT
Start: 2023-10-19 | End: 2023-11-18

## 2023-10-19 RX ORDER — HYDROCODONE BITARTRATE AND ACETAMINOPHEN 10; 325 MG/1; MG/1
TABLET ORAL
Qty: 150 TABLET | Refills: 0 | Status: SHIPPED | OUTPATIENT
Start: 2023-10-19 | End: 2023-11-28

## 2023-10-19 RX ADMIN — Medication 5 ML: at 16:38

## 2023-10-19 RX ADMIN — METHYLPREDNISOLONE ACETATE 40 MG: 40 INJECTION, SUSPENSION INTRA-ARTICULAR; INTRALESIONAL; INTRAMUSCULAR; SOFT TISSUE at 16:42

## 2023-10-19 ASSESSMENT — ENCOUNTER SYMPTOMS: BACK PAIN: 1

## 2023-10-19 NOTE — PROGRESS NOTES
Chief Complaint   Patient presents with    Fall     Patient fell two days ago, injured his right leg and Right shoulder. Diabetes       Have you seen any other physician or provider since your last visit no    Have you had any other diagnostic tests since your last visit? no    Have you changed or stopped any medications since your last visit? no   Immunizations Administered       Name Date Dose Route    Influenza, FLUARIX, FLULAVAL, [address] (age 10 mo+) AND AFLURIA, (age 1 y+), PF, 0.5mL 10/19/2023 0.5 mL Intramuscular    Site: Deltoid- Left    Lot:     NDC: 85482-203-22    Pneumococcal, PCV20, PREVNAR 20, (age 6w+), IM, 0.5mL 10/19/2023 0.5 mL Intramuscular    Site: Deltoid- Right    Lot: [id number]    NDC: 2476-1323-65         Patient tolerated injection well. Patient advised to wait 20 minutes in the office following the injection. No signs/symptoms of reaction noted after 20 minutes.   Administrations This Visit       lidocaine 1 % injection 5 mL       Admin Date  10/19/2023  16:38 Action  Given Dose  5 mL Route  IntraDERmal Site  Shoulder Right Administered By  Narciso William MA    Ordering Provider: Abhilash Jimenes MD    [address]: 53904-882-90    : [address]    Patient Supplied?: No    Comments: Administered by Dr. Abhilash Jimenes              methylPREDNISolone acetate (DEPO-MEDROL) injection 40 mg       Admin Date  10/19/2023  16:42 Action  Given Dose  40 mg Route  IntraMUSCular Site  Shoulder Right Administered By  Narciso William MA    Ordering Provider: Abhilash Jimenes MD    [address]: [id number]    : [address]    Patient Supplied?: No    Comments: Administared by Dr. Abhilash Jimenes

## 2023-10-20 LAB
25(OH)D3 SERPL-MCNC: 26.1 NG/ML (ref 32–100)
ALBUMIN SERPL-MCNC: 3.9 G/DL (ref 3.4–4.8)
ALBUMIN/GLOB SERPL: 1.3 {RATIO} (ref 0.8–2)
ALP SERPL-CCNC: 68 U/L (ref 25–100)
ALT SERPL-CCNC: 30 U/L (ref 4–36)
ANION GAP SERPL CALCULATED.3IONS-SCNC: 10 MMOL/L (ref 3–16)
AST SERPL-CCNC: 31 U/L (ref 8–33)
BILIRUB SERPL-MCNC: 0.3 MG/DL (ref 0.3–1.2)
BUN SERPL-MCNC: 17 MG/DL (ref 6–20)
CALCIUM SERPL-MCNC: 9.5 MG/DL (ref 8.5–10.5)
CHLORIDE SERPL-SCNC: 101 MMOL/L (ref 98–107)
CO2 SERPL-SCNC: 27 MMOL/L (ref 20–30)
CREAT SERPL-MCNC: 0.9 MG/DL (ref 0.4–1.2)
ERYTHROCYTE [DISTWIDTH] IN BLOOD BY AUTOMATED COUNT: 13.3 % (ref 11–16)
FOLATE SERPL-MCNC: >20 NG/ML
GFR SERPLBLD CREATININE-BSD FMLA CKD-EPI: >60 ML/MIN/{1.73_M2}
GLOBULIN SER CALC-MCNC: 3 G/DL
GLUCOSE SERPL-MCNC: 96 MG/DL (ref 74–106)
HBA1C MFR BLD: 8.5 %
HCT VFR BLD AUTO: 46.6 % (ref 40–54)
HGB BLD-MCNC: 14.5 G/DL (ref 13–18)
IRON SATN MFR SERPL: 19 % (ref 20–50)
IRON SERPL-MCNC: 63 UG/DL (ref 59–158)
MCH RBC QN AUTO: 31.5 PG (ref 27–32)
MCHC RBC AUTO-ENTMCNC: 31.1 G/DL (ref 31–35)
MCV RBC AUTO: 101.1 FL (ref 80–100)
PLATELET # BLD AUTO: 109 K/UL (ref 150–400)
PMV BLD AUTO: 13.4 FL (ref 6–10)
POTASSIUM SERPL-SCNC: 4.8 MMOL/L (ref 3.4–5.1)
PROT SERPL-MCNC: 6.9 G/DL (ref 6.4–8.3)
RBC # BLD AUTO: 4.61 M/UL (ref 4.5–6)
SODIUM SERPL-SCNC: 138 MMOL/L (ref 136–145)
TIBC SERPL-MCNC: 331 UG/DL (ref 250–450)
VIT B12 SERPL-MCNC: 273 PG/ML (ref 211–911)
WBC # BLD AUTO: 6.2 K/UL (ref 4–11)

## 2023-10-21 DIAGNOSIS — G47.00 INSOMNIA, UNSPECIFIED TYPE: ICD-10-CM

## 2023-10-25 RX ORDER — ZOLPIDEM TARTRATE 12.5 MG/1
TABLET, FILM COATED, EXTENDED RELEASE ORAL
Qty: 30 TABLET | Refills: 2 | Status: SHIPPED | OUTPATIENT
Start: 2023-10-25 | End: 2024-01-23

## 2023-10-27 ENCOUNTER — TELEPHONE (OUTPATIENT)
Dept: FAMILY MEDICINE CLINIC | Age: 63
End: 2023-10-27

## 2023-10-27 NOTE — TELEPHONE ENCOUNTER
Pavel Goldberg called and stated that he had a fall about a week ago and he is still experiencing rib painon his left side. He was wondering if you could put in x-ray orders for him?

## 2023-10-31 DIAGNOSIS — R07.81 RIB PAIN ON LEFT SIDE: Primary | ICD-10-CM

## 2023-11-01 ENCOUNTER — HOSPITAL ENCOUNTER (OUTPATIENT)
Facility: HOSPITAL | Age: 63
Discharge: HOME | End: 2023-11-01
Payer: MEDICARE

## 2023-11-01 ENCOUNTER — HOSPITAL ENCOUNTER (OUTPATIENT)
Dept: GENERAL RADIOLOGY | Facility: HOSPITAL | Age: 63
Discharge: HOME | End: 2023-11-01
Payer: MEDICARE

## 2023-11-01 DIAGNOSIS — R07.81 RIB PAIN ON LEFT SIDE: ICD-10-CM

## 2023-11-01 PROCEDURE — 71101 X-RAY EXAM UNILAT RIBS/CHEST: CPT

## 2023-11-03 DIAGNOSIS — L98.9 SKIN LESION: ICD-10-CM

## 2023-11-06 DIAGNOSIS — M54.50 LUMBAR BACK PAIN: ICD-10-CM

## 2023-11-06 RX ORDER — TRIAMCINOLONE ACETONIDE 1 MG/G
OINTMENT TOPICAL
Qty: 80 G | Refills: 0 | Status: SHIPPED | OUTPATIENT
Start: 2023-11-06

## 2023-11-06 NOTE — TELEPHONE ENCOUNTER
Patient called, requested refill.        Next Office Visit Date:  Future Appointments   Date Time Provider [address]   11/7/2023  1:00 PM [facility]   11/7/2023  2:00 PM Oanh Hernández, BLACK -  [facility]   11/28/2023  3:15 PM Radha Pham MD [address] [place] [address]   7/23/2024  3:00 PM Radha Pham MD [facility] please review via [address]

## 2023-11-07 ENCOUNTER — OFFICE VISIT (OUTPATIENT)
Dept: FAMILY MEDICINE CLINIC | Age: 63
End: 2023-11-07
Payer: MEDICARE

## 2023-11-07 ENCOUNTER — HOSPITAL ENCOUNTER (OUTPATIENT)
Dept: NUTRITION | Facility: HOSPITAL | Age: 63
Discharge: HOME | End: 2023-11-07
Payer: MEDICARE

## 2023-11-07 VITALS
HEART RATE: 111 BPM | OXYGEN SATURATION: 95 % | SYSTOLIC BLOOD PRESSURE: 138 MMHG | DIASTOLIC BLOOD PRESSURE: 78 MMHG | RESPIRATION RATE: 18 BRPM

## 2023-11-07 VITALS — BODY MASS INDEX: 53.68 KG/M2 | HEIGHT: 76 IN

## 2023-11-07 DIAGNOSIS — G89.29 CHRONIC PAIN OF LEFT KNEE: ICD-10-CM

## 2023-11-07 DIAGNOSIS — M25.562 CHRONIC PAIN OF LEFT KNEE: ICD-10-CM

## 2023-11-07 DIAGNOSIS — M54.41 CHRONIC BILATERAL LOW BACK PAIN WITH RIGHT-SIDED SCIATICA: Primary | ICD-10-CM

## 2023-11-07 DIAGNOSIS — G89.29 CHRONIC BILATERAL LOW BACK PAIN WITH RIGHT-SIDED SCIATICA: Primary | ICD-10-CM

## 2023-11-07 DIAGNOSIS — M23.52 CHRONIC INSTABILITY OF KNEE, LEFT KNEE: ICD-10-CM

## 2023-11-07 DIAGNOSIS — Z99.89 DEPENDENCE ON WALKING STICK: ICD-10-CM

## 2023-11-07 PROCEDURE — 1036F TOBACCO NON-USER: CPT | Performed by: NURSE PRACTITIONER

## 2023-11-07 PROCEDURE — 3075F SYST BP GE 130 - 139MM HG: CPT | Performed by: NURSE PRACTITIONER

## 2023-11-07 PROCEDURE — G8417 CALC BMI ABV UP PARAM F/U: HCPCS | Performed by: NURSE PRACTITIONER

## 2023-11-07 PROCEDURE — 97802 MEDICAL NUTRITION INDIV IN: CPT

## 2023-11-07 PROCEDURE — 3078F DIAST BP <80 MM HG: CPT | Performed by: NURSE PRACTITIONER

## 2023-11-07 PROCEDURE — 3017F COLORECTAL CA SCREEN DOC REV: CPT | Performed by: NURSE PRACTITIONER

## 2023-11-07 PROCEDURE — 1123F ACP DISCUSS/DSCN MKR DOCD: CPT | Performed by: NURSE PRACTITIONER

## 2023-11-07 PROCEDURE — G8427 DOCREV CUR MEDS BY ELIG CLIN: HCPCS | Performed by: NURSE PRACTITIONER

## 2023-11-07 PROCEDURE — 99215 OFFICE O/P EST HI 40 MIN: CPT | Performed by: NURSE PRACTITIONER

## 2023-11-07 PROCEDURE — G8482 FLU IMMUNIZE ORDER/ADMIN: HCPCS | Performed by: NURSE PRACTITIONER

## 2023-11-07 ASSESSMENT — ENCOUNTER SYMPTOMS: BACK PAIN: 1

## 2023-11-07 NOTE — PROGRESS NOTES
Chief Complaint   Patient presents with    Rib Injury       Have you seen any other physician or provider since your last visit yes - [facility] imaging     Have you had any other diagnostic tests since your last visit?  yes - Xrays     Have you changed or stopped any medications since your last visit? no

## 2023-11-07 NOTE — PROGRESS NOTES
Meera Campbell 70 y.o. presents today for   Chief Complaint   Patient presents with    Rib Injury        HPI:  Meera Campbell states he is here to discuss his recent lumbar MRI. Chronic lumbar pain  He has chronic back pain and says over the past 6 months he has been trying to get his insurance to approve an MRI. He did have one recently 23. He has numbness in his legs with weakness. He fell at the end of October at [facility] and fell on his left side. He says his ribs are still sore. He said he has never seen ortho because they will just tell him to lose a lot of weight before they can help him. He has no interest in being referred to them at this time. He hurts so much he can't stand it but says he doesn't want anymore given to him says he already takes too much. He is open to physical therapy but says he doesn't want them to hurt him worse than he is. Chronic left knee pain with instability  He is requesting a knee brace for his left knee and says it has to be more durable.      Family History   Problem Relation Age of Onset    Cancer Mother     Depression Mother     Diabetes Mother     Early Death Mother     Heart Disease Mother     Kidney Disease Mother     Stroke Mother     Vision Loss Mother     Cancer Father     Hearing Loss Father     Diabetes Maternal Grandmother     Heart Disease Maternal Grandmother     Cancer Maternal Grandfather         Social History     Socioeconomic History    Marital status:      Spouse name: Not on file    Number of children: Not on file    Years of education: Not on file    Highest education level: Not on file   Occupational History    Not on file   Tobacco Use    Smoking status: Former     Packs/day: 1.00     Years: 30.00     Additional pack years: 0.00     Total pack years: 30.00     Types: Cigarettes     Start date: 5     Quit date: 1998     Years since quittin.1    Smokeless tobacco: Never   Substance and Sexual Activity    Alcohol

## 2023-11-07 NOTE — PROGRESS NOTES
Nutrition Education    Educated on consistent CHO nutrition therapy, MyPlate for DM, how to read nutrition labels, CHO counting, portion sizes for various foods, 15-15 rule for hypoglycemia. Learners: Patient  Readiness: Acceptance  Method: Explanation, Demonstration, and Handout  Response: Verbalizes Understanding  Contact name and number provided.     Juliane Valencia RD

## 2023-11-14 ENCOUNTER — TELEPHONE (OUTPATIENT)
Dept: FAMILY MEDICINE CLINIC | Age: 63
End: 2023-11-14

## 2023-11-14 NOTE — TELEPHONE ENCOUNTER
Patient called wanting to know the status of his Knee brace. It was sent to [facility] they are no longer doing DME supplies. Sent all required documentation to [facility] in [facility]. Called patient and gave him the information and his appointment with [place] [facility] along with both of their numbers in case he needs to reach out to them.

## 2023-11-28 ENCOUNTER — OFFICE VISIT (OUTPATIENT)
Dept: FAMILY MEDICINE CLINIC | Age: 63
End: 2023-11-28
Payer: MEDICARE

## 2023-11-28 VITALS
OXYGEN SATURATION: 92 % | HEIGHT: 76 IN | HEART RATE: 112 BPM | SYSTOLIC BLOOD PRESSURE: 130 MMHG | RESPIRATION RATE: 16 BRPM | BODY MASS INDEX: 38.36 KG/M2 | WEIGHT: 315 LBS | TEMPERATURE: 98.2 F | DIASTOLIC BLOOD PRESSURE: 62 MMHG

## 2023-11-28 DIAGNOSIS — M54.41 CHRONIC BILATERAL LOW BACK PAIN WITH RIGHT-SIDED SCIATICA: ICD-10-CM

## 2023-11-28 DIAGNOSIS — G89.29 CHRONIC BILATERAL LOW BACK PAIN WITH RIGHT-SIDED SCIATICA: ICD-10-CM

## 2023-11-28 DIAGNOSIS — L97.229 VENOUS STASIS ULCER OF LEFT CALF WITH VARICOSE VEINS, UNSPECIFIED ULCER STAGE (HCC): Primary | ICD-10-CM

## 2023-11-28 DIAGNOSIS — F41.9 ANXIETY: ICD-10-CM

## 2023-11-28 DIAGNOSIS — I50.9 ACUTE ON CHRONIC CONGESTIVE HEART FAILURE, UNSPECIFIED HEART FAILURE TYPE (HCC): ICD-10-CM

## 2023-11-28 DIAGNOSIS — Z79.4 TYPE 2 DIABETES MELLITUS WITH OTHER SPECIFIED COMPLICATION, WITH LONG-TERM CURRENT USE OF INSULIN (HCC): ICD-10-CM

## 2023-11-28 DIAGNOSIS — I83.022 VENOUS STASIS ULCER OF LEFT CALF WITH VARICOSE VEINS, UNSPECIFIED ULCER STAGE (HCC): Primary | ICD-10-CM

## 2023-11-28 DIAGNOSIS — L98.9 SKIN LESION: ICD-10-CM

## 2023-11-28 DIAGNOSIS — E11.69 TYPE 2 DIABETES MELLITUS WITH OTHER SPECIFIED COMPLICATION, WITH LONG-TERM CURRENT USE OF INSULIN (HCC): ICD-10-CM

## 2023-11-28 DIAGNOSIS — K21.9 GASTROESOPHAGEAL REFLUX DISEASE, UNSPECIFIED WHETHER ESOPHAGITIS PRESENT: ICD-10-CM

## 2023-11-28 DIAGNOSIS — D50.9 IRON DEFICIENCY ANEMIA, UNSPECIFIED IRON DEFICIENCY ANEMIA TYPE: ICD-10-CM

## 2023-11-28 PROCEDURE — 1036F TOBACCO NON-USER: CPT | Performed by: FAMILY MEDICINE

## 2023-11-28 PROCEDURE — 3052F HG A1C>EQUAL 8.0%<EQUAL 9.0%: CPT | Performed by: FAMILY MEDICINE

## 2023-11-28 PROCEDURE — 2022F DILAT RTA XM EVC RTNOPTHY: CPT | Performed by: FAMILY MEDICINE

## 2023-11-28 PROCEDURE — 3078F DIAST BP <80 MM HG: CPT | Performed by: FAMILY MEDICINE

## 2023-11-28 PROCEDURE — G8427 DOCREV CUR MEDS BY ELIG CLIN: HCPCS | Performed by: FAMILY MEDICINE

## 2023-11-28 PROCEDURE — 99214 OFFICE O/P EST MOD 30 MIN: CPT | Performed by: FAMILY MEDICINE

## 2023-11-28 PROCEDURE — 3017F COLORECTAL CA SCREEN DOC REV: CPT | Performed by: FAMILY MEDICINE

## 2023-11-28 PROCEDURE — G8417 CALC BMI ABV UP PARAM F/U: HCPCS | Performed by: FAMILY MEDICINE

## 2023-11-28 PROCEDURE — G8482 FLU IMMUNIZE ORDER/ADMIN: HCPCS | Performed by: FAMILY MEDICINE

## 2023-11-28 PROCEDURE — 1123F ACP DISCUSS/DSCN MKR DOCD: CPT | Performed by: FAMILY MEDICINE

## 2023-11-28 PROCEDURE — 3074F SYST BP LT 130 MM HG: CPT | Performed by: FAMILY MEDICINE

## 2023-11-28 RX ORDER — HYDROCODONE BITARTRATE AND ACETAMINOPHEN 10; 325 MG/1; MG/1
TABLET ORAL
Qty: 150 TABLET | Refills: 0 | Status: SHIPPED | OUTPATIENT
Start: 2023-11-28 | End: 2023-11-28 | Stop reason: SDUPTHER

## 2023-11-28 RX ORDER — BUMETANIDE 2 MG/1
2 TABLET ORAL 2 TIMES DAILY
Qty: 60 TABLET | Refills: 3 | Status: SHIPPED | OUTPATIENT
Start: 2023-11-28

## 2023-11-28 RX ORDER — SPIRONOLACTONE 25 MG/1
TABLET ORAL
Qty: 90 TABLET | Refills: 2 | Status: SHIPPED | OUTPATIENT
Start: 2023-11-28

## 2023-11-28 RX ORDER — TIZANIDINE 4 MG/1
TABLET ORAL
Qty: 90 TABLET | Refills: 0 | Status: SHIPPED | OUTPATIENT
Start: 2023-11-28

## 2023-11-28 RX ORDER — HYDROCODONE BITARTRATE AND ACETAMINOPHEN 10; 325 MG/1; MG/1
TABLET ORAL
Qty: 150 TABLET | Refills: 0 | Status: SHIPPED | OUTPATIENT
Start: 2023-11-28 | End: 2024-01-07

## 2023-11-28 RX ORDER — ALBUTEROL SULFATE 90 UG/1
2 AEROSOL, METERED RESPIRATORY (INHALATION) EVERY 4 HOURS PRN
Qty: 18 G | Refills: 0 | Status: SHIPPED | OUTPATIENT
Start: 2023-11-28

## 2023-11-28 RX ORDER — PANTOPRAZOLE SODIUM 40 MG/1
40 TABLET, DELAYED RELEASE ORAL 2 TIMES DAILY
Qty: 60 TABLET | Refills: 3 | Status: SHIPPED | OUTPATIENT
Start: 2023-11-28

## 2023-11-28 RX ORDER — GABAPENTIN 800 MG/1
TABLET ORAL
Qty: 120 TABLET | Refills: 2 | Status: SHIPPED | OUTPATIENT
Start: 2023-11-28 | End: 2024-02-04

## 2023-11-28 RX ORDER — DIAZEPAM 5 MG/1
TABLET ORAL
Qty: 60 TABLET | Refills: 2 | Status: SHIPPED | OUTPATIENT
Start: 2023-11-28 | End: 2024-02-27

## 2023-11-28 RX ORDER — DOCUSATE SODIUM 100 MG/1
CAPSULE, LIQUID FILLED ORAL
Qty: 120 CAPSULE | Refills: 2 | Status: SHIPPED | OUTPATIENT
Start: 2023-11-28

## 2023-11-28 RX ORDER — LANOLIN ALCOHOL/MO/W.PET/CERES
325 CREAM (GRAM) TOPICAL
Qty: 90 TABLET | Refills: 0 | Status: SHIPPED | OUTPATIENT
Start: 2023-11-28

## 2023-11-28 RX ORDER — TRIAMCINOLONE ACETONIDE 1 MG/G
OINTMENT TOPICAL
Qty: 80 G | Refills: 1 | Status: SHIPPED | OUTPATIENT
Start: 2023-11-28 | End: 2023-11-29

## 2023-11-28 RX ORDER — INSULIN GLARGINE 100 [IU]/ML
100 INJECTION, SOLUTION SUBCUTANEOUS 2 TIMES DAILY
Qty: 60 ML | Refills: 2 | Status: SHIPPED | OUTPATIENT
Start: 2023-11-28 | End: 2024-02-26

## 2023-11-28 ASSESSMENT — ENCOUNTER SYMPTOMS: BACK PAIN: 1

## 2023-11-28 NOTE — TELEPHONE ENCOUNTER
Patient called, requested refill.        Next Office Visit Date:  Future Appointments   Date Time Provider [address]   7/23/2024  3:00 PM MD Marin Wilkinson please review via [address]

## 2023-11-28 NOTE — PROGRESS NOTES
SUBJECTIVE:    Patient ID: Miranda Vaca is a 57 y.o. male. Chief Complaint   Patient presents with    Diabetes       HPI: office visit  He is in the office today in follow-up of his diabetes. He is struggling with his medicines. He has not had any real improvement recently in his sugars. He says he is trying to watch his diet. He says he feels weak and tired a lot. He has not been having any chest pain. He denies any shortness of breath. He says he is feeling like he is having more back pain. He is having more arthritic symptoms as he is getting older. Review of Systems   Constitutional:  Positive for fatigue. Cardiovascular:  Positive for leg swelling. Musculoskeletal:  Positive for arthralgias, back pain, gait problem and myalgias. All other systems reviewed and are negative. OBJECTIVE:  BP (!) 144/66   Pulse 73   Resp 18   Ht 6' 4\" (1.93 m)   SpO2 90% Comment: RA  BMI 54.41 kg/m²    Wt Readings from Last 3 Encounters:   02/21/23 (!) 447 lb (202.8 kg)   08/03/22 (!) 414 lb (187.8 kg)   07/28/22 (!) 418 lb (189.6 kg)     BP Readings from Last 3 Encounters:   02/27/23 (!) 144/66   02/21/23 138/62   01/09/23 (!) 146/72      Pulse Readings from Last 3 Encounters:   02/27/23 73   02/21/23 87   01/09/23 60     Body mass index is 54.41 kg/m². Resp Readings from Last 3 Encounters:   02/27/23 18   02/21/23 18   01/09/23 18     Past medical, surgical, family and social history were reviewed and updated with the patient. Physical Exam  Vitals and nursing note reviewed. Constitutional:       Appearance: Normal appearance. He is well-developed. HENT:      Head: Normocephalic. Right Ear: Hearing, tympanic membrane, ear canal and external ear normal.      Left Ear: Hearing, tympanic membrane, ear canal and external ear normal.      Nose: Nose normal.      Mouth/Throat:      Lips: Pink. Pharynx: Pharyngeal swelling present.       Tonsils: No tonsillar exudate or tonsillar abscesses. Eyes:      General: Lids are normal.   Neck:      Thyroid: No thyroid mass, thyromegaly or thyroid tenderness. Vascular: No carotid bruit or JVD. Trachea: Trachea and phonation normal.   Cardiovascular:      Rate and Rhythm: Normal rate and regular rhythm. Heart sounds: Normal heart sounds, S1 normal and S2 normal. No murmur heard. No friction rub. No gallop. Pulmonary:      Effort: Pulmonary effort is normal.      Breath sounds: Normal breath sounds. Abdominal:      General: Bowel sounds are normal.      Palpations: Abdomen is soft. Tenderness: There is no abdominal tenderness. Musculoskeletal:      Cervical back: Full passive range of motion without pain, normal range of motion and neck supple. Lumbar back: Spasms and tenderness present. Decreased range of motion. Right lower le+ Pitting Edema present. Left lower le+ Pitting Edema present. Lymphadenopathy:      Cervical: No cervical adenopathy. Skin:     General: Skin is warm and dry. Neurological:      General: No focal deficit present. Mental Status: He is alert and oriented to person, place, and time. Psychiatric:         Attention and Perception: Attention and perception normal.         Mood and Affect: Mood and affect normal.         Speech: Speech normal.         Behavior: Behavior normal. Behavior is cooperative. Thought Content:  Thought content normal.         Cognition and Memory: Cognition and memory normal.         Judgment: Judgment normal.        Results in Past 30 Days  Result Component Current Result Ref Range Previous Result Ref Range   Albumin/Globulin Ratio 1.1 (2023) 0.8 - 2.0 Not in Time Range    Albumin 4.1 (2023) 3.4 - 4.8 g/dL Not in Time Range    Alkaline Phosphatase 91 (2023) 25 - 100 U/L Not in Time Range    ALT 28 (2023) 4 - 36 U/L Not in Time Range    AST 32 (2023) 8 - 33 U/L Not in Time Range    BUN 23 (H) (2023) 6 - 20 mg/dL Not in Time Range    Calcium 9.6 (2/21/2023) 8.5 - 10.5 mg/dL Not in Time Range    Chloride 100 (2/21/2023) 98 - 107 mmol/L Not in Time Range    CO2 25 (2/21/2023) 20 - 30 mmol/L Not in Time Range    Creatinine 1.3 (H) (2/21/2023) 0.4 - 1.2 mg/dL Not in Time Range    Est, Glom Filt Rate >60 (2/21/2023) >59 Not in Time Range    Globulin 3.9 (2/21/2023) Not Established g/dL Not in Time Range    Glucose 212 (H) (2/21/2023) 74 - 106 mg/dL Not in Time Range    Potassium 5.7 (H) (2/21/2023) 3.4 - 5.1 mmol/L Not in Time Range    Sodium 137 (2/21/2023) 136 - 145 mmol/L Not in Time Range    Total Bilirubin <0.2 (L) (2/21/2023) 0.3 - 1.2 mg/dL Not in Time Range    Total Protein 8.0 (2/21/2023) 6.4 - 8.3 g/dL Not in Time Range      Hemoglobin A1C (%)   Date Value   01/09/2023 9.2 (H)     Microscopic Examination (no units)   Date Value   06/14/2021 Not Indicated     Microalbumin, Random Urine (mg/dL)   Date Value   07/14/2022 83.70 (H)     LDL Calculated (mg/dL)   Date Value   07/28/2022 93       Lab Results   Component Value Date/Time    WBC 4.9 11/28/2022 03:07 PM    NEUTROABS 2.8 06/15/2021 05:07 AM    HGB 13.0 11/28/2022 03:07 PM    HCT 42.0 11/28/2022 03:07 PM    HCT 41.6 06/05/2012 08:40 AM    MCV 97.9 11/28/2022 03:07 PM     11/28/2022 03:07 PM     06/05/2012 08:40 AM     Lab Results   Component Value Date    TSH 2.03 11/28/2022       ASSESSMENT/PLAN    Diagnosis Orders   1. Essential hypertension        2. Chronic bilateral low back pain with right-sided sciatica  HYDROcodone-acetaminophen (NORCO)  MG per tablet    oxyCODONE (OXY-IR) 30 MG immediate release tablet    gabapentin (NEURONTIN) 800 MG tablet      3. Insomnia, unspecified type  zolpidem (AMBIEN CR) 12.5 MG extended release tablet      4. Depression, unspecified depression type        5. Osteoarthritis of multiple joints, unspecified osteoarthritis type        6.  SOB (shortness of breath)  umeclidinium-vilanterol (ANORO ELLIPTA) 62.5-25 MCG/ACT inhaler          Orders Placed This Encounter   Medications    HYDROcodone-acetaminophen (NORCO)  MG per tablet     Sig: TAKE ONE TABLET BY MOUTH EVERY 4 HOURS AS NEEDED FOR PAIN     Dispense:  150 tablet     Refill:  0     Reduce doses taken as pain becomes manageable    zolpidem (AMBIEN CR) 12.5 MG extended release tablet     Sig: Take 1 tablet by mouth nightly as needed for Sleep for up to 30 days. Dispense:  30 tablet     Refill:  2    oxyCODONE (OXY-IR) 30 MG immediate release tablet     Sig: Take 1 tablet by mouth every 4 hours as needed for Pain for up to 30 days. Dispense:  150 tablet     Refill:  0     Reduce doses taken as pain becomes manageable    gabapentin (NEURONTIN) 800 MG tablet     Sig: TAKE ONE TABLET BY MOUTH FOUR TIMES A DAY     Dispense:  120 tablet     Refill:  2    umeclidinium-vilanterol (ANORO ELLIPTA) 62.5-25 MCG/ACT inhaler     Sig: Inhale 1 puff into the lungs daily     Dispense:  60 each     Refill:  5        Medications Discontinued During This Encounter   Medication Reason    gabapentin (NEURONTIN) 800 MG tablet     HYDROcodone-acetaminophen (NORCO)  MG per tablet REORDER    zolpidem (AMBIEN CR) 12.5 MG extended release tablet REORDER       Controlled Substances Monitoring:      Please note: This chart was generated using Dragon dictation software. Although every effort was made to ensure the accuracy of this automated transcription, some errors in transcription may have occurred. No

## 2023-11-28 NOTE — PATIENT INSTRUCTIONS
bag.  Cover up or remove any of your personal information on the empty containers by covering it with black permanent marker or duct tape. Place the sealed container with the mixture, and the empty drug containers, in the trash. If you use a medication that is in the form of a patch, dispose of used patches by folding them in half so that the sticky sides meet, and then flushing them down a toilet. They should not be placed in the household trash where children or pets can find them. If you have any questions, ask your provider or pharmacist for more information. Be sure to keep all appointments for provider visits or tests.

## 2023-11-28 NOTE — TELEPHONE ENCOUNTER
Patient called, requested refill.        Next Office Visit Date:  Future Appointments   Date Time Provider [address]   11/28/2023  3:15 PM Mynor Enamorado MD [address] [place] [address]   7/23/2024  3:00 PM MD Marin Mendes please review via [phone]

## 2023-11-29 RX ORDER — ALBUTEROL SULFATE 90 UG/1
2 AEROSOL, METERED RESPIRATORY (INHALATION) EVERY 4 HOURS PRN
Qty: 18 G | Refills: 0 | OUTPATIENT
Start: 2023-11-29

## 2023-11-29 RX ORDER — HYDROCODONE BITARTRATE AND ACETAMINOPHEN 10; 325 MG/1; MG/1
TABLET ORAL
Qty: 150 TABLET | Refills: 0 | OUTPATIENT
Start: 2023-11-29 | End: 2023-12-28

## 2023-11-29 RX ORDER — NITROGLYCERIN 0.4 MG/1
TABLET SUBLINGUAL
Qty: 25 TABLET | Refills: 0 | Status: SHIPPED | OUTPATIENT
Start: 2023-11-29

## 2023-11-29 RX ORDER — OXYCODONE HYDROCHLORIDE 30 MG/1
TABLET ORAL
Qty: 150 TABLET | Refills: 0 | Status: SHIPPED | OUTPATIENT
Start: 2023-11-29 | End: 2023-12-29

## 2023-11-29 RX ORDER — TRIAMCINOLONE ACETONIDE 1 MG/G
OINTMENT TOPICAL
Qty: 80 G | Refills: 0 | Status: SHIPPED | OUTPATIENT
Start: 2023-11-29

## 2023-11-29 RX ORDER — ERGOCALCIFEROL 1.25 MG/1
CAPSULE ORAL
Qty: 4 CAPSULE | Refills: 2 | Status: SHIPPED | OUTPATIENT
Start: 2023-11-29

## 2023-11-29 RX ORDER — TIZANIDINE 4 MG/1
TABLET ORAL
Qty: 90 TABLET | Refills: 0 | OUTPATIENT
Start: 2023-11-29

## 2023-12-21 ENCOUNTER — OFFICE VISIT (OUTPATIENT)
Dept: FAMILY MEDICINE CLINIC | Age: 63
End: 2023-12-21
Payer: MEDICARE

## 2023-12-21 VITALS
WEIGHT: 315 LBS | HEIGHT: 76 IN | TEMPERATURE: 97.5 F | BODY MASS INDEX: 38.36 KG/M2 | RESPIRATION RATE: 24 BRPM | OXYGEN SATURATION: 94 % | HEART RATE: 102 BPM

## 2023-12-21 DIAGNOSIS — J06.9 ACUTE URI: Primary | ICD-10-CM

## 2023-12-21 DIAGNOSIS — J02.9 SORE THROAT: ICD-10-CM

## 2023-12-21 LAB
INFLUENZA A ANTIBODY: NEGATIVE
INFLUENZA B ANTIBODY: NEGATIVE
Lab: NORMAL
QC PASS/FAIL: NORMAL
SARS-COV-2 RDRP RESP QL NAA+PROBE: NEGATIVE

## 2023-12-21 PROCEDURE — G8417 CALC BMI ABV UP PARAM F/U: HCPCS | Performed by: NURSE PRACTITIONER

## 2023-12-21 PROCEDURE — 1123F ACP DISCUSS/DSCN MKR DOCD: CPT | Performed by: NURSE PRACTITIONER

## 2023-12-21 PROCEDURE — 99213 OFFICE O/P EST LOW 20 MIN: CPT | Performed by: NURSE PRACTITIONER

## 2023-12-21 PROCEDURE — G8427 DOCREV CUR MEDS BY ELIG CLIN: HCPCS | Performed by: NURSE PRACTITIONER

## 2023-12-21 PROCEDURE — G8482 FLU IMMUNIZE ORDER/ADMIN: HCPCS | Performed by: NURSE PRACTITIONER

## 2023-12-21 PROCEDURE — 1036F TOBACCO NON-USER: CPT | Performed by: NURSE PRACTITIONER

## 2023-12-21 PROCEDURE — 3017F COLORECTAL CA SCREEN DOC REV: CPT | Performed by: NURSE PRACTITIONER

## 2023-12-21 RX ORDER — LEVOFLOXACIN 500 MG/1
500 TABLET, FILM COATED ORAL DAILY
Qty: 10 TABLET | Refills: 0 | Status: SHIPPED | OUTPATIENT
Start: 2023-12-21 | End: 2023-12-27 | Stop reason: SDUPTHER

## 2023-12-21 RX ORDER — BENZONATATE 100 MG/1
100-200 CAPSULE ORAL 3 TIMES DAILY PRN
Qty: 60 CAPSULE | Refills: 0 | Status: SHIPPED | OUTPATIENT
Start: 2023-12-21 | End: 2023-12-27 | Stop reason: SDUPTHER

## 2023-12-21 NOTE — PROGRESS NOTES
SUBJECTIVE:  Sony Ch is a 70 y.o. y/o male that presents with Pharyngitis (X 2 days)  . HPI:      Symptoms have been present for 2 day(s). Symptoms are unchanged since they initially started. Fever? No  Runny nose or congestion? Yes   Cough? Yes  Sore throat? Yes  Headache, fatigue, joint pains, muscle aches? Yes  Shortness of breath/Wheezing? No  Nausea/Vomiting/Diarrhea? No  Double Sickening? No  Sick contacts? No  Known COVID exposures of RFs? No  Smoker? No  Preexisting Respiratory conditions? No    Patient has tried nothing with no improvement.       Past Medical History:   Diagnosis Date    Asthma     CHF (congestive heart failure), NYHA class I, acute on chronic, combined ([facility]) 2017    Diabetes mellitus ([facility])     Essential hypertension 2018    Hearing loss     Hyperlipidemia     Low back pain     Obesity     Osteoarthritis of multiple joints 3/22/2017    Right shoulder pain     Shoulder pain, right     Type II or unspecified type diabetes mellitus without mention of complication, not stated as uncontrolled        Social History     Socioeconomic History    Marital status:      Spouse name: Not on file    Number of children: Not on file    Years of education: Not on file    Highest education level: Not on file   Occupational History    Not on file   Tobacco Use    Smoking status: Former     Current packs/day: 0.00     Average packs/day: 1 pack/day for 30.0 years (30.0 ttl pk-yrs)     Types: Cigarettes     Start date: 5     Quit date: 1998     Years since quittin.2    Smokeless tobacco: Never   Substance and Sexual Activity    Alcohol use: No     Alcohol/week: 0.0 standard drinks of alcohol    Drug use: No    Sexual activity: Not on file   Other Topics Concern    Not on file   Social History Narrative    Not on file     Social Determinants of Health     Financial Resource Strain: Low Risk  (2023)    Overall Financial Resource Strain (CARDIA)

## 2023-12-27 ENCOUNTER — HOSPITAL ENCOUNTER (OUTPATIENT)
Facility: HOSPITAL | Age: 63
Discharge: HOME | End: 2023-12-27
Payer: MEDICARE

## 2023-12-27 ENCOUNTER — OFFICE VISIT (OUTPATIENT)
Dept: FAMILY MEDICINE CLINIC | Age: 63
End: 2023-12-27
Payer: MEDICARE

## 2023-12-27 VITALS
TEMPERATURE: 97.8 F | RESPIRATION RATE: 16 BRPM | OXYGEN SATURATION: 98 % | WEIGHT: 315 LBS | SYSTOLIC BLOOD PRESSURE: 118 MMHG | DIASTOLIC BLOOD PRESSURE: 74 MMHG | HEIGHT: 76 IN | HEART RATE: 112 BPM | BODY MASS INDEX: 38.36 KG/M2

## 2023-12-27 DIAGNOSIS — Z79.4 TYPE 2 DIABETES MELLITUS WITH OTHER SPECIFIED COMPLICATION, WITH LONG-TERM CURRENT USE OF INSULIN (HCC): ICD-10-CM

## 2023-12-27 DIAGNOSIS — K21.9 GASTROESOPHAGEAL REFLUX DISEASE, UNSPECIFIED WHETHER ESOPHAGITIS PRESENT: ICD-10-CM

## 2023-12-27 DIAGNOSIS — M79.641 BILATERAL HAND PAIN: Primary | ICD-10-CM

## 2023-12-27 DIAGNOSIS — G89.29 CHRONIC BILATERAL LOW BACK PAIN WITH RIGHT-SIDED SCIATICA: ICD-10-CM

## 2023-12-27 DIAGNOSIS — Z79.4 TYPE 2 DIABETES MELLITUS WITH OTHER SPECIFIED COMPLICATION, WITH LONG-TERM CURRENT USE OF INSULIN (HCC): Primary | ICD-10-CM

## 2023-12-27 DIAGNOSIS — E11.69 TYPE 2 DIABETES MELLITUS WITH OTHER SPECIFIED COMPLICATION, WITH LONG-TERM CURRENT USE OF INSULIN (HCC): ICD-10-CM

## 2023-12-27 DIAGNOSIS — E11.69 TYPE 2 DIABETES MELLITUS WITH OTHER SPECIFIED COMPLICATION, WITH LONG-TERM CURRENT USE OF INSULIN (HCC): Primary | ICD-10-CM

## 2023-12-27 DIAGNOSIS — F41.9 ANXIETY: ICD-10-CM

## 2023-12-27 DIAGNOSIS — I50.9 ACUTE ON CHRONIC CONGESTIVE HEART FAILURE, UNSPECIFIED HEART FAILURE TYPE (HCC): ICD-10-CM

## 2023-12-27 DIAGNOSIS — M15.9 OSTEOARTHRITIS OF MULTIPLE JOINTS, UNSPECIFIED OSTEOARTHRITIS TYPE: ICD-10-CM

## 2023-12-27 DIAGNOSIS — M54.41 CHRONIC BILATERAL LOW BACK PAIN WITH RIGHT-SIDED SCIATICA: ICD-10-CM

## 2023-12-27 DIAGNOSIS — G47.00 INSOMNIA, UNSPECIFIED TYPE: ICD-10-CM

## 2023-12-27 DIAGNOSIS — M79.642 BILATERAL HAND PAIN: Primary | ICD-10-CM

## 2023-12-27 LAB
ALBUMIN SERPL-MCNC: 3.8 G/DL (ref 3.4–4.8)
ALBUMIN/GLOB SERPL: 1.3 {RATIO} (ref 0.8–2)
ALP SERPL-CCNC: 78 U/L (ref 25–100)
ALT SERPL-CCNC: 20 U/L (ref 4–36)
ANION GAP SERPL CALCULATED.3IONS-SCNC: 14 MMOL/L (ref 3–16)
AST SERPL-CCNC: 22 U/L (ref 8–33)
BILIRUB SERPL-MCNC: 0.3 MG/DL (ref 0.3–1.2)
BUN SERPL-MCNC: 17 MG/DL (ref 6–20)
CALCIUM SERPL-MCNC: 9.3 MG/DL (ref 8.5–10.5)
CHLORIDE SERPL-SCNC: 101 MMOL/L (ref 98–107)
CO2 SERPL-SCNC: 25 MMOL/L (ref 20–30)
CREAT SERPL-MCNC: 0.9 MG/DL (ref 0.4–1.2)
GFR SERPLBLD CREATININE-BSD FMLA CKD-EPI: >60 ML/MIN/{1.73_M2}
GLOBULIN SER CALC-MCNC: 3 G/DL
GLUCOSE SERPL-MCNC: 92 MG/DL (ref 74–106)
HBA1C MFR BLD: 7.7 %
POTASSIUM SERPL-SCNC: 4.9 MMOL/L (ref 3.4–5.1)
PROT SERPL-MCNC: 6.8 G/DL (ref 6.4–8.3)
SODIUM SERPL-SCNC: 140 MMOL/L (ref 136–145)

## 2023-12-27 PROCEDURE — 3017F COLORECTAL CA SCREEN DOC REV: CPT | Performed by: FAMILY MEDICINE

## 2023-12-27 PROCEDURE — 80053 COMPREHEN METABOLIC PANEL: CPT

## 2023-12-27 PROCEDURE — 3051F HG A1C>EQUAL 7.0%<8.0%: CPT | Performed by: FAMILY MEDICINE

## 2023-12-27 PROCEDURE — G8427 DOCREV CUR MEDS BY ELIG CLIN: HCPCS | Performed by: FAMILY MEDICINE

## 2023-12-27 PROCEDURE — G8417 CALC BMI ABV UP PARAM F/U: HCPCS | Performed by: FAMILY MEDICINE

## 2023-12-27 PROCEDURE — 3074F SYST BP LT 130 MM HG: CPT | Performed by: FAMILY MEDICINE

## 2023-12-27 PROCEDURE — 99214 OFFICE O/P EST MOD 30 MIN: CPT | Performed by: FAMILY MEDICINE

## 2023-12-27 PROCEDURE — 2022F DILAT RTA XM EVC RTNOPTHY: CPT | Performed by: FAMILY MEDICINE

## 2023-12-27 PROCEDURE — G8482 FLU IMMUNIZE ORDER/ADMIN: HCPCS | Performed by: FAMILY MEDICINE

## 2023-12-27 PROCEDURE — 83036 HEMOGLOBIN GLYCOSYLATED A1C: CPT

## 2023-12-27 PROCEDURE — 1123F ACP DISCUSS/DSCN MKR DOCD: CPT | Performed by: FAMILY MEDICINE

## 2023-12-27 PROCEDURE — 1036F TOBACCO NON-USER: CPT | Performed by: FAMILY MEDICINE

## 2023-12-27 PROCEDURE — 3078F DIAST BP <80 MM HG: CPT | Performed by: FAMILY MEDICINE

## 2023-12-27 RX ORDER — HYDROCODONE BITARTRATE AND ACETAMINOPHEN 10; 325 MG/1; MG/1
TABLET ORAL
Qty: 150 TABLET | Refills: 0 | Status: SHIPPED | OUTPATIENT
Start: 2023-12-27 | End: 2023-12-27 | Stop reason: SDUPTHER

## 2023-12-27 RX ORDER — TIZANIDINE 4 MG/1
TABLET ORAL
Qty: 90 TABLET | Refills: 2 | Status: SHIPPED | OUTPATIENT
Start: 2023-12-27

## 2023-12-27 RX ORDER — LANCETS 30 GAUGE
1 EACH MISCELLANEOUS 3 TIMES DAILY
Qty: 600 EACH | Refills: 3 | Status: SHIPPED | OUTPATIENT
Start: 2023-12-27

## 2023-12-27 RX ORDER — OXYCODONE HYDROCHLORIDE 30 MG/1
30 TABLET ORAL EVERY 4 HOURS PRN
Qty: 150 TABLET | Refills: 0 | Status: SHIPPED | OUTPATIENT
Start: 2023-12-27 | End: 2024-01-26

## 2023-12-27 RX ORDER — TIZANIDINE 4 MG/1
TABLET ORAL
Qty: 90 TABLET | Refills: 0 | Status: SHIPPED | OUTPATIENT
Start: 2023-12-27 | End: 2023-12-27 | Stop reason: SDUPTHER

## 2023-12-27 RX ORDER — SPIRONOLACTONE 25 MG/1
TABLET ORAL
Qty: 90 TABLET | Refills: 2 | Status: SHIPPED | OUTPATIENT
Start: 2023-12-27

## 2023-12-27 RX ORDER — BENZONATATE 100 MG/1
100-200 CAPSULE ORAL 3 TIMES DAILY PRN
Qty: 60 CAPSULE | Refills: 0 | Status: SHIPPED | OUTPATIENT
Start: 2023-12-27 | End: 2023-12-27

## 2023-12-27 RX ORDER — OXYCODONE HYDROCHLORIDE 30 MG/1
30 TABLET ORAL EVERY 4 HOURS PRN
Qty: 150 TABLET | Refills: 0 | Status: SHIPPED | OUTPATIENT
Start: 2023-12-27 | End: 2023-12-27 | Stop reason: SDUPTHER

## 2023-12-27 RX ORDER — ZOLPIDEM TARTRATE 12.5 MG/1
TABLET, FILM COATED, EXTENDED RELEASE ORAL
Qty: 30 TABLET | Refills: 2 | Status: SHIPPED | OUTPATIENT
Start: 2023-12-27 | End: 2024-03-25

## 2023-12-27 RX ORDER — NITROGLYCERIN 0.4 MG/1
TABLET SUBLINGUAL
Qty: 25 TABLET | Refills: 0 | Status: SHIPPED | OUTPATIENT
Start: 2023-12-27

## 2023-12-27 RX ORDER — LEVOFLOXACIN 500 MG/1
500 TABLET, FILM COATED ORAL DAILY
Qty: 10 TABLET | Refills: 0 | Status: SHIPPED | OUTPATIENT
Start: 2023-12-27 | End: 2023-12-27

## 2023-12-27 RX ORDER — ERGOCALCIFEROL 1.25 MG/1
50000 CAPSULE ORAL WEEKLY
Qty: 4 CAPSULE | Refills: 2 | Status: SHIPPED | OUTPATIENT
Start: 2023-12-27

## 2023-12-27 RX ORDER — HYDROCODONE BITARTRATE AND ACETAMINOPHEN 10; 325 MG/1; MG/1
TABLET ORAL
Qty: 150 TABLET | Refills: 0 | Status: SHIPPED | OUTPATIENT
Start: 2023-12-27 | End: 2024-02-05

## 2023-12-27 RX ORDER — INSULIN GLARGINE 100 [IU]/ML
100 INJECTION, SOLUTION SUBCUTANEOUS 2 TIMES DAILY
Qty: 60 ML | Refills: 2 | Status: SHIPPED | OUTPATIENT
Start: 2023-12-27 | End: 2024-03-26

## 2023-12-27 RX ORDER — ALBUTEROL SULFATE 90 UG/1
2 AEROSOL, METERED RESPIRATORY (INHALATION) EVERY 4 HOURS PRN
Qty: 18 G | Refills: 0 | Status: SHIPPED | OUTPATIENT
Start: 2023-12-27

## 2023-12-27 RX ORDER — SIMVASTATIN 40 MG
TABLET ORAL
Qty: 90 TABLET | Refills: 3 | Status: SHIPPED | OUTPATIENT
Start: 2023-12-27

## 2023-12-27 RX ORDER — GLUCOSAMINE HCL/CHONDROITIN SU 500-400 MG
CAPSULE ORAL
Qty: 100 STRIP | Refills: 3 | Status: SHIPPED | OUTPATIENT
Start: 2023-12-27

## 2023-12-27 RX ORDER — PANTOPRAZOLE SODIUM 40 MG/1
40 TABLET, DELAYED RELEASE ORAL 2 TIMES DAILY
Qty: 60 TABLET | Refills: 5 | Status: SHIPPED | OUTPATIENT
Start: 2023-12-27

## 2023-12-27 RX ORDER — DIAZEPAM 5 MG/1
TABLET ORAL
Qty: 60 TABLET | Refills: 2 | Status: SHIPPED | OUTPATIENT
Start: 2023-12-27 | End: 2024-03-27

## 2023-12-27 RX ORDER — CELECOXIB 200 MG/1
200 CAPSULE ORAL DAILY
Qty: 30 CAPSULE | Refills: 2 | Status: SHIPPED | OUTPATIENT
Start: 2023-12-27

## 2023-12-27 NOTE — TELEPHONE ENCOUNTER
Refill request received from pharmacy      Next Office Visit Date:  Future Appointments   Date Time Provider [address]   12/27/2023 11:15 AM Matt Constantino MD [address] [place] [address]   7/23/2024  3:00 PM Matt Constantino MD [facility] - Please review via [address]        Last Office Visit:    12/21/2023

## 2023-12-27 NOTE — PROGRESS NOTES
Chief Complaint   Patient presents with    Hypertension     Reg F/U       Have you seen any other physician or provider since your last visit no    Have you had any other diagnostic tests since your last visit? no    Have you changed or stopped any medications since your last visit? no

## 2023-12-27 NOTE — TELEPHONE ENCOUNTER
Patient called requesting glucose monitor, lancets and strips.         Refill request received from patient      Next Office Visit Date:  Future Appointments   Date Time Provider [facility] [address]   3/27/2024  1:15 PM Candido Harrison MD [address] [place] [address]   7/23/2024  3:00 PM Candido Harrison MD [facility] - Please review via [phone]        Last Office Visit:    12/27/2023

## 2024-01-25 DIAGNOSIS — Z79.4 TYPE 2 DIABETES MELLITUS WITH OTHER SPECIFIED COMPLICATION, WITH LONG-TERM CURRENT USE OF INSULIN (HCC): ICD-10-CM

## 2024-01-25 DIAGNOSIS — E11.69 TYPE 2 DIABETES MELLITUS WITH OTHER SPECIFIED COMPLICATION, WITH LONG-TERM CURRENT USE OF INSULIN (HCC): ICD-10-CM

## 2024-01-25 NOTE — TELEPHONE ENCOUNTER
Refill request received from pharmacy      Next Office Visit Date:  Future Appointments   Date Time Provider Department Center   3/27/2024  1:15 PM Kavitha Baker MD [facility]   7/23/2024  3:00 PM Kavitha Baker MD [facility] - Please review via PDMP        Last Office Visit:    12/27/2023

## 2024-01-26 RX ORDER — NITROGLYCERIN 0.4 MG/1
TABLET SUBLINGUAL
Qty: 25 TABLET | Refills: 0 | Status: SHIPPED | OUTPATIENT
Start: 2024-01-26

## 2024-01-26 RX ORDER — ALBUTEROL SULFATE 90 UG/1
2 AEROSOL, METERED RESPIRATORY (INHALATION) EVERY 4 HOURS PRN
Qty: 18 G | Refills: 0 | Status: SHIPPED | OUTPATIENT
Start: 2024-01-26

## 2024-02-05 ENCOUNTER — TELEPHONE (OUTPATIENT)
Dept: FAMILY MEDICINE CLINIC | Age: 64
End: 2024-02-05

## 2024-02-05 NOTE — TELEPHONE ENCOUNTER
Patient has called stating he has an infected tooth and was needing something for the infection sent to [facility].

## 2024-02-19 DIAGNOSIS — G89.29 CHRONIC BILATERAL LOW BACK PAIN WITH RIGHT-SIDED SCIATICA: ICD-10-CM

## 2024-02-19 DIAGNOSIS — E11.69 TYPE 2 DIABETES MELLITUS WITH OTHER SPECIFIED COMPLICATION, WITH LONG-TERM CURRENT USE OF INSULIN (HCC): ICD-10-CM

## 2024-02-19 DIAGNOSIS — M54.41 CHRONIC BILATERAL LOW BACK PAIN WITH RIGHT-SIDED SCIATICA: ICD-10-CM

## 2024-02-19 DIAGNOSIS — Z79.4 TYPE 2 DIABETES MELLITUS WITH OTHER SPECIFIED COMPLICATION, WITH LONG-TERM CURRENT USE OF INSULIN (HCC): ICD-10-CM

## 2024-02-19 DIAGNOSIS — L98.9 SKIN LESION: ICD-10-CM

## 2024-02-19 NOTE — TELEPHONE ENCOUNTER
Refill request received from pharmacy      Next Office Visit Date:  Future Appointments   Date Time Provider Department Center   3/27/2024  1:15 PM Kavitha Baker MD [facility]   7/23/2024  3:00 PM Kavitha Baker MD [facility] - Please review via PDMP        Last Office Visit:    12/27/2023

## 2024-02-19 NOTE — TELEPHONE ENCOUNTER
Refill request received from pharmacy      Next Office Visit Date:  Future Appointments   Date Time Provider Department Center   3/27/2024  1:15 PM Kavitha Baker MD [facility]   7/23/2024  3:00 PM Kavitha Baker MD [facility] - Please review via PDMP        Last Office Visit:    12/27/2023

## 2024-02-19 NOTE — TELEPHONE ENCOUNTER
Refill request received from pharmacy      Next Office Visit Date:  Future Appointments   Date Time Provider Department Center   3/27/2024  1:15 PM Kavitha Baker MD [facility]   7/23/2024  3:00 PM Kavitha Baker MD [facility] - Please review via PDMP        Last Office Visit:    12/27/2023

## 2024-02-21 DIAGNOSIS — M54.41 CHRONIC BILATERAL LOW BACK PAIN WITH RIGHT-SIDED SCIATICA: ICD-10-CM

## 2024-02-21 DIAGNOSIS — G89.29 CHRONIC BILATERAL LOW BACK PAIN WITH RIGHT-SIDED SCIATICA: ICD-10-CM

## 2024-02-21 RX ORDER — OXYCODONE HYDROCHLORIDE 30 MG/1
TABLET ORAL
Qty: 150 TABLET | Refills: 0 | OUTPATIENT
Start: 2024-02-21

## 2024-02-21 RX ORDER — HYDROCODONE BITARTRATE AND ACETAMINOPHEN 10; 325 MG/1; MG/1
TABLET ORAL
Qty: 150 TABLET | Refills: 0 | Status: SHIPPED | OUTPATIENT
Start: 2024-02-21 | End: 2024-03-30

## 2024-02-21 RX ORDER — OXYCODONE HYDROCHLORIDE 30 MG/1
30 TABLET ORAL EVERY 4 HOURS PRN
Qty: 150 TABLET | Refills: 0 | Status: SHIPPED | OUTPATIENT
Start: 2024-02-21 | End: 2024-03-22

## 2024-02-21 RX ORDER — TRIAMCINOLONE ACETONIDE 1 MG/G
OINTMENT TOPICAL
Qty: 80 G | Refills: 1 | Status: SHIPPED | OUTPATIENT
Start: 2024-02-21

## 2024-02-21 RX ORDER — NITROGLYCERIN 0.4 MG/1
TABLET SUBLINGUAL
Qty: 25 TABLET | Refills: 0 | Status: SHIPPED | OUTPATIENT
Start: 2024-02-21

## 2024-02-21 RX ORDER — ALBUTEROL SULFATE 90 UG/1
2 AEROSOL, METERED RESPIRATORY (INHALATION) EVERY 4 HOURS PRN
Qty: 6.7 G | Refills: 0 | Status: SHIPPED | OUTPATIENT
Start: 2024-02-21

## 2024-03-07 ENCOUNTER — TELEPHONE (OUTPATIENT)
Dept: FAMILY MEDICINE CLINIC | Age: 64
End: 2024-03-07

## 2024-03-07 NOTE — TELEPHONE ENCOUNTER
Patient called and stated that he needs to talk with you he would not give any information as to what he was needing.

## 2024-03-08 DIAGNOSIS — M54.41 CHRONIC BILATERAL LOW BACK PAIN WITH RIGHT-SIDED SCIATICA: ICD-10-CM

## 2024-03-08 DIAGNOSIS — E66.01 CLASS 3 SEVERE OBESITY WITH SERIOUS COMORBIDITY AND BODY MASS INDEX (BMI) OF 50.0 TO 59.9 IN ADULT, UNSPECIFIED OBESITY TYPE (HCC): Primary | ICD-10-CM

## 2024-03-08 DIAGNOSIS — G89.29 CHRONIC BILATERAL LOW BACK PAIN WITH RIGHT-SIDED SCIATICA: ICD-10-CM

## 2024-03-08 RX ORDER — OXYCODONE HYDROCHLORIDE 30 MG/1
30 TABLET ORAL EVERY 4 HOURS PRN
Qty: 150 TABLET | Refills: 0 | Status: SHIPPED | OUTPATIENT
Start: 2024-03-08 | End: 2024-04-07

## 2024-03-08 RX ORDER — HYDROCODONE BITARTRATE AND ACETAMINOPHEN 10; 325 MG/1; MG/1
TABLET ORAL
Qty: 150 TABLET | Refills: 0 | Status: SHIPPED | OUTPATIENT
Start: 2024-03-08 | End: 2024-04-15

## 2024-03-08 RX ORDER — GABAPENTIN 800 MG/1
TABLET ORAL
Qty: 120 TABLET | Refills: 2 | Status: SHIPPED | OUTPATIENT
Start: 2024-03-08 | End: 2024-05-15

## 2024-03-08 NOTE — TELEPHONE ENCOUNTER
Please let Jose Alejandro know that I am going to the nursing home today and I will address [facility] pain medicine.  I have placed his referral for gastric sleeve evaluation.  I have sent his medicine.  Hopeful that is all he needed

## 2024-03-14 DIAGNOSIS — G89.29 CHRONIC BILATERAL LOW BACK PAIN WITH RIGHT-SIDED SCIATICA: ICD-10-CM

## 2024-03-14 DIAGNOSIS — Z79.4 TYPE 2 DIABETES MELLITUS WITH OTHER SPECIFIED COMPLICATION, WITH LONG-TERM CURRENT USE OF INSULIN (HCC): ICD-10-CM

## 2024-03-14 DIAGNOSIS — F41.9 ANXIETY: ICD-10-CM

## 2024-03-14 DIAGNOSIS — E11.69 TYPE 2 DIABETES MELLITUS WITH OTHER SPECIFIED COMPLICATION, WITH LONG-TERM CURRENT USE OF INSULIN (HCC): ICD-10-CM

## 2024-03-14 DIAGNOSIS — M15.9 OSTEOARTHRITIS OF MULTIPLE JOINTS, UNSPECIFIED OSTEOARTHRITIS TYPE: ICD-10-CM

## 2024-03-14 DIAGNOSIS — I50.9 ACUTE ON CHRONIC CONGESTIVE HEART FAILURE, UNSPECIFIED HEART FAILURE TYPE (HCC): ICD-10-CM

## 2024-03-14 DIAGNOSIS — M54.41 CHRONIC BILATERAL LOW BACK PAIN WITH RIGHT-SIDED SCIATICA: ICD-10-CM

## 2024-03-14 RX ORDER — NITROGLYCERIN 0.4 MG/1
TABLET SUBLINGUAL
Qty: 25 TABLET | Refills: 0 | Status: SHIPPED | OUTPATIENT
Start: 2024-03-14

## 2024-03-14 RX ORDER — TIZANIDINE 4 MG/1
TABLET ORAL
Qty: 90 TABLET | Refills: 2 | Status: SHIPPED | OUTPATIENT
Start: 2024-03-14

## 2024-03-14 RX ORDER — ALBUTEROL SULFATE 90 UG/1
2 AEROSOL, METERED RESPIRATORY (INHALATION) EVERY 4 HOURS PRN
Qty: 6.7 G | Refills: 0 | Status: SHIPPED | OUTPATIENT
Start: 2024-03-14

## 2024-03-14 RX ORDER — DOCUSATE SODIUM 100 MG/1
CAPSULE, LIQUID FILLED ORAL
Qty: 120 CAPSULE | Refills: 2 | Status: SHIPPED | OUTPATIENT
Start: 2024-03-14

## 2024-03-14 RX ORDER — SPIRONOLACTONE 25 MG/1
TABLET ORAL
Qty: 90 TABLET | Refills: 2 | Status: SHIPPED | OUTPATIENT
Start: 2024-03-14

## 2024-03-14 RX ORDER — BUMETANIDE 2 MG/1
TABLET ORAL
Qty: 60 TABLET | Refills: 3 | Status: SHIPPED | OUTPATIENT
Start: 2024-03-14

## 2024-03-14 RX ORDER — CELECOXIB 200 MG/1
CAPSULE ORAL
Qty: 30 CAPSULE | Refills: 2 | Status: SHIPPED | OUTPATIENT
Start: 2024-03-14

## 2024-03-14 RX ORDER — DIAZEPAM 5 MG/1
TABLET ORAL
Qty: 60 TABLET | Refills: 2 | Status: SHIPPED | OUTPATIENT
Start: 2024-03-14 | End: 2024-06-13

## 2024-03-14 NOTE — TELEPHONE ENCOUNTER
Refill request received from pharmacy      Next Office Visit Date:  Future Appointments   Date Time Provider Department Center   3/27/2024  1:15 PM Kavitha Baker MD [facility]   7/23/2024  3:00 PM Kavitha Baker MD [facility] - Please review via PDMP        Last Office Visit:    12/27/2023

## 2024-03-27 ENCOUNTER — OFFICE VISIT (OUTPATIENT)
Dept: FAMILY MEDICINE CLINIC | Age: 64
End: 2024-03-27
Payer: MEDICARE

## 2024-03-27 ENCOUNTER — HOSPITAL ENCOUNTER (OUTPATIENT)
Facility: HOSPITAL | Age: 64
Discharge: HOME | End: 2024-03-27
Payer: MEDICARE

## 2024-03-27 VITALS
OXYGEN SATURATION: 96 % | DIASTOLIC BLOOD PRESSURE: 66 MMHG | TEMPERATURE: 98.1 F | SYSTOLIC BLOOD PRESSURE: 132 MMHG | BODY MASS INDEX: 38.36 KG/M2 | HEIGHT: 76 IN | RESPIRATION RATE: 16 BRPM | HEART RATE: 109 BPM | WEIGHT: 315 LBS

## 2024-03-27 DIAGNOSIS — D50.9 IRON DEFICIENCY ANEMIA, UNSPECIFIED IRON DEFICIENCY ANEMIA TYPE: Primary | ICD-10-CM

## 2024-03-27 DIAGNOSIS — E66.01 CLASS 3 SEVERE OBESITY WITH SERIOUS COMORBIDITY AND BODY MASS INDEX (BMI) OF 50.0 TO 59.9 IN ADULT, UNSPECIFIED OBESITY TYPE (HCC): ICD-10-CM

## 2024-03-27 DIAGNOSIS — Z79.4 TYPE 2 DIABETES MELLITUS WITH OTHER SPECIFIED COMPLICATION, WITH LONG-TERM CURRENT USE OF INSULIN (HCC): ICD-10-CM

## 2024-03-27 DIAGNOSIS — I10 ESSENTIAL HYPERTENSION: ICD-10-CM

## 2024-03-27 DIAGNOSIS — E11.69 TYPE 2 DIABETES MELLITUS WITH OTHER SPECIFIED COMPLICATION, WITH LONG-TERM CURRENT USE OF INSULIN (HCC): ICD-10-CM

## 2024-03-27 DIAGNOSIS — E55.9 VITAMIN D DEFICIENCY: ICD-10-CM

## 2024-03-27 DIAGNOSIS — R53.83 FATIGUE, UNSPECIFIED TYPE: ICD-10-CM

## 2024-03-27 DIAGNOSIS — M15.9 OSTEOARTHRITIS OF MULTIPLE JOINTS, UNSPECIFIED OSTEOARTHRITIS TYPE: ICD-10-CM

## 2024-03-27 DIAGNOSIS — Z12.5 SCREENING FOR PROSTATE CANCER: ICD-10-CM

## 2024-03-27 DIAGNOSIS — G89.29 CHRONIC BILATERAL LOW BACK PAIN WITH RIGHT-SIDED SCIATICA: ICD-10-CM

## 2024-03-27 DIAGNOSIS — M54.41 CHRONIC BILATERAL LOW BACK PAIN WITH RIGHT-SIDED SCIATICA: ICD-10-CM

## 2024-03-27 PROCEDURE — G8417 CALC BMI ABV UP PARAM F/U: HCPCS | Performed by: FAMILY MEDICINE

## 2024-03-27 PROCEDURE — 2022F DILAT RTA XM EVC RTNOPTHY: CPT | Performed by: FAMILY MEDICINE

## 2024-03-27 PROCEDURE — 99214 OFFICE O/P EST MOD 30 MIN: CPT | Performed by: FAMILY MEDICINE

## 2024-03-27 PROCEDURE — 84153 ASSAY OF PSA TOTAL: CPT

## 2024-03-27 PROCEDURE — 82306 VITAMIN D 25 HYDROXY: CPT

## 2024-03-27 PROCEDURE — G8427 DOCREV CUR MEDS BY ELIG CLIN: HCPCS | Performed by: FAMILY MEDICINE

## 2024-03-27 PROCEDURE — 83036 HEMOGLOBIN GLYCOSYLATED A1C: CPT

## 2024-03-27 PROCEDURE — 3075F SYST BP GE 130 - 139MM HG: CPT | Performed by: FAMILY MEDICINE

## 2024-03-27 PROCEDURE — 80053 COMPREHEN METABOLIC PANEL: CPT

## 2024-03-27 PROCEDURE — 3078F DIAST BP <80 MM HG: CPT | Performed by: FAMILY MEDICINE

## 2024-03-27 PROCEDURE — 3017F COLORECTAL CA SCREEN DOC REV: CPT | Performed by: FAMILY MEDICINE

## 2024-03-27 PROCEDURE — 36415 COLL VENOUS BLD VENIPUNCTURE: CPT

## 2024-03-27 PROCEDURE — 84443 ASSAY THYROID STIM HORMONE: CPT

## 2024-03-27 PROCEDURE — G8482 FLU IMMUNIZE ORDER/ADMIN: HCPCS | Performed by: FAMILY MEDICINE

## 2024-03-27 PROCEDURE — 82607 VITAMIN B-12: CPT

## 2024-03-27 PROCEDURE — 84439 ASSAY OF FREE THYROXINE: CPT

## 2024-03-27 PROCEDURE — 85027 COMPLETE CBC AUTOMATED: CPT

## 2024-03-27 PROCEDURE — 3051F HG A1C>EQUAL 7.0%<8.0%: CPT | Performed by: FAMILY MEDICINE

## 2024-03-27 PROCEDURE — 82746 ASSAY OF FOLIC ACID SERUM: CPT

## 2024-03-27 PROCEDURE — 1036F TOBACCO NON-USER: CPT | Performed by: FAMILY MEDICINE

## 2024-03-27 PROCEDURE — 1123F ACP DISCUSS/DSCN MKR DOCD: CPT | Performed by: FAMILY MEDICINE

## 2024-03-27 RX ORDER — HYDROCODONE BITARTRATE AND ACETAMINOPHEN 10; 325 MG/1; MG/1
TABLET ORAL
Qty: 150 TABLET | Refills: 0 | Status: SHIPPED | OUTPATIENT
Start: 2024-03-27 | End: 2024-03-27 | Stop reason: SDUPTHER

## 2024-03-27 RX ORDER — INSULIN GLARGINE 100 [IU]/ML
100 INJECTION, SOLUTION SUBCUTANEOUS 2 TIMES DAILY
Qty: 60 ML | Refills: 2 | Status: SHIPPED | OUTPATIENT
Start: 2024-03-27 | End: 2024-06-25

## 2024-03-27 RX ORDER — HYDROCODONE BITARTRATE AND ACETAMINOPHEN 10; 325 MG/1; MG/1
TABLET ORAL
Qty: 150 TABLET | Refills: 0 | Status: SHIPPED | OUTPATIENT
Start: 2024-03-27 | End: 2024-05-04

## 2024-03-27 RX ORDER — OXYCODONE HYDROCHLORIDE 30 MG/1
30 TABLET ORAL EVERY 4 HOURS PRN
Qty: 150 TABLET | Refills: 0 | Status: SHIPPED | OUTPATIENT
Start: 2024-03-27 | End: 2024-04-26

## 2024-03-27 RX ORDER — OXYCODONE HYDROCHLORIDE 30 MG/1
30 TABLET ORAL EVERY 4 HOURS PRN
Qty: 150 TABLET | Refills: 0 | Status: SHIPPED | OUTPATIENT
Start: 2024-03-27 | End: 2024-03-27 | Stop reason: SDUPTHER

## 2024-03-27 SDOH — ECONOMIC STABILITY: INCOME INSECURITY: HOW HARD IS IT FOR YOU TO PAY FOR THE VERY BASICS LIKE FOOD, HOUSING, MEDICAL CARE, AND HEATING?: NOT HARD AT ALL

## 2024-03-27 SDOH — ECONOMIC STABILITY: FOOD INSECURITY: WITHIN THE PAST 12 MONTHS, THE FOOD YOU BOUGHT JUST DIDN'T LAST AND YOU DIDN'T HAVE MONEY TO GET MORE.: NEVER TRUE

## 2024-03-27 SDOH — ECONOMIC STABILITY: FOOD INSECURITY: WITHIN THE PAST 12 MONTHS, YOU WORRIED THAT YOUR FOOD WOULD RUN OUT BEFORE YOU GOT MONEY TO BUY MORE.: NEVER TRUE

## 2024-03-27 ASSESSMENT — PATIENT HEALTH QUESTIONNAIRE - PHQ9
SUM OF ALL RESPONSES TO PHQ QUESTIONS 1-9: 7
3. TROUBLE FALLING OR STAYING ASLEEP: SEVERAL DAYS
6. FEELING BAD ABOUT YOURSELF - OR THAT YOU ARE A FAILURE OR HAVE LET YOURSELF OR YOUR FAMILY DOWN: NOT AT ALL
9. THOUGHTS THAT YOU WOULD BE BETTER OFF DEAD, OR OF HURTING YOURSELF: NOT AT ALL
8. MOVING OR SPEAKING SO SLOWLY THAT OTHER PEOPLE COULD HAVE NOTICED. OR THE OPPOSITE, BEING SO FIGETY OR RESTLESS THAT YOU HAVE BEEN MOVING AROUND A LOT MORE THAN USUAL: NOT AT ALL
10. IF YOU CHECKED OFF ANY PROBLEMS, HOW DIFFICULT HAVE THESE PROBLEMS MADE IT FOR YOU TO DO YOUR WORK, TAKE CARE OF THINGS AT HOME, OR GET ALONG WITH OTHER PEOPLE: NOT DIFFICULT AT ALL
1. LITTLE INTEREST OR PLEASURE IN DOING THINGS: NOT AT ALL
5. POOR APPETITE OR OVEREATING: NEARLY EVERY DAY
2. FEELING DOWN, DEPRESSED OR HOPELESS: NOT AT ALL
4. FEELING TIRED OR HAVING LITTLE ENERGY: NEARLY EVERY DAY
SUM OF ALL RESPONSES TO PHQ QUESTIONS 1-9: 7
SUM OF ALL RESPONSES TO PHQ9 QUESTIONS 1 & 2: 0
SUM OF ALL RESPONSES TO PHQ QUESTIONS 1-9: 7
SUM OF ALL RESPONSES TO PHQ QUESTIONS 1-9: 7
7. TROUBLE CONCENTRATING ON THINGS, SUCH AS READING THE NEWSPAPER OR WATCHING TELEVISION: NOT AT ALL

## 2024-03-27 NOTE — PROGRESS NOTES
Chief Complaint   Patient presents with    Congestive Heart Failure     Reg F/U    Diabetes     Reg F/U    Knee Pain     Bilateral knee pain with swelling.        Have you seen any other physician or provider since your last visit yes - [facility]    Have you had any other diagnostic tests since your last visit? yes - xray    Have you changed or stopped any medications since your last visit? no

## 2024-03-27 NOTE — PROGRESS NOTES
SUBJECTIVE:    Patient ID: Jose Alejandro Nichole is a 63 y.o. male.    Chief Complaint   Patient presents with    Congestive Heart Failure     Reg F/U    Diabetes     Reg F/U    Knee Pain     Bilateral knee pain with swelling.        HPI: office visit  He is having continued issues with his right wrist and elbow.  He is seeing ortho who may need to do surgery due to carpel tunnel.  He is having some signficant seasonal allergies. He is wanting to do gastric banding.  He says he feels that he could have less blood sugar issues and pain.  He is not having any chest pain or increase in shortness of breath.  He is having to use a cane all the time.  He has not had any falls recently.  He is not having any medication problems.      Review of Systems   Constitutional:  Positive for fatigue.   Musculoskeletal:  Positive for arthralgias, back pain, gait problem, joint swelling and myalgias.   Skin:         Leg swelling/infections   Neurological:  Positive for weakness.   Psychiatric/Behavioral:  The patient is nervous/anxious.    All other systems reviewed and are negative.       OBJECTIVE:  /66   Pulse (!) 109   Temp 98.1 °F (36.7 °C) (Infrared)   Resp 16   Ht 1.93 m (6' 4\")   Wt (!) 207 kg (456 lb 6.4 oz)   SpO2 96% Comment: ra  BMI 55.55 kg/m²    Wt Readings from Last 3 Encounters:   03/27/24 (!) 207 kg (456 lb 6.4 oz)   12/27/23 (!) 201.5 kg (444 lb 3.2 oz)   12/21/23 (!) 200.8 kg (442 lb 9.6 oz)     BP Readings from Last 3 Encounters:   03/27/24 132/66   12/27/23 118/74   11/28/23 130/62      Pulse Readings from Last 3 Encounters:   03/27/24 (!) 109   12/27/23 (!) 112   12/21/23 (!) 102     Body mass index is 55.55 kg/m².   Resp Readings from Last 3 Encounters:   03/27/24 16   12/27/23 16   12/21/23 24     Past medical, surgical, family and social history were reviewed and updated with the patient.     Physical Exam  Vitals and nursing note reviewed.   Constitutional:       Appearance: Normal appearance. He

## 2024-03-28 LAB
25(OH)D3 SERPL-MCNC: 25.2 NG/ML (ref 32–100)
ALBUMIN SERPL-MCNC: 4.3 G/DL (ref 3.4–4.8)
ALBUMIN/GLOB SERPL: 1.4 {RATIO} (ref 0.8–2)
ALP SERPL-CCNC: 71 U/L (ref 25–100)
ALT SERPL-CCNC: 24 U/L (ref 4–36)
ANION GAP SERPL CALCULATED.3IONS-SCNC: 12 MMOL/L (ref 3–16)
AST SERPL-CCNC: 21 U/L (ref 8–33)
BILIRUB SERPL-MCNC: 0.3 MG/DL (ref 0.3–1.2)
BUN SERPL-MCNC: 18 MG/DL (ref 6–20)
CALCIUM SERPL-MCNC: 9.8 MG/DL (ref 8.5–10.5)
CHLORIDE SERPL-SCNC: 101 MMOL/L (ref 98–107)
CO2 SERPL-SCNC: 27 MMOL/L (ref 20–30)
CREAT SERPL-MCNC: 1 MG/DL (ref 0.4–1.2)
ERYTHROCYTE [DISTWIDTH] IN BLOOD BY AUTOMATED COUNT: 12.9 % (ref 11–16)
FOLATE SERPL-MCNC: 14.16 NG/ML
GFR SERPLBLD CREATININE-BSD FMLA CKD-EPI: 83 ML/MIN/{1.73_M2}
GLOBULIN SER CALC-MCNC: 3.1 G/DL
GLUCOSE SERPL-MCNC: 141 MG/DL (ref 74–106)
HBA1C MFR BLD: 7.5 %
HCT VFR BLD AUTO: 47.5 % (ref 40–54)
HGB BLD-MCNC: 14.6 G/DL (ref 13–18)
MCH RBC QN AUTO: 31.4 PG (ref 27–32)
MCHC RBC AUTO-ENTMCNC: 30.7 G/DL (ref 31–35)
MCV RBC AUTO: 102.2 FL (ref 80–100)
PLATELET # BLD AUTO: 123 K/UL (ref 150–400)
PMV BLD AUTO: 14 FL (ref 6–10)
POTASSIUM SERPL-SCNC: 5.1 MMOL/L (ref 3.4–5.1)
PROT SERPL-MCNC: 7.4 G/DL (ref 6.4–8.3)
PSA SERPL DL<=0.01 NG/ML-MCNC: 0.18 NG/ML (ref 0–4)
RBC # BLD AUTO: 4.65 M/UL (ref 4.5–6)
SODIUM SERPL-SCNC: 140 MMOL/L (ref 136–145)
T4 FREE SERPL-MCNC: 0.92 NG/DL (ref 0.89–1.76)
TSH SERPL DL<=0.005 MIU/L-ACNC: 1.79 UIU/ML (ref 0.27–4.2)
VIT B12 SERPL-MCNC: 221 PG/ML (ref 211–911)
WBC # BLD AUTO: 5.7 K/UL (ref 4–11)

## 2024-04-03 DIAGNOSIS — D69.6 LOW PLATELET COUNT (HCC): ICD-10-CM

## 2024-04-03 DIAGNOSIS — R74.8 ELEVATED LIVER ENZYMES: Primary | ICD-10-CM

## 2024-04-08 ENCOUNTER — NURSE ONLY (OUTPATIENT)
Dept: FAMILY MEDICINE CLINIC | Age: 64
End: 2024-04-08
Payer: MEDICARE

## 2024-04-08 DIAGNOSIS — E53.8 B12 DEFICIENCY: Primary | ICD-10-CM

## 2024-04-08 PROCEDURE — 96372 THER/PROPH/DIAG INJ SC/IM: CPT

## 2024-04-08 RX ORDER — CYANOCOBALAMIN 1000 UG/ML
1000 INJECTION, SOLUTION INTRAMUSCULAR; SUBCUTANEOUS ONCE
Status: COMPLETED | OUTPATIENT
Start: 2024-04-08 | End: 2024-04-08

## 2024-04-08 RX ADMIN — CYANOCOBALAMIN 1000 MCG: 1000 INJECTION, SOLUTION INTRAMUSCULAR; SUBCUTANEOUS at 15:41

## 2024-04-08 NOTE — PROGRESS NOTES
Patient tolerated injection well. Patient advised to wait 20 minutes in the office following the injection. No signs/symptoms of reaction noted after 20 minutes.  Administrations This Visit       cyanocobalamin injection 1,000 mcg       Admin Date  04/08/2024  15:41 Action  Given Dose  1,000 mcg Route  IntraMUSCular Site  Deltoid Left Administered By  Jeanette Nolasco, RN    Ordering Provider: Mallory Canales APRN - NP    NDC: 73383-507-70    : [facility]    Patient Supplied?: No

## 2024-04-15 DIAGNOSIS — M54.41 CHRONIC BILATERAL LOW BACK PAIN WITH RIGHT-SIDED SCIATICA: ICD-10-CM

## 2024-04-15 DIAGNOSIS — G89.29 CHRONIC BILATERAL LOW BACK PAIN WITH RIGHT-SIDED SCIATICA: ICD-10-CM

## 2024-04-15 NOTE — TELEPHONE ENCOUNTER
Refill request received from pharmacy      Next Office Visit Date:  Future Appointments   Date Time Provider Department Center   6/27/2024  1:15 PM Kavitha Baker MD [facility]   7/23/2024  3:00 PM Kavitha Baker MD [facility] - Please review via PDMP        Last Office Visit:    3/27/2024

## 2024-04-16 DIAGNOSIS — L98.9 SKIN LESION: ICD-10-CM

## 2024-04-16 DIAGNOSIS — Z79.4 TYPE 2 DIABETES MELLITUS WITH OTHER SPECIFIED COMPLICATION, WITH LONG-TERM CURRENT USE OF INSULIN (HCC): ICD-10-CM

## 2024-04-16 DIAGNOSIS — G47.00 INSOMNIA, UNSPECIFIED TYPE: ICD-10-CM

## 2024-04-16 DIAGNOSIS — E11.69 TYPE 2 DIABETES MELLITUS WITH OTHER SPECIFIED COMPLICATION, WITH LONG-TERM CURRENT USE OF INSULIN (HCC): ICD-10-CM

## 2024-04-16 RX ORDER — ZOLPIDEM TARTRATE 12.5 MG/1
TABLET, FILM COATED, EXTENDED RELEASE ORAL
Qty: 30 TABLET | Refills: 2 | Status: SHIPPED | OUTPATIENT
Start: 2024-04-16 | End: 2024-05-16

## 2024-04-16 RX ORDER — NITROGLYCERIN 0.4 MG/1
TABLET SUBLINGUAL
Qty: 25 TABLET | Refills: 0 | Status: SHIPPED | OUTPATIENT
Start: 2024-04-16

## 2024-04-16 RX ORDER — OXYCODONE HYDROCHLORIDE 30 MG/1
30 TABLET ORAL EVERY 4 HOURS PRN
Qty: 150 TABLET | Refills: 0 | Status: SHIPPED | OUTPATIENT
Start: 2024-04-16 | End: 2024-05-16

## 2024-04-16 RX ORDER — TRIAMCINOLONE ACETONIDE 1 MG/G
OINTMENT TOPICAL
Qty: 80 G | Refills: 1 | Status: SHIPPED | OUTPATIENT
Start: 2024-04-16

## 2024-04-16 RX ORDER — ALBUTEROL SULFATE 90 UG/1
2 AEROSOL, METERED RESPIRATORY (INHALATION) EVERY 4 HOURS PRN
Qty: 6.7 G | Refills: 0 | Status: SHIPPED | OUTPATIENT
Start: 2024-04-16

## 2024-04-16 RX ORDER — HYDROCODONE BITARTRATE AND ACETAMINOPHEN 10; 325 MG/1; MG/1
TABLET ORAL
Qty: 150 TABLET | Refills: 0 | Status: SHIPPED | OUTPATIENT
Start: 2024-04-16 | End: 2024-05-23

## 2024-04-16 RX ORDER — BLOOD SUGAR DIAGNOSTIC
STRIP MISCELLANEOUS
Qty: 100 STRIP | Refills: 3 | Status: SHIPPED | OUTPATIENT
Start: 2024-04-16

## 2024-04-16 NOTE — TELEPHONE ENCOUNTER
Refill request received from pharmacy      Next Office Visit Date:  Future Appointments   Date Time Provider Department Center   4/17/2024 10:45 AM Kavitha Baker MD [facility]   6/27/2024  1:15 PM Kavitha Baker MD [facility]   7/23/2024  3:00 PM Kavitha Baker MD [facility] [place]       [facility] - Please review via PDMP        Last Office Visit:    3/27/2024

## 2024-04-17 ENCOUNTER — TELEPHONE (OUTPATIENT)
Dept: FAMILY MEDICINE CLINIC | Age: 64
End: 2024-04-17

## 2024-04-17 ENCOUNTER — OFFICE VISIT (OUTPATIENT)
Dept: FAMILY MEDICINE CLINIC | Age: 64
End: 2024-04-17
Payer: MEDICARE

## 2024-04-17 VITALS
RESPIRATION RATE: 16 BRPM | HEIGHT: 76 IN | HEART RATE: 105 BPM | BODY MASS INDEX: 38.36 KG/M2 | SYSTOLIC BLOOD PRESSURE: 134 MMHG | TEMPERATURE: 97.5 F | WEIGHT: 315 LBS | OXYGEN SATURATION: 95 % | DIASTOLIC BLOOD PRESSURE: 72 MMHG

## 2024-04-17 DIAGNOSIS — R60.0 PEDAL EDEMA: ICD-10-CM

## 2024-04-17 DIAGNOSIS — Z79.4 TYPE 2 DIABETES MELLITUS WITH OTHER SPECIFIED COMPLICATION, WITH LONG-TERM CURRENT USE OF INSULIN (HCC): ICD-10-CM

## 2024-04-17 DIAGNOSIS — G89.29 CHRONIC BILATERAL LOW BACK PAIN WITH RIGHT-SIDED SCIATICA: ICD-10-CM

## 2024-04-17 DIAGNOSIS — Z01.818 PRE-OPERATIVE CLEARANCE: Primary | ICD-10-CM

## 2024-04-17 DIAGNOSIS — I10 ESSENTIAL HYPERTENSION: ICD-10-CM

## 2024-04-17 DIAGNOSIS — E11.69 TYPE 2 DIABETES MELLITUS WITH OTHER SPECIFIED COMPLICATION, WITH LONG-TERM CURRENT USE OF INSULIN (HCC): ICD-10-CM

## 2024-04-17 DIAGNOSIS — R25.2 MUSCLE CRAMPS: Primary | ICD-10-CM

## 2024-04-17 DIAGNOSIS — M19.011 OSTEOARTHRITIS OF RIGHT SHOULDER, UNSPECIFIED OSTEOARTHRITIS TYPE: ICD-10-CM

## 2024-04-17 DIAGNOSIS — M54.41 CHRONIC BILATERAL LOW BACK PAIN WITH RIGHT-SIDED SCIATICA: ICD-10-CM

## 2024-04-17 PROCEDURE — 3017F COLORECTAL CA SCREEN DOC REV: CPT | Performed by: FAMILY MEDICINE

## 2024-04-17 PROCEDURE — 3051F HG A1C>EQUAL 7.0%<8.0%: CPT | Performed by: FAMILY MEDICINE

## 2024-04-17 PROCEDURE — G8417 CALC BMI ABV UP PARAM F/U: HCPCS | Performed by: FAMILY MEDICINE

## 2024-04-17 PROCEDURE — 3075F SYST BP GE 130 - 139MM HG: CPT | Performed by: FAMILY MEDICINE

## 2024-04-17 PROCEDURE — 1123F ACP DISCUSS/DSCN MKR DOCD: CPT | Performed by: FAMILY MEDICINE

## 2024-04-17 PROCEDURE — 1036F TOBACCO NON-USER: CPT | Performed by: FAMILY MEDICINE

## 2024-04-17 PROCEDURE — 2022F DILAT RTA XM EVC RTNOPTHY: CPT | Performed by: FAMILY MEDICINE

## 2024-04-17 PROCEDURE — 99214 OFFICE O/P EST MOD 30 MIN: CPT | Performed by: FAMILY MEDICINE

## 2024-04-17 PROCEDURE — 3078F DIAST BP <80 MM HG: CPT | Performed by: FAMILY MEDICINE

## 2024-04-17 PROCEDURE — G8427 DOCREV CUR MEDS BY ELIG CLIN: HCPCS | Performed by: FAMILY MEDICINE

## 2024-04-17 RX ORDER — MAGNESIUM OXIDE 400 MG/1
400 TABLET ORAL DAILY
Qty: 30 TABLET | Refills: 2 | Status: SHIPPED | OUTPATIENT
Start: 2024-04-17

## 2024-04-17 NOTE — PROGRESS NOTES
SUBJECTIVE:    Patient ID: Jose Alejandro Nichole is a 63 y.o. male.    Chief Complaint   Patient presents with    Congestive Heart Failure     ER Follow up       HPI: office visit  He is in the office today in follow-up of his recent ER visit.  He has had some increased swelling.  He has had some increase shortness of breath.  He says he is got a do something to get this weight off.  He says he feels like he is frustrated with his functional ability.  He is using his cane all the time.  He is having a lot of shortness of breath and pain when he tries to move.  He is not able to do very much.  He denies any chest pain.  He has not had any change in his normal shortness of breath.  He says he is feeling like things are going a little bit better with his son and his wife is still in the nursing home.  His blood pressures have not been bad.  His blood sugars seem to be doing pretty well.  He is complaining of some muscle spasms.  He is still struggling with his sleep.    Review of Systems   Constitutional:  Positive for fatigue.   Musculoskeletal:  Positive for arthralgias, back pain, gait problem, joint swelling and myalgias.   Skin:         Leg swelling/infections   Neurological:  Positive for weakness.   Psychiatric/Behavioral:  The patient is nervous/anxious.    All other systems reviewed and are negative.       OBJECTIVE:  /72   Pulse (!) 105   Temp 97.5 °F (36.4 °C) (Infrared)   Resp 16   Ht 1.93 m (6' 4\")   Wt (!) 203 kg (447 lb 9.6 oz)   SpO2 95% Comment: ra  BMI 54.48 kg/m²    Wt Readings from Last 3 Encounters:   04/17/24 (!) 203 kg (447 lb 9.6 oz)   03/27/24 (!) 207 kg (456 lb 6.4 oz)   12/27/23 (!) 201.5 kg (444 lb 3.2 oz)     BP Readings from Last 3 Encounters:   04/17/24 134/72   03/27/24 132/66   12/27/23 118/74      Pulse Readings from Last 3 Encounters:   04/17/24 (!) 105   03/27/24 (!) 109   12/27/23 (!) 112     Body mass index is 54.48 kg/m².   Resp Readings from Last 3 Encounters:

## 2024-04-17 NOTE — TELEPHONE ENCOUNTER
Called Dr. Perez's office per Dr. Baker to find out if patient is still scheduled for surgery tomorrow. They stated that on 4/15/24 a note was placed stating surgery had been cancelled due to needing cardiac clearance. She stated the surgery scheduler for Dr. Fernandez may know more and can give the clinic a call back once she returns from lunch. Dr. Baker and patient made aware.

## 2024-04-17 NOTE — TELEPHONE ENCOUNTER
Dr. Perez office returned our phone call. Judi in the office stated that she sent the needed cardiac clearance to Dr. Agrawal on 4/11/24. She stated she has not received it and will send it again today. She said as of now the surgery is cancelled. She said as soon as she receives the clearance she will contact patient to reschedule. Attempted to contact patient to inform him. Left him a voicemail about situation and informed him to call office with any questions.

## 2024-04-17 NOTE — PROGRESS NOTES
No chief complaint on file.      Have you seen any other physician or provider since your last visit yes - [facility]    Have you had any other diagnostic tests since your last visit? yes - EKG, Xray, Labs    Have you changed or stopped any medications since your last visit? no

## 2024-04-18 NOTE — TELEPHONE ENCOUNTER
Jose Alejandro notified and I have placed the referral to cardiology. I explained to Jose Alejandro that the specialist will contact him to set up an appointment and he verbalized understanding.

## 2024-04-22 RX ORDER — NYSTATIN 100000 [USP'U]/G
POWDER TOPICAL
Qty: 60 G | Refills: 5 | OUTPATIENT
Start: 2024-04-22

## 2024-05-01 ENCOUNTER — NURSE ONLY (OUTPATIENT)
Dept: FAMILY MEDICINE CLINIC | Age: 64
End: 2024-05-01

## 2024-05-01 DIAGNOSIS — E53.8 B12 DEFICIENCY: Primary | ICD-10-CM

## 2024-05-01 RX ORDER — CYANOCOBALAMIN 1000 UG/ML
1000 INJECTION, SOLUTION INTRAMUSCULAR; SUBCUTANEOUS ONCE
Status: COMPLETED | OUTPATIENT
Start: 2024-05-01 | End: 2024-05-01

## 2024-05-01 RX ADMIN — CYANOCOBALAMIN 1000 MCG: 1000 INJECTION, SOLUTION INTRAMUSCULAR; SUBCUTANEOUS at 13:43

## 2024-05-01 NOTE — PROGRESS NOTES
Patient tolerated injection well. Patient advised to wait 20 minutes in the office following the injection. No signs/symptoms of reaction noted after 20 minutes.  Administrations This Visit       cyanocobalamin injection 1,000 mcg       Admin Date  05/01/2024  13:43 Action  Given Dose  1,000 mcg Route  IntraMUSCular Site  Deltoid Right Administered By  Jeanette Nolasco, RN    Ordering Provider: Kavitha Baker MD    NDC: [id number]    : [facility]    Patient Supplied?: No

## 2024-05-08 ENCOUNTER — OFFICE VISIT (OUTPATIENT)
Dept: FAMILY MEDICINE CLINIC | Age: 64
End: 2024-05-08
Payer: MEDICARE

## 2024-05-08 VITALS
WEIGHT: 315 LBS | RESPIRATION RATE: 16 BRPM | SYSTOLIC BLOOD PRESSURE: 134 MMHG | BODY MASS INDEX: 38.36 KG/M2 | HEIGHT: 76 IN | OXYGEN SATURATION: 91 % | TEMPERATURE: 98 F | DIASTOLIC BLOOD PRESSURE: 78 MMHG | HEART RATE: 104 BPM

## 2024-05-08 DIAGNOSIS — E53.8 B12 DEFICIENCY: ICD-10-CM

## 2024-05-08 DIAGNOSIS — M19.011 OSTEOARTHRITIS OF RIGHT SHOULDER, UNSPECIFIED OSTEOARTHRITIS TYPE: ICD-10-CM

## 2024-05-08 DIAGNOSIS — F41.9 ANXIETY: ICD-10-CM

## 2024-05-08 DIAGNOSIS — R25.2 MUSCLE CRAMPS: Primary | ICD-10-CM

## 2024-05-08 DIAGNOSIS — R53.81 DECLINING FUNCTIONAL STATUS: ICD-10-CM

## 2024-05-08 DIAGNOSIS — I50.43 CHF (CONGESTIVE HEART FAILURE), NYHA CLASS I, ACUTE ON CHRONIC, COMBINED (HCC): ICD-10-CM

## 2024-05-08 DIAGNOSIS — H53.9 VISION CHANGES: ICD-10-CM

## 2024-05-08 DIAGNOSIS — I10 ESSENTIAL HYPERTENSION: ICD-10-CM

## 2024-05-08 DIAGNOSIS — G89.29 CHRONIC BILATERAL LOW BACK PAIN WITH RIGHT-SIDED SCIATICA: ICD-10-CM

## 2024-05-08 DIAGNOSIS — M54.41 CHRONIC BILATERAL LOW BACK PAIN WITH RIGHT-SIDED SCIATICA: ICD-10-CM

## 2024-05-08 PROCEDURE — 1123F ACP DISCUSS/DSCN MKR DOCD: CPT | Performed by: FAMILY MEDICINE

## 2024-05-08 PROCEDURE — 96372 THER/PROPH/DIAG INJ SC/IM: CPT | Performed by: FAMILY MEDICINE

## 2024-05-08 PROCEDURE — G8417 CALC BMI ABV UP PARAM F/U: HCPCS | Performed by: FAMILY MEDICINE

## 2024-05-08 PROCEDURE — 1036F TOBACCO NON-USER: CPT | Performed by: FAMILY MEDICINE

## 2024-05-08 PROCEDURE — 3075F SYST BP GE 130 - 139MM HG: CPT | Performed by: FAMILY MEDICINE

## 2024-05-08 PROCEDURE — 3078F DIAST BP <80 MM HG: CPT | Performed by: FAMILY MEDICINE

## 2024-05-08 PROCEDURE — G8427 DOCREV CUR MEDS BY ELIG CLIN: HCPCS | Performed by: FAMILY MEDICINE

## 2024-05-08 PROCEDURE — 99214 OFFICE O/P EST MOD 30 MIN: CPT | Performed by: FAMILY MEDICINE

## 2024-05-08 PROCEDURE — 3017F COLORECTAL CA SCREEN DOC REV: CPT | Performed by: FAMILY MEDICINE

## 2024-05-08 RX ORDER — CYANOCOBALAMIN 1000 UG/ML
1000 INJECTION, SOLUTION INTRAMUSCULAR; SUBCUTANEOUS ONCE
Status: COMPLETED | OUTPATIENT
Start: 2024-05-08 | End: 2024-05-08

## 2024-05-08 RX ORDER — NYSTATIN 100000 [USP'U]/G
POWDER TOPICAL
Qty: 60 G | Refills: 2 | Status: SHIPPED | OUTPATIENT
Start: 2024-05-08

## 2024-05-08 RX ORDER — HYDROCODONE BITARTRATE AND ACETAMINOPHEN 10; 325 MG/1; MG/1
TABLET ORAL
Qty: 150 TABLET | Refills: 0 | Status: SHIPPED | OUTPATIENT
Start: 2024-05-08 | End: 2024-05-08 | Stop reason: SDUPTHER

## 2024-05-08 RX ORDER — OXYCODONE HYDROCHLORIDE 30 MG/1
30 TABLET ORAL EVERY 4 HOURS PRN
Qty: 150 TABLET | Refills: 0 | Status: SHIPPED | OUTPATIENT
Start: 2024-05-08 | End: 2024-05-08 | Stop reason: SDUPTHER

## 2024-05-08 RX ORDER — OXYCODONE HYDROCHLORIDE 30 MG/1
30 TABLET ORAL EVERY 4 HOURS PRN
Qty: 150 TABLET | Refills: 0 | Status: SHIPPED | OUTPATIENT
Start: 2024-05-08 | End: 2024-06-07

## 2024-05-08 RX ORDER — NYSTATIN 100000 [USP'U]/G
POWDER TOPICAL
Qty: 60 G | Refills: 5 | OUTPATIENT
Start: 2024-05-08

## 2024-05-08 RX ORDER — HYDROCODONE BITARTRATE AND ACETAMINOPHEN 10; 325 MG/1; MG/1
TABLET ORAL
Qty: 150 TABLET | Refills: 0 | Status: SHIPPED | OUTPATIENT
Start: 2024-05-08 | End: 2024-06-14

## 2024-05-08 RX ADMIN — CYANOCOBALAMIN 1000 MCG: 1000 INJECTION, SOLUTION INTRAMUSCULAR; SUBCUTANEOUS at 14:33

## 2024-05-08 NOTE — PROGRESS NOTES
Administrations This Visit       cyanocobalamin injection 1,000 mcg       Admin Date  05/08/2024  14:33 Action  Given Dose  1,000 mcg Route  IntraMUSCular Site  Deltoid Left Administered By  Gwendolyn Valle MA    Ordering Provider: Kavitha Baker MD    NDC: 16285-334-04    : [employer]    Patient Supplied?: No                 Patient tolerated injection well. Patient advised to wait 20 minutes in the office following the injection. No signs/symptoms of reaction noted after 20 minutes.    
Problem: Discharge Planning  Goal: Discharge to home or other facility with appropriate resources  Outcome: Progressing  Flowsheets (Taken 2023 0220)  Discharge to home or other facility with appropriate resources:   Identify barriers to discharge with patient and caregiver   Arrange for needed discharge resources and transportation as appropriate   Identify discharge learning needs (meds, wound care, etc)   Refer to discharge planning if patient needs post-hospital services based on physician order or complex needs related to functional status, cognitive ability or social support system     Problem: Pain -   Goal: Displays adequate comfort level or baseline comfort level  Outcome: Progressing     Problem:  Thermoregulation - /Pediatrics  Goal: Maintains normal body temperature  Outcome: Progressing     Problem: Safety -   Goal: Free from fall injury  Outcome: Progressing     Problem: Normal   Goal: [place] experiences normal transition  Outcome: Progressing  Flowsheets (Taken 2023 2000)  Experiences Normal Transition:   Monitor vital signs   Maintain thermoregulation   Assess for hypoglycemia risk factors or signs and symptoms   Assess for sepsis risk factors or signs and symptoms   Assess for jaundice risk and/or signs and symptoms  Goal: Total Weight Loss Less than 10% of birth weight  Outcome: Progressing  Flowsheets (Taken 2023 2000)  Total Weight Loss Less Than 10% of Birth Weight:   Assess feeding patterns   Weigh daily
MOUTH EVERY 4 HOURS AS NEEDED FOR PAIN.     Dispense:  150 tablet     Refill:  0     Reduce doses taken as pain becomes manageable    nystatin (MYCOSTATIN) 269526 UNIT/GM powder     Sig: Apply 3 times daily.     Dispense:  60 g     Refill:  2    cyanocobalamin injection 1,000 mcg    oxyCODONE (OXY-IR) 30 MG immediate release tablet     Sig: Take 1 tablet by mouth every 4 hours as needed for Pain for up to 30 days. To be filled on or after June 15th Max Daily Amount: 180 mg     Dispense:  150 tablet     Refill:  0     Reduce doses taken as pain becomes manageable    HYDROcodone-acetaminophen (NORCO)  MG per tablet     Sig: TAKE ONE TABLET BY MOUTH EVERY 4 HOURS AS NEEDED FOR PAIN. To be filled on or after June 15th     Dispense:  150 tablet     Refill:  0     Reduce doses taken as pain becomes manageable        Medications Discontinued During This Encounter   Medication Reason    HYDROcodone-acetaminophen (NORCO)  MG per tablet REORDER    oxyCODONE (OXY-IR) 30 MG immediate release tablet REORDER    oxyCODONE (OXY-IR) 30 MG immediate release tablet REORDER    HYDROcodone-acetaminophen (NORCO)  MG per tablet REORDER       Controlled Substances Monitoring: Periodic Controlled Substance Monitoring: Possible medication side effects, risk of tolerance/dependence & alternative treatments discussed., No signs of potential drug abuse or diversion identified., Assessed functional status (ability to engage in work or other purposeful activities, the pain intensity and its interference with activities of daily living, quality of family life and social activities, and the physical activity), Obtaining appropriate analgesic effect of treatment. (Kavitha Baker MD)    Please note: This chart was generated using Dragon dictation software. Although every effort was made to ensure the accuracy of this automated transcription, some errors in transcription may have occurred.

## 2024-05-14 DIAGNOSIS — E11.69 TYPE 2 DIABETES MELLITUS WITH OTHER SPECIFIED COMPLICATION, WITH LONG-TERM CURRENT USE OF INSULIN (HCC): ICD-10-CM

## 2024-05-14 DIAGNOSIS — Z79.4 TYPE 2 DIABETES MELLITUS WITH OTHER SPECIFIED COMPLICATION, WITH LONG-TERM CURRENT USE OF INSULIN (HCC): ICD-10-CM

## 2024-05-14 DIAGNOSIS — J30.2 SEASONAL ALLERGIES: ICD-10-CM

## 2024-05-14 RX ORDER — NITROGLYCERIN 0.4 MG/1
TABLET SUBLINGUAL
Qty: 25 TABLET | Refills: 0 | Status: SHIPPED | OUTPATIENT
Start: 2024-05-14

## 2024-05-14 RX ORDER — MONTELUKAST SODIUM 10 MG/1
TABLET ORAL
Qty: 30 TABLET | Refills: 3 | Status: SHIPPED | OUTPATIENT
Start: 2024-05-14

## 2024-05-14 RX ORDER — ERGOCALCIFEROL 1.25 MG/1
50000 CAPSULE ORAL WEEKLY
Qty: 4 CAPSULE | Refills: 2 | Status: SHIPPED | OUTPATIENT
Start: 2024-05-14

## 2024-05-14 RX ORDER — GLIMEPIRIDE 4 MG/1
4 TABLET ORAL
Qty: 30 TABLET | Refills: 3 | Status: SHIPPED | OUTPATIENT
Start: 2024-05-14

## 2024-05-14 RX ORDER — ALBUTEROL SULFATE 90 UG/1
2 AEROSOL, METERED RESPIRATORY (INHALATION) EVERY 4 HOURS PRN
Qty: 6.7 G | Refills: 0 | Status: SHIPPED | OUTPATIENT
Start: 2024-05-14

## 2024-05-14 RX ORDER — LORATADINE 10 MG/1
TABLET ORAL
Qty: 30 TABLET | Refills: 3 | Status: SHIPPED | OUTPATIENT
Start: 2024-05-14

## 2024-05-23 ENCOUNTER — NURSE ONLY (OUTPATIENT)
Dept: FAMILY MEDICINE CLINIC | Age: 64
End: 2024-05-23
Payer: MEDICARE

## 2024-05-23 ENCOUNTER — HOSPITAL ENCOUNTER (OUTPATIENT)
Facility: HOSPITAL | Age: 64
Discharge: HOME | End: 2024-05-23
Payer: MEDICARE

## 2024-05-23 DIAGNOSIS — E53.8 B12 DEFICIENCY: Primary | ICD-10-CM

## 2024-05-23 LAB
APTT BLD: 27 SEC (ref 22.1–34.9)
HAV IGM SERPL QL IA: NORMAL
HBV CORE IGM SERPL QL IA: NORMAL
HBV SURFACE AB SERPL IA-ACNC: <3.5 MIU/ML
HBV SURFACE AG SERPL QL IA: NORMAL
HCV AB SERPL QL IA: NORMAL
INR PPP: 0.99 (ref 0.87–1.11)
IRON SERPL-MCNC: 121 UG/DL (ref 59–158)
PROTHROMBIN TIME: 12.9 SEC (ref 11.8–14.2)
TIBC SERPL-MCNC: 328 UG/DL (ref 250–450)

## 2024-05-23 PROCEDURE — 82103 ALPHA-1-ANTITRYPSIN TOTAL: CPT

## 2024-05-23 PROCEDURE — 86708 HEPATITIS A ANTIBODY: CPT

## 2024-05-23 PROCEDURE — 86015 ACTIN ANTIBODY EACH: CPT

## 2024-05-23 PROCEDURE — 96372 THER/PROPH/DIAG INJ SC/IM: CPT | Performed by: FAMILY MEDICINE

## 2024-05-23 PROCEDURE — 86706 HEP B SURFACE ANTIBODY: CPT

## 2024-05-23 PROCEDURE — 83516 IMMUNOASSAY NONANTIBODY: CPT

## 2024-05-23 PROCEDURE — 84450 TRANSFERASE (AST) (SGOT): CPT

## 2024-05-23 PROCEDURE — 82728 ASSAY OF FERRITIN: CPT

## 2024-05-23 PROCEDURE — 85610 PROTHROMBIN TIME: CPT

## 2024-05-23 PROCEDURE — 80074 ACUTE HEPATITIS PANEL: CPT

## 2024-05-23 PROCEDURE — 82104 ALPHA-1-ANTITRYPSIN PHENO: CPT

## 2024-05-23 PROCEDURE — 36415 COLL VENOUS BLD VENIPUNCTURE: CPT

## 2024-05-23 PROCEDURE — 84460 ALANINE AMINO (ALT) (SGPT): CPT

## 2024-05-23 PROCEDURE — 82390 ASSAY OF CERULOPLASMIN: CPT

## 2024-05-23 PROCEDURE — 85730 THROMBOPLASTIN TIME PARTIAL: CPT

## 2024-05-23 PROCEDURE — 83540 ASSAY OF IRON: CPT

## 2024-05-23 PROCEDURE — 83550 IRON BINDING TEST: CPT

## 2024-05-23 PROCEDURE — 86038 ANTINUCLEAR ANTIBODIES: CPT

## 2024-05-23 PROCEDURE — 86039 ANTINUCLEAR ANTIBODIES (ANA): CPT

## 2024-05-23 RX ORDER — CYANOCOBALAMIN 1000 UG/ML
1000 INJECTION, SOLUTION INTRAMUSCULAR; SUBCUTANEOUS ONCE
Status: COMPLETED | OUTPATIENT
Start: 2024-05-23 | End: 2024-05-23

## 2024-05-23 RX ADMIN — CYANOCOBALAMIN 1000 MCG: 1000 INJECTION, SOLUTION INTRAMUSCULAR; SUBCUTANEOUS at 11:22

## 2024-05-24 LAB
ANA SER QL IA: POSITIVE
CERULOPLASMIN SERPL-MCNC: 22 MG/DL (ref 15–30)

## 2024-05-25 LAB
HAV AB SER QL IA: NEGATIVE
MISCELLANEOUS LAB TEST ORDER: NORMAL

## 2024-05-27 LAB
A1AT PHENOTYP SERPL-IMP: NORMAL
A1AT SERPL-MCNC: 150 MG/DL (ref 90–200)

## 2024-05-28 LAB — MITOCHONDRIA M2 AB SER IA-ACNC: <0.5 U/ML (ref 0–4)

## 2024-05-30 LAB
ANTINUCLEAR AB INTERPRETIVE COMMENT: NORMAL
NUCLEAR IGG SER QL IF: NORMAL

## 2024-06-04 DIAGNOSIS — I50.9 ACUTE ON CHRONIC CONGESTIVE HEART FAILURE, UNSPECIFIED HEART FAILURE TYPE (HCC): ICD-10-CM

## 2024-06-04 DIAGNOSIS — E11.69 TYPE 2 DIABETES MELLITUS WITH OTHER SPECIFIED COMPLICATION, WITH LONG-TERM CURRENT USE OF INSULIN (HCC): ICD-10-CM

## 2024-06-04 DIAGNOSIS — G89.29 CHRONIC BILATERAL LOW BACK PAIN WITH RIGHT-SIDED SCIATICA: ICD-10-CM

## 2024-06-04 DIAGNOSIS — F41.9 ANXIETY: ICD-10-CM

## 2024-06-04 DIAGNOSIS — Z79.4 TYPE 2 DIABETES MELLITUS WITH OTHER SPECIFIED COMPLICATION, WITH LONG-TERM CURRENT USE OF INSULIN (HCC): ICD-10-CM

## 2024-06-04 DIAGNOSIS — L98.9 SKIN LESION: ICD-10-CM

## 2024-06-04 DIAGNOSIS — M15.9 OSTEOARTHRITIS OF MULTIPLE JOINTS, UNSPECIFIED OSTEOARTHRITIS TYPE: ICD-10-CM

## 2024-06-04 DIAGNOSIS — D50.9 IRON DEFICIENCY ANEMIA, UNSPECIFIED IRON DEFICIENCY ANEMIA TYPE: ICD-10-CM

## 2024-06-04 DIAGNOSIS — M54.41 CHRONIC BILATERAL LOW BACK PAIN WITH RIGHT-SIDED SCIATICA: ICD-10-CM

## 2024-06-04 RX ORDER — FERROUS SULFATE 325(65) MG
1 TABLET ORAL
Qty: 30 TABLET | Refills: 0 | Status: SHIPPED | OUTPATIENT
Start: 2024-06-04

## 2024-06-04 RX ORDER — CELECOXIB 200 MG/1
CAPSULE ORAL
Qty: 30 CAPSULE | Refills: 2 | Status: SHIPPED | OUTPATIENT
Start: 2024-06-04

## 2024-06-04 RX ORDER — DOCUSATE SODIUM 100 MG/1
CAPSULE, LIQUID FILLED ORAL
Qty: 120 CAPSULE | Refills: 2 | Status: SHIPPED | OUTPATIENT
Start: 2024-06-04

## 2024-06-04 RX ORDER — DIAZEPAM 5 MG/1
TABLET ORAL
Qty: 60 TABLET | Refills: 2 | Status: SHIPPED | OUTPATIENT
Start: 2024-06-04 | End: 2024-09-03

## 2024-06-04 RX ORDER — SPIRONOLACTONE 25 MG/1
TABLET ORAL
Qty: 90 TABLET | Refills: 2 | Status: SHIPPED | OUTPATIENT
Start: 2024-06-04

## 2024-06-04 RX ORDER — NITROGLYCERIN 0.4 MG/1
TABLET SUBLINGUAL
Qty: 25 TABLET | Refills: 0 | Status: SHIPPED | OUTPATIENT
Start: 2024-06-04

## 2024-06-04 RX ORDER — ALBUTEROL SULFATE 90 UG/1
2 AEROSOL, METERED RESPIRATORY (INHALATION) EVERY 4 HOURS PRN
Qty: 6.7 G | Refills: 0 | Status: SHIPPED | OUTPATIENT
Start: 2024-06-04

## 2024-06-04 RX ORDER — GABAPENTIN 800 MG/1
TABLET ORAL
Qty: 120 TABLET | Refills: 2 | Status: SHIPPED | OUTPATIENT
Start: 2024-06-04 | End: 2024-09-03

## 2024-06-04 RX ORDER — TIZANIDINE 4 MG/1
TABLET ORAL
Qty: 90 TABLET | Refills: 2 | Status: SHIPPED | OUTPATIENT
Start: 2024-06-04

## 2024-06-04 RX ORDER — TRIAMCINOLONE ACETONIDE 1 MG/G
OINTMENT TOPICAL
Qty: 80 G | Refills: 1 | Status: SHIPPED | OUTPATIENT
Start: 2024-06-04

## 2024-06-04 NOTE — TELEPHONE ENCOUNTER
Refill request received from pharmacy      Next Office Visit Date:  Future Appointments   Date Time Provider Department Center   6/27/2024  1:15 PM Kavitha Baker MD [facility]   7/23/2024  3:00 PM Kavitha Baker MD [facility] - Please review via PDMP        Last Office Visit:    5/8/2024

## 2024-06-05 ENCOUNTER — NURSE ONLY (OUTPATIENT)
Dept: FAMILY MEDICINE CLINIC | Age: 64
End: 2024-06-05

## 2024-06-05 DIAGNOSIS — E53.8 B12 DEFICIENCY: Primary | ICD-10-CM

## 2024-06-05 RX ORDER — CYANOCOBALAMIN 1000 UG/ML
1000 INJECTION, SOLUTION INTRAMUSCULAR; SUBCUTANEOUS ONCE
Status: COMPLETED | OUTPATIENT
Start: 2024-06-05 | End: 2024-06-05

## 2024-06-05 RX ADMIN — CYANOCOBALAMIN 1000 MCG: 1000 INJECTION, SOLUTION INTRAMUSCULAR; SUBCUTANEOUS at 13:38

## 2024-06-05 NOTE — PROGRESS NOTES
Patient tolerated injection well. Patient advised to wait 20 minutes in the office following the injection. No signs/symptoms of reaction noted after 20 minutes.  Administrations This Visit       cyanocobalamin injection 1,000 mcg       Admin Date  06/05/2024  13:38 Action  Given Dose  1,000 mcg Route  IntraMUSCular Site  Deltoid Right Administered By  Jamil Terrell LPN    Ordering Provider: Kavitha Baker MD    NDC: 52370-282-19    : [facility]    Patient Supplied?: No

## 2024-06-11 RX ORDER — ALBUTEROL SULFATE 90 UG/1
2 AEROSOL, METERED RESPIRATORY (INHALATION) EVERY 4 HOURS PRN
Qty: 6.7 G | Refills: 0 | OUTPATIENT
Start: 2024-06-11

## 2024-06-11 NOTE — TELEPHONE ENCOUNTER
Refill request received from pharmacy      Next Office Visit Date:  Future Appointments   Date Time Provider Department Center   6/27/2024  1:15 PM Kavitha Baker MD [facility]   7/23/2024  3:00 PM Kavitha Baker MD [facility] - Please review via PDMP        Last Office Visit:    10/4/2023

## 2024-06-13 ENCOUNTER — COMMUNITY OUTREACH (OUTPATIENT)
Dept: FAMILY MEDICINE CLINIC | Age: 64
End: 2024-06-13

## 2024-06-26 ENCOUNTER — TELEPHONE (OUTPATIENT)
Dept: FAMILY MEDICINE CLINIC | Age: 64
End: 2024-06-26

## 2024-06-27 ENCOUNTER — TELEPHONE (OUTPATIENT)
Dept: FAMILY MEDICINE CLINIC | Age: 64
End: 2024-06-27

## 2024-06-27 ENCOUNTER — OFFICE VISIT (OUTPATIENT)
Dept: FAMILY MEDICINE CLINIC | Age: 64
End: 2024-06-27
Payer: MEDICARE

## 2024-06-27 ENCOUNTER — HOSPITAL ENCOUNTER (OUTPATIENT)
Facility: HOSPITAL | Age: 64
Discharge: HOME | End: 2024-06-27
Payer: MEDICARE

## 2024-06-27 VITALS
HEIGHT: 76 IN | TEMPERATURE: 97.2 F | BODY MASS INDEX: 38.36 KG/M2 | OXYGEN SATURATION: 94 % | RESPIRATION RATE: 16 BRPM | WEIGHT: 315 LBS | SYSTOLIC BLOOD PRESSURE: 124 MMHG | DIASTOLIC BLOOD PRESSURE: 72 MMHG | HEART RATE: 105 BPM

## 2024-06-27 DIAGNOSIS — D50.9 IRON DEFICIENCY ANEMIA, UNSPECIFIED IRON DEFICIENCY ANEMIA TYPE: ICD-10-CM

## 2024-06-27 DIAGNOSIS — E11.621 DIABETIC ULCER OF TOE OF LEFT FOOT ASSOCIATED WITH TYPE 2 DIABETES MELLITUS, WITH FAT LAYER EXPOSED (HCC): Primary | ICD-10-CM

## 2024-06-27 DIAGNOSIS — Z79.4 TYPE 2 DIABETES MELLITUS WITH OTHER SPECIFIED COMPLICATION, WITH LONG-TERM CURRENT USE OF INSULIN (HCC): ICD-10-CM

## 2024-06-27 DIAGNOSIS — L97.522 DIABETIC ULCER OF TOE OF LEFT FOOT ASSOCIATED WITH TYPE 2 DIABETES MELLITUS, WITH FAT LAYER EXPOSED (HCC): Primary | ICD-10-CM

## 2024-06-27 DIAGNOSIS — M19.011 OSTEOARTHRITIS OF RIGHT SHOULDER, UNSPECIFIED OSTEOARTHRITIS TYPE: ICD-10-CM

## 2024-06-27 DIAGNOSIS — M54.41 CHRONIC BILATERAL LOW BACK PAIN WITH RIGHT-SIDED SCIATICA: ICD-10-CM

## 2024-06-27 DIAGNOSIS — E11.69 TYPE 2 DIABETES MELLITUS WITH OTHER SPECIFIED COMPLICATION, WITH LONG-TERM CURRENT USE OF INSULIN (HCC): ICD-10-CM

## 2024-06-27 DIAGNOSIS — G89.29 CHRONIC BILATERAL LOW BACK PAIN WITH RIGHT-SIDED SCIATICA: ICD-10-CM

## 2024-06-27 LAB
ALBUMIN SERPL-MCNC: 4 G/DL (ref 3.4–4.8)
ALBUMIN/GLOB SERPL: 1.4 {RATIO} (ref 0.8–2)
ALP SERPL-CCNC: 73 U/L (ref 25–100)
ALT SERPL-CCNC: 22 U/L (ref 4–36)
ANION GAP SERPL CALCULATED.3IONS-SCNC: 12 MMOL/L (ref 3–16)
AST SERPL-CCNC: 20 U/L (ref 8–33)
BILIRUB SERPL-MCNC: 0.3 MG/DL (ref 0.3–1.2)
BUN SERPL-MCNC: 19 MG/DL (ref 6–20)
CALCIUM SERPL-MCNC: 9.2 MG/DL (ref 8.5–10.5)
CHLORIDE SERPL-SCNC: 102 MMOL/L (ref 98–107)
CO2 SERPL-SCNC: 28 MMOL/L (ref 20–30)
CREAT SERPL-MCNC: 1 MG/DL (ref 0.4–1.2)
ERYTHROCYTE [DISTWIDTH] IN BLOOD BY AUTOMATED COUNT: 13 % (ref 11–16)
GFR SERPLBLD CREATININE-BSD FMLA CKD-EPI: 83 ML/MIN/{1.73_M2}
GLOBULIN SER CALC-MCNC: 2.9 G/DL
GLUCOSE SERPL-MCNC: 149 MG/DL (ref 74–106)
HBA1C MFR BLD: 7.5 %
HCT VFR BLD AUTO: 47.3 % (ref 40–54)
HGB BLD-MCNC: 14.4 G/DL (ref 13–18)
MCH RBC QN AUTO: 31.6 PG (ref 27–32)
MCHC RBC AUTO-ENTMCNC: 30.4 G/DL (ref 31–35)
MCV RBC AUTO: 103.7 FL (ref 80–100)
PLATELET # BLD AUTO: 122 K/UL (ref 150–400)
PMV BLD AUTO: 13.1 FL (ref 6–10)
POTASSIUM SERPL-SCNC: 5 MMOL/L (ref 3.4–5.1)
PROT SERPL-MCNC: 6.9 G/DL (ref 6.4–8.3)
RBC # BLD AUTO: 4.56 M/UL (ref 4.5–6)
SODIUM SERPL-SCNC: 142 MMOL/L (ref 136–145)
WBC # BLD AUTO: 5.8 K/UL (ref 4–11)

## 2024-06-27 PROCEDURE — 1036F TOBACCO NON-USER: CPT | Performed by: FAMILY MEDICINE

## 2024-06-27 PROCEDURE — 3078F DIAST BP <80 MM HG: CPT | Performed by: FAMILY MEDICINE

## 2024-06-27 PROCEDURE — 3074F SYST BP LT 130 MM HG: CPT | Performed by: FAMILY MEDICINE

## 2024-06-27 PROCEDURE — 36415 COLL VENOUS BLD VENIPUNCTURE: CPT

## 2024-06-27 PROCEDURE — 1123F ACP DISCUSS/DSCN MKR DOCD: CPT | Performed by: FAMILY MEDICINE

## 2024-06-27 PROCEDURE — 85027 COMPLETE CBC AUTOMATED: CPT

## 2024-06-27 PROCEDURE — G8417 CALC BMI ABV UP PARAM F/U: HCPCS | Performed by: FAMILY MEDICINE

## 2024-06-27 PROCEDURE — 80053 COMPREHEN METABOLIC PANEL: CPT

## 2024-06-27 PROCEDURE — 83036 HEMOGLOBIN GLYCOSYLATED A1C: CPT

## 2024-06-27 PROCEDURE — 3017F COLORECTAL CA SCREEN DOC REV: CPT | Performed by: FAMILY MEDICINE

## 2024-06-27 PROCEDURE — 3051F HG A1C>EQUAL 7.0%<8.0%: CPT | Performed by: FAMILY MEDICINE

## 2024-06-27 PROCEDURE — 2022F DILAT RTA XM EVC RTNOPTHY: CPT | Performed by: FAMILY MEDICINE

## 2024-06-27 PROCEDURE — G8427 DOCREV CUR MEDS BY ELIG CLIN: HCPCS | Performed by: FAMILY MEDICINE

## 2024-06-27 PROCEDURE — 99214 OFFICE O/P EST MOD 30 MIN: CPT | Performed by: FAMILY MEDICINE

## 2024-06-27 RX ORDER — CLOTRIMAZOLE 1 %
CREAM (GRAM) TOPICAL
Qty: 85 G | Refills: 1 | Status: SHIPPED | OUTPATIENT
Start: 2024-06-27 | End: 2024-07-04

## 2024-06-27 RX ORDER — CEFDINIR 300 MG/1
300 CAPSULE ORAL 2 TIMES DAILY
Qty: 20 CAPSULE | Refills: 0 | Status: SHIPPED | OUTPATIENT
Start: 2024-06-27 | End: 2024-07-07

## 2024-06-27 RX ORDER — INSULIN GLARGINE 100 [IU]/ML
100 INJECTION, SOLUTION SUBCUTANEOUS 2 TIMES DAILY
Qty: 60 ML | Refills: 2 | Status: SHIPPED | OUTPATIENT
Start: 2024-06-27 | End: 2024-09-25

## 2024-06-27 RX ORDER — HYDROCODONE BITARTRATE AND ACETAMINOPHEN 10; 325 MG/1; MG/1
TABLET ORAL
Qty: 150 TABLET | Refills: 0 | Status: SHIPPED | OUTPATIENT
Start: 2024-06-27 | End: 2024-08-03

## 2024-06-27 RX ORDER — OXYCODONE HYDROCHLORIDE 30 MG/1
30 TABLET ORAL EVERY 4 HOURS PRN
Qty: 150 TABLET | Refills: 0 | Status: SHIPPED | OUTPATIENT
Start: 2024-06-27 | End: 2024-07-27

## 2024-06-27 ASSESSMENT — ENCOUNTER SYMPTOMS: BACK PAIN: 1

## 2024-06-27 NOTE — PROGRESS NOTES
Chief Complaint   Patient presents with    Foot Injury     Left foot, sore appeared 4 days ago.     Rash     Rash on his right arm, getting worse for the past 3 months.        Have you seen any other physician or provider since your last visit yes cardiologist    Have you had any other diagnostic tests since your last visit? no    Have you changed or stopped any medications since your last visit? no

## 2024-06-27 NOTE — PROGRESS NOTES
SUBJECTIVE:    Patient ID: Jose Alejandro Nichole is a 63 y.o. male.    Chief Complaint   Patient presents with    Foot Injury     Left foot, sore appeared 4 days ago.     Rash     Rash on his right arm, getting worse for the past 3 months.        HPI: office visit  He is in the office complaining of a place on his arm that is got some rash.  He is also got some issue with his left second and third toe.  He is been out working.  He says he thinks he got way too hot.  He is had on some shoes that have rubbed a blister on 2 of his toes.  He says he is concerned because they are open.  He says he is having some pain in them.  It started about 2 days ago.  He has not had any fever nausea vomiting or other symptoms.  He says his blood sugars are up and down.  He is frustrated with his functional ability.  He is still having a lot of difficulty getting around.  He says he is trying to take care of himself.  It has been really hard.  He is worried about his wife who is still in the nursing home    Review of Systems   Constitutional:  Positive for fatigue.   Musculoskeletal:  Positive for arthralgias, back pain, gait problem, joint swelling and myalgias.   Skin:         Leg swelling/infections   Neurological:  Positive for weakness.   Psychiatric/Behavioral:  The patient is nervous/anxious.    All other systems reviewed and are negative.       OBJECTIVE:  /72   Pulse (!) 105   Temp 97.2 °F (36.2 °C) (Infrared)   Resp 16   Ht 1.93 m (6' 4\")   Wt (!) 209.2 kg (461 lb 3.2 oz)   SpO2 94% Comment: ra  BMI 56.14 kg/m²    Wt Readings from Last 3 Encounters:   06/27/24 (!) 209.2 kg (461 lb 3.2 oz)   05/08/24 (!) 202.7 kg (446 lb 12.8 oz)   04/17/24 (!) 203 kg (447 lb 9.6 oz)     BP Readings from Last 3 Encounters:   06/27/24 124/72   05/08/24 134/78   04/17/24 134/72      Pulse Readings from Last 3 Encounters:   06/27/24 (!) 105   05/08/24 (!) 104   04/17/24 (!) 105     Body mass index is 56.14 kg/m².   Resp Readings from

## 2024-07-01 ENCOUNTER — OFFICE VISIT (OUTPATIENT)
Dept: FAMILY MEDICINE CLINIC | Age: 64
End: 2024-07-01
Payer: MEDICARE

## 2024-07-01 VITALS
DIASTOLIC BLOOD PRESSURE: 70 MMHG | RESPIRATION RATE: 16 BRPM | WEIGHT: 315 LBS | HEIGHT: 76 IN | OXYGEN SATURATION: 96 % | HEART RATE: 96 BPM | BODY MASS INDEX: 38.36 KG/M2 | TEMPERATURE: 97.5 F | SYSTOLIC BLOOD PRESSURE: 132 MMHG

## 2024-07-01 DIAGNOSIS — L97.509 DIABETIC ULCER OF TOE ASSOCIATED WITH TYPE 2 DIABETES MELLITUS, UNSPECIFIED LATERALITY, UNSPECIFIED ULCER STAGE (HCC): Primary | ICD-10-CM

## 2024-07-01 DIAGNOSIS — E11.621 DIABETIC ULCER OF TOE ASSOCIATED WITH TYPE 2 DIABETES MELLITUS, UNSPECIFIED LATERALITY, UNSPECIFIED ULCER STAGE (HCC): Primary | ICD-10-CM

## 2024-07-01 PROCEDURE — 3075F SYST BP GE 130 - 139MM HG: CPT | Performed by: FAMILY MEDICINE

## 2024-07-01 PROCEDURE — 3017F COLORECTAL CA SCREEN DOC REV: CPT | Performed by: FAMILY MEDICINE

## 2024-07-01 PROCEDURE — 1123F ACP DISCUSS/DSCN MKR DOCD: CPT | Performed by: FAMILY MEDICINE

## 2024-07-01 PROCEDURE — 2022F DILAT RTA XM EVC RTNOPTHY: CPT | Performed by: FAMILY MEDICINE

## 2024-07-01 PROCEDURE — 1036F TOBACCO NON-USER: CPT | Performed by: FAMILY MEDICINE

## 2024-07-01 PROCEDURE — 3051F HG A1C>EQUAL 7.0%<8.0%: CPT | Performed by: FAMILY MEDICINE

## 2024-07-01 PROCEDURE — G8427 DOCREV CUR MEDS BY ELIG CLIN: HCPCS | Performed by: FAMILY MEDICINE

## 2024-07-01 PROCEDURE — 3078F DIAST BP <80 MM HG: CPT | Performed by: FAMILY MEDICINE

## 2024-07-01 PROCEDURE — G8417 CALC BMI ABV UP PARAM F/U: HCPCS | Performed by: FAMILY MEDICINE

## 2024-07-01 PROCEDURE — 99213 OFFICE O/P EST LOW 20 MIN: CPT | Performed by: FAMILY MEDICINE

## 2024-07-01 NOTE — PROGRESS NOTES
Chief Complaint   Patient presents with    Fall     Patient fell last week, hit right knee, right hand, and left arm.      diabetic ulcers     Bilateral feet diabetic ulcers on 4 toes.  And on the side of his left foot.        Have you seen any other physician or provider since your last visit no    Have you had any other diagnostic tests since your last visit? no    Have you changed or stopped any medications since your last visit? no

## 2024-07-05 DIAGNOSIS — D50.9 IRON DEFICIENCY ANEMIA, UNSPECIFIED IRON DEFICIENCY ANEMIA TYPE: ICD-10-CM

## 2024-07-05 DIAGNOSIS — I50.9 ACUTE ON CHRONIC CONGESTIVE HEART FAILURE, UNSPECIFIED HEART FAILURE TYPE (HCC): ICD-10-CM

## 2024-07-05 DIAGNOSIS — G47.00 INSOMNIA, UNSPECIFIED TYPE: ICD-10-CM

## 2024-07-05 DIAGNOSIS — Z79.4 TYPE 2 DIABETES MELLITUS WITH OTHER SPECIFIED COMPLICATION, WITH LONG-TERM CURRENT USE OF INSULIN (HCC): ICD-10-CM

## 2024-07-05 DIAGNOSIS — E11.69 TYPE 2 DIABETES MELLITUS WITH OTHER SPECIFIED COMPLICATION, WITH LONG-TERM CURRENT USE OF INSULIN (HCC): ICD-10-CM

## 2024-07-05 DIAGNOSIS — R25.2 MUSCLE CRAMPS: ICD-10-CM

## 2024-07-08 RX ORDER — LANOLIN ALCOHOL/MO/W.PET/CERES
400 CREAM (GRAM) TOPICAL DAILY
Qty: 30 TABLET | Refills: 2 | Status: SHIPPED | OUTPATIENT
Start: 2024-07-08

## 2024-07-08 RX ORDER — BUMETANIDE 2 MG/1
TABLET ORAL
Qty: 60 TABLET | Refills: 3 | Status: SHIPPED | OUTPATIENT
Start: 2024-07-08

## 2024-07-08 RX ORDER — ALBUTEROL SULFATE 90 UG/1
2 AEROSOL, METERED RESPIRATORY (INHALATION) EVERY 4 HOURS PRN
Qty: 6.7 G | Refills: 0 | Status: SHIPPED | OUTPATIENT
Start: 2024-07-08

## 2024-07-08 RX ORDER — FERROUS SULFATE 325(65) MG
1 TABLET ORAL
Qty: 30 TABLET | Refills: 0 | Status: SHIPPED | OUTPATIENT
Start: 2024-07-08

## 2024-07-08 RX ORDER — NITROGLYCERIN 0.4 MG/1
TABLET SUBLINGUAL
Qty: 25 TABLET | Refills: 0 | Status: SHIPPED | OUTPATIENT
Start: 2024-07-08

## 2024-07-08 RX ORDER — ZOLPIDEM TARTRATE 12.5 MG/1
TABLET, FILM COATED, EXTENDED RELEASE ORAL
Qty: 30 TABLET | Refills: 2 | Status: SHIPPED | OUTPATIENT
Start: 2024-07-08 | End: 2024-08-08

## 2024-07-08 NOTE — TELEPHONE ENCOUNTER
Refill request received from pharmacy      Next Office Visit Date:  Future Appointments   Date Time Provider Department Center   7/9/2024  1:00 PM Kavitha Baker MD [facility]   7/23/2024  3:00 PM Kavitha Baker MD [facility] - Please review via PDMP        Last Office Visit:    7/1/2024

## 2024-07-09 ENCOUNTER — OFFICE VISIT (OUTPATIENT)
Dept: FAMILY MEDICINE CLINIC | Age: 64
End: 2024-07-09
Payer: MEDICARE

## 2024-07-09 VITALS
DIASTOLIC BLOOD PRESSURE: 68 MMHG | HEART RATE: 103 BPM | RESPIRATION RATE: 16 BRPM | HEIGHT: 76 IN | OXYGEN SATURATION: 96 % | SYSTOLIC BLOOD PRESSURE: 132 MMHG | TEMPERATURE: 98 F | WEIGHT: 315 LBS | BODY MASS INDEX: 38.36 KG/M2

## 2024-07-09 DIAGNOSIS — E11.621 DIABETIC ULCER OF TOE ASSOCIATED WITH TYPE 2 DIABETES MELLITUS, UNSPECIFIED LATERALITY, UNSPECIFIED ULCER STAGE (HCC): ICD-10-CM

## 2024-07-09 DIAGNOSIS — Z79.4 TYPE 2 DIABETES MELLITUS WITH OTHER SPECIFIED COMPLICATION, WITH LONG-TERM CURRENT USE OF INSULIN (HCC): ICD-10-CM

## 2024-07-09 DIAGNOSIS — E66.01 CLASS 3 SEVERE OBESITY WITH SERIOUS COMORBIDITY AND BODY MASS INDEX (BMI) OF 50.0 TO 59.9 IN ADULT, UNSPECIFIED OBESITY TYPE (HCC): Primary | ICD-10-CM

## 2024-07-09 DIAGNOSIS — L97.509 DIABETIC ULCER OF TOE ASSOCIATED WITH TYPE 2 DIABETES MELLITUS, UNSPECIFIED LATERALITY, UNSPECIFIED ULCER STAGE (HCC): ICD-10-CM

## 2024-07-09 DIAGNOSIS — E11.69 TYPE 2 DIABETES MELLITUS WITH OTHER SPECIFIED COMPLICATION, WITH LONG-TERM CURRENT USE OF INSULIN (HCC): ICD-10-CM

## 2024-07-09 PROCEDURE — G8427 DOCREV CUR MEDS BY ELIG CLIN: HCPCS | Performed by: FAMILY MEDICINE

## 2024-07-09 PROCEDURE — 1036F TOBACCO NON-USER: CPT | Performed by: FAMILY MEDICINE

## 2024-07-09 PROCEDURE — 2022F DILAT RTA XM EVC RTNOPTHY: CPT | Performed by: FAMILY MEDICINE

## 2024-07-09 PROCEDURE — 3017F COLORECTAL CA SCREEN DOC REV: CPT | Performed by: FAMILY MEDICINE

## 2024-07-09 PROCEDURE — 3078F DIAST BP <80 MM HG: CPT | Performed by: FAMILY MEDICINE

## 2024-07-09 PROCEDURE — 99214 OFFICE O/P EST MOD 30 MIN: CPT | Performed by: FAMILY MEDICINE

## 2024-07-09 PROCEDURE — G8417 CALC BMI ABV UP PARAM F/U: HCPCS | Performed by: FAMILY MEDICINE

## 2024-07-09 PROCEDURE — 3075F SYST BP GE 130 - 139MM HG: CPT | Performed by: FAMILY MEDICINE

## 2024-07-09 PROCEDURE — 1123F ACP DISCUSS/DSCN MKR DOCD: CPT | Performed by: FAMILY MEDICINE

## 2024-07-09 PROCEDURE — 3051F HG A1C>EQUAL 7.0%<8.0%: CPT | Performed by: FAMILY MEDICINE

## 2024-07-09 RX ORDER — PHENTERMINE HYDROCHLORIDE 37.5 MG/1
37.5 TABLET ORAL
Qty: 30 TABLET | Refills: 1 | Status: SHIPPED | OUTPATIENT
Start: 2024-07-09 | End: 2024-09-07

## 2024-07-09 NOTE — PROGRESS NOTES
Chief Complaint   Patient presents with    Toe Pain     Diabetic ulcers on both feet       Have you seen any other physician or provider since your last visit no    Have you had any other diagnostic tests since your last visit? no    Have you changed or stopped any medications since your last visit? no

## 2024-07-23 ENCOUNTER — OFFICE VISIT (OUTPATIENT)
Dept: FAMILY MEDICINE CLINIC | Age: 64
End: 2024-07-23

## 2024-07-23 VITALS
WEIGHT: 315 LBS | DIASTOLIC BLOOD PRESSURE: 76 MMHG | HEIGHT: 76 IN | RESPIRATION RATE: 16 BRPM | TEMPERATURE: 98 F | BODY MASS INDEX: 38.36 KG/M2 | OXYGEN SATURATION: 98 % | HEART RATE: 103 BPM | SYSTOLIC BLOOD PRESSURE: 132 MMHG

## 2024-07-23 DIAGNOSIS — M54.41 CHRONIC BILATERAL LOW BACK PAIN WITH RIGHT-SIDED SCIATICA: ICD-10-CM

## 2024-07-23 DIAGNOSIS — Z00.00 MEDICARE ANNUAL WELLNESS VISIT, SUBSEQUENT: Primary | ICD-10-CM

## 2024-07-23 DIAGNOSIS — Z79.4 TYPE 2 DIABETES MELLITUS WITH OTHER SPECIFIED COMPLICATION, WITH LONG-TERM CURRENT USE OF INSULIN (HCC): ICD-10-CM

## 2024-07-23 DIAGNOSIS — E11.69 TYPE 2 DIABETES MELLITUS WITH OTHER SPECIFIED COMPLICATION, WITH LONG-TERM CURRENT USE OF INSULIN (HCC): ICD-10-CM

## 2024-07-23 DIAGNOSIS — L98.9 SKIN LESION: ICD-10-CM

## 2024-07-23 DIAGNOSIS — G89.29 CHRONIC BILATERAL LOW BACK PAIN WITH RIGHT-SIDED SCIATICA: ICD-10-CM

## 2024-07-23 DIAGNOSIS — D50.9 IRON DEFICIENCY ANEMIA, UNSPECIFIED IRON DEFICIENCY ANEMIA TYPE: ICD-10-CM

## 2024-07-23 PROCEDURE — 1123F ACP DISCUSS/DSCN MKR DOCD: CPT | Performed by: FAMILY MEDICINE

## 2024-07-23 PROCEDURE — 3075F SYST BP GE 130 - 139MM HG: CPT | Performed by: FAMILY MEDICINE

## 2024-07-23 PROCEDURE — G0439 PPPS, SUBSEQ VISIT: HCPCS | Performed by: FAMILY MEDICINE

## 2024-07-23 PROCEDURE — 3078F DIAST BP <80 MM HG: CPT | Performed by: FAMILY MEDICINE

## 2024-07-23 PROCEDURE — 3017F COLORECTAL CA SCREEN DOC REV: CPT | Performed by: FAMILY MEDICINE

## 2024-07-23 NOTE — PROGRESS NOTES
your eyesight?: (!) Yes  Have you had an eye exam within the past year?: Yes  Interventions:   Patient encouraged to make appointment with their eye specialist    Safety:  Do you always fasten your seatbelt when you are in a car?: (!) No  Interventions:  Patient advised to follow up in the office for further evaluation and treatment    ADL's:   Patient reports needing help with:  Select all that apply: (!) Housekeeping, Banking/Finances, Shopping, Telephone Use  Interventions:  Patient advised to follow up in the office for further evaluation and treatment    Advanced Directives:  Do you have a Living Will?: (!) No    Intervention:  has NO advanced directive - information provided                     Objective   Vitals:    07/23/24 1539   BP: 132/76   Pulse: (!) 103   Resp: 16   Temp: 98 °F (36.7 °C)   TempSrc: Infrared   SpO2: 98%   Weight: (!) 211.4 kg (466 lb)   Height: 1.93 m (6' 4\")      Body mass index is 56.72 kg/m².                No Known Allergies  Prior to Visit Medications    Medication Sig Taking? Authorizing Provider   phentermine (ADIPEX-P) 37.5 MG tablet Take 1 tablet by mouth every morning (before breakfast) for 60 days. Max Daily Amount: 37.5 mg Yes Kavitha Baker MD   bumetanide (BUMEX) 2 MG tablet TAKE 1 TABLET BY MOUTH 2 TIMES DAILY (FLUID PILL) Yes Kavitha Baker MD   zolpidem (AMBIEN CR) 12.5 MG extended release tablet TAKE ONE TABLET BY MOUTH EVERY NIGHT AS NEEDED FOR SLEEP Yes Kavitha Baker MD   magnesium oxide (MAG-OX) 400 (240 Mg) MG tablet TAKE 1 TABLET BY MOUTH DAILY Yes Kavitha Baker MD   FEROSUL 325 (65 Fe) MG tablet TAKE 1 TABLET BY MOUTH DAILY (WITH BREAKFAST) Yes Kavitha Baker MD   nitroGLYCERIN (NITROSTAT) 0.4 MG SL tablet PLACE 1 TABLET UNDER THE TONGUE EVERY 5 MINUTES AS NEEDED FOR CHEST PAIN (MAX 3 TABLETS IN 15 MIN) Yes Kavitha Baker MD   albuterol sulfate HFA (PROVENTIL;VENTOLIN;PROAIR) 108 (90 Base) MCG/ACT inhaler INHALE 2 PUFFS INTO THE LUNGS EVERY 4 HOURS AS NEEDED FOR

## 2024-07-23 NOTE — PATIENT INSTRUCTIONS
people to talk to. The following resources can help.  Behavioral Health Treatment Services . This service from the national Substance Abuse and Mental Health Services Administration can help you find local counselors. Search online at findtreatment.samhsa.gov or call 0-969-936-HELP (8014), or TDD 1-641.313.1597.  National [place] on [place]ness (ANNIKA). You can call the ANNIKA HelpLine (1-456.852.9019) or go online (www.annika.org/help) to find resources and chat with a trained volunteer.  Where can you learn more?  Go to https://www.Vdancer.net/patientEd and enter Z357 to learn more about \"Learning About Managing Anger.\"  Current as of: June 24, 2023  Content Version: 14.1  © 2006-2024 [employer].   Care instructions adapted under license by [employer]. If you have questions about a medical condition or this instruction, always ask your healthcare professional. [employer] disclaims any warranty or liability for your use of this information.           Learning About Being Active as an Older Adult  Why is being active important as you get older?     Being active is one of the best things you can do for your health. And it's never too late to start. Being active--or getting active, if you aren't already--has definite benefits. It can:  Give you more energy,  Keep your mind sharp.  Improve balance to reduce your risk of falls.  Help you manage chronic illness with fewer medicines.  No matter how old you are, how fit you are, or what health problems you have, there is a form of activity that will work for you. And the more physical activity you can do, the better your overall health will be.  What kinds of activity can help you stay healthy?  Being more active will make your daily activities easier. Physical activity includes planned exercise and things you do in daily life. There are four types of activity:  Aerobic.  Doing aerobic activity makes your heart and lungs

## 2024-07-24 RX ORDER — BLOOD SUGAR DIAGNOSTIC
STRIP MISCELLANEOUS
Qty: 100 STRIP | Refills: 3 | Status: SHIPPED | OUTPATIENT
Start: 2024-07-24

## 2024-07-24 RX ORDER — NITROGLYCERIN 0.4 MG/1
TABLET SUBLINGUAL
Qty: 25 TABLET | Refills: 0 | Status: SHIPPED | OUTPATIENT
Start: 2024-07-24

## 2024-07-24 RX ORDER — ALBUTEROL SULFATE 90 UG/1
2 AEROSOL, METERED RESPIRATORY (INHALATION) EVERY 4 HOURS PRN
Qty: 6.7 G | Refills: 0 | Status: SHIPPED | OUTPATIENT
Start: 2024-07-24

## 2024-07-24 RX ORDER — HYDROCODONE BITARTRATE AND ACETAMINOPHEN 10; 325 MG/1; MG/1
TABLET ORAL
Qty: 150 TABLET | Refills: 0 | Status: SHIPPED | OUTPATIENT
Start: 2024-07-24 | End: 2024-08-22

## 2024-07-24 RX ORDER — ERGOCALCIFEROL 1.25 MG/1
50000 CAPSULE ORAL WEEKLY
Qty: 4 CAPSULE | Refills: 2 | Status: SHIPPED | OUTPATIENT
Start: 2024-07-24

## 2024-07-24 RX ORDER — FERROUS SULFATE 325(65) MG
1 TABLET ORAL
Qty: 30 TABLET | Refills: 0 | Status: SHIPPED | OUTPATIENT
Start: 2024-07-24

## 2024-07-24 RX ORDER — OXYCODONE HYDROCHLORIDE 30 MG/1
30 TABLET ORAL EVERY 4 HOURS PRN
Qty: 150 TABLET | Refills: 0 | Status: SHIPPED | OUTPATIENT
Start: 2024-07-24 | End: 2024-08-23

## 2024-07-24 RX ORDER — TRIAMCINOLONE ACETONIDE 1 MG/G
OINTMENT TOPICAL
Qty: 80 G | Refills: 1 | Status: SHIPPED | OUTPATIENT
Start: 2024-07-24

## 2024-07-24 NOTE — TELEPHONE ENCOUNTER
Refill request received from pharmacy      Next Office Visit Date:  Future Appointments   Date Time Provider Department Center   7/24/2025  1:45 PM Kavitha Baker MD [facility] - Please review via PDMP        Last Office Visit:    7/23/2024

## 2024-07-30 ENCOUNTER — TELEPHONE (OUTPATIENT)
Dept: FAMILY MEDICINE CLINIC | Age: 64
End: 2024-07-30

## 2024-08-07 ENCOUNTER — LAB (OUTPATIENT)
Dept: FAMILY MEDICINE CLINIC | Age: 64
End: 2024-08-07
Payer: MEDICARE

## 2024-08-07 DIAGNOSIS — E11.69 TYPE 2 DIABETES MELLITUS WITH OTHER SPECIFIED COMPLICATION, WITH LONG-TERM CURRENT USE OF INSULIN (HCC): Primary | ICD-10-CM

## 2024-08-07 DIAGNOSIS — Z79.4 TYPE 2 DIABETES MELLITUS WITH OTHER SPECIFIED COMPLICATION, WITH LONG-TERM CURRENT USE OF INSULIN (HCC): Primary | ICD-10-CM

## 2024-08-07 DIAGNOSIS — E53.8 B12 DEFICIENCY: ICD-10-CM

## 2024-08-07 LAB
CHP ED QC CHECK: NORMAL
GLUCOSE BLD-MCNC: 71 MG/DL

## 2024-08-07 PROCEDURE — 82962 GLUCOSE BLOOD TEST: CPT | Performed by: FAMILY MEDICINE

## 2024-08-07 PROCEDURE — 96372 THER/PROPH/DIAG INJ SC/IM: CPT | Performed by: FAMILY MEDICINE

## 2024-08-07 RX ORDER — CYANOCOBALAMIN 1000 UG/ML
1000 INJECTION, SOLUTION INTRAMUSCULAR; SUBCUTANEOUS ONCE
Status: COMPLETED | OUTPATIENT
Start: 2024-08-07 | End: 2024-08-07

## 2024-08-07 RX ADMIN — CYANOCOBALAMIN 1000 MCG: 1000 INJECTION, SOLUTION INTRAMUSCULAR; SUBCUTANEOUS at 15:47

## 2024-08-07 NOTE — PROGRESS NOTES
Patient tolerated injection well. Patient advised to wait 20 minutes in the office following the injection. No signs/symptoms of reaction noted after 20 minutes.  Administrations This Visit       cyanocobalamin injection 1,000 mcg       Admin Date  08/07/2024  15:47 Action  Given Dose  1,000 mcg Route  IntraMUSCular Site  Deltoid Right Administered By  Jeanette Nolasco RN    Ordering Provider: Kavitha Baker MD    Patient Supplied?: No                  Patient requested for his blood sugar to be checked while he is here. Blood glucose checked and result 71. Provided patient crackers and water.

## 2024-08-19 DIAGNOSIS — D50.9 IRON DEFICIENCY ANEMIA, UNSPECIFIED IRON DEFICIENCY ANEMIA TYPE: ICD-10-CM

## 2024-08-19 DIAGNOSIS — E11.69 TYPE 2 DIABETES MELLITUS WITH OTHER SPECIFIED COMPLICATION, WITH LONG-TERM CURRENT USE OF INSULIN (HCC): ICD-10-CM

## 2024-08-19 DIAGNOSIS — M15.9 OSTEOARTHRITIS OF MULTIPLE JOINTS, UNSPECIFIED OSTEOARTHRITIS TYPE: ICD-10-CM

## 2024-08-19 DIAGNOSIS — Z79.4 TYPE 2 DIABETES MELLITUS WITH OTHER SPECIFIED COMPLICATION, WITH LONG-TERM CURRENT USE OF INSULIN (HCC): ICD-10-CM

## 2024-08-19 DIAGNOSIS — J30.2 SEASONAL ALLERGIES: ICD-10-CM

## 2024-08-19 RX ORDER — LORATADINE 10 MG/1
TABLET ORAL
Qty: 30 TABLET | Refills: 3 | Status: SHIPPED | OUTPATIENT
Start: 2024-08-19

## 2024-08-19 RX ORDER — ALBUTEROL SULFATE 90 UG/1
2 AEROSOL, METERED RESPIRATORY (INHALATION) EVERY 4 HOURS PRN
Qty: 6.7 G | Refills: 0 | Status: SHIPPED | OUTPATIENT
Start: 2024-08-19

## 2024-08-19 RX ORDER — FERROUS SULFATE 325(65) MG
1 TABLET ORAL
Qty: 30 TABLET | Refills: 0 | Status: SHIPPED | OUTPATIENT
Start: 2024-08-19

## 2024-08-19 RX ORDER — DOCUSATE SODIUM 100 MG/1
CAPSULE, LIQUID FILLED ORAL
Qty: 120 CAPSULE | Refills: 2 | OUTPATIENT
Start: 2024-08-19

## 2024-08-19 RX ORDER — CELECOXIB 200 MG/1
CAPSULE ORAL
Qty: 30 CAPSULE | Refills: 2 | OUTPATIENT
Start: 2024-08-19

## 2024-08-19 RX ORDER — GLIMEPIRIDE 4 MG/1
4 TABLET ORAL
Qty: 30 TABLET | Refills: 3 | OUTPATIENT
Start: 2024-08-19

## 2024-08-19 RX ORDER — MONTELUKAST SODIUM 10 MG/1
TABLET ORAL
Qty: 30 TABLET | Refills: 3 | OUTPATIENT
Start: 2024-08-19

## 2024-08-19 RX ORDER — NITROGLYCERIN 0.4 MG/1
TABLET SUBLINGUAL
Qty: 25 TABLET | Refills: 0 | Status: SHIPPED | OUTPATIENT
Start: 2024-08-19

## 2024-08-19 NOTE — TELEPHONE ENCOUNTER
Refill request received from pharmacy      Next Office Visit Date:  Future Appointments   Date Time Provider Department Center   7/24/2025  1:45 PM Kavitha Baker MD [facility] - Please review via PDMP        Last Office Visit:    7/23/2024

## 2024-08-26 ENCOUNTER — OFFICE VISIT (OUTPATIENT)
Dept: FAMILY MEDICINE CLINIC | Age: 64
End: 2024-08-26
Payer: MEDICARE

## 2024-08-26 VITALS
TEMPERATURE: 98 F | RESPIRATION RATE: 16 BRPM | DIASTOLIC BLOOD PRESSURE: 86 MMHG | BODY MASS INDEX: 56.72 KG/M2 | WEIGHT: 315 LBS | HEART RATE: 106 BPM | OXYGEN SATURATION: 92 % | SYSTOLIC BLOOD PRESSURE: 120 MMHG

## 2024-08-26 DIAGNOSIS — M15.9 OSTEOARTHRITIS OF MULTIPLE JOINTS, UNSPECIFIED OSTEOARTHRITIS TYPE: ICD-10-CM

## 2024-08-26 DIAGNOSIS — M54.41 CHRONIC BILATERAL LOW BACK PAIN WITH RIGHT-SIDED SCIATICA: ICD-10-CM

## 2024-08-26 DIAGNOSIS — J30.2 SEASONAL ALLERGIES: ICD-10-CM

## 2024-08-26 DIAGNOSIS — G89.29 CHRONIC BILATERAL LOW BACK PAIN WITH RIGHT-SIDED SCIATICA: ICD-10-CM

## 2024-08-26 DIAGNOSIS — E08.59 DIABETES MELLITUS DUE TO UNDERLYING CONDITION WITH OTHER CIRCULATORY COMPLICATIONS (HCC): ICD-10-CM

## 2024-08-26 DIAGNOSIS — I99.8 LOWER LIMB ISCHEMIA: Primary | ICD-10-CM

## 2024-08-26 DIAGNOSIS — E66.01 CLASS 3 SEVERE OBESITY WITH SERIOUS COMORBIDITY AND BODY MASS INDEX (BMI) OF 50.0 TO 59.9 IN ADULT, UNSPECIFIED OBESITY TYPE (HCC): ICD-10-CM

## 2024-08-26 DIAGNOSIS — F41.9 ANXIETY: ICD-10-CM

## 2024-08-26 DIAGNOSIS — E11.69 TYPE 2 DIABETES MELLITUS WITH OTHER SPECIFIED COMPLICATION, WITH LONG-TERM CURRENT USE OF INSULIN (HCC): ICD-10-CM

## 2024-08-26 DIAGNOSIS — Z79.4 TYPE 2 DIABETES MELLITUS WITH OTHER SPECIFIED COMPLICATION, WITH LONG-TERM CURRENT USE OF INSULIN (HCC): ICD-10-CM

## 2024-08-26 PROCEDURE — G8417 CALC BMI ABV UP PARAM F/U: HCPCS | Performed by: FAMILY MEDICINE

## 2024-08-26 PROCEDURE — 1036F TOBACCO NON-USER: CPT | Performed by: FAMILY MEDICINE

## 2024-08-26 PROCEDURE — 3017F COLORECTAL CA SCREEN DOC REV: CPT | Performed by: FAMILY MEDICINE

## 2024-08-26 PROCEDURE — 3079F DIAST BP 80-89 MM HG: CPT | Performed by: FAMILY MEDICINE

## 2024-08-26 PROCEDURE — 1123F ACP DISCUSS/DSCN MKR DOCD: CPT | Performed by: FAMILY MEDICINE

## 2024-08-26 PROCEDURE — 99214 OFFICE O/P EST MOD 30 MIN: CPT | Performed by: FAMILY MEDICINE

## 2024-08-26 PROCEDURE — G8427 DOCREV CUR MEDS BY ELIG CLIN: HCPCS | Performed by: FAMILY MEDICINE

## 2024-08-26 PROCEDURE — 3074F SYST BP LT 130 MM HG: CPT | Performed by: FAMILY MEDICINE

## 2024-08-26 RX ORDER — DIAZEPAM 5 MG
TABLET ORAL
Qty: 60 TABLET | Refills: 2 | Status: SHIPPED | OUTPATIENT
Start: 2024-08-26 | End: 2024-11-25

## 2024-08-26 RX ORDER — GABAPENTIN 800 MG/1
TABLET ORAL
Qty: 120 TABLET | Refills: 2 | Status: SHIPPED | OUTPATIENT
Start: 2024-08-26 | End: 2024-11-25

## 2024-08-26 RX ORDER — PHENTERMINE HYDROCHLORIDE 37.5 MG/1
37.5 TABLET ORAL
Qty: 30 TABLET | Refills: 2 | Status: SHIPPED | OUTPATIENT
Start: 2024-08-26 | End: 2024-11-24

## 2024-08-26 RX ORDER — OXYCODONE HYDROCHLORIDE 30 MG/1
30 TABLET ORAL EVERY 4 HOURS PRN
Qty: 150 TABLET | Refills: 0 | Status: SHIPPED | OUTPATIENT
Start: 2024-08-26 | End: 2024-09-25

## 2024-08-26 ASSESSMENT — ENCOUNTER SYMPTOMS: BACK PAIN: 1

## 2024-08-26 NOTE — PROGRESS NOTES
Chief Complaint   Patient presents with    Foot Injury     Patient noticed the Left foot is turning black a few days ago.        Have you seen any other physician or provider since your last visit no    Have you had any other diagnostic tests since your last visit? no    Have you changed or stopped any medications since your last visit? no

## 2024-08-26 NOTE — PROGRESS NOTES
SUBJECTIVE:    Patient ID: Jose Alejandro Nichole is a 63 y.o. male.    Chief Complaint   Patient presents with    Foot Injury     Patient noticed the Left foot is turning black a few days ago.        HPI: office visit  He is having some significant color change in his left foot.  He says it does seem to be more dark.  He said over the last several days it has looked really dark to the point that it was almost black looking.  He says he is not having any significant pain in it though he does not really have any sensation in that foot due to his diabetes and his back pain.  He says he has not had any injury.  He has not got any open areas there.  He says he is just noticed some change in it.  He says he does not feel like his swelling anymore than it usually does.    Review of Systems   Constitutional:  Positive for fatigue.   Musculoskeletal:  Positive for arthralgias, back pain, gait problem, joint swelling and myalgias.   Skin:         Leg swelling/infections   Neurological:  Positive for weakness.   Psychiatric/Behavioral:  The patient is nervous/anxious.    All other systems reviewed and are negative.       OBJECTIVE:  /86   Pulse (!) 106   Temp 98 °F (36.7 °C) (Infrared)   Resp 16   Wt (!) 211.4 kg (466 lb)   SpO2 92% Comment: ra  BMI 56.72 kg/m²    Wt Readings from Last 3 Encounters:   08/26/24 (!) 211.4 kg (466 lb)   07/23/24 (!) 211.4 kg (466 lb)   07/09/24 (!) 211.4 kg (466 lb)     BP Readings from Last 3 Encounters:   08/26/24 120/86   07/23/24 132/76   07/09/24 132/68      Pulse Readings from Last 3 Encounters:   08/26/24 (!) 106   07/23/24 (!) 103   07/09/24 (!) 103     Body mass index is 56.72 kg/m².   Resp Readings from Last 3 Encounters:   08/26/24 16   07/23/24 16   07/09/24 16     Past medical, surgical, family and social history were reviewed and updated with the patient.     Physical Exam  Vitals and nursing note reviewed.   Constitutional:       Appearance: Normal appearance. He is

## 2024-08-27 RX ORDER — MONTELUKAST SODIUM 10 MG/1
TABLET ORAL
Qty: 30 TABLET | Refills: 3 | Status: SHIPPED | OUTPATIENT
Start: 2024-08-27

## 2024-08-27 RX ORDER — CELECOXIB 200 MG/1
CAPSULE ORAL
Qty: 30 CAPSULE | Refills: 2 | Status: SHIPPED | OUTPATIENT
Start: 2024-08-27

## 2024-08-27 RX ORDER — DOCUSATE SODIUM 100 MG/1
CAPSULE, LIQUID FILLED ORAL
Qty: 120 CAPSULE | Refills: 2 | Status: SHIPPED | OUTPATIENT
Start: 2024-08-27

## 2024-08-27 RX ORDER — GLIMEPIRIDE 4 MG/1
4 TABLET ORAL
Qty: 30 TABLET | Refills: 3 | Status: SHIPPED | OUTPATIENT
Start: 2024-08-27

## 2024-08-27 NOTE — TELEPHONE ENCOUNTER
Refill request received from patient      Next Office Visit Date:  Future Appointments   Date Time Provider Department Center   9/23/2024  3:00 PM Kavitha Baker MD [facility]   7/24/2025  1:45 PM Kavitha Baker MD [facility] DEP       [facility] - Please review via PDMP        Last Office Visit:    7/23/2024

## 2024-08-30 ENCOUNTER — HOSPITAL ENCOUNTER (OUTPATIENT)
Dept: ULTRASOUND IMAGING | Facility: HOSPITAL | Age: 64
Discharge: HOME | End: 2024-08-30
Payer: MEDICARE

## 2024-08-30 DIAGNOSIS — E08.59 DIABETES MELLITUS DUE TO UNDERLYING CONDITION WITH OTHER CIRCULATORY COMPLICATIONS (HCC): ICD-10-CM

## 2024-08-30 DIAGNOSIS — I99.8 LOWER LIMB ISCHEMIA: ICD-10-CM

## 2024-08-30 PROCEDURE — 93925 LOWER EXTREMITY STUDY: CPT

## 2024-08-30 PROCEDURE — 93922 UPR/L XTREMITY ART 2 LEVELS: CPT

## 2024-09-03 DIAGNOSIS — M54.41 CHRONIC BILATERAL LOW BACK PAIN WITH RIGHT-SIDED SCIATICA: ICD-10-CM

## 2024-09-03 DIAGNOSIS — G89.29 CHRONIC BILATERAL LOW BACK PAIN WITH RIGHT-SIDED SCIATICA: ICD-10-CM

## 2024-09-04 NOTE — TELEPHONE ENCOUNTER
Refill request received from pharmacy      Next Office Visit Date:  Future Appointments   Date Time Provider Department Center   9/23/2024  3:00 PM Kavitha Baker MD [facility]   7/24/2025  1:45 PM Kavitha Baker MD [facility] DEP       [facility] - Please review via PDMP        Last Office Visit:    7/23/2024

## 2024-09-05 RX ORDER — HYDROCODONE BITARTRATE AND ACETAMINOPHEN 10; 325 MG/1; MG/1
TABLET ORAL
Qty: 150 TABLET | Refills: 0 | Status: SHIPPED | OUTPATIENT
Start: 2024-09-05 | End: 2024-10-04

## 2024-09-23 ENCOUNTER — OFFICE VISIT (OUTPATIENT)
Dept: FAMILY MEDICINE CLINIC | Age: 64
End: 2024-09-23

## 2024-09-23 ENCOUNTER — HOSPITAL ENCOUNTER (OUTPATIENT)
Facility: HOSPITAL | Age: 64
Discharge: HOME | End: 2024-09-23
Payer: MEDICARE

## 2024-09-23 VITALS
RESPIRATION RATE: 16 BRPM | HEIGHT: 76 IN | WEIGHT: 315 LBS | BODY MASS INDEX: 38.36 KG/M2 | DIASTOLIC BLOOD PRESSURE: 78 MMHG | TEMPERATURE: 97.2 F | SYSTOLIC BLOOD PRESSURE: 122 MMHG | HEART RATE: 106 BPM | OXYGEN SATURATION: 98 %

## 2024-09-23 DIAGNOSIS — Z79.4 TYPE 2 DIABETES MELLITUS WITH OTHER SPECIFIED COMPLICATION, WITH LONG-TERM CURRENT USE OF INSULIN (HCC): Primary | ICD-10-CM

## 2024-09-23 DIAGNOSIS — E53.8 B12 DEFICIENCY: ICD-10-CM

## 2024-09-23 DIAGNOSIS — E11.69 TYPE 2 DIABETES MELLITUS WITH OTHER SPECIFIED COMPLICATION, WITH LONG-TERM CURRENT USE OF INSULIN (HCC): Primary | ICD-10-CM

## 2024-09-23 DIAGNOSIS — G89.29 CHRONIC BILATERAL LOW BACK PAIN WITH RIGHT-SIDED SCIATICA: ICD-10-CM

## 2024-09-23 DIAGNOSIS — M15.9 OSTEOARTHRITIS OF MULTIPLE JOINTS, UNSPECIFIED OSTEOARTHRITIS TYPE: ICD-10-CM

## 2024-09-23 DIAGNOSIS — M54.41 CHRONIC BILATERAL LOW BACK PAIN WITH RIGHT-SIDED SCIATICA: ICD-10-CM

## 2024-09-23 DIAGNOSIS — D50.9 IRON DEFICIENCY ANEMIA, UNSPECIFIED IRON DEFICIENCY ANEMIA TYPE: ICD-10-CM

## 2024-09-23 DIAGNOSIS — Z12.11 SCREENING FOR COLON CANCER: ICD-10-CM

## 2024-09-23 DIAGNOSIS — L97.401 DIABETIC ULCER OF MIDFOOT ASSOCIATED WITH DIABETES MELLITUS DUE TO UNDERLYING CONDITION, LIMITED TO BREAKDOWN OF SKIN, UNSPECIFIED LATERALITY (HCC): ICD-10-CM

## 2024-09-23 DIAGNOSIS — E08.621 DIABETIC ULCER OF MIDFOOT ASSOCIATED WITH DIABETES MELLITUS DUE TO UNDERLYING CONDITION, LIMITED TO BREAKDOWN OF SKIN, UNSPECIFIED LATERALITY (HCC): ICD-10-CM

## 2024-09-23 PROCEDURE — 83735 ASSAY OF MAGNESIUM: CPT

## 2024-09-23 PROCEDURE — 36415 COLL VENOUS BLD VENIPUNCTURE: CPT

## 2024-09-23 PROCEDURE — 83036 HEMOGLOBIN GLYCOSYLATED A1C: CPT

## 2024-09-23 PROCEDURE — 82607 VITAMIN B-12: CPT

## 2024-09-23 PROCEDURE — 82746 ASSAY OF FOLIC ACID SERUM: CPT

## 2024-09-23 PROCEDURE — 80053 COMPREHEN METABOLIC PANEL: CPT

## 2024-09-23 PROCEDURE — 85027 COMPLETE CBC AUTOMATED: CPT

## 2024-09-23 RX ORDER — HYDROCODONE BITARTRATE AND ACETAMINOPHEN 10; 325 MG/1; MG/1
TABLET ORAL
Qty: 150 TABLET | Refills: 0 | Status: SHIPPED | OUTPATIENT
Start: 2024-09-23 | End: 2024-10-22

## 2024-09-23 RX ORDER — INSULIN GLARGINE 100 [IU]/ML
100 INJECTION, SOLUTION SUBCUTANEOUS 2 TIMES DAILY
Qty: 60 ML | Refills: 2 | Status: SHIPPED | OUTPATIENT
Start: 2024-09-23 | End: 2024-12-22

## 2024-09-23 RX ORDER — HYDROCODONE BITARTRATE AND ACETAMINOPHEN 10; 325 MG/1; MG/1
TABLET ORAL
Qty: 150 TABLET | Refills: 0 | Status: SHIPPED | OUTPATIENT
Start: 2024-09-23 | End: 2024-09-23 | Stop reason: SDUPTHER

## 2024-09-23 RX ORDER — CEFDINIR 300 MG/1
300 CAPSULE ORAL 2 TIMES DAILY
Qty: 20 CAPSULE | Refills: 0 | Status: SHIPPED | OUTPATIENT
Start: 2024-09-23 | End: 2024-10-03

## 2024-09-23 RX ORDER — OXYCODONE HYDROCHLORIDE 30 MG/1
30 TABLET ORAL EVERY 4 HOURS PRN
Qty: 150 TABLET | Refills: 0 | Status: SHIPPED | OUTPATIENT
Start: 2024-09-23 | End: 2024-09-23 | Stop reason: SDUPTHER

## 2024-09-23 RX ORDER — OXYCODONE HYDROCHLORIDE 30 MG/1
30 TABLET ORAL EVERY 4 HOURS PRN
Qty: 150 TABLET | Refills: 0 | Status: SHIPPED | OUTPATIENT
Start: 2024-09-23 | End: 2024-10-23

## 2024-09-23 ASSESSMENT — ENCOUNTER SYMPTOMS: BACK PAIN: 1

## 2024-09-24 DIAGNOSIS — Z79.4 TYPE 2 DIABETES MELLITUS WITH OTHER SPECIFIED COMPLICATION, WITH LONG-TERM CURRENT USE OF INSULIN (HCC): ICD-10-CM

## 2024-09-24 DIAGNOSIS — E53.8 B12 DEFICIENCY: ICD-10-CM

## 2024-09-24 DIAGNOSIS — E11.69 TYPE 2 DIABETES MELLITUS WITH OTHER SPECIFIED COMPLICATION, WITH LONG-TERM CURRENT USE OF INSULIN (HCC): ICD-10-CM

## 2024-09-24 DIAGNOSIS — R25.2 MUSCLE CRAMPS: ICD-10-CM

## 2024-09-24 DIAGNOSIS — D50.9 IRON DEFICIENCY ANEMIA, UNSPECIFIED IRON DEFICIENCY ANEMIA TYPE: ICD-10-CM

## 2024-09-24 LAB
ALBUMIN SERPL-MCNC: 4 G/DL (ref 3.4–4.8)
ALBUMIN/GLOB SERPL: 1.2 {RATIO} (ref 0.8–2)
ALP SERPL-CCNC: 71 U/L (ref 25–100)
ALT SERPL-CCNC: 32 U/L (ref 4–36)
ANION GAP SERPL CALCULATED.3IONS-SCNC: 13 MMOL/L (ref 3–16)
AST SERPL-CCNC: 24 U/L (ref 8–33)
BILIRUB SERPL-MCNC: <0.2 MG/DL (ref 0.3–1.2)
BUN SERPL-MCNC: 18 MG/DL (ref 6–20)
CALCIUM SERPL-MCNC: 9.8 MG/DL (ref 8.5–10.5)
CHLORIDE SERPL-SCNC: 102 MMOL/L (ref 98–107)
CO2 SERPL-SCNC: 24 MMOL/L (ref 20–30)
CREAT SERPL-MCNC: 1.1 MG/DL (ref 0.4–1.2)
ERYTHROCYTE [DISTWIDTH] IN BLOOD BY AUTOMATED COUNT: 12.6 % (ref 11–16)
FOLATE SERPL-MCNC: 13.04 NG/ML
GFR SERPLBLD CREATININE-BSD FMLA CKD-EPI: 74 ML/MIN/{1.73_M2}
GLOBULIN SER CALC-MCNC: 3.4 G/DL
GLUCOSE SERPL-MCNC: 160 MG/DL (ref 74–106)
HBA1C MFR BLD: 7.2 %
HCT VFR BLD AUTO: 47.7 % (ref 40–54)
HGB BLD-MCNC: 15 G/DL (ref 13–18)
MAGNESIUM SERPL-MCNC: 2.1 MG/DL (ref 1.7–2.4)
MCH RBC QN AUTO: 31.8 PG (ref 27–32)
MCHC RBC AUTO-ENTMCNC: 31.4 G/DL (ref 31–35)
MCV RBC AUTO: 101.1 FL (ref 80–100)
PLATELET # BLD AUTO: 111 K/UL (ref 150–400)
PMV BLD AUTO: 14 FL (ref 6–10)
POTASSIUM SERPL-SCNC: 5.1 MMOL/L (ref 3.4–5.1)
PROT SERPL-MCNC: 7.4 G/DL (ref 6.4–8.3)
RBC # BLD AUTO: 4.72 M/UL (ref 4.5–6)
SODIUM SERPL-SCNC: 139 MMOL/L (ref 136–145)
VIT B12 SERPL-MCNC: 287 PG/ML (ref 211–911)
WBC # BLD AUTO: 6.8 K/UL (ref 4–11)

## 2024-09-30 DIAGNOSIS — I50.9 ACUTE ON CHRONIC CONGESTIVE HEART FAILURE, UNSPECIFIED HEART FAILURE TYPE (HCC): ICD-10-CM

## 2024-09-30 DIAGNOSIS — F41.9 ANXIETY: ICD-10-CM

## 2024-09-30 DIAGNOSIS — G47.00 INSOMNIA, UNSPECIFIED TYPE: ICD-10-CM

## 2024-09-30 DIAGNOSIS — M54.41 CHRONIC BILATERAL LOW BACK PAIN WITH RIGHT-SIDED SCIATICA: ICD-10-CM

## 2024-09-30 DIAGNOSIS — L98.9 SKIN LESION: ICD-10-CM

## 2024-09-30 DIAGNOSIS — G89.29 CHRONIC BILATERAL LOW BACK PAIN WITH RIGHT-SIDED SCIATICA: ICD-10-CM

## 2024-09-30 DIAGNOSIS — E11.69 TYPE 2 DIABETES MELLITUS WITH OTHER SPECIFIED COMPLICATION, WITH LONG-TERM CURRENT USE OF INSULIN (HCC): ICD-10-CM

## 2024-09-30 DIAGNOSIS — D50.9 IRON DEFICIENCY ANEMIA, UNSPECIFIED IRON DEFICIENCY ANEMIA TYPE: ICD-10-CM

## 2024-09-30 DIAGNOSIS — R25.2 MUSCLE CRAMPS: ICD-10-CM

## 2024-09-30 DIAGNOSIS — Z79.4 TYPE 2 DIABETES MELLITUS WITH OTHER SPECIFIED COMPLICATION, WITH LONG-TERM CURRENT USE OF INSULIN (HCC): ICD-10-CM

## 2024-10-01 RX ORDER — FERROUS SULFATE 325(65) MG
1 TABLET ORAL
Qty: 30 TABLET | Refills: 0 | Status: SHIPPED | OUTPATIENT
Start: 2024-10-01

## 2024-10-01 RX ORDER — LANOLIN ALCOHOL/MO/W.PET/CERES
400 CREAM (GRAM) TOPICAL DAILY
Qty: 30 TABLET | Refills: 2 | Status: SHIPPED | OUTPATIENT
Start: 2024-10-01

## 2024-10-01 RX ORDER — BUMETANIDE 2 MG/1
TABLET ORAL
Qty: 60 TABLET | Refills: 3 | OUTPATIENT
Start: 2024-10-01

## 2024-10-01 RX ORDER — TRIAMCINOLONE ACETONIDE 1 MG/G
OINTMENT TOPICAL
Qty: 80 G | Refills: 1 | Status: SHIPPED | OUTPATIENT
Start: 2024-10-01

## 2024-10-01 RX ORDER — NITROGLYCERIN 0.4 MG/1
TABLET SUBLINGUAL
Qty: 25 TABLET | Refills: 0 | Status: SHIPPED | OUTPATIENT
Start: 2024-10-01

## 2024-10-01 RX ORDER — ALBUTEROL SULFATE 90 UG/1
2 INHALANT RESPIRATORY (INHALATION) EVERY 4 HOURS PRN
Qty: 6.7 G | Refills: 0 | Status: SHIPPED | OUTPATIENT
Start: 2024-10-01

## 2024-10-01 RX ORDER — ZOLPIDEM TARTRATE 12.5 MG/1
TABLET, FILM COATED, EXTENDED RELEASE ORAL
Qty: 30 TABLET | Refills: 0 | Status: SHIPPED | OUTPATIENT
Start: 2024-10-01 | End: 2024-11-01

## 2024-10-01 RX ORDER — DIAZEPAM 5 MG
TABLET ORAL
Qty: 60 TABLET | Refills: 2 | OUTPATIENT
Start: 2024-10-01

## 2024-10-01 RX ORDER — BLOOD SUGAR DIAGNOSTIC
STRIP MISCELLANEOUS
Qty: 100 EACH | Refills: 5 | Status: SHIPPED | OUTPATIENT
Start: 2024-10-01

## 2024-10-01 NOTE — TELEPHONE ENCOUNTER
Refill request received from pharmacy      Next Office Visit Date:  Future Appointments   Date Time Provider Department Center   10/28/2024  2:30 PM Kavitha Baker MD [facility]   7/24/2025  1:45 PM Kavitha Baker MD [facility] DEP       [facility] - Please review via PDMP        Last Office Visit:    7/23/2024

## 2024-10-19 LAB — NONINV COLON CA DNA+OCC BLD SCRN STL QL: POSITIVE

## 2024-10-28 ENCOUNTER — OFFICE VISIT (OUTPATIENT)
Dept: FAMILY MEDICINE CLINIC | Age: 64
End: 2024-10-28
Payer: MEDICARE

## 2024-10-28 VITALS
RESPIRATION RATE: 16 BRPM | SYSTOLIC BLOOD PRESSURE: 134 MMHG | TEMPERATURE: 97.5 F | HEART RATE: 106 BPM | HEIGHT: 76 IN | OXYGEN SATURATION: 92 % | DIASTOLIC BLOOD PRESSURE: 76 MMHG | BODY MASS INDEX: 38.36 KG/M2 | WEIGHT: 315 LBS

## 2024-10-28 DIAGNOSIS — Z79.4 TYPE 2 DIABETES MELLITUS WITH OTHER SPECIFIED COMPLICATION, WITH LONG-TERM CURRENT USE OF INSULIN (HCC): ICD-10-CM

## 2024-10-28 DIAGNOSIS — I10 ESSENTIAL HYPERTENSION: ICD-10-CM

## 2024-10-28 DIAGNOSIS — E11.69 TYPE 2 DIABETES MELLITUS WITH OTHER SPECIFIED COMPLICATION, WITH LONG-TERM CURRENT USE OF INSULIN (HCC): Primary | ICD-10-CM

## 2024-10-28 DIAGNOSIS — B37.2 YEAST DERMATITIS: Primary | ICD-10-CM

## 2024-10-28 DIAGNOSIS — R53.81 DECLINING FUNCTIONAL STATUS: ICD-10-CM

## 2024-10-28 DIAGNOSIS — G47.00 INSOMNIA, UNSPECIFIED TYPE: ICD-10-CM

## 2024-10-28 DIAGNOSIS — E11.69 TYPE 2 DIABETES MELLITUS WITH OTHER SPECIFIED COMPLICATION, WITH LONG-TERM CURRENT USE OF INSULIN (HCC): ICD-10-CM

## 2024-10-28 DIAGNOSIS — K21.9 GASTROESOPHAGEAL REFLUX DISEASE, UNSPECIFIED WHETHER ESOPHAGITIS PRESENT: ICD-10-CM

## 2024-10-28 DIAGNOSIS — Z79.4 TYPE 2 DIABETES MELLITUS WITH OTHER SPECIFIED COMPLICATION, WITH LONG-TERM CURRENT USE OF INSULIN (HCC): Primary | ICD-10-CM

## 2024-10-28 DIAGNOSIS — M54.41 CHRONIC BILATERAL LOW BACK PAIN WITH RIGHT-SIDED SCIATICA: ICD-10-CM

## 2024-10-28 DIAGNOSIS — M15.9 OSTEOARTHRITIS OF MULTIPLE JOINTS, UNSPECIFIED OSTEOARTHRITIS TYPE: ICD-10-CM

## 2024-10-28 DIAGNOSIS — G89.29 CHRONIC BILATERAL LOW BACK PAIN WITH RIGHT-SIDED SCIATICA: ICD-10-CM

## 2024-10-28 DIAGNOSIS — D50.9 IRON DEFICIENCY ANEMIA, UNSPECIFIED IRON DEFICIENCY ANEMIA TYPE: ICD-10-CM

## 2024-10-28 LAB
CHP ED QC CHECK: NORMAL
GLUCOSE BLD-MCNC: 275 MG/DL

## 2024-10-28 PROCEDURE — 82962 GLUCOSE BLOOD TEST: CPT | Performed by: FAMILY MEDICINE

## 2024-10-28 PROCEDURE — G8417 CALC BMI ABV UP PARAM F/U: HCPCS | Performed by: FAMILY MEDICINE

## 2024-10-28 PROCEDURE — 3075F SYST BP GE 130 - 139MM HG: CPT | Performed by: FAMILY MEDICINE

## 2024-10-28 PROCEDURE — G8484 FLU IMMUNIZE NO ADMIN: HCPCS | Performed by: FAMILY MEDICINE

## 2024-10-28 PROCEDURE — 1036F TOBACCO NON-USER: CPT | Performed by: FAMILY MEDICINE

## 2024-10-28 PROCEDURE — 2022F DILAT RTA XM EVC RTNOPTHY: CPT | Performed by: FAMILY MEDICINE

## 2024-10-28 PROCEDURE — G8427 DOCREV CUR MEDS BY ELIG CLIN: HCPCS | Performed by: FAMILY MEDICINE

## 2024-10-28 PROCEDURE — 99214 OFFICE O/P EST MOD 30 MIN: CPT | Performed by: FAMILY MEDICINE

## 2024-10-28 PROCEDURE — 3078F DIAST BP <80 MM HG: CPT | Performed by: FAMILY MEDICINE

## 2024-10-28 PROCEDURE — 1123F ACP DISCUSS/DSCN MKR DOCD: CPT | Performed by: FAMILY MEDICINE

## 2024-10-28 PROCEDURE — 3051F HG A1C>EQUAL 7.0%<8.0%: CPT | Performed by: FAMILY MEDICINE

## 2024-10-28 PROCEDURE — 3017F COLORECTAL CA SCREEN DOC REV: CPT | Performed by: FAMILY MEDICINE

## 2024-10-28 RX ORDER — HYDROCODONE BITARTRATE AND ACETAMINOPHEN 10; 325 MG/1; MG/1
TABLET ORAL
Qty: 150 TABLET | Refills: 0 | Status: SHIPPED | OUTPATIENT
Start: 2024-10-28 | End: 2024-11-26

## 2024-10-28 RX ORDER — GABAPENTIN 800 MG/1
TABLET ORAL
Qty: 120 TABLET | Refills: 2 | Status: SHIPPED | OUTPATIENT
Start: 2024-10-28 | End: 2025-01-27

## 2024-10-28 RX ORDER — OXYCODONE HYDROCHLORIDE 30 MG/1
30 TABLET ORAL EVERY 4 HOURS PRN
Qty: 150 TABLET | Refills: 0 | Status: SHIPPED | OUTPATIENT
Start: 2024-10-28 | End: 2024-10-28 | Stop reason: SDUPTHER

## 2024-10-28 RX ORDER — OXYCODONE HYDROCHLORIDE 30 MG/1
30 TABLET ORAL EVERY 4 HOURS PRN
Qty: 150 TABLET | Refills: 0 | Status: SHIPPED | OUTPATIENT
Start: 2024-10-28 | End: 2024-11-27

## 2024-10-28 RX ORDER — HYDROCODONE BITARTRATE AND ACETAMINOPHEN 10; 325 MG/1; MG/1
TABLET ORAL
Qty: 150 TABLET | Refills: 0 | Status: SHIPPED | OUTPATIENT
Start: 2024-10-28 | End: 2024-10-28 | Stop reason: SDUPTHER

## 2024-10-28 ASSESSMENT — ENCOUNTER SYMPTOMS: BACK PAIN: 1

## 2024-10-28 NOTE — PROGRESS NOTES
\"Have you been to the ER, urgent care clinic since your last visit?  Hospitalized since your last visit?\"    NO    “Have you seen or consulted any other health care providers outside our system since your last visit?”    NO      “Have you had a diabetic eye exam?”    NO     Date of last diabetic eye exam: 6/12/2020

## 2024-10-28 NOTE — PROGRESS NOTES
SUBJECTIVE:    Patient ID: Jose Alejandro Nichole is a 64 y.o. male.    Chief Complaint   Patient presents with    Sweats     Per patient for the past 2-3 weeks he has had sweats/chills.      Discuss Labs     Recent labs and cologuard results in chart, discuss with patient.        HPI: office visit  He is in the office today in follow-up of his labs.  He says he is struggled without Cologuard test.  He says he is not going to do it again.  He says he does feel like he is probably not going to do anything even if he figured out he had a cancer.  His having some sweats.  He said some chills.  He says he feels like his blood pressures have been doing okay.  He has not been checking his blood sugars quite as often as he probably should.  He has not had any significant medication problems that he is able to tell.  He denies any recent falls or injuries.    Review of Systems   Constitutional:  Positive for fatigue.   Musculoskeletal:  Positive for arthralgias, back pain, gait problem, joint swelling and myalgias.   Skin:         Leg swelling/infections   Neurological:  Positive for weakness.   Psychiatric/Behavioral:  The patient is nervous/anxious.    All other systems reviewed and are negative.       OBJECTIVE:  /76   Pulse (!) 106   Temp 97.5 °F (36.4 °C) (Infrared)   Resp 16   Ht 1.93 m (6' 4\")   Wt (!) 201.2 kg (443 lb 9.6 oz)   SpO2 92% Comment: ra  BMI 54.00 kg/m²    Wt Readings from Last 3 Encounters:   10/28/24 (!) 201.2 kg (443 lb 9.6 oz)   09/23/24 (!) 199.6 kg (440 lb)   08/26/24 (!) 211.4 kg (466 lb)     BP Readings from Last 3 Encounters:   10/28/24 134/76   09/23/24 122/78   08/26/24 120/86      Pulse Readings from Last 3 Encounters:   10/28/24 (!) 106   09/23/24 (!) 106   08/26/24 (!) 106     Body mass index is 54 kg/m².   Resp Readings from Last 3 Encounters:   10/28/24 16   09/23/24 16   08/26/24 16     Past medical, surgical, family and social history were reviewed and updated with the patient.

## 2024-10-29 DIAGNOSIS — E66.01 CLASS 3 SEVERE OBESITY WITH SERIOUS COMORBIDITY AND BODY MASS INDEX (BMI) OF 50.0 TO 59.9 IN ADULT, UNSPECIFIED OBESITY TYPE: Primary | ICD-10-CM

## 2024-10-29 DIAGNOSIS — E66.813 CLASS 3 SEVERE OBESITY WITH SERIOUS COMORBIDITY AND BODY MASS INDEX (BMI) OF 50.0 TO 59.9 IN ADULT, UNSPECIFIED OBESITY TYPE: Primary | ICD-10-CM

## 2024-10-29 RX ORDER — ERGOCALCIFEROL 1.25 MG/1
50000 CAPSULE, LIQUID FILLED ORAL WEEKLY
Qty: 4 CAPSULE | Refills: 2 | Status: SHIPPED | OUTPATIENT
Start: 2024-10-29

## 2024-10-29 RX ORDER — NITROGLYCERIN 0.4 MG/1
TABLET SUBLINGUAL
Qty: 25 TABLET | Refills: 0 | Status: SHIPPED | OUTPATIENT
Start: 2024-10-29

## 2024-10-29 RX ORDER — FERROUS SULFATE 325(65) MG
1 TABLET ORAL
Qty: 30 TABLET | Refills: 0 | Status: SHIPPED | OUTPATIENT
Start: 2024-10-29

## 2024-10-29 RX ORDER — NYSTATIN 100000 [USP'U]/G
POWDER TOPICAL
Qty: 60 G | Refills: 2 | Status: SHIPPED | OUTPATIENT
Start: 2024-10-29

## 2024-10-29 RX ORDER — ZOLPIDEM TARTRATE 12.5 MG/1
TABLET, FILM COATED, EXTENDED RELEASE ORAL
Qty: 30 TABLET | Refills: 0 | Status: SHIPPED | OUTPATIENT
Start: 2024-10-29 | End: 2024-10-29

## 2024-10-29 RX ORDER — ALBUTEROL SULFATE 90 UG/1
2 INHALANT RESPIRATORY (INHALATION) EVERY 4 HOURS PRN
Qty: 6.7 G | Refills: 0 | Status: SHIPPED | OUTPATIENT
Start: 2024-10-29

## 2024-10-29 RX ORDER — PANTOPRAZOLE SODIUM 40 MG/1
TABLET, DELAYED RELEASE ORAL
Qty: 60 TABLET | Refills: 5 | Status: SHIPPED | OUTPATIENT
Start: 2024-10-29

## 2024-10-29 NOTE — TELEPHONE ENCOUNTER
Refill request received from pharmacy      Next Office Visit Date:  Future Appointments   Date Time Provider Department Center   11/25/2024  3:45 PM Kavitha Baker MD [facility] DEP   7/24/2025  1:45 PM Kavitha Baker MD [facility] DEP       KITTY - Please review via PDMP        Last Office Visit:    7/23/2024

## 2024-10-31 RX ORDER — SEMAGLUTIDE 0.5 MG/.5ML
0.5 INJECTION, SOLUTION SUBCUTANEOUS
Qty: 2 ML | Refills: 0 | Status: CANCELLED | OUTPATIENT
Start: 2024-10-31

## 2024-10-31 NOTE — TELEPHONE ENCOUNTER
Refill request received from pharmacy      Next Office Visit Date:  Future Appointments   Date Time Provider Department Center   11/25/2024  3:45 PM Kavitha Baker MD [facility] DEP   7/24/2025  1:45 PM Kavitha Baker MD [facility] DEP       KITTY - Please review via PDMP        Last Office Visit:    7/23/2024

## 2024-11-25 ENCOUNTER — OFFICE VISIT (OUTPATIENT)
Age: 64
End: 2024-11-25

## 2024-11-25 VITALS
OXYGEN SATURATION: 92 % | RESPIRATION RATE: 16 BRPM | HEIGHT: 76 IN | HEART RATE: 82 BPM | DIASTOLIC BLOOD PRESSURE: 86 MMHG | WEIGHT: 315 LBS | BODY MASS INDEX: 38.36 KG/M2 | TEMPERATURE: 97.4 F | SYSTOLIC BLOOD PRESSURE: 122 MMHG

## 2024-11-25 DIAGNOSIS — G89.29 CHRONIC BILATERAL LOW BACK PAIN WITH RIGHT-SIDED SCIATICA: ICD-10-CM

## 2024-11-25 DIAGNOSIS — M15.9 OSTEOARTHRITIS OF MULTIPLE JOINTS, UNSPECIFIED OSTEOARTHRITIS TYPE: ICD-10-CM

## 2024-11-25 DIAGNOSIS — E53.8 B12 DEFICIENCY: ICD-10-CM

## 2024-11-25 DIAGNOSIS — I50.9 ACUTE ON CHRONIC CONGESTIVE HEART FAILURE, UNSPECIFIED HEART FAILURE TYPE (HCC): ICD-10-CM

## 2024-11-25 DIAGNOSIS — Z23 NEED FOR INFLUENZA VACCINATION: ICD-10-CM

## 2024-11-25 DIAGNOSIS — G47.00 INSOMNIA, UNSPECIFIED TYPE: ICD-10-CM

## 2024-11-25 DIAGNOSIS — Z79.4 TYPE 2 DIABETES MELLITUS WITH OTHER SPECIFIED COMPLICATION, WITH LONG-TERM CURRENT USE OF INSULIN (HCC): Primary | ICD-10-CM

## 2024-11-25 DIAGNOSIS — D50.9 IRON DEFICIENCY ANEMIA, UNSPECIFIED IRON DEFICIENCY ANEMIA TYPE: ICD-10-CM

## 2024-11-25 DIAGNOSIS — I10 ESSENTIAL HYPERTENSION: ICD-10-CM

## 2024-11-25 DIAGNOSIS — E11.69 TYPE 2 DIABETES MELLITUS WITH OTHER SPECIFIED COMPLICATION, WITH LONG-TERM CURRENT USE OF INSULIN (HCC): Primary | ICD-10-CM

## 2024-11-25 DIAGNOSIS — R53.81 DECLINING FUNCTIONAL STATUS: ICD-10-CM

## 2024-11-25 DIAGNOSIS — M54.41 CHRONIC BILATERAL LOW BACK PAIN WITH RIGHT-SIDED SCIATICA: ICD-10-CM

## 2024-11-25 RX ORDER — HYDROCODONE BITARTRATE AND ACETAMINOPHEN 10; 325 MG/1; MG/1
TABLET ORAL
Qty: 150 TABLET | Refills: 0 | Status: SHIPPED | OUTPATIENT
Start: 2024-11-25 | End: 2024-11-25 | Stop reason: SDUPTHER

## 2024-11-25 RX ORDER — ZOLPIDEM TARTRATE 12.5 MG/1
TABLET, FILM COATED, EXTENDED RELEASE ORAL
Qty: 30 TABLET | Refills: 0 | OUTPATIENT
Start: 2024-11-25

## 2024-11-25 RX ORDER — OXYCODONE HYDROCHLORIDE 30 MG/1
30 TABLET ORAL EVERY 4 HOURS PRN
Qty: 150 TABLET | Refills: 0 | Status: SHIPPED | OUTPATIENT
Start: 2024-11-25 | End: 2024-11-25 | Stop reason: SDUPTHER

## 2024-11-25 RX ORDER — OXYCODONE HYDROCHLORIDE 30 MG/1
30 TABLET ORAL EVERY 4 HOURS PRN
Qty: 150 TABLET | Refills: 0 | Status: SHIPPED | OUTPATIENT
Start: 2024-11-25 | End: 2024-11-26 | Stop reason: SDUPTHER

## 2024-11-25 RX ORDER — CYANOCOBALAMIN 1000 UG/ML
1000 INJECTION, SOLUTION INTRAMUSCULAR; SUBCUTANEOUS ONCE
Status: COMPLETED | OUTPATIENT
Start: 2024-11-25 | End: 2024-11-25

## 2024-11-25 RX ORDER — ZOLPIDEM TARTRATE 12.5 MG/1
TABLET, FILM COATED, EXTENDED RELEASE ORAL
Qty: 30 TABLET | Refills: 2 | Status: SHIPPED | OUTPATIENT
Start: 2024-11-25 | End: 2024-11-25

## 2024-11-25 RX ORDER — HYDROCODONE BITARTRATE AND ACETAMINOPHEN 10; 325 MG/1; MG/1
TABLET ORAL
Qty: 150 TABLET | Refills: 0 | Status: SHIPPED | OUTPATIENT
Start: 2024-11-25 | End: 2024-11-26 | Stop reason: SDUPTHER

## 2024-11-25 RX ADMIN — CYANOCOBALAMIN 1000 MCG: 1000 INJECTION, SOLUTION INTRAMUSCULAR; SUBCUTANEOUS at 18:00

## 2024-11-25 NOTE — PROGRESS NOTES
SUBJECTIVE:    Patient ID: Jose Alejandro Nichole is a 64 y.o. male.    Chief Complaint   Patient presents with    Hypertension     Reg F/U    Diabetes     Reg F/U       HPI: office visit  He is in the office today in follow-up of his diabetes.  He says he is struggling with his diet.  He is sugars are up and down.  He says he is trying to be active though he is definitely not able to move much.  He uses a cane all the time.  He has not had any recent falls or injuries.  He is not having any chest pain.  He does have shortness of breath with any activity.  He is having more functional decline.  He is not able to walk as well.  He has increased back hip and knee pain.  He is struggling with neuropathy in his feet and legs.  Review of Systems   Constitutional:  Positive for fatigue.   Musculoskeletal:  Positive for arthralgias, back pain, gait problem, joint swelling and myalgias.   Skin:         Leg swelling/infections   Neurological:  Positive for weakness.   Psychiatric/Behavioral:  The patient is nervous/anxious.    All other systems reviewed and are negative.       OBJECTIVE:  /86   Pulse 82   Temp 97.4 °F (36.3 °C) (Infrared)   Resp 16   Ht 1.93 m (6' 4\")   Wt (!) 191.3 kg (421 lb 12.8 oz)   SpO2 92% Comment: ra  BMI 51.34 kg/m²    Wt Readings from Last 3 Encounters:   11/25/24 (!) 191.3 kg (421 lb 12.8 oz)   10/28/24 (!) 201.2 kg (443 lb 9.6 oz)   09/23/24 (!) 199.6 kg (440 lb)     BP Readings from Last 3 Encounters:   11/25/24 122/86   10/28/24 134/76   09/23/24 122/78      Pulse Readings from Last 3 Encounters:   11/25/24 82   10/28/24 (!) 106   09/23/24 (!) 106     Body mass index is 51.34 kg/m².   Resp Readings from Last 3 Encounters:   11/25/24 16   10/28/24 16   09/23/24 16     Past medical, surgical, family and social history were reviewed and updated with the patient.     Physical Exam  Vitals and nursing note reviewed.   Constitutional:       Appearance: Normal appearance. He is

## 2024-11-25 NOTE — TELEPHONE ENCOUNTER
Refill request received from pharmacy      Next Office Visit Date:  Future Appointments   Date Time Provider Department Center   11/25/2024  3:45 PM Kavitha Baker MD [facility] DE[facility]   7/24/2025  1:45 PM Kavitha Baker MD [facility] DEP       KITTY - Please review via PDMP        Last Office Visit:    10/28/2024

## 2024-11-25 NOTE — PROGRESS NOTES
\"Have you been to the ER, urgent care clinic since your last visit?  Hospitalized since your last visit?\"    NO    “Have you seen or consulted any other health care providers outside our system since your last visit?”    NO      “Have you had a diabetic eye exam?”    NO     Date of last diabetic eye exam: 6/12/2020     Immunizations Administered       Name Date Dose Route    Influenza, FLUAD, (age 65 y+), IM, Trivalent PF, 0.5mL 11/25/2024 0.5 mL Intramuscular    Site: Deltoid- Left    Lot: [id number]    NDC: 94705-518-21         Patient tolerated injection well. Patient advised to wait 20 minutes in the office following the injection. No signs/symptoms of reaction noted after 20 minutes.     Patient tolerated injection well. Patient advised to wait 20 minutes in the office following the injection. No signs/symptoms of reaction noted after 20 minutes.

## 2024-11-26 ENCOUNTER — TELEPHONE (OUTPATIENT)
Dept: FAMILY MEDICINE CLINIC | Age: 64
End: 2024-11-26

## 2024-11-26 DIAGNOSIS — M54.41 CHRONIC BILATERAL LOW BACK PAIN WITH RIGHT-SIDED SCIATICA: ICD-10-CM

## 2024-11-26 DIAGNOSIS — G89.29 CHRONIC BILATERAL LOW BACK PAIN WITH RIGHT-SIDED SCIATICA: ICD-10-CM

## 2024-11-26 RX ORDER — DOCUSATE SODIUM 100 MG/1
CAPSULE, LIQUID FILLED ORAL
Qty: 120 CAPSULE | Refills: 3 | Status: SHIPPED | OUTPATIENT
Start: 2024-11-26

## 2024-11-26 RX ORDER — SPIRONOLACTONE 25 MG/1
TABLET ORAL
Qty: 90 TABLET | Refills: 3 | Status: SHIPPED | OUTPATIENT
Start: 2024-11-26

## 2024-11-26 RX ORDER — HYDROCODONE BITARTRATE AND ACETAMINOPHEN 10; 325 MG/1; MG/1
TABLET ORAL
Qty: 150 TABLET | Refills: 0 | Status: SHIPPED | OUTPATIENT
Start: 2024-11-26 | End: 2024-12-25

## 2024-11-26 RX ORDER — FERROUS SULFATE 325(65) MG
1 TABLET ORAL
Qty: 30 TABLET | Refills: 5 | Status: SHIPPED | OUTPATIENT
Start: 2024-11-26

## 2024-11-26 RX ORDER — OXYCODONE HYDROCHLORIDE 30 MG/1
30 TABLET ORAL EVERY 4 HOURS PRN
Qty: 150 TABLET | Refills: 0 | Status: SHIPPED | OUTPATIENT
Start: 2024-11-26 | End: 2024-12-26

## 2024-11-26 NOTE — TELEPHONE ENCOUNTER
Patient called and stated that the pain medication was sent for December 1st and the pharmacy is closed that day. Can you resend the prescription to say fill on November 30th?

## 2024-12-04 ENCOUNTER — LAB (OUTPATIENT)
Age: 64
End: 2024-12-04
Payer: MEDICARE

## 2024-12-04 DIAGNOSIS — E53.8 B12 DEFICIENCY: Primary | ICD-10-CM

## 2024-12-04 PROCEDURE — 96372 THER/PROPH/DIAG INJ SC/IM: CPT | Performed by: FAMILY MEDICINE

## 2024-12-04 RX ORDER — CYANOCOBALAMIN 1000 UG/ML
1000 INJECTION, SOLUTION INTRAMUSCULAR; SUBCUTANEOUS ONCE
Status: COMPLETED | OUTPATIENT
Start: 2024-12-04 | End: 2024-12-04

## 2024-12-04 RX ADMIN — CYANOCOBALAMIN 1000 MCG: 1000 INJECTION, SOLUTION INTRAMUSCULAR; SUBCUTANEOUS at 16:20

## 2024-12-24 DIAGNOSIS — G89.29 CHRONIC BILATERAL LOW BACK PAIN WITH RIGHT-SIDED SCIATICA: ICD-10-CM

## 2024-12-24 DIAGNOSIS — M54.41 CHRONIC BILATERAL LOW BACK PAIN WITH RIGHT-SIDED SCIATICA: ICD-10-CM

## 2024-12-24 DIAGNOSIS — F41.9 ANXIETY: ICD-10-CM

## 2024-12-24 DIAGNOSIS — Z79.4 TYPE 2 DIABETES MELLITUS WITH OTHER SPECIFIED COMPLICATION, WITH LONG-TERM CURRENT USE OF INSULIN (HCC): ICD-10-CM

## 2024-12-24 DIAGNOSIS — J30.2 SEASONAL ALLERGIES: ICD-10-CM

## 2024-12-24 DIAGNOSIS — E66.01 CLASS 3 SEVERE OBESITY WITH SERIOUS COMORBIDITY AND BODY MASS INDEX (BMI) OF 50.0 TO 59.9 IN ADULT, UNSPECIFIED OBESITY TYPE: ICD-10-CM

## 2024-12-24 DIAGNOSIS — R25.2 MUSCLE CRAMPS: ICD-10-CM

## 2024-12-24 DIAGNOSIS — E11.69 TYPE 2 DIABETES MELLITUS WITH OTHER SPECIFIED COMPLICATION, WITH LONG-TERM CURRENT USE OF INSULIN (HCC): ICD-10-CM

## 2024-12-24 DIAGNOSIS — E66.813 CLASS 3 SEVERE OBESITY WITH SERIOUS COMORBIDITY AND BODY MASS INDEX (BMI) OF 50.0 TO 59.9 IN ADULT, UNSPECIFIED OBESITY TYPE: ICD-10-CM

## 2024-12-27 RX ORDER — GLIMEPIRIDE 4 MG/1
4 TABLET ORAL
Qty: 30 TABLET | Refills: 3 | Status: SHIPPED | OUTPATIENT
Start: 2024-12-27

## 2024-12-27 RX ORDER — MONTELUKAST SODIUM 10 MG/1
TABLET ORAL
Qty: 30 TABLET | Refills: 3 | Status: SHIPPED | OUTPATIENT
Start: 2024-12-27

## 2024-12-27 RX ORDER — PHENTERMINE HYDROCHLORIDE 37.5 MG/1
37.5 TABLET ORAL
Qty: 30 TABLET | Refills: 2 | Status: SHIPPED | OUTPATIENT
Start: 2024-12-27 | End: 2025-03-27

## 2024-12-27 RX ORDER — LANOLIN ALCOHOL/MO/W.PET/CERES
400 CREAM (GRAM) TOPICAL DAILY
Qty: 30 TABLET | Refills: 2 | Status: SHIPPED | OUTPATIENT
Start: 2024-12-27

## 2024-12-27 RX ORDER — DIAZEPAM 5 MG/1
TABLET ORAL
Qty: 60 TABLET | Refills: 2 | Status: SHIPPED | OUTPATIENT
Start: 2024-12-27 | End: 2025-03-26

## 2024-12-27 RX ORDER — LORATADINE 10 MG/1
TABLET ORAL
Qty: 30 TABLET | Refills: 3 | Status: SHIPPED | OUTPATIENT
Start: 2024-12-27

## 2024-12-27 RX ORDER — OXYCODONE HYDROCHLORIDE 30 MG/1
30 TABLET ORAL EVERY 4 HOURS PRN
Qty: 150 TABLET | Refills: 0 | Status: SHIPPED | OUTPATIENT
Start: 2024-12-27 | End: 2025-01-26

## 2024-12-27 RX ORDER — HYDROCODONE BITARTRATE AND ACETAMINOPHEN 10; 325 MG/1; MG/1
TABLET ORAL
Qty: 150 TABLET | Refills: 0 | Status: SHIPPED | OUTPATIENT
Start: 2024-12-27 | End: 2025-01-26

## 2025-01-14 ENCOUNTER — TELEPHONE (OUTPATIENT)
Age: 65
End: 2025-01-14

## 2025-01-14 NOTE — TELEPHONE ENCOUNTER
Patient called and stated that he has been experiencing dysuria and has a UTI. He was wondering if you could send him in an antibiotic?

## 2025-01-16 ENCOUNTER — HOSPITAL ENCOUNTER (OUTPATIENT)
Facility: HOSPITAL | Age: 65
Discharge: HOME | End: 2025-01-16
Payer: MEDICARE

## 2025-01-16 ENCOUNTER — OFFICE VISIT (OUTPATIENT)
Age: 65
End: 2025-01-16
Payer: MEDICARE

## 2025-01-16 VITALS
DIASTOLIC BLOOD PRESSURE: 84 MMHG | BODY MASS INDEX: 38.36 KG/M2 | HEIGHT: 76 IN | OXYGEN SATURATION: 92 % | HEART RATE: 100 BPM | TEMPERATURE: 98 F | WEIGHT: 315 LBS | RESPIRATION RATE: 16 BRPM | SYSTOLIC BLOOD PRESSURE: 122 MMHG

## 2025-01-16 DIAGNOSIS — R31.9 URINARY TRACT INFECTION WITH HEMATURIA, SITE UNSPECIFIED: Primary | ICD-10-CM

## 2025-01-16 DIAGNOSIS — G89.29 CHRONIC BILATERAL LOW BACK PAIN WITH RIGHT-SIDED SCIATICA: ICD-10-CM

## 2025-01-16 DIAGNOSIS — R25.2 MUSCLE CRAMPS: ICD-10-CM

## 2025-01-16 DIAGNOSIS — Z79.4 TYPE 2 DIABETES MELLITUS WITH DIABETIC POLYNEUROPATHY, WITH LONG-TERM CURRENT USE OF INSULIN (HCC): ICD-10-CM

## 2025-01-16 DIAGNOSIS — N39.0 URINARY TRACT INFECTION WITH HEMATURIA, SITE UNSPECIFIED: Primary | ICD-10-CM

## 2025-01-16 DIAGNOSIS — E53.8 B12 DEFICIENCY: ICD-10-CM

## 2025-01-16 DIAGNOSIS — M54.41 CHRONIC BILATERAL LOW BACK PAIN WITH RIGHT-SIDED SCIATICA: ICD-10-CM

## 2025-01-16 DIAGNOSIS — E11.42 TYPE 2 DIABETES MELLITUS WITH DIABETIC POLYNEUROPATHY, WITH LONG-TERM CURRENT USE OF INSULIN (HCC): ICD-10-CM

## 2025-01-16 PROCEDURE — 3017F COLORECTAL CA SCREEN DOC REV: CPT | Performed by: FAMILY MEDICINE

## 2025-01-16 PROCEDURE — 1123F ACP DISCUSS/DSCN MKR DOCD: CPT | Performed by: FAMILY MEDICINE

## 2025-01-16 PROCEDURE — 2022F DILAT RTA XM EVC RTNOPTHY: CPT | Performed by: FAMILY MEDICINE

## 2025-01-16 PROCEDURE — 1159F MED LIST DOCD IN RCRD: CPT | Performed by: FAMILY MEDICINE

## 2025-01-16 PROCEDURE — 99214 OFFICE O/P EST MOD 30 MIN: CPT | Performed by: FAMILY MEDICINE

## 2025-01-16 PROCEDURE — G8417 CALC BMI ABV UP PARAM F/U: HCPCS | Performed by: FAMILY MEDICINE

## 2025-01-16 PROCEDURE — 87088 URINE BACTERIA CULTURE: CPT

## 2025-01-16 PROCEDURE — 87186 SC STD MICRODIL/AGAR DIL: CPT

## 2025-01-16 PROCEDURE — 1160F RVW MEDS BY RX/DR IN RCRD: CPT | Performed by: FAMILY MEDICINE

## 2025-01-16 PROCEDURE — 1036F TOBACCO NON-USER: CPT | Performed by: FAMILY MEDICINE

## 2025-01-16 PROCEDURE — G8427 DOCREV CUR MEDS BY ELIG CLIN: HCPCS | Performed by: FAMILY MEDICINE

## 2025-01-16 PROCEDURE — 3079F DIAST BP 80-89 MM HG: CPT | Performed by: FAMILY MEDICINE

## 2025-01-16 PROCEDURE — 3046F HEMOGLOBIN A1C LEVEL >9.0%: CPT | Performed by: FAMILY MEDICINE

## 2025-01-16 PROCEDURE — 87086 URINE CULTURE/COLONY COUNT: CPT

## 2025-01-16 PROCEDURE — 3074F SYST BP LT 130 MM HG: CPT | Performed by: FAMILY MEDICINE

## 2025-01-16 RX ORDER — HYDROCODONE BITARTRATE AND ACETAMINOPHEN 10; 325 MG/1; MG/1
TABLET ORAL
Qty: 150 TABLET | Refills: 0 | Status: SHIPPED | OUTPATIENT
Start: 2025-01-16 | End: 2025-02-15

## 2025-01-16 RX ORDER — GABAPENTIN 800 MG/1
TABLET ORAL
Qty: 120 TABLET | Refills: 2 | Status: SHIPPED | OUTPATIENT
Start: 2025-01-16 | End: 2025-04-17

## 2025-01-16 RX ORDER — OXYCODONE HYDROCHLORIDE 30 MG/1
30 TABLET ORAL EVERY 4 HOURS PRN
Qty: 150 TABLET | Refills: 0 | Status: SHIPPED | OUTPATIENT
Start: 2025-01-16 | End: 2025-02-15

## 2025-01-16 RX ORDER — CIPROFLOXACIN 500 MG/1
500 TABLET, FILM COATED ORAL 2 TIMES DAILY
Qty: 14 TABLET | Refills: 0 | Status: SHIPPED | OUTPATIENT
Start: 2025-01-16 | End: 2025-01-23

## 2025-01-16 SDOH — ECONOMIC STABILITY: FOOD INSECURITY: WITHIN THE PAST 12 MONTHS, YOU WORRIED THAT YOUR FOOD WOULD RUN OUT BEFORE YOU GOT MONEY TO BUY MORE.: NEVER TRUE

## 2025-01-16 SDOH — ECONOMIC STABILITY: FOOD INSECURITY: WITHIN THE PAST 12 MONTHS, THE FOOD YOU BOUGHT JUST DIDN'T LAST AND YOU DIDN'T HAVE MONEY TO GET MORE.: NEVER TRUE

## 2025-01-16 ASSESSMENT — PATIENT HEALTH QUESTIONNAIRE - PHQ9
SUM OF ALL RESPONSES TO PHQ QUESTIONS 1-9: 11
4. FEELING TIRED OR HAVING LITTLE ENERGY: NEARLY EVERY DAY
10. IF YOU CHECKED OFF ANY PROBLEMS, HOW DIFFICULT HAVE THESE PROBLEMS MADE IT FOR YOU TO DO YOUR WORK, TAKE CARE OF THINGS AT HOME, OR GET ALONG WITH OTHER PEOPLE: SOMEWHAT DIFFICULT
2. FEELING DOWN, DEPRESSED OR HOPELESS: NOT AT ALL
SUM OF ALL RESPONSES TO PHQ9 QUESTIONS 1 & 2: 1
8. MOVING OR SPEAKING SO SLOWLY THAT OTHER PEOPLE COULD HAVE NOTICED. OR THE OPPOSITE, BEING SO FIGETY OR RESTLESS THAT YOU HAVE BEEN MOVING AROUND A LOT MORE THAN USUAL: NOT AT ALL
SUM OF ALL RESPONSES TO PHQ QUESTIONS 1-9: 11
3. TROUBLE FALLING OR STAYING ASLEEP: NEARLY EVERY DAY
9. THOUGHTS THAT YOU WOULD BE BETTER OFF DEAD, OR OF HURTING YOURSELF: NOT AT ALL
1. LITTLE INTEREST OR PLEASURE IN DOING THINGS: SEVERAL DAYS
5. POOR APPETITE OR OVEREATING: NEARLY EVERY DAY
SUM OF ALL RESPONSES TO PHQ QUESTIONS 1-9: 11
6. FEELING BAD ABOUT YOURSELF - OR THAT YOU ARE A FAILURE OR HAVE LET YOURSELF OR YOUR FAMILY DOWN: NOT AT ALL
SUM OF ALL RESPONSES TO PHQ QUESTIONS 1-9: 11
7. TROUBLE CONCENTRATING ON THINGS, SUCH AS READING THE NEWSPAPER OR WATCHING TELEVISION: SEVERAL DAYS

## 2025-01-16 NOTE — PROGRESS NOTES
SUBJECTIVE:    Patient ID: Jose Alejandro Nichole is a 64 y.o. male.    Chief Complaint   Patient presents with    Urinary Tract Infection     X 2 weeks per patient with blood    Sweats     Per patient he breaks out in a sweat daily       HPI: office visit    He is in the office today complaining of some urinary symptoms.  He had about 2 weeks of some dysuria and noted some blood.  He is having quite a few sweats.  He says he just feels like he breaks out in a sweat for really no obvious reason.  His blood sugars have not been dropping.  He has not had any significant high blood pressures.  He is not having any chest pain.  His shortness of breath is stable.  He is not having a significant amount of headaches.  He says he has not had any falls or injuries.  He does seem to be taking all of his medications appropriately.  He continues to struggle with a lot of back pain.  He is having a lot of pain in his legs and knees with the cold and damp weather.  Review of Systems   Constitutional:  Positive for fatigue.   Musculoskeletal:  Positive for arthralgias, back pain, gait problem, joint swelling and myalgias.   Skin:         Leg swelling/infections   Neurological:  Positive for weakness.   Psychiatric/Behavioral:  The patient is nervous/anxious.    All other systems reviewed and are negative.       OBJECTIVE:  /84   Pulse 100   Temp 98 °F (36.7 °C) (Infrared)   Resp 16   Ht 1.93 m (6' 4\")   Wt (!) 201.9 kg (445 lb 3.2 oz)   SpO2 92% Comment: ra  BMI 54.19 kg/m²    Wt Readings from Last 3 Encounters:   01/16/25 (!) 201.9 kg (445 lb 3.2 oz)   11/25/24 (!) 191.3 kg (421 lb 12.8 oz)   10/28/24 (!) 201.2 kg (443 lb 9.6 oz)     BP Readings from Last 3 Encounters:   01/16/25 122/84   11/25/24 122/86   10/28/24 134/76      Pulse Readings from Last 3 Encounters:   01/16/25 100   11/25/24 82   10/28/24 (!) 106     Body mass index is 54.19 kg/m².   Resp Readings from Last 3 Encounters:   01/16/25 16   11/25/24 16

## 2025-01-19 LAB
BACTERIA UR CULT: ABNORMAL
BACTERIA UR CULT: ABNORMAL
ORGANISM: ABNORMAL

## 2025-01-20 DIAGNOSIS — B37.2 YEAST DERMATITIS: ICD-10-CM

## 2025-01-20 RX ORDER — NYSTATIN 100000 [USP'U]/G
POWDER TOPICAL
Qty: 60 G | Refills: 2 | Status: SHIPPED | OUTPATIENT
Start: 2025-01-20

## 2025-01-20 RX ORDER — SIMVASTATIN 40 MG
TABLET ORAL
Qty: 30 TABLET | Refills: 3 | Status: SHIPPED | OUTPATIENT
Start: 2025-01-20

## 2025-01-20 NOTE — TELEPHONE ENCOUNTER
Refill request received from pharmacy      Next Office Visit Date:  Future Appointments   Date Time Provider Department Center   2/24/2025  2:15 PM Kavitha Baker MD [facility] DEP   7/24/2025  1:45 PM Kavitha Baker MD [facility] DEP       KITTY - Please review via PDMP        Last Office Visit:    1/16/2025

## 2025-02-17 DIAGNOSIS — M54.41 CHRONIC BILATERAL LOW BACK PAIN WITH RIGHT-SIDED SCIATICA: ICD-10-CM

## 2025-02-17 DIAGNOSIS — G47.00 INSOMNIA, UNSPECIFIED TYPE: ICD-10-CM

## 2025-02-17 DIAGNOSIS — G89.29 CHRONIC BILATERAL LOW BACK PAIN WITH RIGHT-SIDED SCIATICA: ICD-10-CM

## 2025-02-17 RX ORDER — ERGOCALCIFEROL 1.25 MG/1
50000 CAPSULE, LIQUID FILLED ORAL WEEKLY
Qty: 4 CAPSULE | Refills: 2 | Status: SHIPPED | OUTPATIENT
Start: 2025-02-17

## 2025-02-17 RX ORDER — HYDROCODONE BITARTRATE AND ACETAMINOPHEN 10; 325 MG/1; MG/1
TABLET ORAL
Qty: 150 TABLET | Refills: 0 | Status: SHIPPED | OUTPATIENT
Start: 2025-02-17 | End: 2025-03-16

## 2025-02-17 RX ORDER — ZOLPIDEM TARTRATE 12.5 MG/1
TABLET, FILM COATED, EXTENDED RELEASE ORAL
Qty: 30 TABLET | Refills: 2 | Status: SHIPPED | OUTPATIENT
Start: 2025-02-17 | End: 2025-03-16

## 2025-02-17 RX ORDER — OXYCODONE HYDROCHLORIDE 30 MG/1
30 TABLET ORAL EVERY 4 HOURS PRN
Qty: 150 TABLET | Refills: 0 | Status: SHIPPED | OUTPATIENT
Start: 2025-02-17 | End: 2025-03-19

## 2025-02-17 NOTE — TELEPHONE ENCOUNTER
Refill request received from pharmacy      Next Office Visit Date:  Future Appointments   Date Time Provider Department Center   2/24/2025  2:15 PM Kavitha Baker MD [facility] DEP   7/24/2025  1:45 PM Kavitha Baker MD [facility] DEP       KITTY - Please review via PDMP        Last Office Visit:    11/25/2024

## 2025-02-24 ENCOUNTER — OFFICE VISIT (OUTPATIENT)
Age: 65
End: 2025-02-24
Payer: MEDICARE

## 2025-02-24 ENCOUNTER — HOSPITAL ENCOUNTER (OUTPATIENT)
Facility: HOSPITAL | Age: 65
Discharge: HOME | End: 2025-02-24
Payer: MEDICARE

## 2025-02-24 VITALS
RESPIRATION RATE: 16 BRPM | TEMPERATURE: 97.2 F | DIASTOLIC BLOOD PRESSURE: 72 MMHG | HEART RATE: 55 BPM | HEIGHT: 76 IN | SYSTOLIC BLOOD PRESSURE: 162 MMHG | WEIGHT: 315 LBS | BODY MASS INDEX: 38.36 KG/M2 | OXYGEN SATURATION: 92 %

## 2025-02-24 DIAGNOSIS — G89.29 CHRONIC BILATERAL LOW BACK PAIN WITH RIGHT-SIDED SCIATICA: ICD-10-CM

## 2025-02-24 DIAGNOSIS — H53.9 VISION CHANGES: ICD-10-CM

## 2025-02-24 DIAGNOSIS — E53.8 B12 DEFICIENCY: ICD-10-CM

## 2025-02-24 DIAGNOSIS — E66.813 CLASS 3 SEVERE OBESITY WITH SERIOUS COMORBIDITY AND BODY MASS INDEX (BMI) OF 50.0 TO 59.9 IN ADULT, UNSPECIFIED OBESITY TYPE: ICD-10-CM

## 2025-02-24 DIAGNOSIS — K21.9 GASTROESOPHAGEAL REFLUX DISEASE, UNSPECIFIED WHETHER ESOPHAGITIS PRESENT: ICD-10-CM

## 2025-02-24 DIAGNOSIS — I10 ESSENTIAL HYPERTENSION: Primary | ICD-10-CM

## 2025-02-24 DIAGNOSIS — G89.29 CHRONIC RIGHT SHOULDER PAIN: ICD-10-CM

## 2025-02-24 DIAGNOSIS — E66.01 CLASS 3 SEVERE OBESITY WITH SERIOUS COMORBIDITY AND BODY MASS INDEX (BMI) OF 50.0 TO 59.9 IN ADULT, UNSPECIFIED OBESITY TYPE: ICD-10-CM

## 2025-02-24 DIAGNOSIS — E66.01 MORBID OBESITY: ICD-10-CM

## 2025-02-24 DIAGNOSIS — F41.9 ANXIETY: ICD-10-CM

## 2025-02-24 DIAGNOSIS — Z79.4 TYPE 2 DIABETES MELLITUS WITH OTHER SPECIFIED COMPLICATION, WITH LONG-TERM CURRENT USE OF INSULIN (HCC): ICD-10-CM

## 2025-02-24 DIAGNOSIS — G56.00 CARPAL TUNNEL SYNDROME, UNSPECIFIED LATERALITY: ICD-10-CM

## 2025-02-24 DIAGNOSIS — M54.41 CHRONIC BILATERAL LOW BACK PAIN WITH RIGHT-SIDED SCIATICA: ICD-10-CM

## 2025-02-24 DIAGNOSIS — E11.69 TYPE 2 DIABETES MELLITUS WITH OTHER SPECIFIED COMPLICATION, WITH LONG-TERM CURRENT USE OF INSULIN (HCC): ICD-10-CM

## 2025-02-24 DIAGNOSIS — M25.511 CHRONIC RIGHT SHOULDER PAIN: ICD-10-CM

## 2025-02-24 PROCEDURE — 85027 COMPLETE CBC AUTOMATED: CPT

## 2025-02-24 PROCEDURE — 80053 COMPREHEN METABOLIC PANEL: CPT

## 2025-02-24 PROCEDURE — 82746 ASSAY OF FOLIC ACID SERUM: CPT

## 2025-02-24 PROCEDURE — 1159F MED LIST DOCD IN RCRD: CPT | Performed by: FAMILY MEDICINE

## 2025-02-24 PROCEDURE — 3017F COLORECTAL CA SCREEN DOC REV: CPT | Performed by: FAMILY MEDICINE

## 2025-02-24 PROCEDURE — G8427 DOCREV CUR MEDS BY ELIG CLIN: HCPCS | Performed by: FAMILY MEDICINE

## 2025-02-24 PROCEDURE — 99214 OFFICE O/P EST MOD 30 MIN: CPT | Performed by: FAMILY MEDICINE

## 2025-02-24 PROCEDURE — 3077F SYST BP >= 140 MM HG: CPT | Performed by: FAMILY MEDICINE

## 2025-02-24 PROCEDURE — 2022F DILAT RTA XM EVC RTNOPTHY: CPT | Performed by: FAMILY MEDICINE

## 2025-02-24 PROCEDURE — 36415 COLL VENOUS BLD VENIPUNCTURE: CPT

## 2025-02-24 PROCEDURE — 1160F RVW MEDS BY RX/DR IN RCRD: CPT | Performed by: FAMILY MEDICINE

## 2025-02-24 PROCEDURE — 20610 DRAIN/INJ JOINT/BURSA W/O US: CPT | Performed by: FAMILY MEDICINE

## 2025-02-24 PROCEDURE — 83036 HEMOGLOBIN GLYCOSYLATED A1C: CPT

## 2025-02-24 PROCEDURE — G8417 CALC BMI ABV UP PARAM F/U: HCPCS | Performed by: FAMILY MEDICINE

## 2025-02-24 PROCEDURE — 82607 VITAMIN B-12: CPT

## 2025-02-24 PROCEDURE — 1036F TOBACCO NON-USER: CPT | Performed by: FAMILY MEDICINE

## 2025-02-24 PROCEDURE — 1123F ACP DISCUSS/DSCN MKR DOCD: CPT | Performed by: FAMILY MEDICINE

## 2025-02-24 PROCEDURE — 83735 ASSAY OF MAGNESIUM: CPT

## 2025-02-24 PROCEDURE — 3046F HEMOGLOBIN A1C LEVEL >9.0%: CPT | Performed by: FAMILY MEDICINE

## 2025-02-24 PROCEDURE — 3078F DIAST BP <80 MM HG: CPT | Performed by: FAMILY MEDICINE

## 2025-02-24 RX ORDER — OXYCODONE HYDROCHLORIDE 30 MG/1
30 TABLET ORAL EVERY 4 HOURS PRN
Qty: 150 TABLET | Refills: 0 | Status: SHIPPED | OUTPATIENT
Start: 2025-02-24 | End: 2025-03-26

## 2025-02-24 RX ORDER — INSULIN GLARGINE 100 [IU]/ML
100 INJECTION, SOLUTION SUBCUTANEOUS 2 TIMES DAILY
Qty: 60 ML | Refills: 2 | Status: SHIPPED | OUTPATIENT
Start: 2025-02-24 | End: 2025-05-25

## 2025-02-24 RX ORDER — DIAZEPAM 5 MG/1
TABLET ORAL
Qty: 60 TABLET | Refills: 2 | Status: SHIPPED | OUTPATIENT
Start: 2025-02-24 | End: 2025-05-24

## 2025-02-24 RX ORDER — PANTOPRAZOLE SODIUM 40 MG/1
40 TABLET, DELAYED RELEASE ORAL 2 TIMES DAILY
Qty: 60 TABLET | Refills: 5 | Status: SHIPPED | OUTPATIENT
Start: 2025-02-24

## 2025-02-24 RX ORDER — HYDROCODONE BITARTRATE AND ACETAMINOPHEN 10; 325 MG/1; MG/1
TABLET ORAL
Qty: 150 TABLET | Refills: 0 | Status: SHIPPED | OUTPATIENT
Start: 2025-02-24 | End: 2025-03-23

## 2025-02-24 RX ORDER — LIDOCAINE HYDROCHLORIDE 10 MG/ML
1 INJECTION, SOLUTION EPIDURAL; INFILTRATION; INTRACAUDAL; PERINEURAL ONCE
Status: DISCONTINUED | OUTPATIENT
Start: 2025-02-24 | End: 2025-02-24

## 2025-02-24 RX ORDER — LIDOCAINE HYDROCHLORIDE 10 MG/ML
1 INJECTION, SOLUTION INFILTRATION; PERINEURAL ONCE
Status: COMPLETED | OUTPATIENT
Start: 2025-02-24 | End: 2025-02-24

## 2025-02-24 RX ORDER — METHYLPREDNISOLONE ACETATE 40 MG/ML
40 INJECTION, SUSPENSION INTRA-ARTICULAR; INTRALESIONAL; INTRAMUSCULAR; SOFT TISSUE ONCE
Status: COMPLETED | OUTPATIENT
Start: 2025-02-24 | End: 2025-02-24

## 2025-02-24 RX ADMIN — METHYLPREDNISOLONE ACETATE 40 MG: 40 INJECTION, SUSPENSION INTRA-ARTICULAR; INTRALESIONAL; INTRAMUSCULAR; SOFT TISSUE at 14:03

## 2025-02-24 RX ADMIN — LIDOCAINE HYDROCHLORIDE 1 ML: 10 INJECTION, SOLUTION INFILTRATION; PERINEURAL at 14:04

## 2025-02-24 ASSESSMENT — ENCOUNTER SYMPTOMS: BACK PAIN: 1

## 2025-02-24 NOTE — PROGRESS NOTES
SUBJECTIVE:    Patient ID: Jose Alejandro Nichole is a 64 y.o. male.    Chief Complaint   Patient presents with    Shoulder Pain     Right shoulder pain, patient is asking for an injection today    Knee Pain     Bilateral knee pain    Back Pain     Increased lower back pain       HPI: office visit  History of Present Illness  The patient presents for evaluation of bradycardia, hypertension, right shoulder pain, and weight management.    He has discontinued 2 cardiac medications, metoprolol and valsartan, due to perceived adverse effects. He reports a sensation of his heart working excessively hard, akin to a hydraulic system under strain. He has been monitoring his heart rate at home, which was previously in the 40s but has since increased to the 60s and 70s following the cessation of the medications. He does not experience any episodes of tachycardia. He has experienced this unusual sensation once or twice since discontinuing the medications but was unable to verify his heart rate at those times. He is uncertain about the impact of these changes on his blood pressure, as he has not been monitoring it at home. He has an upcoming appointment with his cardiologist, who has recommended a cardiac scan or test. He reports no palpitations or arrhythmias. He has been off the cardiac medications for approximately one month.    He is seeking another injection for his right shoulder pain. He has been informed that he is a candidate for a pain block in his shoulder. He has a history of knee pain, which was not alleviated by a previous injection.    He has been prescribed Adipex but does not recall receiving it in December 2024. He is considering undergoing a procedure to reduce abdominal fat.    Supplemental Information  He reports satisfactory blood glucose levels. He experiences intermittent leg swelling, with the left leg being more affected than the right. He has been advised to undergo wrist, elbow, and eye surgeries but has

## 2025-02-25 DIAGNOSIS — R25.2 MUSCLE CRAMPS: ICD-10-CM

## 2025-02-25 DIAGNOSIS — E53.8 B12 DEFICIENCY: ICD-10-CM

## 2025-02-25 DIAGNOSIS — I10 ESSENTIAL HYPERTENSION: ICD-10-CM

## 2025-02-25 DIAGNOSIS — E11.69 TYPE 2 DIABETES MELLITUS WITH OTHER SPECIFIED COMPLICATION, WITH LONG-TERM CURRENT USE OF INSULIN (HCC): ICD-10-CM

## 2025-02-25 DIAGNOSIS — Z79.4 TYPE 2 DIABETES MELLITUS WITH OTHER SPECIFIED COMPLICATION, WITH LONG-TERM CURRENT USE OF INSULIN (HCC): ICD-10-CM

## 2025-02-25 LAB
ALBUMIN SERPL-MCNC: 3.9 G/DL (ref 3.4–4.8)
ALBUMIN/GLOB SERPL: 1.2 {RATIO} (ref 0.8–2)
ALP SERPL-CCNC: 57 U/L (ref 25–100)
ALT SERPL-CCNC: 32 U/L (ref 4–36)
ANION GAP SERPL CALCULATED.3IONS-SCNC: 13 MMOL/L (ref 3–16)
AST SERPL-CCNC: 37 U/L (ref 8–33)
BILIRUB SERPL-MCNC: 0.4 MG/DL (ref 0.3–1.2)
BUN SERPL-MCNC: 20 MG/DL (ref 6–20)
CALCIUM SERPL-MCNC: 10 MG/DL (ref 8.3–10.6)
CHLORIDE SERPL-SCNC: 104 MMOL/L (ref 98–107)
CO2 SERPL-SCNC: 25 MMOL/L (ref 20–30)
CREAT SERPL-MCNC: 0.9 MG/DL (ref 0.4–1.2)
ERYTHROCYTE [DISTWIDTH] IN BLOOD BY AUTOMATED COUNT: 12.9 % (ref 11–16)
FOLATE SERPL-MCNC: 19.3 NG/ML
GFR SERPLBLD CREATININE-BSD FMLA CKD-EPI: >90 ML/MIN/{1.73_M2}
GLOBULIN SER CALC-MCNC: 3.3 G/DL
GLUCOSE SERPL-MCNC: 103 MG/DL (ref 74–106)
HBA1C MFR BLD: 6.7 %
HCT VFR BLD AUTO: 46.8 % (ref 40–54)
HGB BLD-MCNC: 14.4 G/DL (ref 13–18)
MAGNESIUM SERPL-MCNC: 2 MG/DL (ref 1.7–2.4)
MCH RBC QN AUTO: 31.5 PG (ref 27–32)
MCHC RBC AUTO-ENTMCNC: 30.8 G/DL (ref 31–35)
MCV RBC AUTO: 102.4 FL (ref 80–100)
PLATELET # BLD AUTO: 131 K/UL (ref 150–400)
PMV BLD AUTO: 14.4 FL (ref 6–10)
POTASSIUM SERPL-SCNC: 5.1 MMOL/L (ref 3.4–5.1)
PROT SERPL-MCNC: 7.2 G/DL (ref 6.4–8.3)
RBC # BLD AUTO: 4.57 M/UL (ref 4.5–6)
SODIUM SERPL-SCNC: 142 MMOL/L (ref 136–145)
VIT B12 SERPL-MCNC: 281 PG/ML (ref 211–911)
WBC # BLD AUTO: 6.1 K/UL (ref 4–11)

## 2025-03-06 DIAGNOSIS — I50.9 ACUTE ON CHRONIC CONGESTIVE HEART FAILURE, UNSPECIFIED HEART FAILURE TYPE (HCC): ICD-10-CM

## 2025-03-06 DIAGNOSIS — R25.2 MUSCLE CRAMPS: ICD-10-CM

## 2025-03-07 RX ORDER — DOCUSATE SODIUM 100 MG/1
CAPSULE, LIQUID FILLED ORAL
Qty: 120 CAPSULE | Refills: 3 | Status: SHIPPED | OUTPATIENT
Start: 2025-03-07

## 2025-03-07 RX ORDER — SPIRONOLACTONE 25 MG/1
TABLET ORAL
Qty: 90 TABLET | Refills: 3 | Status: SHIPPED | OUTPATIENT
Start: 2025-03-07

## 2025-03-07 RX ORDER — LANOLIN ALCOHOL/MO/W.PET/CERES
400 CREAM (GRAM) TOPICAL DAILY
Qty: 30 TABLET | Refills: 2 | Status: SHIPPED | OUTPATIENT
Start: 2025-03-07

## 2025-03-10 ENCOUNTER — OFFICE VISIT (OUTPATIENT)
Age: 65
End: 2025-03-10
Payer: MEDICARE

## 2025-03-10 VITALS
OXYGEN SATURATION: 97 % | DIASTOLIC BLOOD PRESSURE: 82 MMHG | HEIGHT: 76 IN | TEMPERATURE: 97.2 F | RESPIRATION RATE: 18 BRPM | HEART RATE: 100 BPM | SYSTOLIC BLOOD PRESSURE: 136 MMHG | BODY MASS INDEX: 38.36 KG/M2 | WEIGHT: 315 LBS

## 2025-03-10 DIAGNOSIS — E66.813 CLASS 3 SEVERE OBESITY WITH SERIOUS COMORBIDITY AND BODY MASS INDEX (BMI) OF 50.0 TO 59.9 IN ADULT, UNSPECIFIED OBESITY TYPE: ICD-10-CM

## 2025-03-10 DIAGNOSIS — I50.9 ACUTE ON CHRONIC CONGESTIVE HEART FAILURE, UNSPECIFIED HEART FAILURE TYPE (HCC): ICD-10-CM

## 2025-03-10 DIAGNOSIS — E66.01 CLASS 3 SEVERE OBESITY WITH SERIOUS COMORBIDITY AND BODY MASS INDEX (BMI) OF 50.0 TO 59.9 IN ADULT, UNSPECIFIED OBESITY TYPE: ICD-10-CM

## 2025-03-10 DIAGNOSIS — Z79.4 TYPE 2 DIABETES MELLITUS WITH OTHER SPECIFIED COMPLICATION, WITH LONG-TERM CURRENT USE OF INSULIN (HCC): ICD-10-CM

## 2025-03-10 DIAGNOSIS — I10 ESSENTIAL HYPERTENSION: Primary | ICD-10-CM

## 2025-03-10 DIAGNOSIS — G89.29 CHRONIC BILATERAL LOW BACK PAIN WITH RIGHT-SIDED SCIATICA: ICD-10-CM

## 2025-03-10 DIAGNOSIS — E11.69 TYPE 2 DIABETES MELLITUS WITH OTHER SPECIFIED COMPLICATION, WITH LONG-TERM CURRENT USE OF INSULIN (HCC): ICD-10-CM

## 2025-03-10 DIAGNOSIS — D50.9 IRON DEFICIENCY ANEMIA, UNSPECIFIED IRON DEFICIENCY ANEMIA TYPE: ICD-10-CM

## 2025-03-10 DIAGNOSIS — M54.41 CHRONIC BILATERAL LOW BACK PAIN WITH RIGHT-SIDED SCIATICA: ICD-10-CM

## 2025-03-10 DIAGNOSIS — M15.9 OSTEOARTHRITIS OF MULTIPLE JOINTS, UNSPECIFIED OSTEOARTHRITIS TYPE: ICD-10-CM

## 2025-03-10 PROCEDURE — 99214 OFFICE O/P EST MOD 30 MIN: CPT | Performed by: FAMILY MEDICINE

## 2025-03-10 PROCEDURE — G8417 CALC BMI ABV UP PARAM F/U: HCPCS | Performed by: FAMILY MEDICINE

## 2025-03-10 PROCEDURE — 1159F MED LIST DOCD IN RCRD: CPT | Performed by: FAMILY MEDICINE

## 2025-03-10 PROCEDURE — 3017F COLORECTAL CA SCREEN DOC REV: CPT | Performed by: FAMILY MEDICINE

## 2025-03-10 PROCEDURE — 3075F SYST BP GE 130 - 139MM HG: CPT | Performed by: FAMILY MEDICINE

## 2025-03-10 PROCEDURE — G8427 DOCREV CUR MEDS BY ELIG CLIN: HCPCS | Performed by: FAMILY MEDICINE

## 2025-03-10 PROCEDURE — 1160F RVW MEDS BY RX/DR IN RCRD: CPT | Performed by: FAMILY MEDICINE

## 2025-03-10 PROCEDURE — 3079F DIAST BP 80-89 MM HG: CPT | Performed by: FAMILY MEDICINE

## 2025-03-10 PROCEDURE — 1036F TOBACCO NON-USER: CPT | Performed by: FAMILY MEDICINE

## 2025-03-10 PROCEDURE — 3044F HG A1C LEVEL LT 7.0%: CPT | Performed by: FAMILY MEDICINE

## 2025-03-10 PROCEDURE — 1123F ACP DISCUSS/DSCN MKR DOCD: CPT | Performed by: FAMILY MEDICINE

## 2025-03-10 PROCEDURE — 2022F DILAT RTA XM EVC RTNOPTHY: CPT | Performed by: FAMILY MEDICINE

## 2025-03-10 RX ORDER — BUMETANIDE 1 MG/1
1 TABLET ORAL DAILY
Qty: 30 TABLET | Refills: 3 | Status: SHIPPED | OUTPATIENT
Start: 2025-03-10

## 2025-03-10 RX ORDER — HYDROCODONE BITARTRATE AND ACETAMINOPHEN 10; 325 MG/1; MG/1
TABLET ORAL
Qty: 150 TABLET | Refills: 0 | Status: SHIPPED | OUTPATIENT
Start: 2025-03-10 | End: 2025-04-06

## 2025-03-10 RX ORDER — GABAPENTIN 800 MG/1
TABLET ORAL
Qty: 120 TABLET | Refills: 2 | Status: SHIPPED | OUTPATIENT
Start: 2025-03-10 | End: 2025-06-09

## 2025-03-10 RX ORDER — FERROUS SULFATE 325(65) MG
1 TABLET ORAL
Qty: 90 TABLET | Refills: 3 | Status: SHIPPED | OUTPATIENT
Start: 2025-03-10

## 2025-03-10 RX ORDER — OXYCODONE HYDROCHLORIDE 30 MG/1
30 TABLET ORAL EVERY 4 HOURS PRN
Qty: 150 TABLET | Refills: 0 | Status: SHIPPED | OUTPATIENT
Start: 2025-03-10 | End: 2025-04-09

## 2025-03-10 ASSESSMENT — ENCOUNTER SYMPTOMS: BACK PAIN: 1

## 2025-03-10 NOTE — PROGRESS NOTES
\"Have you been to the ER, urgent care clinic since your last visit?  Hospitalized since your last visit?\"    NO    “Have you seen or consulted any other health care providers outside our system since your last visit?”    NO      “Have you had a diabetic eye exam?”    NO     Date of last diabetic eye exam: 6/12/2020          
5.1 mmol/L Not in Time Range    Sodium 142 (2/24/2025) 136 - 145 mmol/L Not in Time Range    Total Bilirubin 0.4 (2/24/2025) 0.3 - 1.2 mg/dL Not in Time Range      Hemoglobin A1C (%)   Date Value   02/24/2025 6.7 (H)     LDL Direct (mg/dL)   Date Value   12/16/2014 82       Lab Results   Component Value Date/Time    WBC 6.1 02/24/2025 02:05 PM    NEUTROABS 5.9 09/05/2023 06:15 PM    HGB 14.4 02/24/2025 02:05 PM    HGB 13.0 06/05/2012 08:40 AM    HCT 46.8 02/24/2025 02:05 PM    HCT 41.6 06/05/2012 08:40 AM    .4 02/24/2025 02:05 PM     02/24/2025 02:05 PM     06/05/2012 08:40 AM     Lab Results   Component Value Date    TSH 1.79 03/27/2024       ASSESSMENT/PLAN    Diagnosis Orders   1. Essential hypertension        2. Chronic bilateral low back pain with right-sided sciatica  HYDROcodone-acetaminophen (NORCO)  MG per tablet    oxyCODONE (OXY-IR) 30 MG immediate release tablet    gabapentin (NEURONTIN) 800 MG tablet      3. Iron deficiency anemia, unspecified iron deficiency anemia type  ferrous sulfate (FEROSUL) 325 (65 Fe) MG tablet      4. Type 2 diabetes mellitus with other specified complication, with long-term current use of insulin ([facility])        5. Osteoarthritis of multiple joints, unspecified osteoarthritis type        6. Class 3 severe obesity with serious comorbidity and body mass index (BMI) of 50.0 to 59.9 in adult, unspecified obesity type        7. Acute on chronic congestive heart failure, unspecified heart failure type (HCC)  bumetanide (BUMEX) 1 MG tablet        Assessment & Plan  1. Lower back pain.  He reports persistent lower back pain without any new aggravating factors. He has been using pain medications such as hydrocodone and oxycodone as needed. He is advised to continue using these medications cautiously, especially when driving.    2. Fluid retention.  He experiences significant discomfort from his current diuretic medication. The dosage will be adjusted to a less

## 2025-03-13 ENCOUNTER — RESULTS FOLLOW-UP (OUTPATIENT)
Age: 65
End: 2025-03-13

## 2025-04-01 ENCOUNTER — TELEPHONE (OUTPATIENT)
Age: 65
End: 2025-04-01

## 2025-04-01 NOTE — TELEPHONE ENCOUNTER
Patient called about paperwork, he stated that he wouldn't be able to get his medication unless it was completed. Heidy stated that she gave it to you and you were going to look at it.

## 2025-04-08 ENCOUNTER — OFFICE VISIT (OUTPATIENT)
Age: 65
End: 2025-04-08
Payer: MEDICARE

## 2025-04-08 VITALS
BODY MASS INDEX: 38.36 KG/M2 | OXYGEN SATURATION: 95 % | RESPIRATION RATE: 18 BRPM | HEIGHT: 76 IN | TEMPERATURE: 98 F | HEART RATE: 78 BPM | WEIGHT: 315 LBS

## 2025-04-08 DIAGNOSIS — G89.29 CHRONIC BILATERAL LOW BACK PAIN WITH RIGHT-SIDED SCIATICA: ICD-10-CM

## 2025-04-08 DIAGNOSIS — J02.0 STREP PHARYNGITIS: Primary | ICD-10-CM

## 2025-04-08 DIAGNOSIS — R09.89 CHEST CONGESTION: ICD-10-CM

## 2025-04-08 DIAGNOSIS — M54.41 CHRONIC BILATERAL LOW BACK PAIN WITH RIGHT-SIDED SCIATICA: ICD-10-CM

## 2025-04-08 LAB
INFLUENZA A ANTIGEN, POC: NEGATIVE
INFLUENZA B ANTIGEN, POC: NEGATIVE
LOT EXPIRE DATE: NORMAL
LOT KIT NUMBER: NORMAL
S PYO AG THROAT QL: POSITIVE
SARS-COV-2 RNA, POC: NEGATIVE
VALID INTERNAL CONTROL: NORMAL
VENDOR AND KIT NAME POC: NORMAL

## 2025-04-08 PROCEDURE — 1159F MED LIST DOCD IN RCRD: CPT | Performed by: FAMILY MEDICINE

## 2025-04-08 PROCEDURE — 87428 SARSCOV & INF VIR A&B AG IA: CPT | Performed by: FAMILY MEDICINE

## 2025-04-08 PROCEDURE — 1123F ACP DISCUSS/DSCN MKR DOCD: CPT | Performed by: FAMILY MEDICINE

## 2025-04-08 PROCEDURE — 3017F COLORECTAL CA SCREEN DOC REV: CPT | Performed by: FAMILY MEDICINE

## 2025-04-08 PROCEDURE — G8417 CALC BMI ABV UP PARAM F/U: HCPCS | Performed by: FAMILY MEDICINE

## 2025-04-08 PROCEDURE — 87880 STREP A ASSAY W/OPTIC: CPT | Performed by: FAMILY MEDICINE

## 2025-04-08 PROCEDURE — 99214 OFFICE O/P EST MOD 30 MIN: CPT | Performed by: FAMILY MEDICINE

## 2025-04-08 PROCEDURE — 1160F RVW MEDS BY RX/DR IN RCRD: CPT | Performed by: FAMILY MEDICINE

## 2025-04-08 PROCEDURE — 1036F TOBACCO NON-USER: CPT | Performed by: FAMILY MEDICINE

## 2025-04-08 PROCEDURE — G8427 DOCREV CUR MEDS BY ELIG CLIN: HCPCS | Performed by: FAMILY MEDICINE

## 2025-04-08 RX ORDER — OXYCODONE HYDROCHLORIDE 30 MG/1
30 TABLET ORAL
Qty: 150 TABLET | Refills: 0 | Status: SHIPPED | OUTPATIENT
Start: 2025-04-08 | End: 2025-05-08

## 2025-04-08 RX ORDER — LANOLIN ALCOHOL/MO/W.PET/CERES
CREAM (GRAM) TOPICAL
Qty: 30 TABLET | Refills: 3 | Status: SHIPPED | OUTPATIENT
Start: 2025-04-08

## 2025-04-08 RX ORDER — CEFDINIR 300 MG/1
300 CAPSULE ORAL 2 TIMES DAILY
Qty: 20 CAPSULE | Refills: 0 | Status: SHIPPED | OUTPATIENT
Start: 2025-04-08 | End: 2025-04-18

## 2025-04-08 RX ORDER — HYDROCODONE BITARTRATE AND ACETAMINOPHEN 10; 325 MG/1; MG/1
TABLET ORAL
Qty: 150 TABLET | Refills: 0 | Status: SHIPPED | OUTPATIENT
Start: 2025-04-08 | End: 2025-05-05

## 2025-04-08 RX ORDER — BROMPHENIRAMINE MALEATE, PSEUDOEPHEDRINE HYDROCHLORIDE, AND DEXTROMETHORPHAN HYDROBROMIDE 2; 30; 10 MG/5ML; MG/5ML; MG/5ML
5 SYRUP ORAL 4 TIMES DAILY PRN
Qty: 473 ML | Refills: 0 | Status: SHIPPED | OUTPATIENT
Start: 2025-04-08

## 2025-04-08 ASSESSMENT — ENCOUNTER SYMPTOMS
RHINORRHEA: 1
VOICE CHANGE: 1
SHORTNESS OF BREATH: 1
BACK PAIN: 1
SORE THROAT: 1
SINUS PRESSURE: 1
COUGH: 1

## 2025-04-08 NOTE — PROGRESS NOTES
SUBJECTIVE:    Patient ID: Jose Alejandro Nichole is a 64 y.o. male.    Chief Complaint   Patient presents with    Congestion     X 7 days drainage, weakness, coughing, body aches.        HPI: office visit  History of Present Illness  The patient presents for evaluation of strep throat.    He has been diagnosed with strep throat, which initially presented as a cough accompanied by phlegm production. The phlegm is described as having a greenish hue. Mild headaches are reported, but there is no ear pain or significant sinus pressure. An increase in postnasal drainage is noted. Despite the passage of 7 days since the onset of symptoms, there has been no noticeable improvement in his condition. Consumption of apple sauce appears to provide some relief.    Blood sugars were reported to be good last week.    He is not taking any B12.        Review of Systems   Constitutional:  Positive for fatigue.   HENT:  Positive for congestion, postnasal drip, rhinorrhea, sinus pressure, sore throat and voice change.    Respiratory:  Positive for cough and shortness of breath.    Musculoskeletal:  Positive for arthralgias, back pain, gait problem, joint swelling and myalgias.   Neurological:  Positive for weakness.   Psychiatric/Behavioral:  The patient is nervous/anxious.    All other systems reviewed and are negative.       OBJECTIVE:  Pulse 78   Temp 98 °F (36.7 °C) (Infrared)   Resp 18   Ht 1.93 m (6' 4\")   Wt (!) 200.6 kg (442 lb 3.2 oz)   SpO2 95%   BMI 53.83 kg/m²    Wt Readings from Last 3 Encounters:   04/08/25 (!) 200.6 kg (442 lb 3.2 oz)   03/10/25 (!) 197.1 kg (434 lb 9.6 oz)   02/24/25 (!) 204.9 kg (451 lb 12.8 oz)     BP Readings from Last 3 Encounters:   03/10/25 136/82   02/24/25 (!) 162/72   01/16/25 122/84      Pulse Readings from Last 3 Encounters:   04/08/25 78   03/10/25 100   02/24/25 55     Body mass index is 53.83 kg/m².   Resp Readings from Last 3 Encounters:   04/08/25 18   03/10/25 18   02/24/25 16

## 2025-04-17 DIAGNOSIS — J30.2 SEASONAL ALLERGIES: ICD-10-CM

## 2025-04-17 DIAGNOSIS — B37.2 YEAST DERMATITIS: ICD-10-CM

## 2025-04-17 DIAGNOSIS — Z79.4 TYPE 2 DIABETES MELLITUS WITH OTHER SPECIFIED COMPLICATION, WITH LONG-TERM CURRENT USE OF INSULIN (HCC): ICD-10-CM

## 2025-04-17 DIAGNOSIS — E11.69 TYPE 2 DIABETES MELLITUS WITH OTHER SPECIFIED COMPLICATION, WITH LONG-TERM CURRENT USE OF INSULIN (HCC): ICD-10-CM

## 2025-04-17 NOTE — TELEPHONE ENCOUNTER
Refill request received from pharmacy      Next Office Visit Date:  Future Appointments   Date Time Provider Department Center   6/23/2025  1:45 PM Kavitha Baker MD P [facility] DEP   7/24/2025  1:45 PM Kavitha Baker MD [facility] DEP       KITTY - Please review via PDMP        Last Office Visit:    4/8/2025

## 2025-04-22 ENCOUNTER — OFFICE VISIT (OUTPATIENT)
Age: 65
End: 2025-04-22
Payer: MEDICARE

## 2025-04-22 VITALS
OXYGEN SATURATION: 95 % | WEIGHT: 315 LBS | TEMPERATURE: 97 F | RESPIRATION RATE: 18 BRPM | HEART RATE: 71 BPM | HEIGHT: 76 IN | BODY MASS INDEX: 38.36 KG/M2

## 2025-04-22 DIAGNOSIS — R68.89 CONGESTION OF THROAT: ICD-10-CM

## 2025-04-22 DIAGNOSIS — M54.41 CHRONIC BILATERAL LOW BACK PAIN WITH RIGHT-SIDED SCIATICA: ICD-10-CM

## 2025-04-22 DIAGNOSIS — F41.9 ANXIETY: ICD-10-CM

## 2025-04-22 DIAGNOSIS — E53.8 B12 DEFICIENCY: ICD-10-CM

## 2025-04-22 DIAGNOSIS — G89.29 CHRONIC BILATERAL LOW BACK PAIN WITH RIGHT-SIDED SCIATICA: ICD-10-CM

## 2025-04-22 DIAGNOSIS — J20.8 ACUTE BRONCHITIS DUE TO OTHER SPECIFIED ORGANISMS: Primary | ICD-10-CM

## 2025-04-22 LAB
INFLUENZA A ANTIGEN, POC: NEGATIVE
INFLUENZA B ANTIGEN, POC: NEGATIVE
LOT EXPIRE DATE: NORMAL
LOT KIT NUMBER: NORMAL
SARS-COV-2 RNA, POC: NEGATIVE
VALID INTERNAL CONTROL: NORMAL
VENDOR AND KIT NAME POC: NORMAL

## 2025-04-22 PROCEDURE — 87880 STREP A ASSAY W/OPTIC: CPT | Performed by: FAMILY MEDICINE

## 2025-04-22 PROCEDURE — 87428 SARSCOV & INF VIR A&B AG IA: CPT | Performed by: FAMILY MEDICINE

## 2025-04-22 RX ORDER — CEFTRIAXONE 1 G/1
1000 INJECTION, POWDER, FOR SOLUTION INTRAMUSCULAR; INTRAVENOUS ONCE
Status: COMPLETED | OUTPATIENT
Start: 2025-04-22 | End: 2025-04-22

## 2025-04-22 RX ORDER — DIAZEPAM 5 MG/1
TABLET ORAL
Qty: 60 TABLET | Refills: 2 | Status: SHIPPED | OUTPATIENT
Start: 2025-04-22 | End: 2025-07-20

## 2025-04-22 RX ORDER — METHYLPREDNISOLONE SODIUM SUCCINATE 125 MG/2ML
125 INJECTION INTRAMUSCULAR; INTRAVENOUS ONCE
Status: SHIPPED | OUTPATIENT
Start: 2025-04-22

## 2025-04-22 RX ORDER — OXYCODONE HYDROCHLORIDE 30 MG/1
30 TABLET ORAL
Qty: 150 TABLET | Refills: 0 | Status: SHIPPED | OUTPATIENT
Start: 2025-04-22 | End: 2025-05-22

## 2025-04-22 RX ORDER — NYSTATIN 100000 [USP'U]/G
POWDER TOPICAL
Qty: 60 G | Refills: 2 | Status: SHIPPED | OUTPATIENT
Start: 2025-04-22

## 2025-04-22 RX ORDER — LEVOFLOXACIN 500 MG/1
500 TABLET, FILM COATED ORAL DAILY
Qty: 10 TABLET | Refills: 0 | Status: SHIPPED | OUTPATIENT
Start: 2025-04-22 | End: 2025-05-02

## 2025-04-22 RX ORDER — METHYLPREDNISOLONE SODIUM SUCCINATE 125 MG/2ML
125 INJECTION INTRAMUSCULAR; INTRAVENOUS ONCE
Status: COMPLETED | OUTPATIENT
Start: 2025-04-22 | End: 2025-04-22

## 2025-04-22 RX ORDER — MONTELUKAST SODIUM 10 MG/1
TABLET ORAL
Qty: 30 TABLET | Refills: 3 | Status: SHIPPED | OUTPATIENT
Start: 2025-04-22

## 2025-04-22 RX ORDER — BENZONATATE 200 MG/1
200 CAPSULE ORAL 3 TIMES DAILY PRN
Qty: 30 CAPSULE | Refills: 0 | Status: SHIPPED | OUTPATIENT
Start: 2025-04-22 | End: 2025-05-02

## 2025-04-22 RX ORDER — PREDNISONE 5 MG/1
5 TABLET ORAL DAILY
Qty: 21 TABLET | Refills: 0 | Status: SHIPPED | OUTPATIENT
Start: 2025-04-22 | End: 2025-04-28

## 2025-04-22 RX ORDER — INSULIN GLARGINE 100 [IU]/ML
100 INJECTION, SOLUTION SUBCUTANEOUS 2 TIMES DAILY
Qty: 60 ML | Refills: 2 | Status: SHIPPED | OUTPATIENT
Start: 2025-04-22 | End: 2025-07-21

## 2025-04-22 RX ORDER — CYANOCOBALAMIN 1000 UG/ML
1000 INJECTION, SOLUTION INTRAMUSCULAR; SUBCUTANEOUS ONCE
Status: COMPLETED | OUTPATIENT
Start: 2025-04-22 | End: 2025-04-22

## 2025-04-22 RX ORDER — LORATADINE 10 MG/1
TABLET ORAL
Qty: 30 TABLET | Refills: 3 | Status: SHIPPED | OUTPATIENT
Start: 2025-04-22

## 2025-04-22 RX ORDER — HYDROCODONE BITARTRATE AND ACETAMINOPHEN 10; 325 MG/1; MG/1
TABLET ORAL
Qty: 150 TABLET | Refills: 0 | Status: SHIPPED | OUTPATIENT
Start: 2025-04-22 | End: 2025-05-19

## 2025-04-22 RX ADMIN — METHYLPREDNISOLONE SODIUM SUCCINATE 125 MG: 125 INJECTION INTRAMUSCULAR; INTRAVENOUS at 15:31

## 2025-04-22 RX ADMIN — CYANOCOBALAMIN 1000 MCG: 1000 INJECTION, SOLUTION INTRAMUSCULAR; SUBCUTANEOUS at 15:29

## 2025-04-22 RX ADMIN — CEFTRIAXONE 1000 MG: 1 INJECTION, POWDER, FOR SOLUTION INTRAMUSCULAR; INTRAVENOUS at 15:27

## 2025-04-22 ASSESSMENT — ENCOUNTER SYMPTOMS
BACK PAIN: 1
VOICE CHANGE: 1
SINUS PRESSURE: 1
COUGH: 1
RHINORRHEA: 1
SHORTNESS OF BREATH: 1
SORE THROAT: 1

## 2025-04-22 NOTE — PROGRESS NOTES
SUBJECTIVE:    Patient ID: Jose Alejandro Nichole is a 64 y.o. male.    Chief Complaint   Patient presents with    Congestion     Congestion and cough       HPI: office visit  History of Present Illness  The patient presents for evaluation of a persistent cough.    A persistent cough has been reported, which has not improved despite completing a course of antibiotics and using cough medicine. The cough is productive, yielding green and yellow sputum. Shortness of breath and wheezing are also experienced. Sleep has been disrupted for the past two nights due to the cough, which worsens when lying down. A nebulizer is available at home but has not been used recently. No ear pain or sore throat is reported. Progressive weakness has been experienced, leading to some days spent in bed due to illness. There are no known drug allergies. The antibiotic course has been completed.    A sensation of deafness in one ear is reported, which was attempted to be alleviated by cleaning with a Q-tip, resulting in the removal of malodorous ear wax. No recent water exposure to the ear is recalled.    A request for a B12 injection is made, as B12 pills are perceived to be ineffective.      Review of Systems   Constitutional:  Positive for fatigue.   HENT:  Positive for congestion, postnasal drip, rhinorrhea, sinus pressure, sore throat and voice change.    Respiratory:  Positive for cough and shortness of breath.    Musculoskeletal:  Positive for arthralgias, back pain, gait problem, joint swelling and myalgias.   Neurological:  Positive for weakness.   Psychiatric/Behavioral:  The patient is nervous/anxious.    All other systems reviewed and are negative.       OBJECTIVE:  Pulse 71   Temp 97 °F (36.1 °C) (Infrared)   Resp 18   Ht 1.93 m (6' 4\")   Wt (!) 197.8 kg (436 lb)   SpO2 95%   BMI 53.07 kg/m²    Wt Readings from Last 3 Encounters:   04/22/25 (!) 197.8 kg (436 lb)   04/08/25 (!) 200.6 kg (442 lb 3.2 oz)   03/10/25 (!) 197.1 kg

## 2025-04-22 NOTE — PROGRESS NOTES
Patient here today for sick visit only. Health Maintenance not reviewed during this visit.     Administrations This Visit       cefTRIAXone (ROCEPHIN) injection 1,000 mg       Admin Date  04/22/2025  15:27 Action  Given Dose  1,000 mg Route  IntraMUSCular Site  Dorsogluteal Right Documented By  Gwendolyn Valle MA    NDC: 74597-473-30    :  CRITICAL CARE    Patient Supplied?: No              cyanocobalamin injection 1,000 mcg       Admin Date  04/22/2025  15:29 Action  Given Dose  1,000 mcg Route  IntraMUSCular Site  Deltoid Right Documented By  Gwendolyn Valle MA    NDC: 89582-638-54    : [facility]    Patient Supplied?: No                 Patient tolerated injection well. Patient advised to wait 20 minutes in the office following the injection. No signs/symptoms of reaction noted after 20 minutes.

## 2025-04-23 ENCOUNTER — OFFICE VISIT (OUTPATIENT)
Age: 65
End: 2025-04-23
Payer: MEDICARE

## 2025-04-23 VITALS
HEART RATE: 72 BPM | WEIGHT: 315 LBS | DIASTOLIC BLOOD PRESSURE: 80 MMHG | RESPIRATION RATE: 16 BRPM | SYSTOLIC BLOOD PRESSURE: 132 MMHG | TEMPERATURE: 97.2 F | BODY MASS INDEX: 38.36 KG/M2 | OXYGEN SATURATION: 90 % | HEIGHT: 76 IN

## 2025-04-23 DIAGNOSIS — R68.89 CONGESTION OF THROAT: Primary | ICD-10-CM

## 2025-04-23 PROCEDURE — 1123F ACP DISCUSS/DSCN MKR DOCD: CPT | Performed by: FAMILY MEDICINE

## 2025-04-23 PROCEDURE — 96372 THER/PROPH/DIAG INJ SC/IM: CPT | Performed by: FAMILY MEDICINE

## 2025-04-23 PROCEDURE — G8417 CALC BMI ABV UP PARAM F/U: HCPCS | Performed by: FAMILY MEDICINE

## 2025-04-23 PROCEDURE — 3075F SYST BP GE 130 - 139MM HG: CPT | Performed by: FAMILY MEDICINE

## 2025-04-23 PROCEDURE — 3079F DIAST BP 80-89 MM HG: CPT | Performed by: FAMILY MEDICINE

## 2025-04-23 PROCEDURE — 1036F TOBACCO NON-USER: CPT | Performed by: FAMILY MEDICINE

## 2025-04-23 PROCEDURE — 99213 OFFICE O/P EST LOW 20 MIN: CPT | Performed by: FAMILY MEDICINE

## 2025-04-23 PROCEDURE — 3017F COLORECTAL CA SCREEN DOC REV: CPT | Performed by: FAMILY MEDICINE

## 2025-04-23 PROCEDURE — G8427 DOCREV CUR MEDS BY ELIG CLIN: HCPCS | Performed by: FAMILY MEDICINE

## 2025-04-23 RX ORDER — CEFTRIAXONE 1 G/1
1000 INJECTION, POWDER, FOR SOLUTION INTRAMUSCULAR; INTRAVENOUS ONCE
Status: COMPLETED | OUTPATIENT
Start: 2025-04-23 | End: 2025-04-23

## 2025-04-23 RX ADMIN — CEFTRIAXONE 1000 MG: 1 INJECTION, POWDER, FOR SOLUTION INTRAMUSCULAR; INTRAVENOUS at 17:36

## 2025-04-23 NOTE — PROGRESS NOTES
Patient here today for sick visit only. Health Maintenance not reviewed during this visit.     Administrations This Visit       cefTRIAXone (ROCEPHIN) injection 1,000 mg       Admin Date  04/23/2025  17:36 Action  Given Dose  1,000 mg Route  IntraMUSCular Site  Dorsogluteal Left Documented By  Gwendolyn Valle MA    NDC: 10154-724-71    : [facility]    Patient Supplied?: No                 Patient tolerated injection well. Patient advised to wait 20 minutes in the office following the injection. No signs/symptoms of reaction noted after 20 minutes.

## 2025-04-29 ENCOUNTER — LAB (OUTPATIENT)
Age: 65
End: 2025-04-29
Payer: MEDICARE

## 2025-04-29 DIAGNOSIS — E53.8 B12 DEFICIENCY: Primary | ICD-10-CM

## 2025-04-29 DIAGNOSIS — J20.8 ACUTE BRONCHITIS DUE TO OTHER SPECIFIED ORGANISMS: ICD-10-CM

## 2025-04-29 PROCEDURE — 96372 THER/PROPH/DIAG INJ SC/IM: CPT | Performed by: FAMILY MEDICINE

## 2025-04-29 RX ORDER — CYANOCOBALAMIN 1000 UG/ML
1000 INJECTION, SOLUTION INTRAMUSCULAR; SUBCUTANEOUS ONCE
Status: COMPLETED | OUTPATIENT
Start: 2025-04-29 | End: 2025-04-29

## 2025-04-29 RX ORDER — METHYLPREDNISOLONE SODIUM SUCCINATE 125 MG/2ML
125 INJECTION INTRAMUSCULAR; INTRAVENOUS ONCE
Status: COMPLETED | OUTPATIENT
Start: 2025-04-29 | End: 2025-04-29

## 2025-04-29 RX ORDER — CEFTRIAXONE 1 G/1
1000 INJECTION, POWDER, FOR SOLUTION INTRAMUSCULAR; INTRAVENOUS ONCE
Status: COMPLETED | OUTPATIENT
Start: 2025-04-29 | End: 2025-04-29

## 2025-04-29 RX ADMIN — CYANOCOBALAMIN 1000 MCG: 1000 INJECTION, SOLUTION INTRAMUSCULAR; SUBCUTANEOUS at 17:59

## 2025-04-29 RX ADMIN — METHYLPREDNISOLONE SODIUM SUCCINATE 125 MG: 125 INJECTION INTRAMUSCULAR; INTRAVENOUS at 17:59

## 2025-04-29 RX ADMIN — CEFTRIAXONE 1000 MG: 1 INJECTION, POWDER, FOR SOLUTION INTRAMUSCULAR; INTRAVENOUS at 17:58

## 2025-04-29 ASSESSMENT — ENCOUNTER SYMPTOMS
SINUS PRESSURE: 1
RHINORRHEA: 1
SHORTNESS OF BREATH: 1
BACK PAIN: 1
VOICE CHANGE: 1
COUGH: 1
SORE THROAT: 1

## 2025-04-29 NOTE — PROGRESS NOTES
SUBJECTIVE:    Patient ID: Jose Alejandro Nichole is a 64 y.o. male.    Chief Complaint   Patient presents with    Congestion     Patient states that he was coughing this morning but not so much this afternoon.        HPI: office visit  History of Present Illness  The patient presents for evaluation of bronchitis.    Persistent back pain is reported, which was particularly severe this morning. Respiratory distress has been experienced, although there is a slight improvement in breathing. An injection was received yesterday, and medication has been picked up from [facility] and taken as prescribed. Sleep was disrupted last night due to frequent awakenings and coughing episodes. A breathing treatment machine is available at home but has not been utilized yet. Poor appetite has led to irregular eating habits, and episodes of tremors are attributed to hypoglycemia.        Review of Systems   Constitutional:  Positive for fatigue.   HENT:  Positive for congestion, postnasal drip, rhinorrhea, sinus pressure, sore throat and voice change.    Respiratory:  Positive for cough and shortness of breath.    Musculoskeletal:  Positive for arthralgias, back pain, gait problem, joint swelling and myalgias.   Neurological:  Positive for weakness.   Psychiatric/Behavioral:  The patient is nervous/anxious.    All other systems reviewed and are negative.       OBJECTIVE:  /80   Pulse 72   Temp 97.2 °F (36.2 °C) (Infrared)   Resp 16   Ht 1.93 m (6' 4\")   Wt (!) 197.8 kg (436 lb)   SpO2 90%   BMI 53.07 kg/m²    Wt Readings from Last 3 Encounters:   04/23/25 (!) 197.8 kg (436 lb)   04/22/25 (!) 197.8 kg (436 lb)   04/08/25 (!) 200.6 kg (442 lb 3.2 oz)     BP Readings from Last 3 Encounters:   04/23/25 132/80   03/10/25 136/82   02/24/25 (!) 162/72      Pulse Readings from Last 3 Encounters:   04/23/25 72   04/22/25 71   04/08/25 78     Body mass index is 53.07 kg/m².   Resp Readings from Last 3 Encounters:   04/23/25

## 2025-04-29 NOTE — PROGRESS NOTES
Patient tolerated injection well. Patient advised to wait 20 minutes in the office following the injection. No signs/symptoms of reaction noted after 20 minutes.  Administrations This Visit       cefTRIAXone (ROCEPHIN) injection 1,000 mg       Admin Date  04/29/2025  17:58 Action  Given Dose  1,000 mg Route  IntraMUSCular Site  Dorsogluteal Left Documented By  Jeanette Espana RN    NDC: 58273-011-65    :  CRITICAL CARE    Patient Supplied?: No              cyanocobalamin injection 1,000 mcg       Admin Date  04/29/2025  17:59 Action  Given Dose  1,000 mcg Route  IntraMUSCular Site  Deltoid Left Documented By  Jeanette Espana RN    NDC: 36194-765-72    : [facility]    Patient Supplied?: No              methylPREDNISolone sodium succ (SOLU-MEDROL) injection 125 mg       Admin Date  04/29/2025  17:59 Action  Given Dose  125 mg Route  IntraMUSCular Site  Dorsogluteal Right Documented By  Jeanette Espana RN    NDC: 68578-462-99    : [employer]    Patient Supplied?: No

## 2025-05-01 ENCOUNTER — OFFICE VISIT (OUTPATIENT)
Age: 65
End: 2025-05-01
Payer: MEDICARE

## 2025-05-01 VITALS
OXYGEN SATURATION: 90 % | SYSTOLIC BLOOD PRESSURE: 124 MMHG | WEIGHT: 315 LBS | DIASTOLIC BLOOD PRESSURE: 60 MMHG | HEART RATE: 50 BPM | HEIGHT: 76 IN | RESPIRATION RATE: 16 BRPM | TEMPERATURE: 97.6 F | BODY MASS INDEX: 38.36 KG/M2

## 2025-05-01 DIAGNOSIS — M54.41 CHRONIC BILATERAL LOW BACK PAIN WITH RIGHT-SIDED SCIATICA: ICD-10-CM

## 2025-05-01 DIAGNOSIS — G89.29 CHRONIC BILATERAL LOW BACK PAIN WITH RIGHT-SIDED SCIATICA: ICD-10-CM

## 2025-05-01 DIAGNOSIS — E66.01 MORBID OBESITY (HCC): ICD-10-CM

## 2025-05-01 DIAGNOSIS — I49.9 IRREGULAR HEARTBEAT: Primary | ICD-10-CM

## 2025-05-01 PROCEDURE — 1036F TOBACCO NON-USER: CPT | Performed by: FAMILY MEDICINE

## 2025-05-01 PROCEDURE — 3074F SYST BP LT 130 MM HG: CPT | Performed by: FAMILY MEDICINE

## 2025-05-01 PROCEDURE — 3078F DIAST BP <80 MM HG: CPT | Performed by: FAMILY MEDICINE

## 2025-05-01 PROCEDURE — 1159F MED LIST DOCD IN RCRD: CPT | Performed by: FAMILY MEDICINE

## 2025-05-01 PROCEDURE — 99214 OFFICE O/P EST MOD 30 MIN: CPT | Performed by: FAMILY MEDICINE

## 2025-05-01 PROCEDURE — 1160F RVW MEDS BY RX/DR IN RCRD: CPT | Performed by: FAMILY MEDICINE

## 2025-05-01 PROCEDURE — G8427 DOCREV CUR MEDS BY ELIG CLIN: HCPCS | Performed by: FAMILY MEDICINE

## 2025-05-01 PROCEDURE — 3017F COLORECTAL CA SCREEN DOC REV: CPT | Performed by: FAMILY MEDICINE

## 2025-05-01 PROCEDURE — G8417 CALC BMI ABV UP PARAM F/U: HCPCS | Performed by: FAMILY MEDICINE

## 2025-05-01 PROCEDURE — 1123F ACP DISCUSS/DSCN MKR DOCD: CPT | Performed by: FAMILY MEDICINE

## 2025-05-01 RX ORDER — HYDROCODONE BITARTRATE AND ACETAMINOPHEN 10; 325 MG/1; MG/1
TABLET ORAL
Qty: 150 TABLET | Refills: 0 | Status: SHIPPED | OUTPATIENT
Start: 2025-05-01 | End: 2025-05-28

## 2025-05-01 RX ORDER — OXYCODONE HYDROCHLORIDE 30 MG/1
30 TABLET ORAL
Qty: 150 TABLET | Refills: 0 | Status: SHIPPED | OUTPATIENT
Start: 2025-05-01 | End: 2025-05-31

## 2025-05-01 ASSESSMENT — ENCOUNTER SYMPTOMS
SORE THROAT: 1
BACK PAIN: 1
COUGH: 1
RHINORRHEA: 1
SINUS PRESSURE: 1
VOICE CHANGE: 1
SHORTNESS OF BREATH: 1

## 2025-05-01 NOTE — PROGRESS NOTES
Have you been to the ER, urgent care clinic since your last visit?  Hospitalized since your last visit?   NO    Have you seen or consulted any other health care providers outside our system since your last visit?   NO      “Have you had a diabetic eye exam?”    NO     Date of last diabetic eye exam: 6/12/2020

## 2025-05-01 NOTE — PROGRESS NOTES
SUBJECTIVE:    Patient ID: Jose Alejandro Nichole is a 64 y.o. male.    Chief Complaint   Patient presents with    Back Pain     Increased lumbar spine pain, patient was mowing a few days ago.        HPI: office visit  History of Present Illness  The patient presents for evaluation of numbness in her legs.    She reports experiencing several episodes of leg numbness, with the most recent episode being particularly severe. This episode occurred while operating a , which she found particularly concerning. The numbness persists until she changes position, but the duration of relief is uncertain. Difficulty in lifting her foot or straightening it out during ambulation is noted, with the numbness predominantly affecting the right foot, although bilateral numbness was experienced during the recent episode. She expresses concern about potential spinal deterioration and mentions that certain chairs at her [employer] trigger the numbness, while wider chairs provide more comfort. No issues with bowel or bladder control or loss of sensation are reported. Her current pain medication regimen is satisfactory.    An MRI performed in 11/2023 revealed significant arthritis with spurring, narrowed spaces for the nerves, degenerative issues with the bones and disks, and small areas of disk bulging. She reports that her heart rate has decreased to 50 beats per minute.        Review of Systems   Constitutional:  Positive for fatigue.   HENT:  Positive for congestion, postnasal drip, rhinorrhea, sinus pressure, sore throat and voice change.    Respiratory:  Positive for cough and shortness of breath.    Musculoskeletal:  Positive for arthralgias, back pain, gait problem, joint swelling and myalgias.   Neurological:  Positive for weakness.   Psychiatric/Behavioral:  The patient is nervous/anxious.    All other systems reviewed and are negative.       OBJECTIVE:  /60   Pulse 50   Temp 97.6 °F (36.4 °C) (Infrared)   Resp 16   Ht

## 2025-05-06 DIAGNOSIS — G47.00 INSOMNIA, UNSPECIFIED TYPE: ICD-10-CM

## 2025-05-06 RX ORDER — ZOLPIDEM TARTRATE 12.5 MG/1
TABLET, FILM COATED, EXTENDED RELEASE ORAL
Qty: 30 TABLET | Refills: 3 | Status: SHIPPED | OUTPATIENT
Start: 2025-05-06 | End: 2025-06-06

## 2025-05-06 RX ORDER — SIMVASTATIN 40 MG
TABLET ORAL
Qty: 30 TABLET | Refills: 4 | Status: SHIPPED | OUTPATIENT
Start: 2025-05-06

## 2025-05-06 NOTE — TELEPHONE ENCOUNTER
Refill request received from patient      Next Office Visit Date:  Future Appointments   Date Time Provider Department Center   5/29/2025  3:30 PM Kavitha Baker MD [facility] DEP   6/23/2025  1:45 PM Kavitha Baker MD [facility] DEP   7/24/2025  1:45 PM Kavitha Baker MD [facility] DEP       KITTY - Please review via PDMP        Last Office Visit:    5/1/2025

## 2025-05-29 ENCOUNTER — HOSPITAL ENCOUNTER (OUTPATIENT)
Facility: HOSPITAL | Age: 65
Discharge: HOME | End: 2025-05-29
Payer: MEDICARE

## 2025-05-29 ENCOUNTER — OFFICE VISIT (OUTPATIENT)
Age: 65
End: 2025-05-29

## 2025-05-29 VITALS
RESPIRATION RATE: 16 BRPM | BODY MASS INDEX: 38.36 KG/M2 | DIASTOLIC BLOOD PRESSURE: 68 MMHG | TEMPERATURE: 97.2 F | SYSTOLIC BLOOD PRESSURE: 134 MMHG | OXYGEN SATURATION: 91 % | WEIGHT: 315 LBS | HEIGHT: 76 IN | HEART RATE: 62 BPM

## 2025-05-29 DIAGNOSIS — L03.90 ACUTE CELLULITIS: Primary | ICD-10-CM

## 2025-05-29 DIAGNOSIS — R53.82 CHRONIC FATIGUE: ICD-10-CM

## 2025-05-29 DIAGNOSIS — I10 ESSENTIAL HYPERTENSION: ICD-10-CM

## 2025-05-29 DIAGNOSIS — Z79.4 TYPE 2 DIABETES MELLITUS WITH OTHER SPECIFIED COMPLICATION, WITH LONG-TERM CURRENT USE OF INSULIN (HCC): ICD-10-CM

## 2025-05-29 DIAGNOSIS — E83.42 HYPOMAGNESEMIA: ICD-10-CM

## 2025-05-29 DIAGNOSIS — E11.69 TYPE 2 DIABETES MELLITUS WITH OTHER SPECIFIED COMPLICATION, WITH LONG-TERM CURRENT USE OF INSULIN (HCC): ICD-10-CM

## 2025-05-29 DIAGNOSIS — F41.9 ANXIETY: ICD-10-CM

## 2025-05-29 DIAGNOSIS — M54.41 CHRONIC BILATERAL LOW BACK PAIN WITH RIGHT-SIDED SCIATICA: ICD-10-CM

## 2025-05-29 DIAGNOSIS — I50.9 ACUTE ON CHRONIC CONGESTIVE HEART FAILURE, UNSPECIFIED HEART FAILURE TYPE (HCC): ICD-10-CM

## 2025-05-29 DIAGNOSIS — E78.9 LIPID DISORDER: ICD-10-CM

## 2025-05-29 DIAGNOSIS — R60.0 PEDAL EDEMA: ICD-10-CM

## 2025-05-29 DIAGNOSIS — I50.43 CHF (CONGESTIVE HEART FAILURE), NYHA CLASS I, ACUTE ON CHRONIC, COMBINED (HCC): ICD-10-CM

## 2025-05-29 DIAGNOSIS — E55.9 VITAMIN D DEFICIENCY: ICD-10-CM

## 2025-05-29 DIAGNOSIS — G89.29 CHRONIC BILATERAL LOW BACK PAIN WITH RIGHT-SIDED SCIATICA: ICD-10-CM

## 2025-05-29 PROCEDURE — 82570 ASSAY OF URINE CREATININE: CPT

## 2025-05-29 PROCEDURE — 84439 ASSAY OF FREE THYROXINE: CPT

## 2025-05-29 PROCEDURE — 82746 ASSAY OF FOLIC ACID SERUM: CPT

## 2025-05-29 PROCEDURE — 82306 VITAMIN D 25 HYDROXY: CPT

## 2025-05-29 PROCEDURE — 83735 ASSAY OF MAGNESIUM: CPT

## 2025-05-29 PROCEDURE — 80061 LIPID PANEL: CPT

## 2025-05-29 PROCEDURE — 82607 VITAMIN B-12: CPT

## 2025-05-29 PROCEDURE — 80053 COMPREHEN METABOLIC PANEL: CPT

## 2025-05-29 PROCEDURE — 84443 ASSAY THYROID STIM HORMONE: CPT

## 2025-05-29 PROCEDURE — 36415 COLL VENOUS BLD VENIPUNCTURE: CPT

## 2025-05-29 PROCEDURE — 85027 COMPLETE CBC AUTOMATED: CPT

## 2025-05-29 PROCEDURE — 83036 HEMOGLOBIN GLYCOSYLATED A1C: CPT

## 2025-05-29 PROCEDURE — 82043 UR ALBUMIN QUANTITATIVE: CPT

## 2025-05-29 RX ORDER — CEPHALEXIN 500 MG/1
500 CAPSULE ORAL 3 TIMES DAILY
Qty: 30 CAPSULE | Refills: 0 | Status: SHIPPED | OUTPATIENT
Start: 2025-05-29 | End: 2025-06-08

## 2025-05-29 RX ORDER — MUPIROCIN 20 MG/G
OINTMENT TOPICAL
Qty: 30 G | Refills: 1 | Status: SHIPPED | OUTPATIENT
Start: 2025-05-29 | End: 2025-06-05

## 2025-05-29 RX ORDER — GABAPENTIN 800 MG/1
TABLET ORAL
Qty: 120 TABLET | Refills: 2 | Status: SHIPPED | OUTPATIENT
Start: 2025-05-29 | End: 2025-08-28

## 2025-05-29 RX ORDER — DIAZEPAM 5 MG/1
TABLET ORAL
Qty: 60 TABLET | Refills: 2 | Status: SHIPPED | OUTPATIENT
Start: 2025-05-29 | End: 2025-08-26

## 2025-05-29 RX ORDER — POTASSIUM CHLORIDE 1500 MG/1
10 TABLET, EXTENDED RELEASE ORAL DAILY
Qty: 15 TABLET | Refills: 0 | Status: SHIPPED | OUTPATIENT
Start: 2025-05-29

## 2025-05-29 RX ORDER — BUMETANIDE 1 MG/1
1 TABLET ORAL DAILY
Qty: 30 TABLET | Refills: 3 | Status: SHIPPED | OUTPATIENT
Start: 2025-05-29

## 2025-05-29 RX ORDER — OXYCODONE HYDROCHLORIDE 30 MG/1
30 TABLET ORAL
Qty: 150 TABLET | Refills: 0 | Status: SHIPPED | OUTPATIENT
Start: 2025-05-29 | End: 2025-06-28

## 2025-05-29 ASSESSMENT — ENCOUNTER SYMPTOMS
COUGH: 1
BACK PAIN: 1
SORE THROAT: 1
VOICE CHANGE: 1
SHORTNESS OF BREATH: 1
RHINORRHEA: 1
SINUS PRESSURE: 1

## 2025-05-29 NOTE — PROGRESS NOTES
Chief Complaint   Patient presents with    Abrasion     Patient has a sore on his right buttock     Congestive Heart Failure     Reg F/U    Leg Swelling     Bilateral legs swelling.        Have you seen any other physician or provider since your last visit no    Have you had any other diagnostic tests since your last visit? no    Have you changed or stopped any medications since your last visit? no     Per Kavitha Baker MD order, Unna Boot applied to Bilateral Lower Extremity. Secured with ace bandage. Patient tolerated procedure well.

## 2025-05-29 NOTE — PROGRESS NOTES
SUBJECTIVE:    Patient ID: Jose Alejandro Nichole is a 64 y.o. male.    Chief Complaint   Patient presents with    Abrasion     Patient has a sore on his right buttock     Congestive Heart Failure     Reg F/U    Leg Swelling     Bilateral legs swelling.        HPI: office visit  History of Present Illness  The patient presents for evaluation of bilateral leg swelling, back pain, wrist pain, and a buttock sore.    He reports experiencing soreness in his legs, which have been unusually red and swollen for the past few days. He does not believe there are any open wounds on his legs. Recent activities have not involved prolonged standing, but he has been sitting with his feet in a dependent position, which may have contributed to the swelling.    Additionally, he mentions a sore on his buttocks and has used a gel cushion to aid in healing.    He is not currently taking any diuretics due to their side effect of frequent urination but has been supplementing with magnesium and potassium. He experiences back pain, which is exacerbated by the warmer weather, and avoids standing for extended periods. Wrist pain is also reported, particularly when attempting to grasp objects.    He has an appointment with his cardiologist on 05/29/2025. He reports no issues with his blood sugar levels or blood pressure and does not experience headaches.        Review of Systems   Constitutional:  Positive for fatigue.   HENT:  Positive for congestion, postnasal drip, rhinorrhea, sinus pressure, sore throat and voice change.    Respiratory:  Positive for cough and shortness of breath.    Musculoskeletal:  Positive for arthralgias, back pain, gait problem, joint swelling and myalgias.   Neurological:  Positive for weakness.   Psychiatric/Behavioral:  The patient is nervous/anxious.    All other systems reviewed and are negative.       OBJECTIVE:  /68   Pulse 62   Temp 97.2 °F (36.2 °C) (Infrared)   Resp 16   Ht 1.93 m (6' 4\")   Wt (!)

## 2025-05-30 LAB
25(OH)D3 SERPL-MCNC: 17.2 NG/ML (ref 32–100)
ALBUMIN SERPL-MCNC: 4.2 G/DL (ref 3.4–4.8)
ALBUMIN/GLOB SERPL: 1.2 {RATIO} (ref 0.8–2)
ALP SERPL-CCNC: 74 U/L (ref 25–100)
ALT SERPL-CCNC: 28 U/L (ref 4–36)
ANION GAP SERPL CALCULATED.3IONS-SCNC: 14 MMOL/L (ref 3–16)
AST SERPL-CCNC: 25 U/L (ref 8–33)
BILIRUB SERPL-MCNC: 0.3 MG/DL (ref 0.3–1.2)
BUN SERPL-MCNC: 20 MG/DL (ref 6–20)
CALCIUM SERPL-MCNC: 10.5 MG/DL (ref 8.3–10.6)
CHLORIDE SERPL-SCNC: 100 MMOL/L (ref 98–107)
CHOLEST SERPL-MCNC: 201 MG/DL (ref 0–200)
CO2 SERPL-SCNC: 25 MMOL/L (ref 20–30)
CREAT SERPL-MCNC: 1.1 MG/DL (ref 0.8–1.3)
CREAT UR-MCNC: 89.7 MG/DL (ref 39–259)
ERYTHROCYTE [DISTWIDTH] IN BLOOD BY AUTOMATED COUNT: 12.8 % (ref 11–16)
FOLATE SERPL-MCNC: 12.7 NG/ML
GFR SERPLBLD CREATININE-BSD FMLA CKD-EPI: 74 ML/MIN/{1.73_M2}
GLOBULIN SER CALC-MCNC: 3.5 G/DL
GLUCOSE SERPL-MCNC: 161 MG/DL (ref 74–106)
HBA1C MFR BLD: 6.7 %
HCT VFR BLD AUTO: 47.6 % (ref 40–54)
HDLC SERPL-MCNC: 46 MG/DL (ref 40–60)
HGB BLD-MCNC: 14.8 G/DL (ref 13–18)
LDLC SERPL CALC-MCNC: 97 MG/DL
MAGNESIUM SERPL-MCNC: 2.2 MG/DL (ref 1.7–2.4)
MCH RBC QN AUTO: 32.1 PG (ref 27–32)
MCHC RBC AUTO-ENTMCNC: 31.1 G/DL (ref 31–35)
MCV RBC AUTO: 103.3 FL (ref 80–100)
MICROALBUMIN UR DL<=1MG/L-MCNC: <1.2 MG/DL (ref 0–22)
MICROALBUMIN/CREAT UR: NORMAL MG/G (ref 0–30)
PLATELET # BLD AUTO: 123 K/UL (ref 150–400)
PMV BLD AUTO: 14.3 FL (ref 6–10)
POTASSIUM SERPL-SCNC: 5.2 MMOL/L (ref 3.4–5.1)
PROT SERPL-MCNC: 7.7 G/DL (ref 6.4–8.3)
RBC # BLD AUTO: 4.61 M/UL (ref 4.5–6)
SODIUM SERPL-SCNC: 139 MMOL/L (ref 136–145)
T4 FREE SERPL-MCNC: 0.9 NG/DL (ref 0.92–1.68)
TRIGL SERPL-MCNC: 289 MG/DL (ref 0–249)
TSH SERPL DL<=0.005 MIU/L-ACNC: 1.92 UIU/ML (ref 0.27–4.2)
VIT B12 SERPL-MCNC: 907 PG/ML (ref 211–911)
VLDLC SERPL CALC-MCNC: 58 MG/DL
WBC # BLD AUTO: 7.2 K/UL (ref 4–11)

## 2025-06-04 ENCOUNTER — OFFICE VISIT (OUTPATIENT)
Age: 65
End: 2025-06-04
Payer: MEDICARE

## 2025-06-04 VITALS
DIASTOLIC BLOOD PRESSURE: 62 MMHG | WEIGHT: 315 LBS | SYSTOLIC BLOOD PRESSURE: 128 MMHG | OXYGEN SATURATION: 98 % | HEIGHT: 76 IN | BODY MASS INDEX: 38.36 KG/M2 | HEART RATE: 66 BPM | TEMPERATURE: 98.4 F | RESPIRATION RATE: 16 BRPM

## 2025-06-04 DIAGNOSIS — M54.41 CHRONIC BILATERAL LOW BACK PAIN WITH RIGHT-SIDED SCIATICA: ICD-10-CM

## 2025-06-04 DIAGNOSIS — G89.29 CHRONIC BILATERAL LOW BACK PAIN WITH RIGHT-SIDED SCIATICA: ICD-10-CM

## 2025-06-04 DIAGNOSIS — R53.81 DECLINING FUNCTIONAL STATUS: ICD-10-CM

## 2025-06-04 DIAGNOSIS — R60.0 PEDAL EDEMA: Primary | ICD-10-CM

## 2025-06-04 PROCEDURE — 3078F DIAST BP <80 MM HG: CPT | Performed by: FAMILY MEDICINE

## 2025-06-04 PROCEDURE — G8417 CALC BMI ABV UP PARAM F/U: HCPCS | Performed by: FAMILY MEDICINE

## 2025-06-04 PROCEDURE — 1123F ACP DISCUSS/DSCN MKR DOCD: CPT | Performed by: FAMILY MEDICINE

## 2025-06-04 PROCEDURE — 1160F RVW MEDS BY RX/DR IN RCRD: CPT | Performed by: FAMILY MEDICINE

## 2025-06-04 PROCEDURE — G8427 DOCREV CUR MEDS BY ELIG CLIN: HCPCS | Performed by: FAMILY MEDICINE

## 2025-06-04 PROCEDURE — 1159F MED LIST DOCD IN RCRD: CPT | Performed by: FAMILY MEDICINE

## 2025-06-04 PROCEDURE — 3017F COLORECTAL CA SCREEN DOC REV: CPT | Performed by: FAMILY MEDICINE

## 2025-06-04 PROCEDURE — 3074F SYST BP LT 130 MM HG: CPT | Performed by: FAMILY MEDICINE

## 2025-06-04 PROCEDURE — 1036F TOBACCO NON-USER: CPT | Performed by: FAMILY MEDICINE

## 2025-06-04 PROCEDURE — 99213 OFFICE O/P EST LOW 20 MIN: CPT | Performed by: FAMILY MEDICINE

## 2025-06-04 ASSESSMENT — ENCOUNTER SYMPTOMS
VOICE CHANGE: 1
BACK PAIN: 1

## 2025-06-04 NOTE — PROGRESS NOTES
No chief complaint on file.      Have you seen any other physician or provider since your last visit yes - Cardiologist    Have you had any other diagnostic tests since your last visit? no    Have you changed or stopped any medications since your last visit? no

## 2025-06-04 NOTE — PROGRESS NOTES
SUBJECTIVE:    Patient ID: Jose Alejandro Nichole is a 64 y.o. male.    Chief Complaint   Patient presents with    Wound Check     Bilateral wound check on feet.        HPI: office visit  He is following up about his leg swelling.  He is not having as much swelling or redness after the unna boots. He is feeling pretty well.  He is having some pretty good blood pressures.  He is struggling still with his pain.  He is still having some help with the medication. He is frustrated with his continued functional decline  Review of Systems   Constitutional:  Positive for fatigue.   HENT:  Positive for voice change.    Musculoskeletal:  Positive for arthralgias, back pain, gait problem, joint swelling and myalgias.   Neurological:  Positive for weakness.   Psychiatric/Behavioral:  The patient is nervous/anxious.    All other systems reviewed and are negative.       OBJECTIVE:  /62   Pulse 66   Temp 98.4 °F (36.9 °C) (Infrared)   Resp 16   Ht 1.93 m (6' 4\")   Wt (!) 196 kg (432 lb)   SpO2 98%   BMI 52.58 kg/m²    Wt Readings from Last 3 Encounters:   06/04/25 (!) 196 kg (432 lb)   05/29/25 (!) 200.6 kg (442 lb 3.2 oz)   05/01/25 (!) 191.4 kg (422 lb)     BP Readings from Last 3 Encounters:   06/04/25 128/62   05/29/25 134/68   05/01/25 124/60      Pulse Readings from Last 3 Encounters:   06/04/25 66   05/29/25 62   05/01/25 50     Body mass index is 52.58 kg/m².   Resp Readings from Last 3 Encounters:   06/04/25 16   05/29/25 16   05/01/25 16     Past medical, surgical, family and social history were reviewed and updated with the patient.     Physical Exam  Vitals and nursing note reviewed.   Constitutional:       Appearance: Normal appearance. He is well-developed. He is morbidly obese. He is not ill-appearing.   HENT:      Head: Normocephalic.      Right Ear: Hearing, tympanic membrane, ear canal and external ear normal.      Left Ear: Hearing, tympanic membrane, ear canal and external ear normal.      Nose: Nasal

## 2025-06-06 PROBLEM — J96.02 ACUTE RESPIRATORY FAILURE WITH HYPERCAPNIA (HCC): Status: RESOLVED | Noted: 2021-06-15 | Resolved: 2025-06-06

## 2025-06-06 PROBLEM — L97.901 ULCER OF LOWER EXTREMITY, LIMITED TO BREAKDOWN OF SKIN (HCC): Status: RESOLVED | Noted: 2018-12-02 | Resolved: 2025-06-06

## 2025-06-08 ENCOUNTER — RESULTS FOLLOW-UP (OUTPATIENT)
Age: 65
End: 2025-06-08

## 2025-06-10 DIAGNOSIS — R25.2 MUSCLE CRAMPS: ICD-10-CM

## 2025-06-10 RX ORDER — LANOLIN ALCOHOL/MO/W.PET/CERES
400 CREAM (GRAM) TOPICAL DAILY
Qty: 30 TABLET | Refills: 2 | Status: SHIPPED | OUTPATIENT
Start: 2025-06-10

## 2025-06-26 ENCOUNTER — OFFICE VISIT (OUTPATIENT)
Age: 65
End: 2025-06-26

## 2025-06-26 VITALS
WEIGHT: 315 LBS | HEART RATE: 75 BPM | OXYGEN SATURATION: 92 % | SYSTOLIC BLOOD PRESSURE: 132 MMHG | RESPIRATION RATE: 16 BRPM | DIASTOLIC BLOOD PRESSURE: 74 MMHG | BODY MASS INDEX: 38.36 KG/M2 | HEIGHT: 76 IN | TEMPERATURE: 97.3 F

## 2025-06-26 DIAGNOSIS — G89.29 CHRONIC BILATERAL LOW BACK PAIN WITH RIGHT-SIDED SCIATICA: ICD-10-CM

## 2025-06-26 DIAGNOSIS — M54.41 CHRONIC BILATERAL LOW BACK PAIN WITH RIGHT-SIDED SCIATICA: ICD-10-CM

## 2025-06-26 DIAGNOSIS — R42 DIZZINESS: ICD-10-CM

## 2025-06-26 DIAGNOSIS — E86.0 DEHYDRATION: ICD-10-CM

## 2025-06-26 DIAGNOSIS — E11.69 TYPE 2 DIABETES MELLITUS WITH OTHER SPECIFIED COMPLICATION, WITH LONG-TERM CURRENT USE OF INSULIN (HCC): ICD-10-CM

## 2025-06-26 DIAGNOSIS — I10 ESSENTIAL HYPERTENSION: ICD-10-CM
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
## 2025-06-26 DIAGNOSIS — E11.3419: ICD-10-CM

## 2025-06-26 DIAGNOSIS — Z79.4 TYPE 2 DIABETES MELLITUS WITH OTHER SPECIFIED COMPLICATION, WITH LONG-TERM CURRENT USE OF INSULIN (HCC): ICD-10-CM

## 2025-06-26 DIAGNOSIS — R13.19 OTHER DYSPHAGIA: ICD-10-CM

## 2025-06-26 RX ORDER — OXYCODONE HYDROCHLORIDE 30 MG/1
30 TABLET ORAL
Qty: 150 TABLET | Refills: 0 | Status: SHIPPED | OUTPATIENT
Start: 2025-06-26 | End: 2025-07-26

## 2025-06-26 ASSESSMENT — ENCOUNTER SYMPTOMS
VOICE CHANGE: 1
BACK PAIN: 1

## 2025-06-26 NOTE — PROGRESS NOTES
Have you been to the ER, urgent care clinic since your last visit?  Hospitalized since your last visit?   NO    Have you seen or consulted any other health care providers outside our system since your last visit?   NO      “Have you had a diabetic eye exam?”    NO     Date of last diabetic eye exam: 6/12/2020          
Orthostatic Vitals: layin/82, hr 68  Sittin/74, hr 64  Standin/48, 64  lying  
  Breath sounds: No decreased air movement. Decreased breath sounds present.   Abdominal:      General: Bowel sounds are normal.      Palpations: Abdomen is soft.      Tenderness: There is no abdominal tenderness.   Musculoskeletal:      Cervical back: Full passive range of motion without pain, normal range of motion and neck supple.      Lumbar back: Spasms and tenderness present. Decreased range of motion.   Feet:      Comments: Decreased pulses bilateral with the left being more signifcant than the right  Lymphadenopathy:      Cervical: No cervical adenopathy.   Skin:     General: Skin is warm and dry.   Neurological:      General: No focal deficit present.      Mental Status: He is alert and oriented to person, place, and time.   Psychiatric:         Attention and Perception: Attention and perception normal.         Mood and Affect: Mood and affect normal.         Speech: Speech normal.         Behavior: Behavior normal. Behavior is cooperative.         Thought Content: Thought content normal.         Cognition and Memory: Cognition and memory normal.         Judgment: Judgment normal.          Results in Past 30 Days  Result Component Current Result Ref Range Previous Result Ref Range   Albumin/Globulin Ratio 1.2 (5/29/2025) 0.8 - 2.0 Not in Time Range    Alkaline Phosphatase 74 (5/29/2025) 25 - 100 U/L Not in Time Range    ALT 28 (5/29/2025) 4 - 36 U/L Not in Time Range    AST 25 (5/29/2025) 8 - 33 U/L Not in Time Range    BUN 20 (5/29/2025) 6 - 20 mg/dL Not in Time Range    Calcium 10.5 (5/29/2025) 8.3 - 10.6 mg/dL Not in Time Range    Chloride 100 (5/29/2025) 98 - 107 mmol/L Not in Time Range    CO2 25 (5/29/2025) 20 - 30 mmol/L Not in Time Range    Creatinine 1.1 (5/29/2025) 0.8 - 1.3 mg/dL Not in Time Range    Est, Glom Filt Rate 74 (5/29/2025) >59 Not in Time Range    Globulin 3.5 (5/29/2025) Not Established g/dL Not in Time Range    Glucose 161 (H) (5/29/2025) 74 - 106 mg/dL Not in Time Range

## 2025-07-08 DIAGNOSIS — G89.29 CHRONIC BILATERAL LOW BACK PAIN WITH RIGHT-SIDED SCIATICA: ICD-10-CM

## 2025-07-08 DIAGNOSIS — I50.9 ACUTE ON CHRONIC CONGESTIVE HEART FAILURE, UNSPECIFIED HEART FAILURE TYPE (HCC): ICD-10-CM

## 2025-07-08 DIAGNOSIS — M54.41 CHRONIC BILATERAL LOW BACK PAIN WITH RIGHT-SIDED SCIATICA: ICD-10-CM

## 2025-07-08 RX ORDER — POTASSIUM CHLORIDE 1500 MG/1
10 TABLET, EXTENDED RELEASE ORAL DAILY
Qty: 15 TABLET | Refills: 0 | Status: SHIPPED | OUTPATIENT
Start: 2025-07-08

## 2025-07-08 RX ORDER — DOCUSATE SODIUM 100 MG/1
CAPSULE, LIQUID FILLED ORAL
Qty: 120 CAPSULE | Refills: 3 | Status: SHIPPED | OUTPATIENT
Start: 2025-07-08

## 2025-07-08 RX ORDER — SPIRONOLACTONE 25 MG/1
TABLET ORAL
Qty: 90 TABLET | Refills: 3 | Status: SHIPPED | OUTPATIENT
Start: 2025-07-08

## 2025-07-08 NOTE — TELEPHONE ENCOUNTER
Refill request received from pharmacy      Next Office Visit Date:  Future Appointments   Date Time Provider Department Center   7/24/2025  1:45 PM Kavitha Baker MD [facility]   8/27/2025  2:30 PM Kavitha Baker MD [facility]       KITTY - Please review via PDMP        Last Office Visit:    3/10/2025

## 2025-07-13 RX ORDER — HYDROCODONE BITARTRATE AND ACETAMINOPHEN 10; 325 MG/1; MG/1
TABLET ORAL
Qty: 150 TABLET | Refills: 0 | Status: SHIPPED | OUTPATIENT
Start: 2025-07-13 | End: 2025-08-13

## 2025-07-24 ENCOUNTER — OFFICE VISIT (OUTPATIENT)
Age: 65
End: 2025-07-24
Payer: MEDICARE

## 2025-07-24 VITALS
RESPIRATION RATE: 16 BRPM | DIASTOLIC BLOOD PRESSURE: 66 MMHG | TEMPERATURE: 97.7 F | OXYGEN SATURATION: 92 % | HEART RATE: 66 BPM | HEIGHT: 76 IN | SYSTOLIC BLOOD PRESSURE: 122 MMHG | BODY MASS INDEX: 38.36 KG/M2 | WEIGHT: 315 LBS

## 2025-07-24 DIAGNOSIS — G89.29 CHRONIC BILATERAL LOW BACK PAIN WITH RIGHT-SIDED SCIATICA: ICD-10-CM

## 2025-07-24 DIAGNOSIS — Z00.00 MEDICARE ANNUAL WELLNESS VISIT, SUBSEQUENT: Primary | ICD-10-CM
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
## 2025-07-24 DIAGNOSIS — M54.41 CHRONIC BILATERAL LOW BACK PAIN WITH RIGHT-SIDED SCIATICA: ICD-10-CM

## 2025-07-24 DIAGNOSIS — F41.9 ANXIETY: ICD-10-CM

## 2025-07-24 PROCEDURE — 1123F ACP DISCUSS/DSCN MKR DOCD: CPT | Performed by: FAMILY MEDICINE

## 2025-07-24 PROCEDURE — G8417 CALC BMI ABV UP PARAM F/U: HCPCS | Performed by: FAMILY MEDICINE

## 2025-07-24 PROCEDURE — 3017F COLORECTAL CA SCREEN DOC REV: CPT | Performed by: FAMILY MEDICINE

## 2025-07-24 PROCEDURE — 1036F TOBACCO NON-USER: CPT | Performed by: FAMILY MEDICINE

## 2025-07-24 PROCEDURE — 3078F DIAST BP <80 MM HG: CPT | Performed by: FAMILY MEDICINE

## 2025-07-24 PROCEDURE — G0439 PPPS, SUBSEQ VISIT: HCPCS | Performed by: FAMILY MEDICINE

## 2025-07-24 PROCEDURE — 3074F SYST BP LT 130 MM HG: CPT | Performed by: FAMILY MEDICINE

## 2025-07-24 PROCEDURE — 99213 OFFICE O/P EST LOW 20 MIN: CPT | Performed by: FAMILY MEDICINE

## 2025-07-24 PROCEDURE — 1160F RVW MEDS BY RX/DR IN RCRD: CPT | Performed by: FAMILY MEDICINE

## 2025-07-24 PROCEDURE — G8427 DOCREV CUR MEDS BY ELIG CLIN: HCPCS | Performed by: FAMILY MEDICINE

## 2025-07-24 PROCEDURE — 1159F MED LIST DOCD IN RCRD: CPT | Performed by: FAMILY MEDICINE

## 2025-07-24 RX ORDER — OXYCODONE HYDROCHLORIDE 30 MG/1
30 TABLET ORAL
Qty: 150 TABLET | Refills: 0 | Status: SHIPPED | OUTPATIENT
Start: 2025-07-24 | End: 2025-08-23

## 2025-07-24 RX ORDER — GABAPENTIN 800 MG/1
TABLET ORAL
Qty: 120 TABLET | Refills: 2 | Status: SHIPPED | OUTPATIENT
Start: 2025-07-24 | End: 2025-10-23

## 2025-07-24 RX ORDER — HYDROCODONE BITARTRATE AND ACETAMINOPHEN 10; 325 MG/1; MG/1
TABLET ORAL
Qty: 150 TABLET | Refills: 0 | Status: SHIPPED | OUTPATIENT
Start: 2025-07-24 | End: 2025-08-24

## 2025-07-24 RX ORDER — DIAZEPAM 5 MG/1
TABLET ORAL
Qty: 60 TABLET | Refills: 2 | Status: SHIPPED | OUTPATIENT
Start: 2025-07-24 | End: 2025-10-21

## 2025-07-24 ASSESSMENT — PATIENT HEALTH QUESTIONNAIRE - PHQ9
SUM OF ALL RESPONSES TO PHQ QUESTIONS 1-9: 2
1. LITTLE INTEREST OR PLEASURE IN DOING THINGS: MORE THAN HALF THE DAYS
2. FEELING DOWN, DEPRESSED OR HOPELESS: NOT AT ALL
SUM OF ALL RESPONSES TO PHQ QUESTIONS 1-9: 2

## 2025-07-24 NOTE — PROGRESS NOTES
Have you been to the ER, urgent care clinic since your last visit?  Hospitalized since your last visit?   NO    Have you seen or consulted any other health care providers outside our system since your last visit?   NO      “Have you had a diabetic eye exam?”    NO     Date of last diabetic eye exam: 6/12/2020          
unsteady or are you worried about falling? : (!) yes  2 or more falls in past year?: (!) yes  Fall with injury in past year?: (!) yes  Interventions:    Reviewed medications, home hazards, visual acuity, and co-morbidities that can increase risk for falls         Controlled Medication Review:    Today's Pain Level: No data recorded   Opioid Risk: (Low risk score <55) Opioid risk score: 27    Patient is low risk for opioid use disorder or overdose.    Last PDMP [place] as Reviewed:  Review User Review Instant Review Result   EZIO RUIZ 5/29/2025  4:13 PM     Reviewed PDMP [1]     Last Controlled Substance Monitoring Documentation      Flowsheet Row Office Visit from 5/29/2025 in [facility]   Periodic Controlled Substance Monitoring Possible medication side effects, risk of tolerance/dependence & alternative treatments discussed., No signs of potential drug abuse or diversion identified., Assessed functional status (ability to engage in work or other purposeful activities, the pain intensity and its interference with activities of daily living, quality of family life and social activities, and the physical activity), Obtaining appropriate analgesic effect of treatment. filed at 05/29/2025 1613   Chronic Pain > 120 MEDD Obtained or confirmed \"Medication Contract\" on file. filed at 05/29/2025 1613           Self-assessment of health:  In general, how would you say your health is?: (!) Poor    Interventions:  Patient advised to follow-up in this office for further evaluation and treatment    General HRA Questions:  Select all that apply: (!) New or Increased Pain  Interventions - Pain:  Patient advised to follow up in the office for further evaluation and treatment      Inactivity:  On average, how many days per week do you engage in moderate to strenuous exercise (like a brisk walk)?: 0 days (!) Abnormal  On average, how many minutes do you engage in exercise at this level?: 0

## 2025-07-25 DIAGNOSIS — B37.2 YEAST DERMATITIS: ICD-10-CM

## 2025-07-25 RX ORDER — NYSTATIN 100000 [USP'U]/G
POWDER TOPICAL
Qty: 60 G | Refills: 2 | Status: SHIPPED | OUTPATIENT
Start: 2025-07-25

## 2025-07-25 RX ORDER — POTASSIUM CHLORIDE 1500 MG/1
10 TABLET, EXTENDED RELEASE ORAL DAILY
Qty: 15 TABLET | Refills: 0 | Status: SHIPPED | OUTPATIENT
Start: 2025-07-25

## 2025-07-25 NOTE — TELEPHONE ENCOUNTER
Refill request received from pharmacy      Next Office Visit Date:  Future Appointments   Date Time Provider Department Center   8/27/2025  2:30 PM [facility]       [facility]ITTY - Please review via PDMP

## 2025-07-31 ENCOUNTER — APPOINTMENT (OUTPATIENT)
Dept: GENERAL RADIOLOGY | Facility: HOSPITAL | Age: 65
End: 2025-07-31
Payer: MEDICARE

## 2025-07-31 ENCOUNTER — HOSPITAL ENCOUNTER (EMERGENCY)
Facility: HOSPITAL | Age: 65
Discharge: HOME | End: 2025-07-31
Attending: HOSPITALIST
Payer: MEDICARE

## 2025-07-31 ENCOUNTER — TELEPHONE (OUTPATIENT)
Age: 65
End: 2025-07-31

## 2025-07-31 VITALS
OXYGEN SATURATION: 95 % | SYSTOLIC BLOOD PRESSURE: 138 MMHG | WEIGHT: 315 LBS | RESPIRATION RATE: 18 BRPM | BODY MASS INDEX: 54.17 KG/M2 | HEART RATE: 73 BPM | DIASTOLIC BLOOD PRESSURE: 40 MMHG | TEMPERATURE: 97.6 F
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
## 2025-07-31 DIAGNOSIS — S89.91XA RIGHT KNEE INJURY, INITIAL ENCOUNTER: ICD-10-CM

## 2025-07-31 DIAGNOSIS — S80.211A ABRASION OF RIGHT KNEE, INITIAL ENCOUNTER: ICD-10-CM

## 2025-07-31 DIAGNOSIS — M79.672 LEFT FOOT PAIN: ICD-10-CM

## 2025-07-31 PROCEDURE — 99283 EMERGENCY DEPT VISIT LOW MDM: CPT

## 2025-07-31 PROCEDURE — 73562 X-RAY EXAM OF KNEE 3: CPT

## 2025-07-31 PROCEDURE — 73630 X-RAY EXAM OF FOOT: CPT

## 2025-07-31 ASSESSMENT — PAIN SCALES - GENERAL: PAINLEVEL_OUTOF10: 7

## 2025-07-31 ASSESSMENT — PAIN DESCRIPTION - LOCATION: LOCATION: KNEE

## 2025-07-31 ASSESSMENT — PAIN DESCRIPTION - ORIENTATION: ORIENTATION: RIGHT

## 2025-07-31 NOTE — ED TRIAGE NOTES
Pt arrives to ED POV with complaints of R knee pain and L foot pain after a fall after Christian yesterday. Pt ambulated to door of ER with cane and asked for a wheelchair when he got to the door.

## 2025-07-31 NOTE — ED NOTES
Pt left ED in wheelchair with belongings at this time. Pt verbalized understanding of discharge instructions and follow-up.

## 2025-07-31 NOTE — ED PROVIDER NOTES
MEDICATIONS:  Discontinued Medications    No medications on file              (Please note that portions of this note were completed with a voice recognition program.  Efforts were made to edit the dictations but occasionally words are mis-transcribed.)    Mayur Malone DO (electronically signed)           Mayur Malone DO  07/31/25 [id number]

## 2025-08-06 ENCOUNTER — HOSPITAL ENCOUNTER (OUTPATIENT)
Facility: HOSPITAL | Age: 65
Discharge: HOME | End: 2025-08-06
Payer: MEDICARE

## 2025-08-06 ENCOUNTER — OFFICE VISIT (OUTPATIENT)
Age: 65
End: 2025-08-06
Payer: MEDICARE

## 2025-08-06 VITALS
TEMPERATURE: 97.8 F | HEART RATE: 66 BPM | OXYGEN SATURATION: 92 % | RESPIRATION RATE: 16 BRPM | WEIGHT: 315 LBS | SYSTOLIC BLOOD PRESSURE: 138 MMHG | DIASTOLIC BLOOD PRESSURE: 60 MMHG | BODY MASS INDEX: 38.36 KG/M2 | HEIGHT: 76 IN

## 2025-08-06 DIAGNOSIS — M54.41 CHRONIC BILATERAL LOW BACK PAIN WITH RIGHT-SIDED SCIATICA: ICD-10-CM

## 2025-08-06 DIAGNOSIS — M25.521 BILATERAL ELBOW JOINT PAIN: ICD-10-CM

## 2025-08-06 DIAGNOSIS — E66.01 MORBID OBESITY (HCC): ICD-10-CM

## 2025-08-06 DIAGNOSIS — Z79.4 TYPE 2 DIABETES MELLITUS WITH DIABETIC POLYNEUROPATHY, WITH LONG-TERM CURRENT USE OF INSULIN (HCC): ICD-10-CM

## 2025-08-06 DIAGNOSIS — E11.42 TYPE 2 DIABETES MELLITUS WITH DIABETIC POLYNEUROPATHY, WITH LONG-TERM CURRENT USE OF INSULIN (HCC): ICD-10-CM

## 2025-08-06 DIAGNOSIS — R82.90 FOUL SMELLING URINE: ICD-10-CM

## 2025-08-06 DIAGNOSIS — M25.522 BILATERAL ELBOW JOINT PAIN: ICD-10-CM

## 2025-08-06 DIAGNOSIS — M25.532 BILATERAL WRIST PAIN: Primary | ICD-10-CM

## 2025-08-06 DIAGNOSIS — G89.29 CHRONIC BILATERAL LOW BACK PAIN WITH RIGHT-SIDED SCIATICA: ICD-10-CM

## 2025-08-06 DIAGNOSIS — M25.531 BILATERAL WRIST PAIN: Primary | ICD-10-CM

## 2025-08-06 LAB
BILIRUBIN, POC: NEGATIVE
BLOOD URINE, POC: NORMAL
CLARITY, POC: NORMAL
COLOR, POC: NORMAL
GLUCOSE URINE, POC: 1000 MG/DL
KETONES, POC: NEGATIVE MG/DL
LEUKOCYTE EST, POC: NORMAL
NITRITE, POC: POSITIVE
PH, POC: 5.5
PROTEIN, POC: 30 MG/DL
SPECIFIC GRAVITY, POC: 1.02
UROBILINOGEN, POC: 0.2 MG/DL

## 2025-08-06 PROCEDURE — 3044F HG A1C LEVEL LT 7.0%: CPT | Performed by: FAMILY MEDICINE

## 2025-08-06 PROCEDURE — 1159F MED LIST DOCD IN RCRD: CPT | Performed by: FAMILY MEDICINE

## 2025-08-06 PROCEDURE — 1036F TOBACCO NON-USER: CPT | Performed by: FAMILY MEDICINE

## 2025-08-06 PROCEDURE — G8417 CALC BMI ABV UP PARAM F/U: HCPCS | Performed by: FAMILY MEDICINE

## 2025-08-06 PROCEDURE — 87086 URINE CULTURE/COLONY COUNT: CPT

## 2025-08-06 PROCEDURE — 3017F COLORECTAL CA SCREEN DOC REV: CPT | Performed by: FAMILY MEDICINE

## 2025-08-06 PROCEDURE — G8427 DOCREV CUR MEDS BY ELIG CLIN: HCPCS | Performed by: FAMILY MEDICINE

## 2025-08-06 PROCEDURE — 1123F ACP DISCUSS/DSCN MKR DOCD: CPT | Performed by: FAMILY MEDICINE

## 2025-08-06 PROCEDURE — 81002 URINALYSIS NONAUTO W/O SCOPE: CPT | Performed by: FAMILY MEDICINE

## 2025-08-06 PROCEDURE — 3075F SYST BP GE 130 - 139MM HG: CPT | Performed by: FAMILY MEDICINE

## 2025-08-06 PROCEDURE — 2022F DILAT RTA XM EVC RTNOPTHY: CPT | Performed by: FAMILY MEDICINE

## 2025-08-06 PROCEDURE — 3078F DIAST BP <80 MM HG: CPT | Performed by: FAMILY MEDICINE

## 2025-08-06 PROCEDURE — 99214 OFFICE O/P EST MOD 30 MIN: CPT | Performed by: FAMILY MEDICINE

## 2025-08-06 PROCEDURE — 1160F RVW MEDS BY RX/DR IN RCRD: CPT | Performed by: FAMILY MEDICINE

## 2025-08-06 RX ORDER — PHENTERMINE HYDROCHLORIDE 37.5 MG/1
37.5 TABLET ORAL
Qty: 30 TABLET | Refills: 0 | Status: SHIPPED | OUTPATIENT
Start: 2025-08-06 | End: 2025-09-05

## 2025-08-06 RX ORDER — HYDROCODONE BITARTRATE AND ACETAMINOPHEN 10; 325 MG/1; MG/1
TABLET ORAL
Qty: 150 TABLET | Refills: 0 | Status: SHIPPED | OUTPATIENT
Start: 2025-08-06 | End: 2025-09-06

## 2025-08-06 RX ORDER — OXYCODONE HYDROCHLORIDE 30 MG/1
30 TABLET ORAL
Qty: 150 TABLET | Refills: 0 | Status: SHIPPED | OUTPATIENT
Start: 2025-08-06 | End: 2025-09-05

## 2025-08-06 ASSESSMENT — ENCOUNTER SYMPTOMS
VOICE CHANGE: 1
BACK PAIN: 1

## 2025-08-08 LAB — BACTERIA UR CULT: NORMAL

## 2025-08-11 DIAGNOSIS — E11.42 TYPE 2 DIABETES MELLITUS WITH DIABETIC POLYNEUROPATHY, WITH LONG-TERM CURRENT USE OF INSULIN (HCC): Primary | ICD-10-CM

## 2025-08-11 DIAGNOSIS — Z79.4 TYPE 2 DIABETES MELLITUS WITH DIABETIC POLYNEUROPATHY, WITH LONG-TERM CURRENT USE OF INSULIN (HCC): Primary | ICD-10-CM

## 2025-08-11 DIAGNOSIS — E66.01 MORBID OBESITY (HCC): ICD-10-CM

## 2025-08-20 ENCOUNTER — OFFICE VISIT (OUTPATIENT)
Age: 65
End: 2025-08-20
Payer: MEDICARE

## 2025-08-20 VITALS
WEIGHT: 315 LBS | BODY MASS INDEX: 38.36 KG/M2 | HEIGHT: 76 IN | TEMPERATURE: 97 F | RESPIRATION RATE: 18 BRPM | DIASTOLIC BLOOD PRESSURE: 62 MMHG | HEART RATE: 69 BPM | OXYGEN SATURATION: 96 % | SYSTOLIC BLOOD PRESSURE: 132 MMHG

## 2025-08-20 DIAGNOSIS — S89.92XS INJURY OF LEFT LOWER EXTREMITY, SEQUELA: ICD-10-CM

## 2025-08-20 DIAGNOSIS — S81.812A LACERATION OF SKIN OF LEFT LOWER LEG, INITIAL ENCOUNTER: Primary | ICD-10-CM

## 2025-08-20 PROCEDURE — 3078F DIAST BP <80 MM HG: CPT | Performed by: FAMILY MEDICINE

## 2025-08-20 PROCEDURE — 1159F MED LIST DOCD IN RCRD: CPT | Performed by: FAMILY MEDICINE

## 2025-08-20 PROCEDURE — G8427 DOCREV CUR MEDS BY ELIG CLIN: HCPCS | Performed by: FAMILY MEDICINE

## 2025-08-20 PROCEDURE — G8417 CALC BMI ABV UP PARAM F/U: HCPCS | Performed by: FAMILY MEDICINE

## 2025-08-20 PROCEDURE — 3017F COLORECTAL CA SCREEN DOC REV: CPT | Performed by: FAMILY MEDICINE

## 2025-08-20 PROCEDURE — 1036F TOBACCO NON-USER: CPT | Performed by: FAMILY MEDICINE

## 2025-08-20 PROCEDURE — 1160F RVW MEDS BY RX/DR IN RCRD: CPT | Performed by: FAMILY MEDICINE

## 2025-08-20 PROCEDURE — 3075F SYST BP GE 130 - 139MM HG: CPT | Performed by: FAMILY MEDICINE

## 2025-08-20 PROCEDURE — 99213 OFFICE O/P EST LOW 20 MIN: CPT | Performed by: FAMILY MEDICINE

## 2025-08-20 PROCEDURE — 1123F ACP DISCUSS/DSCN MKR DOCD: CPT | Performed by: FAMILY MEDICINE

## 2025-08-20 RX ORDER — METOPROLOL SUCCINATE 100 MG/1
TABLET, EXTENDED RELEASE ORAL
COMMUNITY
Start: 2025-08-05

## 2025-08-20 RX ORDER — VALSARTAN 80 MG/1
TABLET ORAL
COMMUNITY
Start: 2025-06-03

## 2025-08-20 RX ORDER — ZOLPIDEM TARTRATE 12.5 MG/1
TABLET, FILM COATED, EXTENDED RELEASE ORAL
COMMUNITY
Start: 2025-08-09

## 2025-08-20 RX ORDER — CEPHALEXIN 500 MG/1
500 CAPSULE ORAL 3 TIMES DAILY
Qty: 30 CAPSULE | Refills: 0 | Status: SHIPPED | OUTPATIENT
Start: 2025-08-20 | End: 2025-08-30

## 2025-08-20 ASSESSMENT — ENCOUNTER SYMPTOMS
BACK PAIN: 1
VOICE CHANGE: 1

## 2025-08-26 ENCOUNTER — OFFICE VISIT (OUTPATIENT)
Age: 65
End: 2025-08-26

## 2025-08-26 VITALS
HEIGHT: 76 IN | RESPIRATION RATE: 16 BRPM | HEART RATE: 65 BPM | TEMPERATURE: 97 F | WEIGHT: 315 LBS | OXYGEN SATURATION: 97 % | DIASTOLIC BLOOD PRESSURE: 62 MMHG | SYSTOLIC BLOOD PRESSURE: 134 MMHG | BODY MASS INDEX: 38.36 KG/M2

## 2025-08-26 DIAGNOSIS — M54.41 CHRONIC BILATERAL LOW BACK PAIN WITH RIGHT-SIDED SCIATICA: ICD-10-CM

## 2025-08-26 DIAGNOSIS — E11.42 TYPE 2 DIABETES MELLITUS WITH DIABETIC POLYNEUROPATHY, WITH LONG-TERM CURRENT USE OF INSULIN (HCC): ICD-10-CM

## 2025-08-26 DIAGNOSIS — E66.01 MORBID OBESITY (HCC): ICD-10-CM

## 2025-08-26 DIAGNOSIS — I10 ESSENTIAL HYPERTENSION: ICD-10-CM

## 2025-08-26 DIAGNOSIS — Z79.4 TYPE 2 DIABETES MELLITUS WITH DIABETIC POLYNEUROPATHY, WITH LONG-TERM CURRENT USE OF INSULIN (HCC): ICD-10-CM

## 2025-08-26 DIAGNOSIS — G89.29 CHRONIC BILATERAL LOW BACK PAIN WITH RIGHT-SIDED SCIATICA: ICD-10-CM

## 2025-08-26 DIAGNOSIS — R53.82 CHRONIC FATIGUE: ICD-10-CM

## 2025-08-26 DIAGNOSIS — S89.92XS INJURY OF LEFT LOWER EXTREMITY, SEQUELA: Primary | ICD-10-CM

## 2025-08-26 DIAGNOSIS — R60.0 PEDAL EDEMA: ICD-10-CM

## 2025-08-26 RX ORDER — PHENTERMINE HYDROCHLORIDE 37.5 MG/1
37.5 TABLET ORAL
Qty: 30 TABLET | Refills: 0 | Status: SHIPPED | OUTPATIENT
Start: 2025-08-26 | End: 2025-09-25

## 2025-08-26 RX ORDER — HYDROCODONE BITARTRATE AND ACETAMINOPHEN 10; 325 MG/1; MG/1
TABLET ORAL
Qty: 150 TABLET | Refills: 0 | Status: SHIPPED | OUTPATIENT
Start: 2025-08-26 | End: 2025-09-26

## 2025-08-26 RX ORDER — OXYCODONE HYDROCHLORIDE 30 MG/1
30 TABLET ORAL
Qty: 150 TABLET | Refills: 0 | Status: SHIPPED | OUTPATIENT
Start: 2025-08-26 | End: 2025-09-25

## 2025-08-26 ASSESSMENT — ENCOUNTER SYMPTOMS
VOICE CHANGE: 1
BACK PAIN: 1

## 2025-09-02 ENCOUNTER — OFFICE VISIT (OUTPATIENT)
Age: 65
End: 2025-09-02
Payer: MEDICARE

## 2025-09-02 ENCOUNTER — HOSPITAL ENCOUNTER (OUTPATIENT)
Facility: HOSPITAL | Age: 65
Discharge: HOME | End: 2025-09-02
Payer: MEDICARE

## 2025-09-02 VITALS
SYSTOLIC BLOOD PRESSURE: 126 MMHG | DIASTOLIC BLOOD PRESSURE: 72 MMHG | HEIGHT: 76 IN | BODY MASS INDEX: 38.36 KG/M2 | TEMPERATURE: 97.2 F | OXYGEN SATURATION: 96 % | HEART RATE: 63 BPM | WEIGHT: 315 LBS | RESPIRATION RATE: 18 BRPM

## 2025-09-02 DIAGNOSIS — I10 ESSENTIAL HYPERTENSION: ICD-10-CM

## 2025-09-02 DIAGNOSIS — R60.0 PEDAL EDEMA: ICD-10-CM

## 2025-09-02 DIAGNOSIS — E11.69 TYPE 2 DIABETES MELLITUS WITH OTHER SPECIFIED COMPLICATION, WITH LONG-TERM CURRENT USE OF INSULIN (HCC): ICD-10-CM

## 2025-09-02 DIAGNOSIS — G47.09 OTHER INSOMNIA: Primary | ICD-10-CM

## 2025-09-02 DIAGNOSIS — E11.42 TYPE 2 DIABETES MELLITUS WITH DIABETIC POLYNEUROPATHY, WITH LONG-TERM CURRENT USE OF INSULIN (HCC): ICD-10-CM

## 2025-09-02 DIAGNOSIS — L97.909 DIABETIC LEG ULCER (HCC): Primary | ICD-10-CM

## 2025-09-02 DIAGNOSIS — E11.622 DIABETIC LEG ULCER (HCC): Primary | ICD-10-CM

## 2025-09-02 DIAGNOSIS — J30.2 SEASONAL ALLERGIES: ICD-10-CM

## 2025-09-02 DIAGNOSIS — R53.82 CHRONIC FATIGUE: ICD-10-CM

## 2025-09-02 DIAGNOSIS — Z79.4 TYPE 2 DIABETES MELLITUS WITH DIABETIC POLYNEUROPATHY, WITH LONG-TERM CURRENT USE OF INSULIN (HCC): ICD-10-CM

## 2025-09-02 DIAGNOSIS — R25.2 MUSCLE CRAMPS: ICD-10-CM

## 2025-09-02 DIAGNOSIS — E66.01 MORBID OBESITY (HCC): ICD-10-CM

## 2025-09-02 DIAGNOSIS — Z79.4 TYPE 2 DIABETES MELLITUS WITH OTHER SPECIFIED COMPLICATION, WITH LONG-TERM CURRENT USE OF INSULIN (HCC): ICD-10-CM

## 2025-09-02 PROCEDURE — 29580 STRAPPING UNNA BOOT: CPT | Performed by: FAMILY MEDICINE

## 2025-09-02 PROCEDURE — 1160F RVW MEDS BY RX/DR IN RCRD: CPT | Performed by: FAMILY MEDICINE

## 2025-09-02 PROCEDURE — 1159F MED LIST DOCD IN RCRD: CPT | Performed by: FAMILY MEDICINE

## 2025-09-02 PROCEDURE — 3074F SYST BP LT 130 MM HG: CPT | Performed by: FAMILY MEDICINE

## 2025-09-02 PROCEDURE — 83036 HEMOGLOBIN GLYCOSYLATED A1C: CPT

## 2025-09-02 PROCEDURE — G8417 CALC BMI ABV UP PARAM F/U: HCPCS | Performed by: FAMILY MEDICINE

## 2025-09-02 PROCEDURE — G8427 DOCREV CUR MEDS BY ELIG CLIN: HCPCS | Performed by: FAMILY MEDICINE

## 2025-09-02 PROCEDURE — 80053 COMPREHEN METABOLIC PANEL: CPT

## 2025-09-02 PROCEDURE — 36415 COLL VENOUS BLD VENIPUNCTURE: CPT

## 2025-09-02 PROCEDURE — 84443 ASSAY THYROID STIM HORMONE: CPT

## 2025-09-02 PROCEDURE — 3044F HG A1C LEVEL LT 7.0%: CPT | Performed by: FAMILY MEDICINE

## 2025-09-02 PROCEDURE — 99213 OFFICE O/P EST LOW 20 MIN: CPT | Performed by: FAMILY MEDICINE

## 2025-09-02 PROCEDURE — 85027 COMPLETE CBC AUTOMATED: CPT

## 2025-09-02 PROCEDURE — 3017F COLORECTAL CA SCREEN DOC REV: CPT | Performed by: FAMILY MEDICINE

## 2025-09-02 PROCEDURE — 84439 ASSAY OF FREE THYROXINE: CPT

## 2025-09-02 PROCEDURE — 1036F TOBACCO NON-USER: CPT | Performed by: FAMILY MEDICINE

## 2025-09-02 PROCEDURE — 3078F DIAST BP <80 MM HG: CPT | Performed by: FAMILY MEDICINE

## 2025-09-02 PROCEDURE — 2022F DILAT RTA XM EVC RTNOPTHY: CPT | Performed by: FAMILY MEDICINE

## 2025-09-02 PROCEDURE — 1123F ACP DISCUSS/DSCN MKR DOCD: CPT | Performed by: FAMILY MEDICINE

## 2025-09-02 RX ORDER — INSULIN GLARGINE 100 [IU]/ML
100 INJECTION, SOLUTION SUBCUTANEOUS 2 TIMES DAILY
Qty: 60 ML | Refills: 2 | Status: SHIPPED | OUTPATIENT
Start: 2025-09-02 | End: 2025-12-01

## 2025-09-02 ASSESSMENT — ENCOUNTER SYMPTOMS
BACK PAIN: 1
VOICE CHANGE: 1

## 2025-09-03 LAB
ALBUMIN SERPL-MCNC: 4 G/DL (ref 3.4–4.8)
ALBUMIN/GLOB SERPL: 1.2 {RATIO} (ref 0.8–2)
ALP SERPL-CCNC: 118 U/L (ref 25–100)
ALT SERPL-CCNC: 30 U/L (ref 4–36)
ANION GAP SERPL CALCULATED.3IONS-SCNC: 14 MMOL/L (ref 3–16)
AST SERPL-CCNC: 29 U/L (ref 8–33)
BILIRUB SERPL-MCNC: 0.3 MG/DL (ref 0.3–1.2)
BUN SERPL-MCNC: 17 MG/DL (ref 6–20)
CALCIUM SERPL-MCNC: 9.8 MG/DL (ref 8.3–10.6)
CHLORIDE SERPL-SCNC: 101 MMOL/L (ref 98–107)
CO2 SERPL-SCNC: 24 MMOL/L (ref 20–30)
CREAT SERPL-MCNC: 0.9 MG/DL (ref 0.8–1.3)
ERYTHROCYTE [DISTWIDTH] IN BLOOD BY AUTOMATED COUNT: 12.7 % (ref 11–16)
GFR SERPLBLD CREATININE-BSD FMLA CKD-EPI: >90 ML/MIN/{1.73_M2}
GLOBULIN SER CALC-MCNC: 3.4 G/DL
GLUCOSE SERPL-MCNC: 124 MG/DL (ref 74–106)
HBA1C MFR BLD: 6.4 %
HCT VFR BLD AUTO: 47.7 % (ref 40–54)
HGB BLD-MCNC: 15.1 G/DL (ref 13–18)
MCH RBC QN AUTO: 31.3 PG (ref 27–32)
MCHC RBC AUTO-ENTMCNC: 31.7 G/DL (ref 31–35)
MCV RBC AUTO: 99 FL (ref 80–100)
PLATELET # BLD AUTO: 143 K/UL (ref 150–400)
PMV BLD AUTO: 13.4 FL (ref 6–10)
POTASSIUM SERPL-SCNC: 4.6 MMOL/L (ref 3.4–5.1)
PROT SERPL-MCNC: 7.4 G/DL (ref 6.4–8.3)
RBC # BLD AUTO: 4.82 M/UL (ref 4.5–6)
SODIUM SERPL-SCNC: 139 MMOL/L (ref 136–145)
T4 FREE SERPL-MCNC: 1.1 NG/DL (ref 0.92–1.68)
TSH SERPL DL<=0.005 MIU/L-ACNC: 2.1 UIU/ML (ref 0.27–4.2)
WBC # BLD AUTO: 7.6 K/UL (ref 4–11)

## 2025-09-03 RX ORDER — LANOLIN ALCOHOL/MO/W.PET/CERES
400 CREAM (GRAM) TOPICAL DAILY
Qty: 30 TABLET | Refills: 2 | Status: SHIPPED | OUTPATIENT
Start: 2025-09-03

## 2025-09-03 RX ORDER — ZOLPIDEM TARTRATE 12.5 MG/1
TABLET, FILM COATED, EXTENDED RELEASE ORAL
Qty: 30 TABLET | Refills: 3 | Status: SHIPPED | OUTPATIENT
Start: 2025-09-03 | End: 2025-10-03

## 2025-09-03 RX ORDER — POTASSIUM CHLORIDE 1500 MG/1
10 TABLET, EXTENDED RELEASE ORAL DAILY
Qty: 15 TABLET | Refills: 0 | Status: SHIPPED | OUTPATIENT
Start: 2025-09-03

## 2025-09-03 RX ORDER — LORATADINE 10 MG/1
TABLET ORAL
Qty: 30 TABLET | Refills: 3 | Status: SHIPPED | OUTPATIENT
Start: 2025-09-03

## 2025-09-03 RX ORDER — MONTELUKAST SODIUM 10 MG/1
TABLET ORAL
Qty: 30 TABLET | Refills: 3 | Status: SHIPPED | OUTPATIENT
Start: 2025-09-03